# Patient Record
Sex: FEMALE | Race: WHITE | NOT HISPANIC OR LATINO | Employment: OTHER | URBAN - METROPOLITAN AREA
[De-identification: names, ages, dates, MRNs, and addresses within clinical notes are randomized per-mention and may not be internally consistent; named-entity substitution may affect disease eponyms.]

---

## 2019-11-18 ENCOUNTER — APPOINTMENT (OUTPATIENT)
Dept: CT IMAGING | Facility: CLINIC | Age: 84
DRG: 203 | End: 2019-11-18
Attending: EMERGENCY MEDICINE
Payer: COMMERCIAL

## 2019-11-18 ENCOUNTER — HOSPITAL ENCOUNTER (INPATIENT)
Facility: CLINIC | Age: 84
LOS: 7 days | Discharge: NURSING, REHAB OR LONG-TERM CARE | DRG: 203 | End: 2019-11-25
Attending: EMERGENCY MEDICINE | Admitting: INTERNAL MEDICINE
Payer: COMMERCIAL

## 2019-11-18 ENCOUNTER — APPOINTMENT (OUTPATIENT)
Dept: GENERAL RADIOLOGY | Facility: CLINIC | Age: 84
DRG: 203 | End: 2019-11-18
Attending: INTERNAL MEDICINE
Payer: COMMERCIAL

## 2019-11-18 ENCOUNTER — APPOINTMENT (OUTPATIENT)
Dept: GENERAL RADIOLOGY | Facility: CLINIC | Age: 84
DRG: 203 | End: 2019-11-18
Attending: EMERGENCY MEDICINE
Payer: COMMERCIAL

## 2019-11-18 DIAGNOSIS — I48.91 ATRIAL FIBRILLATION WITH RVR (H): ICD-10-CM

## 2019-11-18 DIAGNOSIS — J20.9 ACUTE BRONCHITIS, UNSPECIFIED ORGANISM: ICD-10-CM

## 2019-11-18 DIAGNOSIS — R06.02 SHORTNESS OF BREATH: ICD-10-CM

## 2019-11-18 DIAGNOSIS — J96.01 ACUTE RESPIRATORY FAILURE WITH HYPOXIA (H): ICD-10-CM

## 2019-11-18 DIAGNOSIS — J45.901 REACTIVE AIRWAY DISEASE WITH ACUTE EXACERBATION, UNSPECIFIED ASTHMA SEVERITY, UNSPECIFIED WHETHER PERSISTENT: ICD-10-CM

## 2019-11-18 DIAGNOSIS — R52 PAIN: Primary | ICD-10-CM

## 2019-11-18 DIAGNOSIS — R06.2 WHEEZING: ICD-10-CM

## 2019-11-18 DIAGNOSIS — K59.00 CONSTIPATION, UNSPECIFIED CONSTIPATION TYPE: ICD-10-CM

## 2019-11-18 DIAGNOSIS — G47.00 INSOMNIA, UNSPECIFIED TYPE: ICD-10-CM

## 2019-11-18 DIAGNOSIS — R05.9 COUGH: ICD-10-CM

## 2019-11-18 DIAGNOSIS — J45.901 EXACERBATION OF ASTHMA, UNSPECIFIED ASTHMA SEVERITY, UNSPECIFIED WHETHER PERSISTENT: ICD-10-CM

## 2019-11-18 LAB
ALBUMIN UR-MCNC: 10 MG/DL
ANION GAP SERPL CALCULATED.3IONS-SCNC: 7 MMOL/L (ref 3–14)
APPEARANCE UR: CLEAR
BASOPHILS # BLD AUTO: 0 10E9/L (ref 0–0.2)
BASOPHILS NFR BLD AUTO: 0.6 %
BILIRUB UR QL STRIP: NEGATIVE
BUN SERPL-MCNC: 17 MG/DL (ref 7–30)
CALCIUM SERPL-MCNC: 9.3 MG/DL (ref 8.5–10.1)
CHLORIDE SERPL-SCNC: 104 MMOL/L (ref 94–109)
CO2 BLDCOV-SCNC: 29 MMOL/L (ref 21–28)
CO2 SERPL-SCNC: 28 MMOL/L (ref 20–32)
COLOR UR AUTO: ABNORMAL
CREAT SERPL-MCNC: 0.95 MG/DL (ref 0.52–1.04)
DIFFERENTIAL METHOD BLD: ABNORMAL
EOSINOPHIL # BLD AUTO: 0 10E9/L (ref 0–0.7)
EOSINOPHIL NFR BLD AUTO: 0.1 %
ERYTHROCYTE [DISTWIDTH] IN BLOOD BY AUTOMATED COUNT: 13.2 % (ref 10–15)
FLUAV+FLUBV AG SPEC QL: NEGATIVE
FLUAV+FLUBV AG SPEC QL: NEGATIVE
GFR SERPL CREATININE-BSD FRML MDRD: 53 ML/MIN/{1.73_M2}
GLUCOSE SERPL-MCNC: 122 MG/DL (ref 70–99)
GLUCOSE UR STRIP-MCNC: NEGATIVE MG/DL
HCT VFR BLD AUTO: 31.9 % (ref 35–47)
HGB BLD-MCNC: 10.6 G/DL (ref 11.7–15.7)
HGB UR QL STRIP: ABNORMAL
IMM GRANULOCYTES # BLD: 0 10E9/L (ref 0–0.4)
IMM GRANULOCYTES NFR BLD: 0.4 %
INR PPP: 1.73 (ref 0.86–1.14)
INTERPRETATION ECG - MUSE: NORMAL
KETONES UR STRIP-MCNC: 10 MG/DL
LACTATE BLD-SCNC: 1 MMOL/L (ref 0.7–2.1)
LEUKOCYTE ESTERASE UR QL STRIP: NEGATIVE
LYMPHOCYTES # BLD AUTO: 0.4 10E9/L (ref 0.8–5.3)
LYMPHOCYTES NFR BLD AUTO: 5.7 %
MCH RBC QN AUTO: 33.5 PG (ref 26.5–33)
MCHC RBC AUTO-ENTMCNC: 33.2 G/DL (ref 31.5–36.5)
MCV RBC AUTO: 101 FL (ref 78–100)
MONOCYTES # BLD AUTO: 0.4 10E9/L (ref 0–1.3)
MONOCYTES NFR BLD AUTO: 6.3 %
MUCOUS THREADS #/AREA URNS LPF: PRESENT /LPF
NEUTROPHILS # BLD AUTO: 6.1 10E9/L (ref 1.6–8.3)
NEUTROPHILS NFR BLD AUTO: 86.9 %
NITRATE UR QL: NEGATIVE
NRBC # BLD AUTO: 0 10*3/UL
NRBC BLD AUTO-RTO: 0 /100
PCO2 BLDV: 44 MM HG (ref 40–50)
PH BLDV: 7.42 PH (ref 7.32–7.43)
PH UR STRIP: 6 PH (ref 5–7)
PLATELET # BLD AUTO: 190 10E9/L (ref 150–450)
PO2 BLDV: 38 MM HG (ref 25–47)
POTASSIUM SERPL-SCNC: 3.6 MMOL/L (ref 3.4–5.3)
RBC # BLD AUTO: 3.16 10E12/L (ref 3.8–5.2)
RBC #/AREA URNS AUTO: 18 /HPF (ref 0–2)
SAO2 % BLDV FROM PO2: 73 %
SODIUM SERPL-SCNC: 139 MMOL/L (ref 133–144)
SOURCE: ABNORMAL
SP GR UR STRIP: 1.02 (ref 1–1.03)
SPECIMEN SOURCE: NORMAL
SQUAMOUS #/AREA URNS AUTO: <1 /HPF (ref 0–1)
TROPONIN I SERPL-MCNC: 0.02 UG/L (ref 0–0.04)
TSH SERPL DL<=0.005 MIU/L-ACNC: 1.32 MU/L (ref 0.4–4)
UROBILINOGEN UR STRIP-MCNC: NORMAL MG/DL (ref 0–2)
WBC # BLD AUTO: 7 10E9/L (ref 4–11)
WBC #/AREA URNS AUTO: <1 /HPF (ref 0–5)

## 2019-11-18 PROCEDURE — 81001 URINALYSIS AUTO W/SCOPE: CPT | Performed by: EMERGENCY MEDICINE

## 2019-11-18 PROCEDURE — 82803 BLOOD GASES ANY COMBINATION: CPT

## 2019-11-18 PROCEDURE — 72100 X-RAY EXAM L-S SPINE 2/3 VWS: CPT

## 2019-11-18 PROCEDURE — 87804 INFLUENZA ASSAY W/OPTIC: CPT | Performed by: EMERGENCY MEDICINE

## 2019-11-18 PROCEDURE — 80048 BASIC METABOLIC PNL TOTAL CA: CPT | Performed by: EMERGENCY MEDICINE

## 2019-11-18 PROCEDURE — 93005 ELECTROCARDIOGRAM TRACING: CPT

## 2019-11-18 PROCEDURE — 99223 1ST HOSP IP/OBS HIGH 75: CPT | Mod: AI | Performed by: INTERNAL MEDICINE

## 2019-11-18 PROCEDURE — 87086 URINE CULTURE/COLONY COUNT: CPT | Performed by: EMERGENCY MEDICINE

## 2019-11-18 PROCEDURE — 25800030 ZZH RX IP 258 OP 636: Performed by: INTERNAL MEDICINE

## 2019-11-18 PROCEDURE — 25000125 ZZHC RX 250: Performed by: EMERGENCY MEDICINE

## 2019-11-18 PROCEDURE — 12000000 ZZH R&B MED SURG/OB

## 2019-11-18 PROCEDURE — 36415 COLL VENOUS BLD VENIPUNCTURE: CPT | Performed by: EMERGENCY MEDICINE

## 2019-11-18 PROCEDURE — 83605 ASSAY OF LACTIC ACID: CPT

## 2019-11-18 PROCEDURE — 87040 BLOOD CULTURE FOR BACTERIA: CPT | Performed by: EMERGENCY MEDICINE

## 2019-11-18 PROCEDURE — 85025 COMPLETE CBC W/AUTO DIFF WBC: CPT | Performed by: EMERGENCY MEDICINE

## 2019-11-18 PROCEDURE — 94640 AIRWAY INHALATION TREATMENT: CPT

## 2019-11-18 PROCEDURE — 25800030 ZZH RX IP 258 OP 636: Performed by: EMERGENCY MEDICINE

## 2019-11-18 PROCEDURE — 96361 HYDRATE IV INFUSION ADD-ON: CPT

## 2019-11-18 PROCEDURE — 85610 PROTHROMBIN TIME: CPT | Performed by: EMERGENCY MEDICINE

## 2019-11-18 PROCEDURE — 84484 ASSAY OF TROPONIN QUANT: CPT | Performed by: EMERGENCY MEDICINE

## 2019-11-18 PROCEDURE — 84443 ASSAY THYROID STIM HORMONE: CPT | Performed by: EMERGENCY MEDICINE

## 2019-11-18 PROCEDURE — 73523 X-RAY EXAM HIPS BI 5/> VIEWS: CPT

## 2019-11-18 PROCEDURE — 25000128 H RX IP 250 OP 636: Performed by: EMERGENCY MEDICINE

## 2019-11-18 PROCEDURE — 96374 THER/PROPH/DIAG INJ IV PUSH: CPT

## 2019-11-18 PROCEDURE — 25000132 ZZH RX MED GY IP 250 OP 250 PS 637: Performed by: INTERNAL MEDICINE

## 2019-11-18 PROCEDURE — 99285 EMERGENCY DEPT VISIT HI MDM: CPT | Mod: 25

## 2019-11-18 PROCEDURE — 71250 CT THORAX DX C-: CPT

## 2019-11-18 PROCEDURE — 71046 X-RAY EXAM CHEST 2 VIEWS: CPT

## 2019-11-18 RX ORDER — LIDOCAINE 40 MG/G
CREAM TOPICAL
Status: DISCONTINUED | OUTPATIENT
Start: 2019-11-18 | End: 2019-11-25 | Stop reason: HOSPADM

## 2019-11-18 RX ORDER — METHYLPREDNISOLONE SODIUM SUCCINATE 125 MG/2ML
125 INJECTION, POWDER, LYOPHILIZED, FOR SOLUTION INTRAMUSCULAR; INTRAVENOUS ONCE
Status: COMPLETED | OUTPATIENT
Start: 2019-11-18 | End: 2019-11-18

## 2019-11-18 RX ORDER — LEVALBUTEROL INHALATION SOLUTION 0.63 MG/3ML
0.63 SOLUTION RESPIRATORY (INHALATION) EVERY 4 HOURS PRN
Status: DISCONTINUED | OUTPATIENT
Start: 2019-11-18 | End: 2019-11-25 | Stop reason: HOSPADM

## 2019-11-18 RX ORDER — AMOXICILLIN 250 MG
2 CAPSULE ORAL 2 TIMES DAILY PRN
Status: DISCONTINUED | OUTPATIENT
Start: 2019-11-18 | End: 2019-11-25 | Stop reason: HOSPADM

## 2019-11-18 RX ORDER — SODIUM CHLORIDE 9 MG/ML
INJECTION, SOLUTION INTRAVENOUS CONTINUOUS
Status: DISCONTINUED | OUTPATIENT
Start: 2019-11-18 | End: 2019-11-19

## 2019-11-18 RX ORDER — WARFARIN SODIUM 2 MG/1
3 TABLET ORAL EVERY EVENING
Status: ON HOLD | COMMUNITY
End: 2019-11-25

## 2019-11-18 RX ORDER — WARFARIN SODIUM 3 MG/1
3 TABLET ORAL
Status: COMPLETED | OUTPATIENT
Start: 2019-11-18 | End: 2019-11-18

## 2019-11-18 RX ORDER — ONDANSETRON 2 MG/ML
4 INJECTION INTRAMUSCULAR; INTRAVENOUS EVERY 6 HOURS PRN
Status: DISCONTINUED | OUTPATIENT
Start: 2019-11-18 | End: 2019-11-25 | Stop reason: HOSPADM

## 2019-11-18 RX ORDER — POLYETHYLENE GLYCOL 3350 17 G/17G
17 POWDER, FOR SOLUTION ORAL DAILY PRN
Status: DISCONTINUED | OUTPATIENT
Start: 2019-11-18 | End: 2019-11-25 | Stop reason: HOSPADM

## 2019-11-18 RX ORDER — HYDROCODONE BITARTRATE AND ACETAMINOPHEN 5; 325 MG/1; MG/1
1 TABLET ORAL 3 TIMES DAILY
Refills: 0 | Status: ON HOLD | COMMUNITY
Start: 2019-11-01 | End: 2019-11-25

## 2019-11-18 RX ORDER — ERGOCALCIFEROL 1.25 MG/1
50000 CAPSULE, LIQUID FILLED ORAL WEEKLY
Status: ON HOLD | COMMUNITY
End: 2020-09-06

## 2019-11-18 RX ORDER — IPRATROPIUM BROMIDE AND ALBUTEROL SULFATE 2.5; .5 MG/3ML; MG/3ML
3 SOLUTION RESPIRATORY (INHALATION)
Status: DISCONTINUED | OUTPATIENT
Start: 2019-11-18 | End: 2019-11-18

## 2019-11-18 RX ORDER — ATORVASTATIN CALCIUM 10 MG/1
10 TABLET, FILM COATED ORAL AT BEDTIME
Status: DISCONTINUED | OUTPATIENT
Start: 2019-11-18 | End: 2019-11-25 | Stop reason: HOSPADM

## 2019-11-18 RX ORDER — BENZONATATE 100 MG/1
100 CAPSULE ORAL 3 TIMES DAILY PRN
Status: DISCONTINUED | OUTPATIENT
Start: 2019-11-18 | End: 2019-11-25 | Stop reason: HOSPADM

## 2019-11-18 RX ORDER — NALOXONE HYDROCHLORIDE 0.4 MG/ML
.1-.4 INJECTION, SOLUTION INTRAMUSCULAR; INTRAVENOUS; SUBCUTANEOUS
Status: DISCONTINUED | OUTPATIENT
Start: 2019-11-18 | End: 2019-11-25 | Stop reason: HOSPADM

## 2019-11-18 RX ORDER — METHYLPREDNISOLONE SODIUM SUCCINATE 40 MG/ML
40 INJECTION, POWDER, LYOPHILIZED, FOR SOLUTION INTRAMUSCULAR; INTRAVENOUS EVERY 12 HOURS
Status: DISCONTINUED | OUTPATIENT
Start: 2019-11-19 | End: 2019-11-19

## 2019-11-18 RX ORDER — HYDROCODONE BITARTRATE AND ACETAMINOPHEN 5; 325 MG/1; MG/1
1 TABLET ORAL 3 TIMES DAILY
Status: DISCONTINUED | OUTPATIENT
Start: 2019-11-18 | End: 2019-11-25 | Stop reason: HOSPADM

## 2019-11-18 RX ORDER — FLUTICASONE PROPIONATE 50 MCG
1 SPRAY, SUSPENSION (ML) NASAL DAILY PRN
COMMUNITY
End: 2021-09-22

## 2019-11-18 RX ORDER — METOPROLOL TARTRATE 50 MG
37.5 TABLET ORAL 2 TIMES DAILY
Status: ON HOLD | COMMUNITY
End: 2020-09-14

## 2019-11-18 RX ORDER — METOPROLOL TARTRATE 50 MG
50 TABLET ORAL 2 TIMES DAILY
Status: DISCONTINUED | OUTPATIENT
Start: 2019-11-18 | End: 2019-11-25 | Stop reason: HOSPADM

## 2019-11-18 RX ORDER — ONDANSETRON 4 MG/1
4 TABLET, ORALLY DISINTEGRATING ORAL EVERY 6 HOURS PRN
Status: DISCONTINUED | OUTPATIENT
Start: 2019-11-18 | End: 2019-11-25 | Stop reason: HOSPADM

## 2019-11-18 RX ORDER — AMOXICILLIN 250 MG
1 CAPSULE ORAL 2 TIMES DAILY PRN
Status: DISCONTINUED | OUTPATIENT
Start: 2019-11-18 | End: 2019-11-25 | Stop reason: HOSPADM

## 2019-11-18 RX ORDER — FLUTICASONE PROPIONATE 50 MCG
1 SPRAY, SUSPENSION (ML) NASAL DAILY
Status: DISCONTINUED | OUTPATIENT
Start: 2019-11-18 | End: 2019-11-25 | Stop reason: HOSPADM

## 2019-11-18 RX ORDER — ACETAMINOPHEN 325 MG/1
650 TABLET ORAL EVERY 4 HOURS PRN
Status: DISCONTINUED | OUTPATIENT
Start: 2019-11-18 | End: 2019-11-25 | Stop reason: HOSPADM

## 2019-11-18 RX ADMIN — SODIUM CHLORIDE: 9 INJECTION, SOLUTION INTRAVENOUS at 16:32

## 2019-11-18 RX ADMIN — SODIUM CHLORIDE: 9 INJECTION, SOLUTION INTRAVENOUS at 22:01

## 2019-11-18 RX ADMIN — ATORVASTATIN CALCIUM 10 MG: 10 TABLET, FILM COATED ORAL at 21:53

## 2019-11-18 RX ADMIN — FLUTICASONE PROPIONATE 1 SPRAY: 50 SPRAY, METERED NASAL at 16:38

## 2019-11-18 RX ADMIN — IPRATROPIUM BROMIDE AND ALBUTEROL SULFATE 3 ML: .5; 3 SOLUTION RESPIRATORY (INHALATION) at 10:49

## 2019-11-18 RX ADMIN — METHYLPREDNISOLONE SODIUM SUCCINATE 125 MG: 125 INJECTION, POWDER, FOR SOLUTION INTRAMUSCULAR; INTRAVENOUS at 11:54

## 2019-11-18 RX ADMIN — HYDROCODONE BITARTRATE AND ACETAMINOPHEN 1 TABLET: 5; 325 TABLET ORAL at 16:38

## 2019-11-18 RX ADMIN — BENZONATATE 100 MG: 100 CAPSULE ORAL at 21:53

## 2019-11-18 RX ADMIN — SODIUM CHLORIDE 1000 ML: 9 INJECTION, SOLUTION INTRAVENOUS at 10:07

## 2019-11-18 RX ADMIN — METOPROLOL TARTRATE 50 MG: 50 TABLET, FILM COATED ORAL at 21:53

## 2019-11-18 RX ADMIN — WARFARIN SODIUM 3 MG: 3 TABLET ORAL at 18:30

## 2019-11-18 ASSESSMENT — ACTIVITIES OF DAILY LIVING (ADL)
ADLS_ACUITY_SCORE: 19
ADLS_ACUITY_SCORE: 20

## 2019-11-18 ASSESSMENT — ENCOUNTER SYMPTOMS
SHORTNESS OF BREATH: 1
BACK PAIN: 1
ABDOMINAL PAIN: 1
COUGH: 1

## 2019-11-18 NOTE — ED TRIAGE NOTES
Presents to the ED with SOB and cough. Began yesterday. Reports was recently around a sick grandchild.

## 2019-11-18 NOTE — ED NOTES
"[facility]  ED Nurse Handoff Report    Radha Cunningham is a 89 year old female   ED Chief complaint: Shortness of Breath  . ED Diagnosis:   Final diagnoses:   Atrial fibrillation with RVR (H)   Shortness of breath   Acute respiratory failure with hypoxia (H)     Allergies:   Allergies   Allergen Reactions     Contrast Dye        Code Status: Not on file  Activity level - Baseline/Home:  Stand by Assist. Activity Level - Current:   Stand by Assist. Lift room needed: No. Bariatric: No   Needed: No   Isolation: No. Infection: Not Applicable.     Vital Signs:   Vitals:    11/18/19 1220 11/18/19 1230 11/18/19 1240 11/18/19 1250   BP: (!) 141/83 (!) 143/89  110/70   Pulse:  94     Resp: 22 29 22 21   Temp:       TempSrc:       SpO2: 98% 97% 97% 96%       Cardiac Rhythm:  ,      Pain level:    Patient confused: No. Patient Falls Risk: Yes.   Elimination Status: Has not voided yet   Patient Report - Initial Complaint: Cough, SOB. Focused Assessment: Radha Cunningham is a 89 year old female with a history of COPD, Atrial Fibrillation on Coumadin, skin cancer, type II DM, and hypertension who presents with shortness of breath. Patient called nurse triage today complaining of worsening shortness of breath causing difficulty speaking. Patient also has a cough associated with her shortness of breath, which began last night. Daughter confirms this, stating that the patient was coughing all last night. She does note that her grandson recently had a cough and she is thinking he got her sick. Patient also endorses a \"funny feeling\" in her abdomen. Patient did have a flu shot this year. In addition to these symptoms, daughter states that the patient had a fall one week ago and landed on her bottom. Since then, she has been complaining of mid back pain, and daughter describes bruising along her back.   Tests Performed: Chest Xray, Blood Work. Abnormal Results:   Labs Ordered and Resulted from Time of ED Arrival " Up to the Time of Departure from the ED   CBC WITH PLATELETS DIFFERENTIAL - Abnormal; Notable for the following components:       Result Value    RBC Count 3.16 (*)     Hemoglobin 10.6 (*)     Hematocrit 31.9 (*)      (*)     MCH 33.5 (*)     Absolute Lymphocytes 0.4 (*)     All other components within normal limits   BASIC METABOLIC PANEL - Abnormal; Notable for the following components:    Glucose 122 (*)     GFR Estimate 53 (*)     All other components within normal limits   ROUTINE UA WITH MICROSCOPIC - Abnormal; Notable for the following components:    Ketones Urine 10 (*)     Blood Urine Moderate (*)     Protein Albumin Urine 10 (*)     RBC Urine 18 (*)     Mucous Urine Present (*)     All other components within normal limits   INR - Abnormal; Notable for the following components:    INR 1.73 (*)     All other components within normal limits   ISTAT  GASES LACTATE WIN POCT - Abnormal; Notable for the following components:    Bicarbonate Venous 29 (*)     All other components within normal limits   TROPONIN I   TSH WITH FREE T4 REFLEX   ISTAT CG4 GASES LACTATE WIN NURSING POCT   STRAIGHT CATH FOR URINE   URINE CULTURE AEROBIC BACTERIAL   BLOOD CULTURE   BLOOD CULTURE   INFLUENZA A/B ANTIGEN     Chest CT w/o contrast   Preliminary Result   IMPRESSION:   1. No evidence of pneumonia.   2. Cardiomegaly with four-chamber enlargement and pacemaker. No   evidence of heart failure.   3. Left renal cysts.      XR Chest 2 Views   Preliminary Result   IMPRESSION: Cardiomegaly is unchanged, pacemaker with leads in the   right atrium and right ventricle is new. Lungs are clear, normal   pulmonary vascularity, no pleural effusion.          Treatments provided:  Fluids   Family Comments:Daughter in room  OBS brochure/video discussed/provided to patient:  TBD  ED Medications:   Medications   ipratropium - albuterol 0.5 mg/2.5 mg/3 mL (DUONEB) neb solution 3 mL (3 mLs Nebulization Given 11/18/19 9330)   0.9% sodium  chloride BOLUS (1,000 mLs Intravenous New Bag 11/18/19 1007)   methylPREDNISolone sodium succinate (solu-MEDROL) injection 125 mg (125 mg Intravenous Given 11/18/19 1154)     Drips infusing:  Yes  For the majority of the shift, the patient's behavior Green. Interventions performed were NA.     Severe Sepsis OR Septic Shock Diagnosis Present: No      ED Nurse Name/Phone Number: Linn Choudhury RN,   10:15 AM  RECEIVING UNIT ED HANDOFF REVIEW    Above ED Nurse Handoff Report was reviewed: Yes  Reviewed by: Bradly Gould RN on November 18, 2019 at 1:38 PM

## 2019-11-18 NOTE — H&P
[facility]  Hospitalist Admission Note  Name: Radha Cunningham    MRN: [id number]  YOB: 1929    Age: 89 year old  Date of admission: 11/18/2019  Primary care provider: Morris Bush    Chief Complaint:  Shortness of breath    Assessment and Plan:   Acute reactive airway disease, suspect viral URI:   Cough and progressive shortness of breath for the past 24-48 hours PTA.  Low-grade fever 99.3 and tachycardia in ED.  No documented hypoxia, but currently on 2 L via nasal cannula.  Significant wheezing on exam suspicious for acute reactive airway disease although no history of asthma or COPD per the daughter although she reports she has asthma.  Although I do see some documentation in this chart and care everywhere that would support asthma or obstructive pulmonary disease although she is a lifelong non-smoker.  She is on Flonase chronically.  Grandson is also coughing.  Rapid influenza antigen testing negative.  No leukocytosis.  CT chest without any obvious opacity to suggest bacterial pneumonia.  Suspect viral URI.  -Solu-Medrol 40 mg IV twice daily  -Xopenex nebs as needed for wheezing and shortness of breath (tachycardia)  -Tessalon Perles and guaifenesin cough suppressant  -Supplemental oxygen as needed  -Continue her Flonase daily    Fall: Fell 1 week ago onto her buttocks.  Does have some bruising in the mid thoracic spine area and left lateral back at this site as well.  I do not see any rib fractures on the CT chest or obvious deformity of the thoracic spine.  Uses a seated four-wheel walker at baseline.  -Obtain lumbar and pelvis x-rays  -PT consult    Paroxysmal A. fib: Chronically on warfarin.  Also metoprolol tartrate 50 mg twice daily.  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  Previous EKGs intimately show normal sinus rhythm so likely paroxysmal A. fib.  Currently in A. fib with mild tachycardia heart rates in the 100-110s, but did not take medications today.   INR subtherapeutic at 1.73 on admission.  -Continue metoprolol tartrate 50 mg twice daily  -Continue warfarin, Pharm.D. to dose  -Telemetry    History of type II DM: Diet controlled.  Most recent A1c 6.1 in October.  May have hyperglycemia with steroids, monitor for this on morning labs tomorrow.    HLD: Continue atorvastatin 10 mg at bedtime.    Chronic pain syndrome: Chronic back and SI joint pain.  Chronically on Norco 1 tablet 3 times daily.  -Continue Norco 1 tablet every 8 hours if more alert, monitor for sedation    Chronic anemia: History of iron deficiency anemia.  Unclear if still taking oral iron replacement.  Hemoglobin is 10.6 which is baseline for her per care everywhere.    GERD: Continue omeprazole 20 mg daily.    DVT Prophylaxis: Warfarin  Code Status: Full Code, discussed at length with patient's daughter and patient cannot make this decision at this time due to somnolence and some confusion from baseline.  FEN: Advance diet as tolerated to regular diet, normal saline at 100 ml/hr  Discharge Dispo: PT consult, lives with daughter  Estimated Disch Date / # of Days until Disch: Admit inpatient for reactive airway disease requiring IV steroids and nebulization treatment.  Given significant weakness and falls anticipate at least 2 night hospitalization.      History of Present Illness:  Radha Cunningham is a 89 year old female with PMH including paroxysmal A. fib on warfarin, type II DM diet-controlled, HTN, hiatal hernia, PUD, anemia, and chronic pain syndrome on Norco who presents with shortness of breath and cough.  The patient has had a persistent very frequent cough associate with shortness of breath for the past 24-48 hours.  Grandson is also sick with similar symptoms.  This morning she felt like she could not breathe so she was brought into the ER as urgent care was not open yet per daughter.  Her cough has been minimally productive.  Her oral intake has been low.  No fevers reported at home.   The patient provides minimal history due to feeling very somnolent.  Per daughter she was up most of the night coughing.  Is not reporting any chest pain symptoms.  No reported nausea, vomiting, diarrhea.  She does seem much weaker than normal and she did fall last week landing on her buttocks.  She has some bruising on her back since then and ongoing increased pain from baseline.  She has chronic SI joint and back pain for which she takes Norco 3 times per day.  Her daughter denies any history of asthma for the patient, although she herself has asthma.  The patient is on Flonase daily.  Do not take any medications today prior to coming in.  Uses a 4 wheeled seated walker at baseline.    History obtained from patient, patient's daughter, medical record, and from Dr. Francis in the emergency department.  Tachycardic to the 110-120 range.  EKG confirms A. fib.  Blood pressure 130/74.  Temperature 99.3.  Oxygen initially 96% on room air.  Now currently on 2 L with oxygen saturation 95%.  Hemoglobin 10.6 which is baseline.  White blood cell count 7.0.  BMP similar to previous.  Troponin 0 0.024.  TSH 1.32.  Lactic acid 1.0.  INR subtherapeutic at 1.73.  Rapid influenza antigen testing negative.  Quite lethargic.  Very wheezy initially and she was given a DuoNeb for this and 125 mg IV Solu-Medrol for reactive airway disease suspicion.  Chest x-ray unimpressive so CT chest without contrast performed.  No obvious infiltrate.  Does have cardiomegaly which is baseline.  Given 1 L of normal saline as well as she appears hypovolemic on exam.  Admit inpatient telemetry for IV steroids and nebulization treatment.     Past Medical History reviewed:  Past Medical History:   Diagnosis Date     Asthma      Cholelithiasis      Diverticulosis      Hiatal hernia      Hypertension      Paroxysmal A-fib (H)      PUD (peptic ulcer disease)      Skin cancer    Chronic pain syndrome  Type II DM  GERD  Anemia    Past Surgical History  reviewed:  Past Surgical History:   Procedure Laterality Date     HERNIA REPAIR  2004    umbilical     Skin cancer removal - forehead  2005     Social History reviewed:  Social History     Tobacco Use     Smoking status: Never Smoker   Substance Use Topics     Alcohol use: No     Social History     Social History Narrative     Not on file     Family History reviewed:  Family History   Problem Relation Age of Onset     C.A.D. Mother      Hypertension Mother      Allergies:  Allergies   Allergen Reactions     Contrast Dye      Medications:  Prior to Admission medications    Medication Sig Last Dose Taking? Auth Provider   ASPIRIN PO Take 81 mg by mouth daily  11/17/2019 at am Yes Reported, Patient   ATORVASTATIN CALCIUM PO Take 10 mg by mouth At Bedtime  11/17/2019 at hs Yes Reported, Patient   calcium carbonate (OS-SHAN 500 MG [place]. [place]) 500 MG tablet Take 500 mg by mouth daily  11/17/2019 at am Yes Reported, Patient   HYDROcodone-acetaminophen (NORCO) 5-325 MG tablet Take 1 tablet by mouth 3 times daily 11/17/2019 at x 3 Yes Unknown, Entered By History   metoprolol tartrate (LOPRESSOR) 50 MG tablet Take 50 mg by mouth 2 times daily 11/17/2019 at x 2 Yes Unknown, Entered By History   omeprazole (PRILOSEC) 20 MG DR capsule Take 20 mg by mouth daily 11/17/2019 at am Yes Unknown, Entered By History   vitamin D2 (ERGOCALCIFEROL) 41269 units (1250 mcg) capsule Take 50,000 Units by mouth once a week Tuesdays 11/12/2019 at am Yes Unknown, Entered By History   warfarin ANTICOAGULANT (COUMADIN) 2 MG tablet Take 3 mg by mouth every evening 11/17/2019 at pm Yes Unknown, Entered By History   fluticasone (FLONASE) 50 MCG/ACT nasal spray Spray 1 spray into both nostrils daily as needed for rhinitis or allergies More than a month at Unknown time  Unknown, Entered By History     Review of Systems:  A Comprehensive greater than 10 system review of systems was carried out.  Pertinent positives and negatives are noted above.  Otherwise  negative.     Physical Exam:  Blood pressure (!) 145/89, pulse 98, temperature 98.2  F (36.8  C), temperature source Oral, resp. rate 29, SpO2 97 %.  Wt Readings from Last 1 Encounters:   07/02/16 71.2 kg (157 lb)     Exam:  Constitutional: Somnolent but does not appear in distress  Eyes: sclera white   HEENT: Dry mucous membranes  Respiratory: Bilateral expiratory wheeze, no focal crackles  Cardiovascular: Irregularly, irregular rhythm, tachycardia, no murmur  GI: non-tender, not distended, bowel sounds present  Skin: Bruise to mid thoracic spine and left lateral back at the site  Musculoskeletal/extremities: Trace bilateral lower extremity edema  Neurologic: Somnolent, mild dysarthria  Psychiatric: calm, cooperative     Lab and imaging data personally reviewed:  Labs:  Recent Labs   Lab 11/18/19  1000   PHV 7.42   PO2V 38   PCO2V 44   HCO3V 29*     Recent Labs   Lab 11/18/19  0953   WBC 7.0   HGB 10.6*   HCT 31.9*   *        Recent Labs   Lab 11/18/19  0953      POTASSIUM 3.6   CHLORIDE 104   CO2 28   ANIONGAP 7   *   BUN 17   CR 0.95   GFRESTIMATED 53*   GFRESTBLACK 61   SHAN 9.3     Recent Labs   Lab 11/18/19  0953   INR 1.73*     Recent Labs   Lab 11/18/19  1000   LACT 1.0     Recent Labs   Lab 11/18/19  0953   TSH 1.32     Recent Labs   Lab 11/18/19  0953   TROPI 0.024     Urinalysis: Less than 1 WBC, 18 RBCs    Rapid influenza antigen negative    EKG: Tachycardia, A. fib, no ST elevation or depression    Imaging:  Recent Results (from the past 24 hour(s))   XR Chest 2 Views    Narrative    CHEST TWO VIEWS  11/18/2019 10:40 AM     HISTORY:  Shortness of breath, sepsis.    COMPARISON: 5/6/2016      Impression    IMPRESSION: Cardiomegaly is unchanged, pacemaker with leads in the  right atrium and right ventricle is new. Lungs are clear, normal  pulmonary vascularity, no pleural effusion.    KAI HACKETT MD   Chest CT w/o contrast    Narrative    CT CHEST WITHOUT CONTRAST   11/18/2019  11:45 AM     HISTORY: Acute respiratory illness; cough, shortness of breath,  concern for pneumonia.    TECHNIQUE:  No IV contrast material.  Radiation dose for this scan was  reduced using automated exposure control, adjustment of the mA and/or  kV according to patient size, or iterative reconstruction technique.    COMPARISON: Chest x-ray today.    FINDINGS:  Cardiomegaly with four-chamber enlargement. Pacemaker in  the heart, no pleural effusion or pericardial effusion. Coronary  artery calcifications. Minimal atelectasis or scarring at the  posterior right lung base and otherwise the lungs are clear. No bone  lesions, cervical spine degenerative changes. Tortuous carotid  arteries. Left renal cysts. Visualized portions of the remaining  abdominal organs appear normal.      Impression    IMPRESSION:  1. No evidence of pneumonia.  2. Cardiomegaly with four-chamber enlargement and pacemaker. No  evidence of heart failure.  3. Left renal cysts.    MD Charanjit TAYLOR MD  Hospitalist  [facility]

## 2019-11-18 NOTE — ED PROVIDER NOTES
"  History     Chief Complaint:  Shortness of Breath      HPI   Radha Cunningham is a 89 year old female with a history of COPD, Atrial Fibrillation on Coumadin, skin cancer, type II DM, and hypertension who presents with shortness of breath. Patient called nurse triage today complaining of worsening shortness of breath causing difficulty speaking. Patient also has a cough associated with her shortness of breath, which began last night. Daughter confirms this, stating that the patient was coughing all last night. She does note that her grandson recently had a cough and she is thinking he got her sick. Patient also endorses a \"funny feeling\" in her abdomen. Patient did have a flu shot this year. In addition to these symptoms, daughter states that the patient had a fall one week ago and landed on her bottom. Since then, she has been complaining of mid back pain, and daughter describes bruising along her back.    Echo 11/05/2019  Final Conclusion Previous Study: 07/11/2017   1. Normal left ventricular chamber size and systolic function. Estimated left ventricular   ejection fraction is 55-60%.   2. Mild right ventricular chamber enlargement with normal systolic function; Estimated right   ventricular systolic pressure is 38 mmHg.   3. Severe biatrial enlargement.   4. Moderate tricuspid valve regurgitation.   5. Mild mitral valve regurgitation.   6. Mild central aortic valve regurgitation.   7. Mild ascending aorta dilatation (4.3 cm).   8. When compared to the previous echocardiographic images of 07/11/2017, there has been no   significant change.   Estimated EF: 55-60%    Allergies:  Contrast Dye      Medications:    Aspirin    Atorvastatin   Omeprazole   Lopressor   Lisinopril-Hydrochlorothiazide    Norco   Coumadin    Past Medical History:    Asthma  Cholelithiasis   Diverticulosis   Hiatal hernia  Hypertension  PUD  Skin cancer   Malignant neoplasm of thyroid gland   Diabetic ulcer of left ankle   Type II DM   Pain " medication agreement  Chronic SI joint pain   Non rheumatic mitral regurgitation   Osteoarthritis of knee   Pacemaker in place   COPD   Cardiomegaly   Anemia   Chronic diarrhea   History of Atrial Fibrillation   Stroke   Pelvic abscess in female     Past Surgical History:    Hernia repair x2   Gastrostomy tube placement   Abscess drainage   Cholecystectomy     Family History:    Mother - coronary artery disease, hypertension     Social History:  The patient was accompanied to the ED by her daughter.  Smoking Status: Never  Smokeless Tobacco: No  Alcohol Use: No   Marital Status:        Review of Systems   Respiratory: Positive for cough and shortness of breath.    Gastrointestinal: Positive for abdominal pain.   Musculoskeletal: Positive for back pain.   All other systems reviewed and are negative.    Physical Exam     Patient Vitals for the past 24 hrs:   BP Temp Temp src Pulse Heart Rate Resp SpO2   11/18/19 1600 130/87 99.5  F (37.5  C) Oral -- 100 20 93 %   11/18/19 1449 (!) 154/93 100.1  F (37.8  C) Oral -- 103 24 93 %   11/18/19 1330 123/84 -- -- 86 88 24 97 %   11/18/19 1315 134/79 -- -- 93 84 23 96 %   11/18/19 1300 115/71 -- -- 90 86 23 96 %   11/18/19 1250 110/70 -- -- -- 94 21 96 %   11/18/19 1240 -- -- -- -- 91 22 97 %   11/18/19 1230 (!) 143/89 -- -- 94 86 29 97 %   11/18/19 1220 (!) 141/83 -- -- -- 92 22 98 %   11/18/19 1215 (!) 141/83 -- -- 92 89 21 97 %   11/18/19 1210 -- -- -- -- 96 24 97 %   11/18/19 1200 139/70 -- -- 93 102 18 94 %   11/18/19 1130 -- -- -- -- 104 -- 95 %   11/18/19 1120 -- -- -- -- 110 -- 96 %   11/18/19 1118 -- 98.2  F (36.8  C) Oral -- -- -- --   11/18/19 1115 130/74 -- -- 101 104 -- 95 %   11/18/19 1110 -- -- -- -- 101 -- 97 %   11/18/19 1100 131/79 -- -- 89 93 -- 100 %   11/18/19 1050 -- -- -- -- 90 -- 98 %   11/18/19 1045 (!) 146/94 -- -- 101 -- -- --   11/18/19 1030 -- -- -- 98 -- -- 97 %   11/18/19 1020 (!) 145/89 -- -- -- 106 (!) 46 97 %   11/18/19 1015 (!) 145/89  -- -- 107 112 29 98 %   11/18/19 1010 (!) 143/103 -- -- 98 101 25 99 %   11/18/19 1000 -- -- -- -- 101 (!) 33 98 %   11/18/19 0945 (!) 186/116 -- -- -- -- -- 96 %   11/18/19 0941 (!) 186/116 99.3  F (37.4  C) Oral 109 -- 24 (!) 89 %       Physical Exam  General: Alert, appears elderly and frail. Cooperative.     In moderate distress  HEENT:  Head:  Atraumatic  Ears:  External ears are normal  Mouth/Throat:  Oropharynx is without erythema or exudate and mucous membranes are dry.   Eyes:   Conjunctivae normal and EOM are normal. No scleral icterus.  CV:  Tachycardic rate, irregular rhythm, normal heart sounds and radial pulses are 2+ and symmetric.  No murmur.  Resp:  Breath sounds are coarse bilaterally with inspiratory wheezing.     Non-labored, no retractions or accessory muscle use  GI:  Abdomen is soft, no distension, no tenderness. No rebound or guarding.  No CVA tenderness bilaterally  MS:  Normal range of motion. No edema.    Back atraumatic.    No midline cervical, thoracic, or lumbar tenderness  Skin:  Warm and dry.  No rash or lesions noted.  Neuro: Alert. Globally weak. GCS: 15  Psych:  Normal mood and affect.  Lymph: No anterior or posterior cervical lymphadenopathy noted.      Emergency Department Course     ECG:  Indication: shortness of breath   Completed at 0943.  Read at 0947.   Atrial fibrillation with RVR   Left anterior fascicular block   Nonspecific ST and T wave abnormality   Abnormal ECG  Rate 110 bpm. IN interval *. QRS duration 100. QT/QTc 340/460. P-R-T axes * -51 -33.    Imaging:  Radiology findings were communicated with the patient, family and Admitting MD who voiced understanding of the findings.    Chest CT w/o contrast  IMPRESSION:  1. No evidence of pneumonia.  2. Cardiomegaly with four-chamber enlargement and pacemaker. No  evidence of heart failure.  3. Left renal cysts.  Report per radiology     XR Chest 2 Views  IMPRESSION: Cardiomegaly is unchanged, pacemaker with leads in  the  right atrium and right ventricle is new. Lungs are clear, normal  pulmonary vascularity, no pleural effusion.  Report per radiology     Laboratory:  Laboratory findings were communicated with the patient, family and Admitting MD who voiced understanding of the findings.    CBC: HGB 10.6 (L) o/w WNL. (WBC 7.0, )   BMP: Glucose 122 (H), GFR Estimate 53 (L) o/w WNL (Creatinine 0.95)     TSH with free T4 reflex: 1.32   UA: Light yellow, clear urine. Ketones urine 10, blood urine moderate, protein albumin urine 10, RBC urine 18 (H), mucous urine present o/w WNL     INR: 1.73 (H)    Troponin (Collected 0953): 0.024     ISTAT gases lactate manny POCT (1000): pH Venous 7.42, PCO2 Venous 44, PO2 Venous 38, Bicarbonate Venous 29 (H), O2 Sat Venous 73, Lactic Acid 1.0   Influenza A/B Antigen: negative for influenza A and B    Blood cultures x2: pending    Urine Culture Aerobic Bacterial: Pending    Interventions:  1007 NS Bolus 1,000mL IV   1049 DuoNeb 3mL Nebulization    1154 Solu-Medrol 125mg IV     Emergency Department Course:  Past medical records, nursing notes, and vitals reviewed.    0940 I performed an exam of the patient as documented above.     EKG obtained in the ED, see results above.   IV was inserted and blood was drawn for laboratory testing, results above.  The patient provided a urine sample here in the emergency department. This was sent for laboratory testing, findings above.  The patient was sent for a CXR while in the emergency department, results above.     1119 I rechecked the patient and discussed the results of her workup thus far.     The patient's nose was swabbed and this sample was sent for influenza antigen, findings above.     1139 I spoke with Dr. Broderick of the Hospitalist service regarding patient's presentation, findings, and plan of care.    Findings and plan explained to the Patient and daughter who consents to admission. Discussed the patient with Dr. Broderick, who will admit the  patient to a inpatient medical bed for further monitoring, evaluation, and treatment.    I personally reviewed the laboratory results with the Patient and daughter and answered all related questions prior to admission.     Impression & Plan     Medical Decision Making:  Patient is a 89-year-old female with a history of atrial fibrillation on chronic anticoagulation with Coumadin who presents with cough and generalized weakness.  Patient with a broad work-up here in the emergency department.  Reassuringly CT imaging shows no evidence of focal pneumonia.  I do suspect likely viral illness as the cause of her presentation in the setting of a possible asthma exacerbation.  She does have some inspiratory wheezing on initial presentation and hypoxia requiring nasal cannula oxygen.  Patient initially had atrial fibrillation with rapid ventricular rate although does appear rate controlled after IV fluids here.  I think there is a dehydration component superimposed on her respiratory illness.  Influenza swab negative.  Due to patient's acute respiratory failure with hypoxia and suspected asthma exacerbation in the setting of a possible viral illness plan for admission for further evaluation and treatment.  I spoke with Dr. Broderick who agreed to admission.    Discharge Diagnosis:    ICD-10-CM    1. Atrial fibrillation with RVR (H) I48.91 Troponin I (now)     CBC with platelets differential     Basic metabolic panel     TSH with free T4 reflex     UA with Microscopic     Urine Culture     Blood culture     INR     Blood culture     Lactic acid whole blood     ISTAT gases lactate manny POCT     ISTAT gases lactate manny POCT     Influenza A/B antigen     CANCELED: Lactic acid whole blood   2. Shortness of breath R06.02    3. Acute respiratory failure with hypoxia (H) J96.01    4. Exacerbation of asthma, unspecified asthma severity, unspecified whether persistent J45.901    5. Wheezing R06.2    6. Cough R05         Disposition:  Admitted to an inpatient medical bed.    Scribe Disclosure:  I, Fadumo Villatoro, am serving as a scribe at 9:45 AM on 11/18/2019 to document services personally performed by Joce Francis MD based on my observations and the provider's statements to me.   Fadumo Villatoro  11/18/2019   [facility] EMERGENCY DEPARTMENT       Joce Francis MD  11/18/19 4740

## 2019-11-18 NOTE — PHARMACY-ADMISSION MEDICATION HISTORY
Admission medication history interview status for this patient is complete. See [facility] admission navigator for allergy information, prior to admission medications and immunization status.     Medication history interview source(s):Patient  Medication history resources (including written lists, pill bottles, clinic record):None  Primary pharmacy: [id number] - [place], [place] - [address]    Changes made to PTA medication list:  Added: warfarin, flonase, metoprolol, omeprazole, hydrocodone/APAP  Deleted: raloxifene, iron, lisinopril/HCTZ  Changed: ergocalciferol (added dose/sig), atorvastatin (added dose/sig)    Actions taken by pharmacist (provider contacted, etc):None   Additional medication history information:None  Medication reconciliation/reorder completed by provider prior to medication history?  No       Prior to Admission medications    Medication Sig Last Dose Taking? Auth Provider   ASPIRIN PO Take 81 mg by mouth daily  11/17/2019 at am Yes Reported, Patient   ATORVASTATIN CALCIUM PO Take 10 mg by mouth At Bedtime  11/17/2019 at hs Yes Reported, Patient   calcium carbonate (OS-SHAN 500 MG [place]. CA) 500 MG tablet Take 500 mg by mouth daily  11/17/2019 at am Yes Reported, Patient   HYDROcodone-acetaminophen (NORCO) 5-325 MG tablet Take 1 tablet by mouth 3 times daily 11/17/2019 at x 3 Yes Unknown, Entered By History   metoprolol tartrate (LOPRESSOR) 50 MG tablet Take 50 mg by mouth 2 times daily 11/17/2019 at x 2 Yes Unknown, Entered By History   omeprazole (PRILOSEC) 20 MG DR capsule Take 20 mg by mouth daily 11/17/2019 at am Yes Unknown, Entered By History   vitamin D2 (ERGOCALCIFEROL) 10592 units (1250 mcg) capsule Take 50,000 Units by mouth once a week Tuesdays 11/12/2019 at am Yes Unknown, Entered By History   warfarin ANTICOAGULANT (COUMADIN) 2 MG tablet Take 3 mg by mouth every evening 11/17/2019 at pm Yes Unknown, Entered By History   fluticasone (FLONASE) 50 MCG/ACT nasal spray Spray 1  spray into both nostrils daily as needed for rhinitis or allergies More than a month at Unknown time  Unknown, Entered By History

## 2019-11-18 NOTE — ED NOTES
Called and spoke with family member Sandra who was looking for an update at [phone]. Patient gave verbal consent to speak with her. She was updated on plan of care. Kristie states she is currently on her way to the ER to visit patient.

## 2019-11-18 NOTE — PLAN OF CARE
Vss. A&O to person place and situation. Tearful. Hx of dementia. Lives alone. Daughter Carolina brought pt in. Per Sandra, who pt states is her guardian, pt lives a lone and her daughter Carolina helps her care. Sandra States that pt should have a PT OT eval for home safety. Pt new admit. Pt was settled. And report given to oncoming RN. PT does have 3 bandaides to left lower leg. Per Sandra pt has skin cancer and is due for a biopsy to one of the sites. Alarms on bed pt agrees to call for assist oob.

## 2019-11-19 ENCOUNTER — APPOINTMENT (OUTPATIENT)
Dept: PHYSICAL THERAPY | Facility: CLINIC | Age: 84
DRG: 203 | End: 2019-11-19
Attending: INTERNAL MEDICINE
Payer: COMMERCIAL

## 2019-11-19 LAB
ANION GAP SERPL CALCULATED.3IONS-SCNC: 6 MMOL/L (ref 3–14)
BACTERIA SPEC CULT: NO GROWTH
BUN SERPL-MCNC: 19 MG/DL (ref 7–30)
CALCIUM SERPL-MCNC: 8.2 MG/DL (ref 8.5–10.1)
CHLORIDE SERPL-SCNC: 109 MMOL/L (ref 94–109)
CO2 SERPL-SCNC: 27 MMOL/L (ref 20–32)
CREAT SERPL-MCNC: 0.82 MG/DL (ref 0.52–1.04)
ERYTHROCYTE [DISTWIDTH] IN BLOOD BY AUTOMATED COUNT: 12.9 % (ref 10–15)
GFR SERPL CREATININE-BSD FRML MDRD: 63 ML/MIN/{1.73_M2}
GLUCOSE SERPL-MCNC: 156 MG/DL (ref 70–99)
HCT VFR BLD AUTO: 28.9 % (ref 35–47)
HGB BLD-MCNC: 9.5 G/DL (ref 11.7–15.7)
INR PPP: 2.46 (ref 0.86–1.14)
Lab: NORMAL
MCH RBC QN AUTO: 32.9 PG (ref 26.5–33)
MCHC RBC AUTO-ENTMCNC: 32.9 G/DL (ref 31.5–36.5)
MCV RBC AUTO: 100 FL (ref 78–100)
PLATELET # BLD AUTO: 167 10E9/L (ref 150–450)
POTASSIUM SERPL-SCNC: 3.7 MMOL/L (ref 3.4–5.3)
RBC # BLD AUTO: 2.89 10E12/L (ref 3.8–5.2)
SODIUM SERPL-SCNC: 142 MMOL/L (ref 133–144)
SPECIMEN SOURCE: NORMAL
WBC # BLD AUTO: 5.7 10E9/L (ref 4–11)

## 2019-11-19 PROCEDURE — 25000128 H RX IP 250 OP 636: Performed by: INTERNAL MEDICINE

## 2019-11-19 PROCEDURE — 99232 SBSQ HOSP IP/OBS MODERATE 35: CPT | Performed by: HOSPITALIST

## 2019-11-19 PROCEDURE — 40000275 ZZH STATISTIC RCP TIME EA 10 MIN

## 2019-11-19 PROCEDURE — 25000132 ZZH RX MED GY IP 250 OP 250 PS 637: Performed by: HOSPITALIST

## 2019-11-19 PROCEDURE — 97530 THERAPEUTIC ACTIVITIES: CPT | Mod: GP

## 2019-11-19 PROCEDURE — 25000132 ZZH RX MED GY IP 250 OP 250 PS 637: Performed by: INTERNAL MEDICINE

## 2019-11-19 PROCEDURE — 80048 BASIC METABOLIC PNL TOTAL CA: CPT | Performed by: INTERNAL MEDICINE

## 2019-11-19 PROCEDURE — 25000125 ZZHC RX 250: Performed by: HOSPITALIST

## 2019-11-19 PROCEDURE — 40000274 ZZH STATISTIC RCP CONSULT EA 30 MIN

## 2019-11-19 PROCEDURE — 85610 PROTHROMBIN TIME: CPT | Performed by: INTERNAL MEDICINE

## 2019-11-19 PROCEDURE — 97116 GAIT TRAINING THERAPY: CPT | Mod: GP

## 2019-11-19 PROCEDURE — 94640 AIRWAY INHALATION TREATMENT: CPT | Mod: 76

## 2019-11-19 PROCEDURE — 25800030 ZZH RX IP 258 OP 636: Performed by: INTERNAL MEDICINE

## 2019-11-19 PROCEDURE — 94640 AIRWAY INHALATION TREATMENT: CPT

## 2019-11-19 PROCEDURE — 36415 COLL VENOUS BLD VENIPUNCTURE: CPT | Performed by: INTERNAL MEDICINE

## 2019-11-19 PROCEDURE — 12000000 ZZH R&B MED SURG/OB

## 2019-11-19 PROCEDURE — 85027 COMPLETE CBC AUTOMATED: CPT | Performed by: INTERNAL MEDICINE

## 2019-11-19 RX ORDER — PREDNISONE 20 MG/1
40 TABLET ORAL DAILY
Status: COMPLETED | OUTPATIENT
Start: 2019-11-20 | End: 2019-11-24

## 2019-11-19 RX ORDER — WARFARIN SODIUM 1 MG/1
1 TABLET ORAL
Status: COMPLETED | OUTPATIENT
Start: 2019-11-19 | End: 2019-11-19

## 2019-11-19 RX ORDER — IPRATROPIUM BROMIDE AND ALBUTEROL SULFATE 2.5; .5 MG/3ML; MG/3ML
3 SOLUTION RESPIRATORY (INHALATION)
Status: DISCONTINUED | OUTPATIENT
Start: 2019-11-19 | End: 2019-11-25 | Stop reason: HOSPADM

## 2019-11-19 RX ORDER — IPRATROPIUM BROMIDE AND ALBUTEROL SULFATE 2.5; .5 MG/3ML; MG/3ML
3 SOLUTION RESPIRATORY (INHALATION) EVERY 4 HOURS PRN
Status: DISCONTINUED | OUTPATIENT
Start: 2019-11-19 | End: 2019-11-25 | Stop reason: HOSPADM

## 2019-11-19 RX ADMIN — FLUTICASONE PROPIONATE 1 SPRAY: 50 SPRAY, METERED NASAL at 13:44

## 2019-11-19 RX ADMIN — ACETAMINOPHEN 650 MG: 325 TABLET, FILM COATED ORAL at 00:50

## 2019-11-19 RX ADMIN — HYDROCODONE BITARTRATE AND ACETAMINOPHEN 1 TABLET: 5; 325 TABLET ORAL at 21:48

## 2019-11-19 RX ADMIN — METOPROLOL TARTRATE 50 MG: 50 TABLET, FILM COATED ORAL at 21:48

## 2019-11-19 RX ADMIN — MICONAZOLE NITRATE: 20 POWDER TOPICAL at 22:00

## 2019-11-19 RX ADMIN — METOPROLOL TARTRATE 50 MG: 50 TABLET, FILM COATED ORAL at 07:57

## 2019-11-19 RX ADMIN — IPRATROPIUM BROMIDE AND ALBUTEROL SULFATE 3 ML: .5; 3 SOLUTION RESPIRATORY (INHALATION) at 08:16

## 2019-11-19 RX ADMIN — AMOXICILLIN AND CLAVULANATE POTASSIUM 1 TABLET: 875; 125 TABLET, FILM COATED ORAL at 21:48

## 2019-11-19 RX ADMIN — HYDROCODONE BITARTRATE AND ACETAMINOPHEN 1 TABLET: 5; 325 TABLET ORAL at 16:32

## 2019-11-19 RX ADMIN — METHYLPREDNISOLONE SODIUM SUCCINATE 40 MG: 40 INJECTION, POWDER, FOR SOLUTION INTRAMUSCULAR; INTRAVENOUS at 07:58

## 2019-11-19 RX ADMIN — WARFARIN SODIUM 1 MG: 1 TABLET ORAL at 17:35

## 2019-11-19 RX ADMIN — ATORVASTATIN CALCIUM 10 MG: 10 TABLET, FILM COATED ORAL at 21:48

## 2019-11-19 RX ADMIN — SODIUM CHLORIDE: 9 INJECTION, SOLUTION INTRAVENOUS at 08:12

## 2019-11-19 RX ADMIN — OMEPRAZOLE 20 MG: 20 CAPSULE, DELAYED RELEASE ORAL at 07:57

## 2019-11-19 RX ADMIN — IPRATROPIUM BROMIDE AND ALBUTEROL SULFATE 3 ML: .5; 3 SOLUTION RESPIRATORY (INHALATION) at 15:20

## 2019-11-19 RX ADMIN — HYDROCODONE BITARTRATE AND ACETAMINOPHEN 1 TABLET: 5; 325 TABLET ORAL at 07:56

## 2019-11-19 RX ADMIN — AMOXICILLIN AND CLAVULANATE POTASSIUM 1 TABLET: 875; 125 TABLET, FILM COATED ORAL at 13:43

## 2019-11-19 RX ADMIN — IPRATROPIUM BROMIDE AND ALBUTEROL SULFATE 3 ML: .5; 3 SOLUTION RESPIRATORY (INHALATION) at 19:29

## 2019-11-19 ASSESSMENT — ACTIVITIES OF DAILY LIVING (ADL)
ADLS_ACUITY_SCORE: 24
ADLS_ACUITY_SCORE: 19
ADLS_ACUITY_SCORE: 28
ADLS_ACUITY_SCORE: 20
ADLS_ACUITY_SCORE: 31
ADLS_ACUITY_SCORE: 19

## 2019-11-19 ASSESSMENT — MIFFLIN-ST. JEOR: SCORE: 1123.99

## 2019-11-19 NOTE — PLAN OF CARE
PT orders received, evaluation completed, treatment initiated. Pt is a 89 year old female with a history of COPD, Atrial Fibrillation on Coumadin, skin cancer, type II DM, and hypertension who presents with shortness of breath. Patient called nurse triage today complaining of worsening shortness of breath causing difficulty speaking. Patient also had a cough associated with her shortness of breath, which began last night. In addition to these symptoms, daughter states that the patient had a fall one week ago and landed on her bottom. Since then, she has been complaining of mid back pain, and daughter describes bruising along her back. Pt lives alone in large house with two stairs to enter with bilateral railings, per pt report all needs met on main level, has tub shower with a grab bar but pt reports she usually sponge bathes in sink and does not get into tub d/t fear of falling, has raised toilet seat. Pt ambulates with 4WW at baseline.      Discharge Planner PT   Patient plan for discharge: not stated  Current status: Pt alert and oriented sitting upon arrival on 2 L supplemental O2, agreeable to PT. Transfers  sit<>stand with 4WW, VC for technique, and min-mod Ax1. Ambulates 90' with 4WW, CGA, 2 L O2 and one seated rest break. O2 sats stayed above 90%. Ambulates at decreased gait speed, with significant forward flexed posture, leaning on forearms when fatigued. Pt SOB after  45', requiring seated rest break. Assist needed to manage O2 and IV lines. Transferred  sit<>supine with SBA and increased time. Scoots up in bed with max A x 2. Supine  end of session with all needs in reach.  Barriers to return to prior living situation: lives alone, stairs to enter, decreased strength requiring assist for transfers and decreased activity tolerance  Recommendations for discharge: TCU  Rationale for recommendations: Pt presenting below baseline for all functional mobility, currently requiring min to mod A with transfers and  CGA for ambulation. Pt only able to ambulate short distances at this time due to decreased activity tolerance. Would benefit from continued IP PT and continued PT in TCU setting to increase strength and endurance in order to return to PLOF.       Entered by: Evita Freed 11/19/2019 12:00 PM

## 2019-11-19 NOTE — PLAN OF CARE
Pt A& O x2, A 1-2 with gb and walker, low grade temp, o2 96% 2 L NC.  Tele afib (hr 80-90s), pt has chronic pain given schedule Narco, IVF 100ml/hr, continue IV Solumedrol, prn nebs,  skin- blanchable redness to Coccyx, bruising, wounds to left and right LE- foam applied, tolerating clears. Discharge plan two days.  Continue POC.

## 2019-11-19 NOTE — PROGRESS NOTES
[facility]    Hospitalist Progress Note             Date of Admission:  11/18/2019                   Day of hospitalization: 1    Assessment and Plan:      Radha Cunningham is a 89 year old female with PMH including paroxysmal A. fib on warfarin, type II DM diet-controlled, HTN, hiatal hernia, PUD, anemia, and chronic pain syndrome on Norco who presents with shortness of breath and cough.       Acute viral bronchitis, vs early pneumonia  - started on Augmentin, continue PO prednisone, cultures pending    Acute hypoxia  - on 2 L, monitor most likely secondary to above    Fall  - mechanical, imaging no fractures  - PT recommends TCU      Paroxysmal A. Fib  - continue metoprolol 50 mg BID, and coumadin per pharmacy     History of type II DM: Diet controlled.  Most recent A1c 6.1   - monitor with daily POC glucose    HLD: Continue atorvastatin 10 mg at bedtime.    Chronic pain syndrome: Chronic back and SI joint pain.  Chronically on Norco 1 tablet 3 times daily.  -Continue Norco 1 tablet every 8 hours if more alert, monitor for sedation    GERD: Continue omeprazole 20 mg daily.  ---------        DVT Prophylaxis: Warfarin  Code Status: Full Code,  Discussed with daughter at length, she knows her parents wishes, and has reversed code status in the past.  Discharge planning: needs TCU, anticipate ready in 1-2 days  Leilani Cantu MD  Text Page (7am - 6pm, M-F)               Subjective   Chief Complaint: cough/sob  Subjective: feels better today, has bowel movements, no diarrhea, no fevers, chills, + cough, SOB improved, feels more energetic today. Denies dizziness, chest pain, nausea, vomiting. Otherwise, no other complaints.          Objective   /80 (BP Location: Right arm)   Pulse 86   Temp 96.2  F (35.7  C) (Axillary)   Resp 22   SpO2 96%      Physical Exam  General: Pt in NAD, normal appearance  HEENT: OP clear MMM, no JVD  Lungs: Coarse breath sounds normal breathing  without accessory muscle  usage, no wheezing, rhonchi or crackles  Cardiac: +S1, S2, irregularly irregular no MRG, trace edema ble  Abdominal: normal bowel sounds, NT/ND,   Skin: warm, dry, normal turgor, no rash  Psyche: A& O x2-3, appropriate affect             Intake/Output Summary (Last 24 hours) at 11/19/2019 1243  Last data filed at 11/18/2019 1930  Gross per 24 hour   Intake 480 ml   Output --   Net 480 ml           Labs and Imaging Results:      Recent Labs   Lab 11/19/19  0725 11/18/19  0953   WBC 5.7 7.0   HGB 9.5* 10.6*    190        Recent Labs   Lab 11/19/19  0725 11/18/19  0953    139   CO2 27 28   BUN 19 17        Recent Labs   Lab 11/19/19  0725 11/18/19  0953   INR 2.46* 1.73*      No results for input(s): CKMB in the last 168 hours.    Invalid input(s): TROPONINT   No results for input(s): ALBUMIN, AST, ALT, ALKPHOS, BILITOT in the last 168 hours.     Micro:     Radio:  XR Pelvis and Hip Bilateral 2 Views   Final Result   IMPRESSION: Advanced osteoporosis. No visible/displaced fractures but   suggest MRI or CT if high clinical suspicion or persistent unexplained   pain. Mild bilateral hip osteoarthritis. No evidence for AVN. Chronic   indolent/benign sclerosis intertrochanteric proximal left femur   unchanged since 2016 CT.      WILEY VEGA MD      XR Lumbar Spine 2/3 Views   Final Result   IMPRESSION: Bones appear osteopenic which limits evaluation for   fractures. No obvious loss of vertebral body height. Moderate   degenerative endplate changes and loss of vertebral disc space   throughout the lumbar spine. Marked facet hypertrophy in the lower   lumbar spine.      ROXANNE HACKETT MD      Chest CT w/o contrast   Final Result   IMPRESSION:   1. No evidence of pneumonia.   2. Cardiomegaly with four-chamber enlargement and pacemaker. No   evidence of heart failure.   3. Left renal cysts.      KAI HACKETT MD      XR Chest 2 Views   Final Result   IMPRESSION: Cardiomegaly is unchanged, pacemaker with leads  in the   right atrium and right ventricle is new. Lungs are clear, normal   pulmonary vascularity, no pleural effusion.      KAI HACKETT MD              Medications:      Scheduled Meds:      amoxicillin-clavulanate  1 tablet Oral Q12H STEPHEN     atorvastatin  10 mg Oral At Bedtime     fluticasone  1 spray Both Nostrils Daily     HYDROcodone-acetaminophen  1 tablet Oral TID     ipratropium - albuterol 0.5 mg/2.5 mg/3 mL  3 mL Nebulization 4x daily     metoprolol tartrate  50 mg Oral BID     omeprazole  20 mg Oral Daily     predniSONE  40 mg Oral Daily     sodium chloride (PF)  3 mL Intracatheter Q8H     warfarin ANTICOAGULANT  1 mg Oral ONCE at 18:00     Continuous Infusions:      - MEDICATION INSTRUCTIONS -       Warfarin Therapy Reminder       PRN Meds:  acetaminophen, benzonatate, guaiFENesin, ipratropium - albuterol 0.5 mg/2.5 mg/3 mL, levalbuterol, lidocaine 4%, lidocaine (buffered or not buffered), melatonin, naloxone, ondansetron **OR** ondansetron, - MEDICATION INSTRUCTIONS -, polyethylene glycol, senna-docusate **OR** senna-docusate, sodium chloride (PF), Warfarin Therapy Reminder

## 2019-11-19 NOTE — PROGRESS NOTES
"[facility] RCAT     Date: 11/19/19    Admission Dx: Reactive airway disease, viral URI    Pulmonary History: No documented history of COPD or asthma    Home Nebulizer/MDI Use: None    Home Oxygen: None    Acuity Level (RCAT flow sheet): 3    Aerosol Therapy initiated: Duoneb QID, Xopenex Q4 prn, Duoneb Q4 prn      Pulmonary Hygiene initiated: Cough and deep breathing      Volume Expansion initiated: Incentive Spirometry      Current Oxygen Requirements: 2L NC  Current SpO2: 94%    Re-evaluation date: 11/22/19    Patient Education: Talked with patient about RCAT protocol and medication benefits as well as side effects. Patient is easily confused but for the most part understood what I was talking about.           See \"RT Assessments\" flow sheet for patient assessment scoring and Acuity Level Details.             "

## 2019-11-19 NOTE — PROGRESS NOTES
11/19/19 1053   Quick Adds   Type of Visit Initial PT Evaluation   Living Environment   Lives With alone   Living Arrangements house   Home Accessibility stairs to enter home   Number of Stairs, Main Entrance 2   Stair Railings, Main Entrance railings on both sides of stairs   Transportation Anticipated family or friend will provide   Living Environment Comment Lives alone in large house, per pt report all needs met on main level, has tub shower with a grab bar but pt reports she usually sponge bathes in sink and does not get into tub d/t fear of falling, has raised toilet seat   Self-Care   Usual Activity Tolerance moderate   Current Activity Tolerance fair   Equipment Currently Used at Home walker, rolling   Functional Level Prior   Ambulation 1-->assistive equipment   Transferring 1-->assistive equipment   Toileting 1-->assistive equipment   Bathing 3-->assistive equipment and person  (per  pt: does not bathe independently  d/t fear of falling)   Fall history within last six months yes   Number of times patient has fallen within last six months 1   Which of the above functional risks had a recent onset or change? ambulation;transferring;toileting;bathing;dressing   General Information   Onset of Illness/Injury or Date of Surgery - Date 11/18/19   Referring Physician Charanjit Broderick MD   Patient/Family Goals Statement not stated   Pertinent History of Current Problem (include personal factors and/or comorbidities that impact the POC) Pt is a 89 year old female with a history of COPD, Atrial Fibrillation on Coumadin, skin cancer, type II DM, and hypertension who presents with shortness of breath. Patient called nurse triage today complaining of worsening shortness of breath causing difficulty speaking. Patient also has a cough associated with her shortness of breath, which began last night. In addition to these symptoms, daughter states that the patient had a fall one week ago and landed on her bottom.  Since then, she has been complaining of mid back pain, and daughter describes bruising along her back.   Precautions/Limitations fall precautions   Cognitive Status Examination   Orientation orientation to person, place and time   Level of Consciousness alert   Follows Commands and Answers Questions 100% of the time   Personal Safety and Judgment intact   Memory intact   Integumentary/Edema   Integumentary/Edema Comments Pt has  IV in R UE   Posture    Posture Forward head position;Kyphosis;Protracted shoulders   Range of Motion (ROM)   ROM Comment Ambulates with  forward flexed posture   Strength   Strength Comments Decreased strength noted with transfers; pt requires min-mod A   Bed Mobility   Bed Mobility Comments Transferred  sit<>supine with SBA and increased time. Scoots up in bed with max A x 2. Supine  end of session with all needs in reach.   Transfer Skills   Transfer Comments Transfers  sit<>stand with 4WW, VC for technique, and min-mod A.    Gait   Gait Comments Ambulates 90' with 4WW, CGA, 2 L O2 and one seated rest break. O2 sats stayed above 90%. Ambulates at decreased gait speed, with significant forward flexed posture, leaning on forearms when fatigued. Pt SOB after  45', requiring seated rest break. Assist needed to manage O2 and IV lines.   Balance   Balance Comments moderately impaired, uses 4WW for support   Coordination   Coordination no deficits were identified   Muscle Tone   Muscle Tone no deficits were identified   General Therapy Interventions   Planned Therapy Interventions bed mobility training;gait training;transfer training;home program guidelines;progressive activity/exercise   Clinical Impression   Criteria for Skilled Therapeutic Intervention yes, treatment indicated   PT Diagnosis impaired functional mobility   Influenced by the following impairments decreased  activity tolerance, decreased balance, decreased strength   Functional limitations due to impairments transfers, bed  "mobility, ambulation, stairs   Clinical Presentation Stable/Uncomplicated   Clinical Presentation Rationale symptoms unchanging, PT plan in place   Clinical Decision Making (Complexity) Low complexity   Therapy Frequency 5x/week   Predicted Duration of Therapy Intervention (days/wks) 5 days   Anticipated Discharge Disposition Transitional Care Facility   Risk & Benefits of therapy have been explained Yes   Patient, Family & other staff in agreement with plan of care Yes   [facility] TM \"6 Clicks\"   2016, Trustees of [facility], under license to [employer].  All rights reserved.   6 Clicks Short Forms Basic Mobility Inpatient Short Form   [facility] AM-PAC  \"6 Clicks\" V.2 Basic Mobility Inpatient Short Form   1. Turning from your back to your side while in a flat bed without using bedrails? 3 - A Little   2. Moving from lying on your back to sitting on the side of a flat bed without using bedrails? 3 - A Little   3. Moving to and from a bed to a chair (including a wheelchair)? 3 - A Little   4. Standing up from a chair using your arms (e.g., wheelchair, or bedside chair)? 3 - A Little   5. To walk in hospital room? 3 - A Little   6. Climbing 3-5 steps with a railing? 2 - A Lot   Basic Mobility Raw Score (Score out of 24.Lower scores equate to lower levels of function) 17   Total Evaluation Time   Total Evaluation Time (Minutes) 10     "

## 2019-11-19 NOTE — PLAN OF CARE
VS: max temp 100.4, tylenol given  Orientation: WDL, forgetful  Tele: Afib, CVR  Glucose checks: NA  Activity: x1-2 gb walker  Diet: clears ADAT  GI: WDL  : WDL, incontinent at times  Respiratory: 2LNC. Dim lungs with wheezing  IV: NS @100  Plan: continue treatment with nebs, solumedrol, flonase, and tesslon pearls

## 2019-11-19 NOTE — PLAN OF CARE
Bronchitis started on Augmentin and steroids. Previous fall with negative scans for injury. Chronic back pain on norco. A fib on metoprolol and coumadin. DM, diet controlled. Plan to TCU.

## 2019-11-19 NOTE — PROGRESS NOTES
Patient has a possible guardian listed on demographics sheet. Contacted Sandra Mann ([phone]) of [employer]. She was getting ready to fax guardianship papers to unit fax at [phone]. Updated Chg RN to watch for them. CM will send them to [facility] for validation. Once validated, writer will document valid legal guardian in sticky notes.     CM will continue to follow patient until discharge for any additional needs.     Heidy Swift RN, BSN, CPHN, CM  Inpatient Care Coordination  [facility]  [phone].    Addendum 3:26pm- Received response from [facility]. They need additional information (Letters of Guardianship that shows acceptance of guardianship). LM with Sandra Mann of [employer] requesting Letters of Guardianship. Updated CM who will f/u tomorrow.    ds

## 2019-11-20 ENCOUNTER — DOCUMENTATION ONLY (OUTPATIENT)
Dept: OTHER | Facility: CLINIC | Age: 84
End: 2019-11-20

## 2019-11-20 LAB
GLUCOSE BLDC GLUCOMTR-MCNC: 122 MG/DL (ref 70–99)
INR PPP: 2.67 (ref 0.86–1.14)

## 2019-11-20 PROCEDURE — 25000132 ZZH RX MED GY IP 250 OP 250 PS 637: Performed by: INTERNAL MEDICINE

## 2019-11-20 PROCEDURE — 25000132 ZZH RX MED GY IP 250 OP 250 PS 637: Performed by: HOSPITALIST

## 2019-11-20 PROCEDURE — 36415 COLL VENOUS BLD VENIPUNCTURE: CPT | Performed by: INTERNAL MEDICINE

## 2019-11-20 PROCEDURE — 94640 AIRWAY INHALATION TREATMENT: CPT

## 2019-11-20 PROCEDURE — 40000275 ZZH STATISTIC RCP TIME EA 10 MIN

## 2019-11-20 PROCEDURE — 12000000 ZZH R&B MED SURG/OB

## 2019-11-20 PROCEDURE — 00000146 ZZHCL STATISTIC GLUCOSE BY METER IP

## 2019-11-20 PROCEDURE — 99232 SBSQ HOSP IP/OBS MODERATE 35: CPT | Performed by: INTERNAL MEDICINE

## 2019-11-20 PROCEDURE — 25000125 ZZHC RX 250: Performed by: HOSPITALIST

## 2019-11-20 PROCEDURE — 25000131 ZZH RX MED GY IP 250 OP 636 PS 637: Performed by: HOSPITALIST

## 2019-11-20 PROCEDURE — 85610 PROTHROMBIN TIME: CPT | Performed by: INTERNAL MEDICINE

## 2019-11-20 PROCEDURE — 94640 AIRWAY INHALATION TREATMENT: CPT | Mod: 76

## 2019-11-20 RX ORDER — WARFARIN SODIUM 1 MG/1
1 TABLET ORAL
Status: COMPLETED | OUTPATIENT
Start: 2019-11-20 | End: 2019-11-20

## 2019-11-20 RX ADMIN — HYDROCODONE BITARTRATE AND ACETAMINOPHEN 1 TABLET: 5; 325 TABLET ORAL at 08:45

## 2019-11-20 RX ADMIN — PREDNISONE 40 MG: 20 TABLET ORAL at 08:45

## 2019-11-20 RX ADMIN — IPRATROPIUM BROMIDE AND ALBUTEROL SULFATE 3 ML: .5; 3 SOLUTION RESPIRATORY (INHALATION) at 11:20

## 2019-11-20 RX ADMIN — HYDROCODONE BITARTRATE AND ACETAMINOPHEN 1 TABLET: 5; 325 TABLET ORAL at 15:58

## 2019-11-20 RX ADMIN — IPRATROPIUM BROMIDE AND ALBUTEROL SULFATE 3 ML: .5; 3 SOLUTION RESPIRATORY (INHALATION) at 20:23

## 2019-11-20 RX ADMIN — AMOXICILLIN AND CLAVULANATE POTASSIUM 1 TABLET: 875; 125 TABLET, FILM COATED ORAL at 19:09

## 2019-11-20 RX ADMIN — HYDROCODONE BITARTRATE AND ACETAMINOPHEN 1 TABLET: 5; 325 TABLET ORAL at 22:05

## 2019-11-20 RX ADMIN — ATORVASTATIN CALCIUM 10 MG: 10 TABLET, FILM COATED ORAL at 22:05

## 2019-11-20 RX ADMIN — MICONAZOLE NITRATE: 20 POWDER TOPICAL at 10:49

## 2019-11-20 RX ADMIN — FLUTICASONE PROPIONATE 1 SPRAY: 50 SPRAY, METERED NASAL at 08:51

## 2019-11-20 RX ADMIN — IPRATROPIUM BROMIDE AND ALBUTEROL SULFATE 3 ML: .5; 3 SOLUTION RESPIRATORY (INHALATION) at 07:55

## 2019-11-20 RX ADMIN — WARFARIN SODIUM 1 MG: 1 TABLET ORAL at 19:09

## 2019-11-20 RX ADMIN — METOPROLOL TARTRATE 50 MG: 50 TABLET, FILM COATED ORAL at 08:45

## 2019-11-20 RX ADMIN — AMOXICILLIN AND CLAVULANATE POTASSIUM 1 TABLET: 875; 125 TABLET, FILM COATED ORAL at 08:45

## 2019-11-20 RX ADMIN — OMEPRAZOLE 20 MG: 20 CAPSULE, DELAYED RELEASE ORAL at 08:45

## 2019-11-20 RX ADMIN — METOPROLOL TARTRATE 50 MG: 50 TABLET, FILM COATED ORAL at 22:06

## 2019-11-20 RX ADMIN — IPRATROPIUM BROMIDE AND ALBUTEROL SULFATE 3 ML: .5; 3 SOLUTION RESPIRATORY (INHALATION) at 15:17

## 2019-11-20 ASSESSMENT — ACTIVITIES OF DAILY LIVING (ADL)
ADLS_ACUITY_SCORE: 31
ADLS_ACUITY_SCORE: 30

## 2019-11-20 NOTE — PROGRESS NOTES
Brief sw note:    **Sw is aware of consult to see the pt for discharge planning.    Pt's discharge recommendation is TCU.  RN CC is attempting to get the correct paperwork for [employer] to determine the correct guardian for the pt (See CC notes from 11/19 and 11/20).  Sw will follow-up with this consult tomorrow once the correct guardian is identified.    ALEJANDRO Peters, [facility]  Inpatient Care Coordination  [facility]  [phone]

## 2019-11-20 NOTE — PLAN OF CARE
Day RN  VS monitored, low grade temp, weaned to RA, LS dim, up w/A1 and ww, c/o abd pain that eventually went away with scheduled Norco and relaxation, dtr reports this abd pain is on-going and would like this checked out while in hospital, Dr Bhatia notified, remote tele d/c'd, urinary incontinence, hypo BS, last bm 11/19, wolf reg diet, TCU upon discharge, will cont to monitor.

## 2019-11-20 NOTE — PLAN OF CARE
Tele: Afib CVR w/occ V-paced  A&Ox4  Activity: A-1 walker  Diet:Regular  VSS   O2: 2L O2 via nasal canula sat mid 90's  PIV: SL RA  Pain: denies  GI/: incontinent at times  Labs: INR 2.46  Plan: PO prednisone and Augmentin, metoporol bid  Discharge:TCU

## 2019-11-20 NOTE — PROGRESS NOTES
Per patient's [place] patient was exposed to influenza A.  Grandson came down with it.  Patient was with him Friday into Saturday.    Web paged Dr. Martino with above.    Informed primary Rn, Alana Encinas.    Dr. Bhatia called.  She said patient was screened for influenza on the 18th.  It was negative.    Will inform RN, Alana Encinas.

## 2019-11-20 NOTE — PROGRESS NOTES
[facility]  Hospitalist Progress Note    Summary:  Radha is a 89-year-old female with a past medical history including paroxysmal atrial fibrillation on warfarin, type 2 diabetes mellitus diet-controlled, hypertension, hiatal hernia, peptic ulcer disease, and anemia presents to [facility] on 11/18 with shortness of breath and cough.     Assessment & Plan   Radha is a 89-year-old female with a past medical history including paroxysmal atrial fibrillation on warfarin, type 2 diabetes mellitus diet-controlled, hypertension, hiatal hernia, peptic ulcer disease, and anemia presents to [facility] on 11/18 with shortness of breath and cough.     Acute viral bronchitis with acute hypoxic respiratory failure  On admission patient was tachycardic, had a low-grade fever, and 2 L nasal cannula in the ER.   -On admission patient started on Solu-Medrol 40 mg IV twice daily, Xopenex, Tessalon Perles, oxygen and Flonase.  She was transitioned to Augmentin (11/23) and oral prednisone (last dose 11/20). Eileen QID.   -Follow culture data to completion no growth to date  -Titrate oxygen - goal >92%; off oxygen 11/20 in AM    Abdominal pain  Patient reported dull abdominal pain this AM that resolved by the time I met with the patient. She was unconcerned about her symptoms. Family called and informed nursing that his has been an ongoing issue and workup could be completed  -Patient's symptoms had resolved. Will monitor for signs of constipation and reassess if patient reoccur. Recommend outpatient follow-up with PCP.     Fall  Patient fell 1 week ago.  CT did not show rib fractures.  Lumbar pelvis x-rays were completed did not show an acute fracture  -Continue with physical therapy and recommending TCU on discharge    Paroxysmal A. Fib   -Continued metoprolol 50 mg BID  -Coumadin with pharmacy to dose    Hx of DMII  -Diet controlled  -A1c 6.1  -POCT    Chronic pain syndrome  -Chronic back and SI joint  pain  -Continue Noroc    GERD  -Omeprazole 20 mg daily     HLD  -Atorvastatin 10 mg daily     FEN: Oral hydration; monitor; regular   DVT Prophylaxis: Warfarin  Code Status: Full Code  Expected discharge: Tomorrow, recommended to transitional care unit once O2 use less than 1 liters/minute.    Roxana Bhatia DO  Text Page (7am - 6pm, M-F)    Interval History   Patient doing well this morning. Titrated off oxygen today. Reported abdominal pain in the morning that resolved. Unclear what worsens abdominal pain symptoms or what improved the pain. However, she reports pain went away when she was able to relax. Denies chest pain or palpitations. SOB improved. Cough present with no increased sputum production. Family not present. Reports she lives in a home with her grandsons. Discussed with nursing.     -Data reviewed today: I reviewed all new labs and imaging results over the last 24 hours.     Physical Exam   Temp: 99.2  F (37.3  C) Temp src: Oral BP: 136/82 Pulse: 89 Heart Rate: 78 Resp: 20 SpO2: 99 % O2 Device: None (Room air) Oxygen Delivery: 2 LPM  Vitals:    11/19/19 1643   Weight: 75.1 kg (165 lb 7.7 oz)     Vital Signs with Ranges  Temp:  [96.2  F (35.7  C)-99.2  F (37.3  C)] 99.2  F (37.3  C)  Pulse:  [89] 89  Heart Rate:  [71-92] 78  Resp:  [16-22] 20  BP: (113-140)/(63-91) 136/82  SpO2:  [94 %-99 %] 99 %  I/O last 3 completed shifts:  In: 1560 [P.O.:960; I.V.:600]  Out: -     Constitutional: NAD. Non-toxic. Comfortable in chair. Frail  HEENT: NC. AT. MMM. No JVD  Cardiovascular:  Irregularly, irregular. Normal S1/S2. No LE edema.   Lungs: Moves air bilaterally. No wheezing, rales, or rhonchi. Course. No use of accessory respiratory muscles. No oxygen.   GI: Obese, round, soft, NT, ND. Normoactive BS+ No rebound tenderness or guarnding.   Skin/Integumen: Warm, dry, no rash  Psych: A&Ox3.; Appropriate affect.     Medications     - MEDICATION INSTRUCTIONS -       Warfarin Therapy Reminder          amoxicillin-clavulanate  1 tablet Oral Q12H STEPHEN     atorvastatin  10 mg Oral At Bedtime     fluticasone  1 spray Both Nostrils Daily     HYDROcodone-acetaminophen  1 tablet Oral TID     ipratropium - albuterol 0.5 mg/2.5 mg/3 mL  3 mL Nebulization 4x daily     metoprolol tartrate  50 mg Oral BID     omeprazole  20 mg Oral Daily     predniSONE  40 mg Oral Daily     sodium chloride (PF)  3 mL Intracatheter Q8H       Data   Recent Labs   Lab 11/20/19  0656 11/19/19  0725 11/18/19  0953   WBC  --  5.7 7.0   HGB  --  9.5* 10.6*   MCV  --  100 101*   PLT  --  167 190   INR 2.67* 2.46* 1.73*   NA  --  142 139   POTASSIUM  --  3.7 3.6   CHLORIDE  --  109 104   CO2  --  27 28   BUN  --  19 17   CR  --  0.82 0.95   ANIONGAP  --  6 7   SHAN  --  8.2* 9.3   GLC  --  156* 122*   TROPI  --   --  0.024       No results found for this or any previous visit (from the past 24 hour(s)).

## 2019-11-20 NOTE — PLAN OF CARE
ORIENTATION: A&o x4  VS:stable  PAIN: denies pain  TELE:Afib-CVR with occational AV pacing  GLUCOSE CHECKS:  No BG check  RESPIRATORY: LS clear/diminished   GI:BS+ , BM for this shift  : incontinent at times  SKIN: red, blanchable, wound, BLE foam applied  ACTIVITY: Ax1 with Walker gb  DIET: Regular  PLAN: Continue metoprolol 50 mg BID, PO prednisone, discharge TCU 1-2 days.

## 2019-11-20 NOTE — PROGRESS NOTES
"Pt noted to have possible guardian, working to obtain the correct documentation indicating this. Contacted Sandra Mann ([phone]) of [employer] and  requesting additional information for: Letters of Guardianship that shows acceptance of guardianship. Requested this be faxed to [employer] at [phone]. Awaiting documents/response.     Update 1450: Received fax from Sandra w/ \"Order appointing conservator and limited guardian\", same documents that were received on 11/19. Discussed w/ Renuka of [employer] who is reviewing this further. Awaiting response. Copy also placed on front of chart.     CM will continue to follow patient for any additional discharge needs.     Amaris Rico RN BSN   Inpatient Care Coordination  [facility]   Phone [phone]    "

## 2019-11-21 ENCOUNTER — APPOINTMENT (OUTPATIENT)
Dept: PHYSICAL THERAPY | Facility: CLINIC | Age: 84
DRG: 203 | End: 2019-11-21
Payer: COMMERCIAL

## 2019-11-21 LAB
ANION GAP SERPL CALCULATED.3IONS-SCNC: 4 MMOL/L (ref 3–14)
BUN SERPL-MCNC: 24 MG/DL (ref 7–30)
CALCIUM SERPL-MCNC: 8.6 MG/DL (ref 8.5–10.1)
CHLORIDE SERPL-SCNC: 108 MMOL/L (ref 94–109)
CO2 SERPL-SCNC: 29 MMOL/L (ref 20–32)
CREAT SERPL-MCNC: 0.77 MG/DL (ref 0.52–1.04)
ERYTHROCYTE [DISTWIDTH] IN BLOOD BY AUTOMATED COUNT: 13.4 % (ref 10–15)
GFR SERPL CREATININE-BSD FRML MDRD: 67 ML/MIN/{1.73_M2}
GLUCOSE BLDC GLUCOMTR-MCNC: 105 MG/DL (ref 70–99)
GLUCOSE SERPL-MCNC: 98 MG/DL (ref 70–99)
HCT VFR BLD AUTO: 28.8 % (ref 35–47)
HGB BLD-MCNC: 9.6 G/DL (ref 11.7–15.7)
INR PPP: 1.97 (ref 0.86–1.14)
MCH RBC QN AUTO: 33.7 PG (ref 26.5–33)
MCHC RBC AUTO-ENTMCNC: 33.3 G/DL (ref 31.5–36.5)
MCV RBC AUTO: 101 FL (ref 78–100)
PLATELET # BLD AUTO: 166 10E9/L (ref 150–450)
POTASSIUM SERPL-SCNC: 3.9 MMOL/L (ref 3.4–5.3)
RBC # BLD AUTO: 2.85 10E12/L (ref 3.8–5.2)
SODIUM SERPL-SCNC: 141 MMOL/L (ref 133–144)
WBC # BLD AUTO: 6.7 10E9/L (ref 4–11)

## 2019-11-21 PROCEDURE — 97530 THERAPEUTIC ACTIVITIES: CPT | Mod: GP | Performed by: PHYSICAL THERAPY ASSISTANT

## 2019-11-21 PROCEDURE — 97116 GAIT TRAINING THERAPY: CPT | Mod: GP | Performed by: PHYSICAL THERAPY ASSISTANT

## 2019-11-21 PROCEDURE — 00000146 ZZHCL STATISTIC GLUCOSE BY METER IP

## 2019-11-21 PROCEDURE — 94640 AIRWAY INHALATION TREATMENT: CPT

## 2019-11-21 PROCEDURE — 94640 AIRWAY INHALATION TREATMENT: CPT | Mod: 76

## 2019-11-21 PROCEDURE — 80048 BASIC METABOLIC PNL TOTAL CA: CPT | Performed by: INTERNAL MEDICINE

## 2019-11-21 PROCEDURE — 25000132 ZZH RX MED GY IP 250 OP 250 PS 637: Performed by: INTERNAL MEDICINE

## 2019-11-21 PROCEDURE — 25000131 ZZH RX MED GY IP 250 OP 636 PS 637: Performed by: INTERNAL MEDICINE

## 2019-11-21 PROCEDURE — 12000000 ZZH R&B MED SURG/OB

## 2019-11-21 PROCEDURE — 40000275 ZZH STATISTIC RCP TIME EA 10 MIN

## 2019-11-21 PROCEDURE — 85610 PROTHROMBIN TIME: CPT | Performed by: INTERNAL MEDICINE

## 2019-11-21 PROCEDURE — 36415 COLL VENOUS BLD VENIPUNCTURE: CPT | Performed by: INTERNAL MEDICINE

## 2019-11-21 PROCEDURE — 85027 COMPLETE CBC AUTOMATED: CPT | Performed by: INTERNAL MEDICINE

## 2019-11-21 PROCEDURE — 25000125 ZZHC RX 250: Performed by: HOSPITALIST

## 2019-11-21 PROCEDURE — 99232 SBSQ HOSP IP/OBS MODERATE 35: CPT | Performed by: INTERNAL MEDICINE

## 2019-11-21 RX ORDER — WARFARIN SODIUM 2.5 MG/1
2.5 TABLET ORAL
Status: COMPLETED | OUTPATIENT
Start: 2019-11-21 | End: 2019-11-21

## 2019-11-21 RX ADMIN — AMOXICILLIN AND CLAVULANATE POTASSIUM 1 TABLET: 875; 125 TABLET, FILM COATED ORAL at 07:59

## 2019-11-21 RX ADMIN — METOPROLOL TARTRATE 50 MG: 50 TABLET, FILM COATED ORAL at 21:39

## 2019-11-21 RX ADMIN — OMEPRAZOLE 20 MG: 20 CAPSULE, DELAYED RELEASE ORAL at 07:59

## 2019-11-21 RX ADMIN — WARFARIN SODIUM 2.5 MG: 2.5 TABLET ORAL at 17:12

## 2019-11-21 RX ADMIN — METOPROLOL TARTRATE 50 MG: 50 TABLET, FILM COATED ORAL at 07:59

## 2019-11-21 RX ADMIN — HYDROCODONE BITARTRATE AND ACETAMINOPHEN 1 TABLET: 5; 325 TABLET ORAL at 17:12

## 2019-11-21 RX ADMIN — PREDNISONE 40 MG: 20 TABLET ORAL at 08:00

## 2019-11-21 RX ADMIN — HYDROCODONE BITARTRATE AND ACETAMINOPHEN 1 TABLET: 5; 325 TABLET ORAL at 21:39

## 2019-11-21 RX ADMIN — IPRATROPIUM BROMIDE AND ALBUTEROL SULFATE 3 ML: .5; 3 SOLUTION RESPIRATORY (INHALATION) at 15:25

## 2019-11-21 RX ADMIN — MICONAZOLE NITRATE: 20 POWDER TOPICAL at 21:42

## 2019-11-21 RX ADMIN — IPRATROPIUM BROMIDE AND ALBUTEROL SULFATE 3 ML: .5; 3 SOLUTION RESPIRATORY (INHALATION) at 19:23

## 2019-11-21 RX ADMIN — AMOXICILLIN AND CLAVULANATE POTASSIUM 1 TABLET: 875; 125 TABLET, FILM COATED ORAL at 21:39

## 2019-11-21 RX ADMIN — ATORVASTATIN CALCIUM 10 MG: 10 TABLET, FILM COATED ORAL at 21:39

## 2019-11-21 RX ADMIN — HYDROCODONE BITARTRATE AND ACETAMINOPHEN 1 TABLET: 5; 325 TABLET ORAL at 08:00

## 2019-11-21 RX ADMIN — FLUTICASONE PROPIONATE 1 SPRAY: 50 SPRAY, METERED NASAL at 08:03

## 2019-11-21 RX ADMIN — IPRATROPIUM BROMIDE AND ALBUTEROL SULFATE 3 ML: .5; 3 SOLUTION RESPIRATORY (INHALATION) at 07:34

## 2019-11-21 ASSESSMENT — ACTIVITIES OF DAILY LIVING (ADL)
COGNITION: 0 - NO COGNITION ISSUES REPORTED
RETIRED_EATING: 0-->INDEPENDENT
AMBULATION: 1-->ASSISTIVE EQUIPMENT
TRANSFERRING: 1-->ASSISTIVE EQUIPMENT
ADLS_ACUITY_SCORE: 23
BATHING: 3-->ASSISTIVE EQUIPMENT AND PERSON
FALL_HISTORY_WITHIN_LAST_SIX_MONTHS: YES
ADLS_ACUITY_SCORE: 29
TOILETING: 1-->ASSISTIVE EQUIPMENT
WHICH_OF_THE_ABOVE_FUNCTIONAL_RISKS_HAD_A_RECENT_ONSET_OR_CHANGE?: AMBULATION;TRANSFERRING;TOILETING;BATHING;DRESSING
ADLS_ACUITY_SCORE: 29
ADLS_ACUITY_SCORE: 29
NUMBER_OF_TIMES_PATIENT_HAS_FALLEN_WITHIN_LAST_SIX_MONTHS: 1
ADLS_ACUITY_SCORE: 29
RETIRED_COMMUNICATION: 0-->UNDERSTANDS/COMMUNICATES WITHOUT DIFFICULTY
SWALLOWING: 0-->SWALLOWS FOODS/LIQUIDS WITHOUT DIFFICULTY
DRESS: 0-->INDEPENDENT
ADLS_ACUITY_SCORE: 23

## 2019-11-21 NOTE — CONSULTS
Care Transition Initial Assessment - SW     Met with: Patient    Active Problems:    Reactive airway disease with acute exacerbation       DATA  Lives With: alone   Living Arrangements: house  Quality of Family Relationships: helpful, involved, supportive  Description of Support System: Supportive, Involved  Who is your support system?: Children, Guardian  Support Assessment: Adequate social supports, Adequate family and caregiver support.  Pt states she has a very supportive family and has great support from her guardian (for abode) - Sandra.  Identified issues/concerns regarding health management: Pt was admitted for reactive airway disease with acute exacerbation.     Resources List: Skilled Nursing Facility     Quality of Family Relationships: helpful, involved, supportive  Transportation Anticipated: HE wheelchair    ASSESSMENT  Cognitive Status:  awake, alert and oriented   Concerns to be addressed: Pt was sitting in her chair when sw arrived.  Pt was in a pleasant mood and contributed appropriately to the conversation.  Pt states that she lives alone and there are stairs in her home, but all of her needs can be met on the main level.  Pt uses a walker to help with ambulation.  The pt gets help with housekeeping, shopping, meals, transport, and medications from her dtr Carolina.  Pt is aware and accepts the discharge recommendation of TCU.  The pt requested that sw reach out to Sandra, pt's guardian for abode through [employer] [phone], and her dtr Carolina, [phone].  Sallie spoke with Sandra who requested that referrals be sent to [facility] and [facility].  Pt will need prior authorization from her insurance.  Sandra said that she is the guardian for making sure the pt has placement and is conservator of her finances.  Sandra requests the pt be in a private room and that HE wheelchair transport is used.  Sandra is aware and acknowledges the costs.  Sandra said that she became involved with the pt  through [facility].  Sandra has had concerns about the pt's family, specifically Carolina, isolating the pt and keeping information from Sandra.  Sandra said that the police have been involved with the pt in addition to APS for safety and family concerns.  At this time, Sandra has petitioned the court to get additional medical khan for the pt.  The pt is on a wait list at [facility].    Sw sent the TCU referrals.       PLAN  Financial costs for the patient includes private room fees and transportation.  Patient given options and choices for discharge TCU.  Patient/family is agreeable to the plan?  Yes.  Transportation/person available to transport on day of discharge  is HE wheelchair.  Patient Goals and Preferences: discharge to TCU and return home.  Patient anticipates discharging to:  TCU, undetermined at this time.  Referrals were sent.    Sw will continue with discharge planning and will be available as needed until discharge.      ALEJANDRO Peters, [facility]  Inpatient Care Coordination  [facility]  [phone]

## 2019-11-21 NOTE — PLAN OF CARE
Discharge Planner PT   Patient plan for discharge: TCU  Current status: gait wit 4ww to 100 feet x 2 with CGA SOB noted, bed mobility with Min A   Barriers to return to prior living situation: : lives alone, stairs to enter, decreased strength requiring assist for transfers and decreased activity tolerance  Recommendations for discharge: TCU per plan established by the PT.   Rationale for recommendations: Pt presenting below baseline for all functional mobility, currently requiring min to mod A with transfers and CGA for ambulation. Pt only able to ambulate short distances at this time due to decreased activity tolerance. Would benefit from continued IP PT and continued PT in TCU setting to increase strength and endurance in order to return to PLOF.       Entered by: Brandie Goldstein 11/21/2019 2:23 PM

## 2019-11-21 NOTE — PROGRESS NOTES
Patient has been assessed for Home Oxygen needs. Oxygen readings:    *Pulse oximetry (SpO2) = 94% on room air at rest while awake.    *SpO2 improved to 94% on RA  at rest.    *SpO2 = 88-91% on room air during activity/with exercise.    *SpO2 improved to 92% on RA  during activity/with exercise.    Pt had dyspnea on exertion. Oxygen dropped to 86 but recovered quickly to 88-91% on room air during walk in pham. Oxygen was not applied at this time.

## 2019-11-21 NOTE — PLAN OF CARE
Tele: discontinued  A&O x4  Activity: A-1 walker, gait belt  Diet:Regular  VSS  O2: RA  PIV: SL RA  Pain: denies  GI/: incontinent at times  Labs: INR 2.67  Plan: Abd pain resolved, pt to follow up outpatient with PCP  Discharge:Today to TCU if able to keep O2 less than 1L.  Continue plan of care.

## 2019-11-21 NOTE — PROGRESS NOTES
[facility]  Hospitalist Progress Note    Summary:  Radha is a 89-year-old female with a past medical history including paroxysmal atrial fibrillation on warfarin, type 2 diabetes mellitus diet-controlled, hypertension, hiatal hernia, peptic ulcer disease, and anemia presents to [facility] on 11/18 with shortness of breath and cough.     Assessment & Plan   Radha is a 89-year-old female with a past medical history including paroxysmal atrial fibrillation on warfarin, type 2 diabetes mellitus diet-controlled, hypertension, hiatal hernia, peptic ulcer disease, and anemia presents to [facility] on 11/18 with shortness of breath and cough.     Acute viral bronchitis with acute hypoxic respiratory failure  On admission patient was tachycardic, had a low-grade fever, and 2 L nasal cannula in the ER.   -On admission patient started on Solu-Medrol 40 mg IV twice daily, Xopenex, Tessalon Perles, oxygen and Flonase.  She was transitioned to Augmentin (11/23 last dose) and oral prednisone (last dose 11/20). Eileen QID.   -Follow culture data to completion no growth to date  -Titrate oxygen - goal >92%; off oxygen 11/20; check ambulatory pulse ox     Abdominal pain  Patient reported dull abdominal pain this AM that resolved by the time I met with the patient. She was unconcerned about her symptoms. Family called and informed nursing that his has been an ongoing issue and workup could be completed  -Patient denies abdominal pain today. Reports that she gets lower quadrant abdominal pain on occasion. It occurs prior to bowel movements and is relieved with bowel movements  -Continue to monitor and defer to PCP if symptoms progress    Fall  Patient fell 1 week ago.  CT did not show rib fractures.  Lumbar pelvis x-rays were completed did not show an acute fracture  -Continue with physical therapy and recommending TCU on discharge    Paroxysmal A. Fib   -Continued metoprolol 50 mg BID  -Coumadin with pharmacy to  dose    Hx of DMII  -Diet controlled  -A1c 6.1  -POCT    Chronic pain syndrome  -Chronic back and SI joint pain  -Continue Noroc    GERD  -Omeprazole 20 mg daily     HLD  -Atorvastatin 10 mg daily     FEN: Oral hydration; monitor; regular   DVT Prophylaxis: Warfarin  Code Status: Full Code  Expected discharge: Tomorrow, recommended to transitional care unit. Awaiting placement.     Roxana Bhatia DO  Text Page (7am - 6pm, M-F)    Interval History   Abdominal pain resolved. Eating breakfast. Fells well. No cough or SOB. No nausea or vomiting. Family not present. Discussed with nursing.     -Data reviewed today: I reviewed all new labs and imaging results over the last 24 hours.     Physical Exam   Temp: 97.5  F (36.4  C) Temp src: Axillary BP: (!) 152/88 Pulse: 83 Heart Rate: 64 Resp: 28 SpO2: 92 % O2 Device: None (Room air)    Vitals:    11/19/19 1643   Weight: 75.1 kg (165 lb 7.7 oz)     Vital Signs with Ranges  Temp:  [97.2  F (36.2  C)-97.8  F (36.6  C)] 97.5  F (36.4  C)  Pulse:  [83-89] 83  Heart Rate:  [64-86] 64  Resp:  [18-28] 28  BP: (121-152)/(65-88) 152/88  SpO2:  [92 %-95 %] 92 %  I/O last 3 completed shifts:  In: 600 [P.O.:600]  Out: -     Constitutional: NAD. Non-toxic. Comfortable in chair. Frail  HEENT: NC. AT. MMM. No JVD  Cardiovascular:  Irregularly, irregular. Normal S1/S2. No LE edema.   Lungs: Moves air bilaterally. No wheezing, rales, or rhonchi. No use of accessory respiratory muscles. No oxygen.   GI: Obese, round, soft, NT, ND. Normoactive BS+ No rebound tenderness or guarnding.   Skin/Integumen: Warm, dry, no rash  Psych: A&Ox3.; Appropriate affect.     Medications     - MEDICATION INSTRUCTIONS -       Warfarin Therapy Reminder         amoxicillin-clavulanate  1 tablet Oral Q12H STEPHEN     atorvastatin  10 mg Oral At Bedtime     fluticasone  1 spray Both Nostrils Daily     HYDROcodone-acetaminophen  1 tablet Oral TID     ipratropium - albuterol 0.5 mg/2.5 mg/3 mL  3 mL Nebulization 4x daily      metoprolol tartrate  50 mg Oral BID     omeprazole  20 mg Oral Daily     predniSONE  40 mg Oral Daily     sodium chloride (PF)  3 mL Intracatheter Q8H     warfarin ANTICOAGULANT  2.5 mg Oral ONCE at 18:00     Data   Recent Labs   Lab 11/21/19  0721 11/20/19  0656 11/19/19  0725 11/18/19  0953   WBC 6.7  --  5.7 7.0   HGB 9.6*  --  9.5* 10.6*   *  --  100 101*     --  167 190   INR 1.97* 2.67* 2.46* 1.73*     --  142 139   POTASSIUM 3.9  --  3.7 3.6   CHLORIDE 108  --  109 104   CO2 29  --  27 28   BUN 24  --  19 17   CR 0.77  --  0.82 0.95   ANIONGAP 4  --  6 7   SHAN 8.6  --  8.2* 9.3   GLC 98  --  156* 122*   TROPI  --   --   --  0.024       No results found for this or any previous visit (from the past 24 hour(s)).

## 2019-11-21 NOTE — PLAN OF CARE
ORIENTATION: A&O x 4, forgetful  VS:stable  PAIN: no pain for this shift  TELE: no tele  GLUCOSE CHECKS: none  IV: SL  RESPIRATORY: LS clear,  GI:BS+  : incontinent at times  SKIN: red blanchable on coccyx, wound BLE, foam applied  ACTIVITY:Ax1 with walker  DIET:Regular diet  PLAN: continue treatment with nebs, solumedrol, teslon. Discharge plan to TCU tomorrow.  has following.

## 2019-11-22 ENCOUNTER — APPOINTMENT (OUTPATIENT)
Dept: GENERAL RADIOLOGY | Facility: CLINIC | Age: 84
DRG: 203 | End: 2019-11-22
Attending: INTERNAL MEDICINE
Payer: COMMERCIAL

## 2019-11-22 ENCOUNTER — APPOINTMENT (OUTPATIENT)
Dept: OCCUPATIONAL THERAPY | Facility: CLINIC | Age: 84
DRG: 203 | End: 2019-11-22
Attending: INTERNAL MEDICINE
Payer: COMMERCIAL

## 2019-11-22 LAB
ANION GAP SERPL CALCULATED.3IONS-SCNC: 5 MMOL/L (ref 3–14)
BUN SERPL-MCNC: 23 MG/DL (ref 7–30)
CALCIUM SERPL-MCNC: 8.5 MG/DL (ref 8.5–10.1)
CHLORIDE SERPL-SCNC: 107 MMOL/L (ref 94–109)
CO2 SERPL-SCNC: 28 MMOL/L (ref 20–32)
CREAT SERPL-MCNC: 0.72 MG/DL (ref 0.52–1.04)
ERYTHROCYTE [DISTWIDTH] IN BLOOD BY AUTOMATED COUNT: 13.2 % (ref 10–15)
GFR SERPL CREATININE-BSD FRML MDRD: 74 ML/MIN/{1.73_M2}
GLUCOSE SERPL-MCNC: 95 MG/DL (ref 70–99)
HCT VFR BLD AUTO: 30.2 % (ref 35–47)
HGB BLD-MCNC: 9.7 G/DL (ref 11.7–15.7)
INR PPP: 1.83 (ref 0.86–1.14)
MCH RBC QN AUTO: 32.4 PG (ref 26.5–33)
MCHC RBC AUTO-ENTMCNC: 32.1 G/DL (ref 31.5–36.5)
MCV RBC AUTO: 101 FL (ref 78–100)
PLATELET # BLD AUTO: 172 10E9/L (ref 150–450)
POTASSIUM SERPL-SCNC: 3.9 MMOL/L (ref 3.4–5.3)
RBC # BLD AUTO: 2.99 10E12/L (ref 3.8–5.2)
SODIUM SERPL-SCNC: 140 MMOL/L (ref 133–144)
WBC # BLD AUTO: 5.9 10E9/L (ref 4–11)

## 2019-11-22 PROCEDURE — 40000274 ZZH STATISTIC RCP CONSULT EA 30 MIN

## 2019-11-22 PROCEDURE — 94640 AIRWAY INHALATION TREATMENT: CPT | Mod: 76

## 2019-11-22 PROCEDURE — 97530 THERAPEUTIC ACTIVITIES: CPT | Mod: GO | Performed by: REHABILITATION PRACTITIONER

## 2019-11-22 PROCEDURE — 99233 SBSQ HOSP IP/OBS HIGH 50: CPT | Performed by: INTERNAL MEDICINE

## 2019-11-22 PROCEDURE — 85610 PROTHROMBIN TIME: CPT | Performed by: INTERNAL MEDICINE

## 2019-11-22 PROCEDURE — 25000125 ZZHC RX 250: Performed by: HOSPITALIST

## 2019-11-22 PROCEDURE — 97166 OT EVAL MOD COMPLEX 45 MIN: CPT | Mod: GO | Performed by: REHABILITATION PRACTITIONER

## 2019-11-22 PROCEDURE — 71046 X-RAY EXAM CHEST 2 VIEWS: CPT

## 2019-11-22 PROCEDURE — 85027 COMPLETE CBC AUTOMATED: CPT | Performed by: INTERNAL MEDICINE

## 2019-11-22 PROCEDURE — 25000131 ZZH RX MED GY IP 250 OP 636 PS 637: Performed by: INTERNAL MEDICINE

## 2019-11-22 PROCEDURE — 25000132 ZZH RX MED GY IP 250 OP 250 PS 637: Performed by: INTERNAL MEDICINE

## 2019-11-22 PROCEDURE — 80048 BASIC METABOLIC PNL TOTAL CA: CPT | Performed by: INTERNAL MEDICINE

## 2019-11-22 PROCEDURE — 40000275 ZZH STATISTIC RCP TIME EA 10 MIN

## 2019-11-22 PROCEDURE — 94640 AIRWAY INHALATION TREATMENT: CPT

## 2019-11-22 PROCEDURE — 94799 UNLISTED PULMONARY SVC/PX: CPT

## 2019-11-22 PROCEDURE — 12000000 ZZH R&B MED SURG/OB

## 2019-11-22 PROCEDURE — 36415 COLL VENOUS BLD VENIPUNCTURE: CPT | Performed by: INTERNAL MEDICINE

## 2019-11-22 PROCEDURE — 40000809 ZZH STATISTIC NO DOCUMENTATION TO SUPPORT CHARGE

## 2019-11-22 RX ORDER — WARFARIN SODIUM 2.5 MG/1
2.5 TABLET ORAL
Status: COMPLETED | OUTPATIENT
Start: 2019-11-22 | End: 2019-11-22

## 2019-11-22 RX ADMIN — METOPROLOL TARTRATE 50 MG: 50 TABLET, FILM COATED ORAL at 08:12

## 2019-11-22 RX ADMIN — HYDROCODONE BITARTRATE AND ACETAMINOPHEN 1 TABLET: 5; 325 TABLET ORAL at 16:56

## 2019-11-22 RX ADMIN — ATORVASTATIN CALCIUM 10 MG: 10 TABLET, FILM COATED ORAL at 21:14

## 2019-11-22 RX ADMIN — WARFARIN SODIUM 2.5 MG: 2.5 TABLET ORAL at 19:05

## 2019-11-22 RX ADMIN — IPRATROPIUM BROMIDE AND ALBUTEROL SULFATE 3 ML: .5; 3 SOLUTION RESPIRATORY (INHALATION) at 07:15

## 2019-11-22 RX ADMIN — FLUTICASONE PROPIONATE 1 SPRAY: 50 SPRAY, METERED NASAL at 08:12

## 2019-11-22 RX ADMIN — HYDROCODONE BITARTRATE AND ACETAMINOPHEN 1 TABLET: 5; 325 TABLET ORAL at 21:14

## 2019-11-22 RX ADMIN — OMEPRAZOLE 20 MG: 20 CAPSULE, DELAYED RELEASE ORAL at 08:12

## 2019-11-22 RX ADMIN — IPRATROPIUM BROMIDE AND ALBUTEROL SULFATE 3 ML: .5; 3 SOLUTION RESPIRATORY (INHALATION) at 11:42

## 2019-11-22 RX ADMIN — AMOXICILLIN AND CLAVULANATE POTASSIUM 1 TABLET: 875; 125 TABLET, FILM COATED ORAL at 19:05

## 2019-11-22 RX ADMIN — PREDNISONE 40 MG: 20 TABLET ORAL at 08:12

## 2019-11-22 RX ADMIN — METOPROLOL TARTRATE 50 MG: 50 TABLET, FILM COATED ORAL at 21:14

## 2019-11-22 RX ADMIN — IPRATROPIUM BROMIDE AND ALBUTEROL SULFATE 3 ML: .5; 3 SOLUTION RESPIRATORY (INHALATION) at 19:09

## 2019-11-22 RX ADMIN — AMOXICILLIN AND CLAVULANATE POTASSIUM 1 TABLET: 875; 125 TABLET, FILM COATED ORAL at 08:12

## 2019-11-22 RX ADMIN — IPRATROPIUM BROMIDE AND ALBUTEROL SULFATE 3 ML: .5; 3 SOLUTION RESPIRATORY (INHALATION) at 16:04

## 2019-11-22 RX ADMIN — HYDROCODONE BITARTRATE AND ACETAMINOPHEN 1 TABLET: 5; 325 TABLET ORAL at 08:12

## 2019-11-22 ASSESSMENT — ACTIVITIES OF DAILY LIVING (ADL)
ADLS_ACUITY_SCORE: 23
ADLS_ACUITY_SCORE: 23
PREVIOUS_RESPONSIBILITIES: LAUNDRY;SHOPPING
ADLS_ACUITY_SCORE: 23

## 2019-11-22 NOTE — PROGRESS NOTES
D:  Per record review, pt's discharge recommendation is TCU.  Referrals were sent to [facility] and [facility].    I:  Ami at [facility], [phone], called and said that they can send in for insurance prior authorization.  The only hold up is an OT recommendation.  Rosalba paged the physician and requested an OT consult.  The consult was placed and rosalba spoke with the OT lead who said that they will see the pt ASAP.    A/P:  Rosalba will continue with discharge planning and will be available as needed until discharge.    ALEJANDRO Peters, [facility]  Inpatient Care Coordination  [facility]  [phone]    ADDENDUM:  Rosalba spoke with Ami who said that they send in the paperwork for the insurance authorization and will update rosalba once the approval has been received.

## 2019-11-22 NOTE — PLAN OF CARE
Pt is a/o, up with A1 and her own walker. VSS. Lung sounds course with expiratory wheezes.Cough infreqeunt.  Incontinent at times. Discharge to TCU possibly tomorrow, placement pending.

## 2019-11-22 NOTE — PLAN OF CARE
A&Ox4  Activity:A-1 walker gait belt  Diet:Reg  VSS  O2: RA  PIV: SL   Pain: denies  GI/: incontinent at times  Labs: blood cultures show no growth  Discharge: Possible discharge today to TCU pending placement.  Continue plan of care.

## 2019-11-22 NOTE — PROGRESS NOTES
A/O x4, forgetful. VSS. Lower back pain managed with scheduled norco. Ambulated in hallway with pulse ox - desat to 86% on RA but recovered quickly with rest. Tolerating regular diet. Incontinent of urine, purewick applied. +BM today. Plan for possible discharge to TCU tomorrow. Will continue to monitor.

## 2019-11-22 NOTE — PROGRESS NOTES
"[facility] RCAT     Date: 11-2-2019  Admission Dx: RAD  Pulmonary History:  NO  Home Nebulizer/MDI Use: NO  Home Oxygen: NO  Acuity Level (RCAT flow sheet): 3  Aerosol Therapy initiated: Duoneb QID, Aerobika QID      Pulmonary Hygiene initiated: Cough and Deep Breathe      Volume Expansion initiated: Incentive Spirometer, Aerobika, C&DB      Current Oxygen Requirements: 1 LPM  Current SpO2: 96%    Re-evaluation date: 11-    Patient Education: Proper use of nebs, IS and Aerobika      See \"RT Assessments\" flow sheet for patient assessment scoring and Acuity Level Details.             "

## 2019-11-22 NOTE — PLAN OF CARE
OT- eval completed and treatment initiated. Patient is a 89-year-old female with a past medical history including paroxysmal atrial fibrillation on warfarin, type 2 diabetes mellitus diet-controlled, hypertension, hiatal hernia, peptic ulcer disease, and anemia presents to [facility] on 11/18 with shortness of breath and cough. DX with Acute viral bronchitis with acute hypoxic respiratory failure.     Prior to admission, patient living alone in large house, per pt report all needs met on main level, has tub shower with a grab bar but pt reports she usually sponge bathes in sink and does not get into tub d/t fear of falling, has raised toilet seat. Patient's daughter A with shopping needs (patient was able to ambulate in [employer] and [employer] while pushing 4ww or cart, slowly, but able to manage with dtr A with shopping in Oct). Dtr completes housekeeping, all meals come from [facility], patient no longer cooks but likes to do her own laundry.     Discharge Planner OT   Patient plan for discharge: rehab  Current status: Patient was seated in chair and agreeable to OT session. Patient was I with donning doffing socks and shoes, however patient required significant rest breaks between each task due to MARIO and fatigue. As a result, task required significant time to complete compared to expected time frame. CGA, 4ww to stand and ambulate short distance in room, patient unable to advance beyond 6 feet due to MARIO before needing to return to chair to sit and rest. O2 sats remained at 93% on RA for seated activity, after mobility, O2 sats were 90%, rebounded to 93% with rest.   Barriers to return to prior living situation: lives alone, stairs to enter, decreased strength requiring assist for transfers and decreased activity tolerance, below baseline for ADL's  Recommendations for discharge: TCU  Rationale for recommendations: patient would benefit from TCU to maximize ADL and functional mobility I in order to return home  safety and PLOF. OT will continue to advance ADL's, improve activity tolerance and strength.       Entered by: Brandie Toussaint 11/22/2019 11:49 AM

## 2019-11-22 NOTE — PROGRESS NOTES
[facility]  Hospitalist Progress Note    Assessment & Plan   Radha is a 89-year-old female with a past medical history including paroxysmal atrial fibrillation on warfarin, type 2 diabetes mellitus diet-controlled, hypertension, hiatal hernia, peptic ulcer disease, and anemia presents to [facility] on 11/18 with shortness of breath and cough.     Acute viral bronchitis with acute hypoxic respiratory failure  On admission patient was tachycardic, had a low-grade fever, and 2 L nasal cannula in the ER.   -Solu-Medrol 40 mg IV twice daily, Xopenex, Tessalon Perles, oxygen and Flonase.  She was transitioned to Augmentin (11/23 last dose) and oral prednisone (last dose 11/20). Eileen QID.   -Follow culture data to completion no growth to date  -Increased SOB/work of breathing in AM - improving with respiratory treatments - repeat CXR  -Titrate oxygen - goal >92% - briefly wearing oxygen 11/22 in AM and now off in afternoon     Abdominal pain  Patient reported dull abdominal pain this AM that resolved by the time I met with the patient. She was unconcerned about her symptoms. Family called and informed nursing that his has been an ongoing issue and workup could be completed  -Continues to deny abdominal pain.   -Continue to monitor and defer to PCP if symptoms progress    Fall  Patient fell 1 week ago.  CT did not show rib fractures.  Lumbar pelvis x-rays were completed did not show an acute fracture  -Continue with physical therapy and recommending TCU on discharge    Paroxysmal A. Fib   -Continued metoprolol 50 mg BID  -Coumadin with pharmacy to dose    Hx of DMII  -Diet controlled  -A1c 6.1  -POCT    Chronic pain syndrome  -Chronic back and SI joint pain  -Continue Noroc    GERD  -Omeprazole 20 mg daily     HLD  -Atorvastatin 10 mg daily     FEN: Oral hydration; monitor; regular   DVT Prophylaxis: Warfarin  Code Status: Full Code  Expected discharge: Tomorrow, recommended to transitional care unit.  Awaiting placement.     Roxana Bhatia, DO  Text Page (7am - 6pm, M-F)    Interval History   Not feeling well this AM. Increased SOB and work of breathing. Treated with nebulizer and oxygen. Improved and titrated off oxygen. No abdominal pain or chest pain. No SOB this afternoon. No family present. Discussed with nursing.     -Data reviewed today: I reviewed all new labs and imaging results over the last 24 hours.     Physical Exam   Temp: 98.6  F (37  C) Temp src: Oral BP: (!) 152/87 Pulse: 80 Heart Rate: 88 Resp: 22 SpO2: 94 %(recheck with O2) O2 Device: Nasal cannula Oxygen Delivery: 1 LPM  Vitals:    11/19/19 1643   Weight: 75.1 kg (165 lb 7.7 oz)     Vital Signs with Ranges  Temp:  [96.8  F (36  C)-99.3  F (37.4  C)] 98.6  F (37  C)  Pulse:  [70-80] 80  Heart Rate:  [] 88  Resp:  [18-24] 22  BP: (131-168)/(73-95) 152/87  SpO2:  [90 %-95 %] 94 %  I/O last 3 completed shifts:  In: 120 [P.O.:120]  Out: -     Constitutional: NAD. Non-toxic. Increased work of breathing. NAD.   HEENT: NC. AT. MMM.   Cardiovascular:  Irregularly, irregular. Normal S1/S2. No LE edema.   Lungs: Moves air bilaterally. Diffuse crackles throughout with rhonchi. No wheezing. No use of accessory respiratory muscles  GI: Obese, round, soft, NT, ND. Normoactive BS+ No rebound tenderness or guarnding.   Skin/Integumen: Warm, dry, no rash  Psych: A&Ox3.; Appropriate affect.     Medications     - MEDICATION INSTRUCTIONS -       Warfarin Therapy Reminder         amoxicillin-clavulanate  1 tablet Oral Q12H STEPHEN     atorvastatin  10 mg Oral At Bedtime     fluticasone  1 spray Both Nostrils Daily     HYDROcodone-acetaminophen  1 tablet Oral TID     ipratropium - albuterol 0.5 mg/2.5 mg/3 mL  3 mL Nebulization 4x daily     metoprolol tartrate  50 mg Oral BID     omeprazole  20 mg Oral Daily     predniSONE  40 mg Oral Daily     sodium chloride (PF)  3 mL Intracatheter Q8H     warfarin ANTICOAGULANT  2.5 mg Oral ONCE at 18:00     Data   Recent Labs    Lab 11/22/19  0738 11/21/19  0721 11/20/19  0656 11/19/19  0725 11/18/19  0953   WBC  --  6.7  --  5.7 7.0   HGB  --  9.6*  --  9.5* 10.6*   MCV  --  101*  --  100 101*   PLT  --  166  --  167 190   INR 1.83* 1.97* 2.67* 2.46* 1.73*   NA  --  141  --  142 139   POTASSIUM  --  3.9  --  3.7 3.6   CHLORIDE  --  108  --  109 104   CO2  --  29  --  27 28   BUN  --  24  --  19 17   CR  --  0.77  --  0.82 0.95   ANIONGAP  --  4  --  6 7   SHAN  --  8.6  --  8.2* 9.3   GLC  --  98  --  156* 122*   TROPI  --   --   --   --  0.024       Recent Results (from the past 24 hour(s))   XR Chest 2 Views    Narrative    CHEST TWO VIEWS  11/22/2019 2:34 PM     HISTORY:  Shortness of breath.    COMPARISON: 11/18/2019.      Impression    IMPRESSION: No significant interval change. Cardiac enlargement.  Dual-lead cardiac device left chest wall, with leads unchanged in  position. Pulmonary vascularity is within normal limits. Tortuous  calcified thoracic aorta. No pleural effusions. Lungs clear.

## 2019-11-22 NOTE — PLAN OF CARE
"VS BP (!) 152/87 (BP Location: Left arm)   Pulse 80   Temp 98.6  F (37  C) (Oral)   Resp 22   Ht 1.575 m (5' 2.01\")   Wt 75.1 kg (165 lb 7.7 oz)   SpO2 94%   BMI 30.26 kg/m    Lung sounds course, expiratory/inspiratory wheezes, CXR pending  O2 weaned to room air  Bowel sounds active, BM this shift  Tolerating regular diet  IVF saline locked  Dressings multiple mepilex in place over cancerous sites/biopsies  CMS forgetful  Activity up with stand by assist  Pain denies, takes scheduled [place]   Patient/family centered care patient involved in plan of care rounding  Discharge plan anticipate discharge to TCU when stable and placement found    "

## 2019-11-22 NOTE — PROGRESS NOTES
11/22/19 1119   Quick Adds   Type of Visit Initial Occupational Therapy Evaluation   Living Environment   Lives With alone   Living Arrangements house   Home Accessibility stairs to enter home   Number of Stairs, Main Entrance 2   Stair Railings, Main Entrance railings on both sides of stairs   Transportation Anticipated family or friend will provide   Living Environment Comment Lives alone in large house, per pt report all needs met on main level, has tub shower with a grab bar but pt reports she usually sponge bathes in sink and does not get into tub d/t fear of falling, has raised toilet seat   Self-Care   Equipment Currently Used at Home walker, rolling  (4ww)   Functional Level   Ambulation 1-->assistive equipment   Transferring 1-->assistive equipment   Toileting 1-->assistive equipment   Bathing 3-->assistive equipment and person  (sponge bath)   Dressing 0-->independent   Eating 0-->independent   Communication 0-->understands/communicates without difficulty   Swallowing 0-->swallows foods/liquids without difficulty   Cognition 0 - no cognition issues reported   Fall history within last six months yes   Number of times patient has fallen within last six months 1   Which of the above functional risks had a recent onset or change? ambulation;transferring;toileting;bathing;dressing   General Information   Onset of Illness/Injury or Date of Surgery - Date 11/18/19   Referring Physician Dr. Roxana Bhatia   Patient/Family Goals Statement to discharge to rehab   Additional Occupational Profile Info/Pertinent History of Current Problem Patient is a 89-year-old female with a past medical history including paroxysmal atrial fibrillation on warfarin, type 2 diabetes mellitus diet-controlled, hypertension, hiatal hernia, peptic ulcer disease, and anemia presents to [facility] on 11/18 with shortness of breath and cough. DX with Acute viral bronchitis with acute hypoxic respiratory failure   Precautions/Limitations  fall precautions   General Observations patient seated in chair and agreeable to OT session   Cognitive Status Examination   Orientation orientation to person, place and time   Level of Consciousness alert   Follows Commands (Cognition) WNL   Memory intact   Visual Perception   Visual Perception Comments patient B cataract removeal and no longer needs glasses   Pain Assessment   Patient Currently in Pain Yes, see Vital Sign flowsheet  (rating not provided)   Integumentary/Edema   Integumentary/Edema no deficits were identifed   Range of Motion (ROM)   ROM Quick Adds No deficits were identified   Strength   Strength Comments demonstrates generalized weakness and activity tolerance   Hand Strength   Hand Strength Comments B hand strength intact   Muscle Tone Assessment   Muscle Tone Quick Adds No deficits were identified   Coordination   Upper Extremity Coordination No deficits were identified   Mobility   Bed Mobility Comments not assessed, patient was in chair upon OT arrival and requested to remain in chiar   Transfer Skill: Sit to Stand   Level of [place]: Sit/Stand contact guard   Physical Assist/Nonphysical Assist: Sit/Stand supervision   Transfer Skill: Sit to Stand weight-bearing as tolerated   Assistive Device for Transfer: Sit/Stand rolling walker   Toilet Transfer   Toilet Transfer Comments patient declined offer to practice or complete toileting   Balance   Balance Comments LOB was not noted, general unsteadiness observed at times   Lower Body Dressing   Level of [place]: Dress Lower Body contact guard  (treatment initiated-defer to OT daily note for details)   Physical Assist/Nonphysical Assist: Dress Lower Body supervision   Eating/Self Feeding   Level of [place]: Eating independent   Instrumental Activities of Daily Living (IADL)   Previous Responsibilities laundry;shopping   IADL Comments patient's daughter A with shopping needs (patient was able to ambulate in [employer] and [facility]  "while pushing 4ww or cart, slowly, but able to manage with dtr A with shopping in Oct). Dtr completes housekeeping, all meals come from [facility], patient no longer cooks   Activities of Daily Living Analysis   Impairments Contributing to Impaired Activities of Daily Living strength decreased;pain  (decreased activity tolerance, MARIO)   General Therapy Interventions   Planned Therapy Interventions ADL retraining;progressive activity/exercise   Clinical Impression   Criteria for Skilled Therapeutic Interventions Met yes, treatment indicated   OT Diagnosis decreased ADLs   Influenced by the following impairments strength decreased;pain, decreased activity tolerance   Assessment of Occupational Performance 5 or more Performance Deficits   Identified Performance Deficits decreased ADL's and IADL's- dsg, toileting, household chores (laundry), safe functional and community mobility   Clinical Decision Making (Complexity) Moderate complexity   Therapy Frequency 3x/week   Predicted Duration of Therapy Intervention (days/wks) 3 days   Anticipated Discharge Disposition Transitional Care Facility   Risks and Benefits of Treatment have been explained. Yes   Patient, Family & other staff in agreement with plan of care Yes   [facility] [employer]  \"6 Clicks\"   2016, Trustees of [facility], under license to [employer].  All rights reserved.   6 Clicks Short Forms Daily Activity Inpatient Short Form   [facility] AM-PAC  \"6 Clicks\" Daily Activity Inpatient Short Form   1. Putting on and taking off regular lower body clothing? 3 - A Little   2. Bathing (including washing, rinsing, drying)? 3 - A Little   3. Toileting, which includes using toilet, bedpan or urinal? 3 - A Little   4. Putting on and taking off regular upper body clothing? 3 - A Little   5. Taking care of personal grooming such as brushing teeth? 3 - A Little   6. Eating meals? 4 - None   Daily Activity Raw Score (Score out of 24.Lower scores equate to " lower levels of function) 19   Total Evaluation Time   Total Evaluation Time (Minutes) 10

## 2019-11-23 ENCOUNTER — APPOINTMENT (OUTPATIENT)
Dept: OCCUPATIONAL THERAPY | Facility: CLINIC | Age: 84
DRG: 203 | End: 2019-11-23
Attending: INTERNAL MEDICINE
Payer: COMMERCIAL

## 2019-11-23 ENCOUNTER — APPOINTMENT (OUTPATIENT)
Dept: PHYSICAL THERAPY | Facility: CLINIC | Age: 84
DRG: 203 | End: 2019-11-23
Payer: COMMERCIAL

## 2019-11-23 LAB
ANION GAP SERPL CALCULATED.3IONS-SCNC: 1 MMOL/L (ref 3–14)
BUN SERPL-MCNC: 22 MG/DL (ref 7–30)
CALCIUM SERPL-MCNC: 8.6 MG/DL (ref 8.5–10.1)
CHLORIDE SERPL-SCNC: 105 MMOL/L (ref 94–109)
CO2 SERPL-SCNC: 33 MMOL/L (ref 20–32)
CREAT SERPL-MCNC: 0.74 MG/DL (ref 0.52–1.04)
ERYTHROCYTE [DISTWIDTH] IN BLOOD BY AUTOMATED COUNT: 13.2 % (ref 10–15)
GFR SERPL CREATININE-BSD FRML MDRD: 71 ML/MIN/{1.73_M2}
GLUCOSE SERPL-MCNC: 104 MG/DL (ref 70–99)
HCT VFR BLD AUTO: 29.5 % (ref 35–47)
HGB BLD-MCNC: 9.6 G/DL (ref 11.7–15.7)
INR PPP: 1.91 (ref 0.86–1.14)
MCH RBC QN AUTO: 33.1 PG (ref 26.5–33)
MCHC RBC AUTO-ENTMCNC: 32.5 G/DL (ref 31.5–36.5)
MCV RBC AUTO: 102 FL (ref 78–100)
PLATELET # BLD AUTO: 161 10E9/L (ref 150–450)
POTASSIUM SERPL-SCNC: 4.4 MMOL/L (ref 3.4–5.3)
RBC # BLD AUTO: 2.9 10E12/L (ref 3.8–5.2)
SODIUM SERPL-SCNC: 139 MMOL/L (ref 133–144)
WBC # BLD AUTO: 4.6 10E9/L (ref 4–11)

## 2019-11-23 PROCEDURE — 94640 AIRWAY INHALATION TREATMENT: CPT

## 2019-11-23 PROCEDURE — 12000000 ZZH R&B MED SURG/OB

## 2019-11-23 PROCEDURE — 25000125 ZZHC RX 250: Performed by: HOSPITALIST

## 2019-11-23 PROCEDURE — 94640 AIRWAY INHALATION TREATMENT: CPT | Mod: 76

## 2019-11-23 PROCEDURE — 25000131 ZZH RX MED GY IP 250 OP 636 PS 637: Performed by: INTERNAL MEDICINE

## 2019-11-23 PROCEDURE — 97530 THERAPEUTIC ACTIVITIES: CPT | Mod: GO

## 2019-11-23 PROCEDURE — 85610 PROTHROMBIN TIME: CPT | Performed by: INTERNAL MEDICINE

## 2019-11-23 PROCEDURE — 80048 BASIC METABOLIC PNL TOTAL CA: CPT | Performed by: INTERNAL MEDICINE

## 2019-11-23 PROCEDURE — 94799 UNLISTED PULMONARY SVC/PX: CPT

## 2019-11-23 PROCEDURE — 97530 THERAPEUTIC ACTIVITIES: CPT | Mod: GP | Performed by: PHYSICAL THERAPIST

## 2019-11-23 PROCEDURE — 25000132 ZZH RX MED GY IP 250 OP 250 PS 637: Performed by: INTERNAL MEDICINE

## 2019-11-23 PROCEDURE — 36415 COLL VENOUS BLD VENIPUNCTURE: CPT | Performed by: INTERNAL MEDICINE

## 2019-11-23 PROCEDURE — 40000275 ZZH STATISTIC RCP TIME EA 10 MIN

## 2019-11-23 PROCEDURE — 40000274 ZZH STATISTIC RCP CONSULT EA 30 MIN

## 2019-11-23 PROCEDURE — 97116 GAIT TRAINING THERAPY: CPT | Mod: GP | Performed by: PHYSICAL THERAPIST

## 2019-11-23 PROCEDURE — 99232 SBSQ HOSP IP/OBS MODERATE 35: CPT | Performed by: INTERNAL MEDICINE

## 2019-11-23 PROCEDURE — 85027 COMPLETE CBC AUTOMATED: CPT | Performed by: INTERNAL MEDICINE

## 2019-11-23 RX ORDER — WARFARIN SODIUM 2.5 MG/1
2.5 TABLET ORAL
Status: COMPLETED | OUTPATIENT
Start: 2019-11-23 | End: 2019-11-23

## 2019-11-23 RX ADMIN — AMOXICILLIN AND CLAVULANATE POTASSIUM 1 TABLET: 875; 125 TABLET, FILM COATED ORAL at 20:43

## 2019-11-23 RX ADMIN — ATORVASTATIN CALCIUM 10 MG: 10 TABLET, FILM COATED ORAL at 22:23

## 2019-11-23 RX ADMIN — FLUTICASONE PROPIONATE 1 SPRAY: 50 SPRAY, METERED NASAL at 09:15

## 2019-11-23 RX ADMIN — OMEPRAZOLE 20 MG: 20 CAPSULE, DELAYED RELEASE ORAL at 09:13

## 2019-11-23 RX ADMIN — IPRATROPIUM BROMIDE AND ALBUTEROL SULFATE 3 ML: .5; 3 SOLUTION RESPIRATORY (INHALATION) at 19:31

## 2019-11-23 RX ADMIN — PREDNISONE 40 MG: 20 TABLET ORAL at 09:13

## 2019-11-23 RX ADMIN — IPRATROPIUM BROMIDE AND ALBUTEROL SULFATE 3 ML: .5; 3 SOLUTION RESPIRATORY (INHALATION) at 07:37

## 2019-11-23 RX ADMIN — HYDROCODONE BITARTRATE AND ACETAMINOPHEN 1 TABLET: 5; 325 TABLET ORAL at 22:23

## 2019-11-23 RX ADMIN — HYDROCODONE BITARTRATE AND ACETAMINOPHEN 1 TABLET: 5; 325 TABLET ORAL at 16:09

## 2019-11-23 RX ADMIN — METOPROLOL TARTRATE 50 MG: 50 TABLET, FILM COATED ORAL at 20:43

## 2019-11-23 RX ADMIN — AMOXICILLIN AND CLAVULANATE POTASSIUM 1 TABLET: 875; 125 TABLET, FILM COATED ORAL at 09:13

## 2019-11-23 RX ADMIN — IPRATROPIUM BROMIDE AND ALBUTEROL SULFATE 3 ML: .5; 3 SOLUTION RESPIRATORY (INHALATION) at 15:28

## 2019-11-23 RX ADMIN — IPRATROPIUM BROMIDE AND ALBUTEROL SULFATE 3 ML: .5; 3 SOLUTION RESPIRATORY (INHALATION) at 11:52

## 2019-11-23 RX ADMIN — WARFARIN SODIUM 2.5 MG: 2.5 TABLET ORAL at 18:06

## 2019-11-23 RX ADMIN — METOPROLOL TARTRATE 50 MG: 50 TABLET, FILM COATED ORAL at 09:13

## 2019-11-23 RX ADMIN — HYDROCODONE BITARTRATE AND ACETAMINOPHEN 1 TABLET: 5; 325 TABLET ORAL at 09:13

## 2019-11-23 ASSESSMENT — ACTIVITIES OF DAILY LIVING (ADL)
ADLS_ACUITY_SCORE: 23

## 2019-11-23 NOTE — PLAN OF CARE
Pt A/O x 4, forgetful. VSS, Pt denies pain, headache, dizziness, & n/v. Pt dyspneic upon exertion. Lung sounds coarse, expiratory wheezes, on 1L O2. Pt up Asst:1 w/walker & gait belt. Foam dressings in-place, no drainage. Will continue with plan of care.

## 2019-11-23 NOTE — PLAN OF CARE
Discharge Planner PT   Patient plan for discharge: TCU  Current status: gait 100 feet x 2 with 4WW, 1 l oxygen via n/c , sats 96 % after HR up to 108 bpm. Transfers sit to stand from chair with min assist.  Ambulates with flexed trunk and forearms on the handles of the walker , with upright trunk greater trendelenburg on the R.   Barriers to return to prior living situation: lives alone, stairs, decreased from her baseline.   Recommendations for discharge: TCU  Rationale for recommendations: Pt would benefit from further PT to safely progress her functional activity tolerance, strength and mobiltiy and optimize her ability to return home safely.        Entered by: Kaitlyn Yates 11/23/2019 1:05 PM

## 2019-11-23 NOTE — PLAN OF CARE
A&O, forgetful. Denies pain. VSS. IV SL. LS Coarse. MARIO/SOB. O2 95% on 1 L. Does not wear O2 at home. Dressing to LLE and RLE CDI. Assist of 1 with a walker.

## 2019-11-23 NOTE — PROGRESS NOTES
"                                                            [facility] RCAT    Date:11/23/2019  Admission Dx: Acute viral bronchitis with acute hypoxic respiratory failure  Pulmonary History: none  Home Nebulizer/MDI Use: none  Home Oxygen: none    Acuity Level (RCAT flow sheet): 3    Aerosol Therapy initiated: Duoneb QID    Pulmonary Hygiene initiated: Acapella QID    Volume Expansion initiated: IS    Current Oxygen Requirements: RA     Current SpO2: 92%    Re-evaluation date: 30 November     Patient Education: Patient informed of medication benefits and possible side effects.    See \"RT Assessments\" flow sheet for patient assessment scoring and Acuity Level Details.       Jocy Renteria, RRT  11/23/2019    "

## 2019-11-23 NOTE — PLAN OF CARE
Discharge Planner OT   Patient plan for discharge: TCU  Current status: Pt completed sit > stand from chair with min A, unable to achieve upright standing position without boost. Pt ambulated short distance in room 4ww level, SOB noted, unable to tolerate upright standing position due to fatigue/weakness. MARIO with minimal activity, required min A for safe mgmt of 4ww. O2 sats 96% on 1LPM at rest; 93% on 1LPM post activity. Declined ADLs at this time.    Barriers to return to prior living situation: lives alone, stairs to enter, decreased strength requiring assist for transfers and decreased activity tolerance, below baseline for ADL's  Recommendations for discharge: TCU  Rationale for recommendations: Patient would benefit from TCU to maximize ADL and functional mobility I in order to return home safety and PLOF. OT will continue to advance ADL's, improve activity tolerance and strength.       Entered by: Shavonne Navarro 11/23/2019 10:57 AM

## 2019-11-24 ENCOUNTER — APPOINTMENT (OUTPATIENT)
Dept: PHYSICAL THERAPY | Facility: CLINIC | Age: 84
DRG: 203 | End: 2019-11-24
Payer: COMMERCIAL

## 2019-11-24 LAB
BACTERIA SPEC CULT: NO GROWTH
BACTERIA SPEC CULT: NO GROWTH
INR PPP: 2.11 (ref 0.86–1.14)
Lab: NORMAL
Lab: NORMAL
SPECIMEN SOURCE: NORMAL
SPECIMEN SOURCE: NORMAL

## 2019-11-24 PROCEDURE — 25000131 ZZH RX MED GY IP 250 OP 636 PS 637: Performed by: INTERNAL MEDICINE

## 2019-11-24 PROCEDURE — 94640 AIRWAY INHALATION TREATMENT: CPT | Mod: 76

## 2019-11-24 PROCEDURE — 40000275 ZZH STATISTIC RCP TIME EA 10 MIN

## 2019-11-24 PROCEDURE — 36415 COLL VENOUS BLD VENIPUNCTURE: CPT | Performed by: INTERNAL MEDICINE

## 2019-11-24 PROCEDURE — 99232 SBSQ HOSP IP/OBS MODERATE 35: CPT | Performed by: INTERNAL MEDICINE

## 2019-11-24 PROCEDURE — 25000132 ZZH RX MED GY IP 250 OP 250 PS 637: Performed by: INTERNAL MEDICINE

## 2019-11-24 PROCEDURE — 12000000 ZZH R&B MED SURG/OB

## 2019-11-24 PROCEDURE — 97116 GAIT TRAINING THERAPY: CPT | Mod: GP

## 2019-11-24 PROCEDURE — 94640 AIRWAY INHALATION TREATMENT: CPT

## 2019-11-24 PROCEDURE — 85610 PROTHROMBIN TIME: CPT | Performed by: INTERNAL MEDICINE

## 2019-11-24 PROCEDURE — 97530 THERAPEUTIC ACTIVITIES: CPT | Mod: GP

## 2019-11-24 PROCEDURE — 25000125 ZZHC RX 250: Performed by: HOSPITALIST

## 2019-11-24 RX ORDER — WARFARIN SODIUM 2.5 MG/1
2.5 TABLET ORAL
Status: COMPLETED | OUTPATIENT
Start: 2019-11-24 | End: 2019-11-24

## 2019-11-24 RX ADMIN — OMEPRAZOLE 20 MG: 20 CAPSULE, DELAYED RELEASE ORAL at 08:12

## 2019-11-24 RX ADMIN — METOPROLOL TARTRATE 50 MG: 50 TABLET, FILM COATED ORAL at 21:46

## 2019-11-24 RX ADMIN — FLUTICASONE PROPIONATE 1 SPRAY: 50 SPRAY, METERED NASAL at 08:11

## 2019-11-24 RX ADMIN — METOPROLOL TARTRATE 50 MG: 50 TABLET, FILM COATED ORAL at 08:12

## 2019-11-24 RX ADMIN — WARFARIN SODIUM 2.5 MG: 2.5 TABLET ORAL at 17:41

## 2019-11-24 RX ADMIN — IPRATROPIUM BROMIDE AND ALBUTEROL SULFATE 3 ML: .5; 3 SOLUTION RESPIRATORY (INHALATION) at 19:53

## 2019-11-24 RX ADMIN — HYDROCODONE BITARTRATE AND ACETAMINOPHEN 1 TABLET: 5; 325 TABLET ORAL at 16:03

## 2019-11-24 RX ADMIN — IPRATROPIUM BROMIDE AND ALBUTEROL SULFATE 3 ML: .5; 3 SOLUTION RESPIRATORY (INHALATION) at 07:58

## 2019-11-24 RX ADMIN — PREDNISONE 40 MG: 20 TABLET ORAL at 08:12

## 2019-11-24 RX ADMIN — IPRATROPIUM BROMIDE AND ALBUTEROL SULFATE 3 ML: .5; 3 SOLUTION RESPIRATORY (INHALATION) at 15:38

## 2019-11-24 RX ADMIN — HYDROCODONE BITARTRATE AND ACETAMINOPHEN 1 TABLET: 5; 325 TABLET ORAL at 08:12

## 2019-11-24 RX ADMIN — HYDROCODONE BITARTRATE AND ACETAMINOPHEN 1 TABLET: 5; 325 TABLET ORAL at 21:45

## 2019-11-24 RX ADMIN — ATORVASTATIN CALCIUM 10 MG: 10 TABLET, FILM COATED ORAL at 21:46

## 2019-11-24 ASSESSMENT — ACTIVITIES OF DAILY LIVING (ADL)
ADLS_ACUITY_SCORE: 23

## 2019-11-24 NOTE — PROGRESS NOTES
[facility]  Hospitalist Progress Note  Patient Name: Radha Cunningham    MRN: [id number]  Provider: Dean Taavrez MD  11/24/19    Initial presenting complaint/issue to hospital (Diagnosis): shortness of breath         Assessment and Plan:      Summary:  Radha is a 89-year-old female with a past medical history including paroxysmal atrial fibrillation on warfarin, type 2 diabetes mellitus diet-controlled, hypertension, hiatal hernia, peptic ulcer disease, and anemia who presented to [facility] on 11/18 with shortness of breath and cough. Work up suggested acute bronchitis with bronchospasm and acute hypoxic respiratory failure.     Problem list:     Acute viral bronchitis with bronchospasm and acute hypoxic respiratory failure  On admission patient was tachycardic, had a low-grade fever, and 2 L nasal cannula in the ER.   -She was treated with Solu-Medrol 40 mg IV twice daily, Xopenex, Tessalon Perles, oxygen and Flonase.  She was transitioned to Augmentin (5 day course completed)  and oral prednisone (starting taper with dose reduction to 30 mg daily tomorrow). ContinueDuonebs QID. Wheezing was worse on 11/23 but improved today. Continue to wean O2 as able      Abdominal pain  -Resolved     Fall  Patient fell 1 week ago.  CT did not show rib fractures.  Lumbar pelvis x-rays were completed did not show an acute fracture  -Continue with physical therapy and recommending TCU on discharge     Paroxysmal A. Fib   -Continued metoprolol 50 mg BID  -Coumadin with pharmacy to dose     Hx of DMII  -Diet controlled  -A1c 6.1  -POCT     Chronic pain syndrome  -Chronic back and SI joint pain  -Continue Noroc     GERD  -Omeprazole 20 mg daily      HLD  -Atorvastatin 10 mg daily      FEN: Oral hydration; monitor; regular   DVT Prophylaxis: Warfarin  Code Status: Full Code  Disposition:  SW following. To TCU tomorrow if improvement continues.         Interval History:      Less wheezing today- feels better.         "           Physical Exam:      Last Vital Signs:  /71 (BP Location: Left arm)   Pulse 75   Temp 98.3  F (36.8  C) (Oral)   Resp 20   Ht 1.575 m (5' 2.01\")   Wt 75.1 kg (165 lb 7.7 oz)   SpO2 94%   BMI 30.26 kg/m      Intake/Output Summary (Last 24 hours) at 11/24/2019 1531  Last data filed at 11/24/2019 1329  Gross per 24 hour   Intake 536 ml   Output --   Net 536 ml       GENERAL:  Comfortable. Cooperative.  PSYCH: pleasant, oriented, No acute distress.  EYES: PERRLA, Normal conjunctiva.  HEART:  Regular rate and rhythm. No JVD. Pulses normal. No edema.  LUNGS:  Clear to auscultation, normal Respiratory effort.  ABDOMEN:  Soft, no hepatosplenomegaly, normal bowel sounds.  EXTREMETIES: No clubbing, cyanosis or ischemia  SKIN:  Dry to touch, No rash.           Medications:      All current medications were reviewed.         Data:      All new lab and imaging data was reviewed.   Labs:  Recent Labs   Lab 11/18/19  1111 11/18/19  1025 11/18/19  0953   CULT No growth No growth No growth          Lab Results   Component Value Date     11/23/2019     11/22/2019     11/21/2019    Lab Results   Component Value Date    CHLORIDE 105 11/23/2019    CHLORIDE 107 11/22/2019    CHLORIDE 108 11/21/2019    Lab Results   Component Value Date    BUN 22 11/23/2019    BUN 23 11/22/2019    BUN 24 11/21/2019      Lab Results   Component Value Date    POTASSIUM 4.4 11/23/2019    POTASSIUM 3.9 11/22/2019    POTASSIUM 3.9 11/21/2019    Lab Results   Component Value Date    CO2 33 11/23/2019    CO2 28 11/22/2019    CO2 29 11/21/2019    Lab Results   Component Value Date    CR 0.74 11/23/2019    CR 0.72 11/22/2019    CR 0.77 11/21/2019        Recent Labs   Lab 11/23/19  0654 11/22/19  0738 11/21/19  0721   WBC 4.6 5.9 6.7   HGB 9.6* 9.7* 9.6*   HCT 29.5* 30.2* 28.8*   * 101* 101*    172 166            "

## 2019-11-24 NOTE — PLAN OF CARE
VSS on 1L O2. Scheduled Norco for chronic pain. Attempted to wean off oxygen, but de-sated to mid 80s. Up with 1 and walker. Incontinent at times. PO augmentin. Accepted at [facility] waiting insurance authorization. Plan: possibly discharge Monday.

## 2019-11-24 NOTE — PLAN OF CARE
VSS. On scheduled Norco for chronic pain. Placed on 1L 02. Ambulated in halls with staff x1. Scheduled nebs. Up with 1. Insurance auth came through today. Plan: possibly discharge tomorrow to [facility].

## 2019-11-24 NOTE — PLAN OF CARE
Discharge Planner PT   Patient plan for discharge: rehab  Current status: Sats 90% on RA at rest. 1L O2 during ambulation sats > 90%.     Pt performed STS with min A x 1, requires cues and education for walker safety with use of 4WW as pt tends to abandon walker leaving it to the side when approaching surfaces to sit. Also cues to lock braks    Pt ambulated 80' x 2 with use of 4WW and CGA-SBA, pt required extended seated rest break, increased wheezing noted post ambulation. Pt ambulates with kyphotic posture, leans forearms on walker     Barriers to return to prior living situation: Level of assist, impaired activity tolerance, fall risk     Recommendations for discharge: TCU    Rationale for recommendations: Pt will benefit from continued skilled PT at TCU to improve strength, balance, gait, endurance to improve functional mobility prior to return home          Entered by: Alissa Farley 11/24/2019 11:47 AM

## 2019-11-24 NOTE — PLAN OF CARE
A&O, but forgetful. Denies pain. LS Coarse crackles. MARIO. 98% 1 L. Dressing to BLE, CDI. Incontinent of urine at times. Assist of 1 with walker/gait belt.

## 2019-11-24 NOTE — PROGRESS NOTES
Received a call from Lynette at [facility] reporting insurance auth as come through. Lynette requested medicare number.   Page MD

## 2019-11-25 VITALS
BODY MASS INDEX: 30.45 KG/M2 | TEMPERATURE: 97.8 F | OXYGEN SATURATION: 94 % | WEIGHT: 165.48 LBS | RESPIRATION RATE: 19 BRPM | HEART RATE: 75 BPM | HEIGHT: 62 IN | SYSTOLIC BLOOD PRESSURE: 147 MMHG | DIASTOLIC BLOOD PRESSURE: 67 MMHG

## 2019-11-25 PROBLEM — J20.9 ACUTE BRONCHITIS: Status: ACTIVE | Noted: 2019-11-25

## 2019-11-25 PROBLEM — J96.01 ACUTE RESPIRATORY FAILURE WITH HYPOXIA (H): Status: ACTIVE | Noted: 2019-11-25

## 2019-11-25 LAB — INR PPP: 2.5 (ref 0.86–1.14)

## 2019-11-25 PROCEDURE — 25000125 ZZHC RX 250: Performed by: HOSPITALIST

## 2019-11-25 PROCEDURE — 99239 HOSP IP/OBS DSCHRG MGMT >30: CPT | Performed by: INTERNAL MEDICINE

## 2019-11-25 PROCEDURE — 25000132 ZZH RX MED GY IP 250 OP 250 PS 637: Performed by: INTERNAL MEDICINE

## 2019-11-25 PROCEDURE — 94640 AIRWAY INHALATION TREATMENT: CPT | Mod: 76

## 2019-11-25 PROCEDURE — 85610 PROTHROMBIN TIME: CPT | Performed by: INTERNAL MEDICINE

## 2019-11-25 PROCEDURE — 25000128 H RX IP 250 OP 636: Performed by: INTERNAL MEDICINE

## 2019-11-25 PROCEDURE — 94640 AIRWAY INHALATION TREATMENT: CPT

## 2019-11-25 PROCEDURE — 36415 COLL VENOUS BLD VENIPUNCTURE: CPT | Performed by: INTERNAL MEDICINE

## 2019-11-25 PROCEDURE — 25000131 ZZH RX MED GY IP 250 OP 636 PS 637: Performed by: INTERNAL MEDICINE

## 2019-11-25 PROCEDURE — 40000275 ZZH STATISTIC RCP TIME EA 10 MIN

## 2019-11-25 RX ORDER — ALBUTEROL SULFATE 0.63 MG/3ML
1 SOLUTION RESPIRATORY (INHALATION) EVERY 4 HOURS PRN
DISCHARGE
Start: 2019-11-25 | End: 2019-12-13 | Stop reason: ALTCHOICE

## 2019-11-25 RX ORDER — BENZONATATE 100 MG/1
100 CAPSULE ORAL 3 TIMES DAILY PRN
DISCHARGE
Start: 2019-11-25 | End: 2019-12-13

## 2019-11-25 RX ORDER — POLYETHYLENE GLYCOL 3350 17 G/17G
17 POWDER, FOR SOLUTION ORAL DAILY PRN
Status: ON HOLD | DISCHARGE
Start: 2019-11-25 | End: 2020-09-06

## 2019-11-25 RX ORDER — POTASSIUM CHLORIDE 1.5 G/1.58G
20 POWDER, FOR SOLUTION ORAL ONCE
Status: COMPLETED | OUTPATIENT
Start: 2019-11-25 | End: 2019-11-25

## 2019-11-25 RX ORDER — IPRATROPIUM BROMIDE AND ALBUTEROL SULFATE 2.5; .5 MG/3ML; MG/3ML
3 SOLUTION RESPIRATORY (INHALATION) 4 TIMES DAILY
DISCHARGE
Start: 2019-11-25 | End: 2021-09-22

## 2019-11-25 RX ORDER — WARFARIN SODIUM 2.5 MG/1
2.5 TABLET ORAL EVERY EVENING
DISCHARGE
Start: 2019-11-25 | End: 2019-11-28

## 2019-11-25 RX ORDER — ACETAMINOPHEN 325 MG/1
325-650 TABLET ORAL EVERY 6 HOURS PRN
Status: ON HOLD | DISCHARGE
Start: 2019-11-25 | End: 2020-09-06

## 2019-11-25 RX ORDER — FUROSEMIDE 10 MG/ML
20 INJECTION INTRAMUSCULAR; INTRAVENOUS ONCE
Status: COMPLETED | OUTPATIENT
Start: 2019-11-25 | End: 2019-11-25

## 2019-11-25 RX ORDER — WARFARIN SODIUM 2 MG/1
3 TABLET ORAL EVERY EVENING
DISCHARGE
Start: 2019-11-25 | End: 2019-11-25

## 2019-11-25 RX ORDER — AMOXICILLIN 250 MG
1 CAPSULE ORAL 2 TIMES DAILY PRN
Status: ON HOLD | DISCHARGE
Start: 2019-11-25 | End: 2020-09-06

## 2019-11-25 RX ORDER — PREDNISONE 10 MG/1
TABLET ORAL
DISCHARGE
Start: 2019-11-25 | End: 2019-12-13

## 2019-11-25 RX ORDER — HYDROCODONE BITARTRATE AND ACETAMINOPHEN 5; 325 MG/1; MG/1
1 TABLET ORAL 3 TIMES DAILY
Qty: 15 TABLET | Refills: 0 | Status: SHIPPED | OUTPATIENT
Start: 2019-11-25 | End: 2019-11-30

## 2019-11-25 RX ADMIN — IPRATROPIUM BROMIDE AND ALBUTEROL SULFATE 3 ML: .5; 3 SOLUTION RESPIRATORY (INHALATION) at 08:04

## 2019-11-25 RX ADMIN — METOPROLOL TARTRATE 50 MG: 50 TABLET, FILM COATED ORAL at 09:07

## 2019-11-25 RX ADMIN — HYDROCODONE BITARTRATE AND ACETAMINOPHEN 1 TABLET: 5; 325 TABLET ORAL at 09:07

## 2019-11-25 RX ADMIN — FUROSEMIDE 20 MG: 10 INJECTION, SOLUTION INTRAVENOUS at 11:58

## 2019-11-25 RX ADMIN — POTASSIUM CHLORIDE 20 MEQ: 1.5 POWDER, FOR SOLUTION ORAL at 11:58

## 2019-11-25 RX ADMIN — IPRATROPIUM BROMIDE AND ALBUTEROL SULFATE 3 ML: .5; 3 SOLUTION RESPIRATORY (INHALATION) at 11:22

## 2019-11-25 RX ADMIN — PREDNISONE 30 MG: 20 TABLET ORAL at 09:07

## 2019-11-25 RX ADMIN — FLUTICASONE PROPIONATE 1 SPRAY: 50 SPRAY, METERED NASAL at 09:11

## 2019-11-25 RX ADMIN — OMEPRAZOLE 20 MG: 20 CAPSULE, DELAYED RELEASE ORAL at 09:10

## 2019-11-25 ASSESSMENT — ACTIVITIES OF DAILY LIVING (ADL)
ADLS_ACUITY_SCORE: 23

## 2019-11-25 NOTE — PLAN OF CARE
Occupational Therapy Discharge Summary    Reason for therapy discharge:    Discharged to transitional care facility.    Progress towards therapy goal(s). See goals on Care Plan in [facility] electronic health record for goal details.  Goals not met.  Barriers to achieving goals:   limited tolerance for therapy and discharge from facility.    Therapy recommendation(s):    Continued therapy is recommended.  Rationale/Recommendations:  TCU.

## 2019-11-25 NOTE — PLAN OF CARE
Vitals stable and afebrile. Took norco for chronic pain per normal dose. Watching tv award show and talking on the phone this evening.

## 2019-11-25 NOTE — PLAN OF CARE
A&O, forgetful. Denies pain. LS coarse crackles throughout, Bilateral upper lobes exp. Wheezing. O2 96% on RA. Dressings CDI to BLE. Incontinent at times. Assist of 1 with walker.

## 2019-11-25 NOTE — PHARMACY-ANTICOAGULATION SERVICE
Clinical Pharmacy- Warfarin Discharge Note  Warfarin PTA Regimen: 3mg daily      Anticoagulation Dose History     Recent Dosing and Labs Latest Ref Rng & Units 11/19/2019 11/20/2019 11/21/2019 11/22/2019 11/23/2019 11/24/2019 11/25/2019    Warfarin 1 mg - 1 mg 1 mg - - - - -    Warfarin 2.5 mg - - - 2.5 mg 2.5 mg 2.5 mg 2.5 mg -    INR 0.86 - 1.14 2.46(H) 2.67(H) 1.97(H) 1.83(H) 1.91(H) 2.11(H) 2.50(H)        Patient admitted with warfarin as pta med.  Agree with plan to reduce dose slightly to 2.5 mg daily- NOTE- patient is on steroids which can interact.  Will need close monitoring with decreasing steroid dose    The patient should have an INR checked weekly INR.

## 2019-11-25 NOTE — DISCHARGE SUMMARY
"Discharge Summary    Radha Cunningham MRN# [id number]   YOB: 1929 Age: 89 year old     Date of Admission:  11/18/2019  Date of Discharge:  11/25/2019  Admitting Physician:  Charanjit Broderick MD  Discharge Physician:  Dean Tavarez MD  Discharging Service:  Hospitalist     Home clinic: [facility]  Primary Provider: Morris Bush          Discharge Diagnosis:   Acute bronchitis, likely viral  Acute bronchospasm  Acute hypoxic respiratory failure  Paroxysmal atrial fibrillation  Chronic anticoagulation with coumadin  GERD  Diet controlled type 2 diabtes  Dyslipidemia  Skin cancer on right lower leg with plans for excision in the near future  Skin tear on left lower leg             Discharge Disposition:   Discharged to home           Allergies:   Allergies   Allergen Reactions     Contrast Dye                 Condition on Discharge:   Discharge condition: Stable   Discharge vitals: Blood pressure (!) 147/67, pulse 75, temperature 97.8  F (36.6  C), temperature source Oral, resp. rate 19, height 1.575 m (5' 2.01\"), weight 75.1 kg (165 lb 7.7 oz), SpO2 94 %.   Code status on discharge: Full Code   Physical exam on day of discharge:   GENERAL:  Comfortable. Cooperative.  PSYCH: pleasant, oriented, No acute distress.  EYES: PERRLA, Normal conjunctiva.  HEART:  Regular rate and rhythm. No JVD. Pulses normal. No edema.  LUNGS:  Clear to auscultation, normal Respiratory effort.  ABDOMEN:  Soft, no hepatosplenomegaly, normal bowel sounds.  EXTREMETIES: No clubbing, cyanosis or ischemia  SKIN:  Dry to touch, No rash.         History of Present Illness and Hospital Course:     See detailed admission note for full details.  Radha is a 89-year-old female with history of paroxysmal atrial fibrillation for which she is on warfarin, type 2 diabetes mellitus (diet-controlled), hypertension, hiatal hernia, peptic ulcer disease, and anemia. She presented to [facility] Emergency Department " on 11/18 with shortness of breath and cough. Work up suggested acute bronchitis with bronchospasm and acute hypoxic respiratory failure. Neither CXR nor CT of chest showed pneumonia.  She was admitted and treated with empiric antibiotics  (Augmentin), corticosteroids, bronchodilators, and supplemental oxygen. She was slow to improve but did improve. She is still weak and needing a bit of oxygen on the day of discharge. She will discharge to transitional care at [facility] today. I met with Radha and her two daughters prior to discharge and answered all questions. Her daughter, Carolina, advocated for a prolonged Prednisone taper. Given Radha's slow improvement, I agree that this would be advantageous.            Procedures / Imaging:   CXR  CT of chest           Consultations:   No consultations were requested during this admission             Pending Results:   None           Discharge Instructions and Follow-Up:   Discharge diet: Regular   Discharge activity: Activity as tolerated with assistance   Discharge follow-up: Follow up with nursing home physician with weekly INR   Outpatient therapy: PT and OT   Other instructions: None             Discharge Medications:   Current Discharge Medication List      START taking these medications    Details   acetaminophen (TYLENOL) 325 MG tablet Take 1-2 tablets (325-650 mg) by mouth every 6 hours as needed for mild pain    Associated Diagnoses: Pain      albuterol (ACCUNEB) 0.63 MG/3ML neb solution Take 3 mLs (0.63 mg) by nebulization every 4 hours as needed for shortness of breath / dyspnea or wheezing    Associated Diagnoses: Reactive airway disease with acute exacerbation, unspecified asthma severity, unspecified whether persistent      benzonatate (TESSALON) 100 MG capsule Take 1 capsule (100 mg) by mouth 3 times daily as needed for cough    Associated Diagnoses: Acute bronchitis, unspecified organism      guaiFENesin (ROBITUSSIN) 20 mg/mL SOLN solution Take 10  mLs by mouth every 4 hours as needed for cough    Associated Diagnoses: Acute bronchitis, unspecified organism      ipratropium - albuterol 0.5 mg/2.5 mg/3 mL (DUONEB) 0.5-2.5 (3) MG/3ML neb solution Take 1 vial (3 mLs) by nebulization 4 times daily    Associated Diagnoses: Reactive airway disease with acute exacerbation, unspecified asthma severity, unspecified whether persistent      melatonin 1 MG TABS tablet Take 1 tablet (1 mg) by mouth nightly as needed for sleep    Associated Diagnoses: Insomnia, unspecified type      polyethylene glycol (MIRALAX/GLYCOLAX) packet Take 17 g by mouth daily as needed for constipation    Associated Diagnoses: Constipation, unspecified constipation type      predniSONE (DELTASONE) 10 MG tablet By mouth, take 3 tabs daily for 4 days, then 2 tabs daily for 4 days, then 1 tab daily for 4 days, then stop.    Associated Diagnoses: Reactive airway disease with acute exacerbation, unspecified asthma severity, unspecified whether persistent      senna-docusate (SENOKOT-S/PERICOLACE) 8.6-50 MG tablet Take 1 tablet by mouth 2 times daily as needed for constipation    Associated Diagnoses: Constipation, unspecified constipation type         CONTINUE these medications which have CHANGED    Details   HYDROcodone-acetaminophen (NORCO) 5-325 MG tablet Take 1 tablet by mouth 3 times daily  Qty: 15 tablet, Refills: 0    Associated Diagnoses: Pain      warfarin ANTICOAGULANT (COUMADIN) 2.5 MG tablet Take 1 tablet (2.5 mg) by mouth every evening or as directed by provider.    Associated Diagnoses: Atrial fibrillation with RVR (H)         CONTINUE these medications which have NOT CHANGED    Details   ASPIRIN PO Take 81 mg by mouth daily       ATORVASTATIN CALCIUM PO Take 10 mg by mouth At Bedtime       calcium carbonate (OS-SHAN 500 MG [place]. [place]) 500 MG tablet Take 500 mg by mouth daily       metoprolol tartrate (LOPRESSOR) 50 MG tablet Take 50 mg by mouth 2 times daily      omeprazole (PRILOSEC) 20 MG  DR capsule Take 20 mg by mouth daily      vitamin D2 (ERGOCALCIFEROL) 73814 units (1250 mcg) capsule Take 50,000 Units by mouth once a week Tuesdays      fluticasone (FLONASE) 50 MCG/ACT nasal spray Spray 1 spray into both nostrils daily as needed for rhinitis or allergies                Total time spent in face to face contact with the patient and coordinating discharge was:  45 Minutes

## 2019-11-25 NOTE — PROVIDER NOTIFICATION
Discharge Planner   Discharge Plans in progress: Pt will discharge today to [facility] via HE wheelchair at 1300.  Pt's financial conservator, Sandra Post [phone], was updated on the discharge plan and is aware and acknowledges the costs for transport.  Sw updated the facility on the discharge time and orders were sent.  Pt's dtr Justa was present during visit with the pt.     11/25/19 1054   Final Resources   Resources List Skilled Nursing Facility   Skilled Nursing Facility [facility] [phone], Fax: [phone]   PAS Number [id number]     Barriers to discharge plan: None.  Follow up plan: Sw will continue to be available as needed until discharge.       Entered by: Magda Reeves 11/25/2019 10:58 AM     ALEJANDRO Peters, [facility]  Inpatient Care Coordination  [facility]  [phone]

## 2019-11-25 NOTE — PROGRESS NOTES
Your information has been submitted on November 25th, 2019 at 09:13:22 AM [facility]. The confirmation number is [id number]

## 2019-11-25 NOTE — PLAN OF CARE
Pt discharged to TCU with [facility] as transport via wheelchair.  Discharge instructions reviewed with patient, she verbalized understanding and all questions were answered.  IV removed.  Pt discharged with all personal belongings except red pair of soiled pants.  Pt was to be notified.

## 2019-11-25 NOTE — PLAN OF CARE
Physical Therapy Discharge Summary    Reason for therapy discharge:    Discharged to transitional care facility.    Progress towards therapy goal(s). See goals on Care Plan in [facility] electronic health record for goal details.  Goals partially met.  Barriers to achieving goals:   discharge from facility.    Therapy recommendation(s):    Continued therapy is recommended.  Rationale/Recommendations:  to improve IND with functional mobility as pt below baseline at this time .

## 2019-11-26 ENCOUNTER — NURSING HOME VISIT (OUTPATIENT)
Dept: GERIATRICS | Facility: CLINIC | Age: 84
End: 2019-11-26
Payer: COMMERCIAL

## 2019-11-26 VITALS
WEIGHT: 165 LBS | BODY MASS INDEX: 30.36 KG/M2 | OXYGEN SATURATION: 91 % | TEMPERATURE: 97.9 F | SYSTOLIC BLOOD PRESSURE: 155 MMHG | HEART RATE: 90 BPM | HEIGHT: 62 IN | RESPIRATION RATE: 20 BRPM | DIASTOLIC BLOOD PRESSURE: 89 MMHG

## 2019-11-26 DIAGNOSIS — R53.81 PHYSICAL DECONDITIONING: ICD-10-CM

## 2019-11-26 DIAGNOSIS — L97.329 DIABETIC ULCER OF LEFT ANKLE (H): ICD-10-CM

## 2019-11-26 DIAGNOSIS — E11.622 DIABETIC ULCER OF LEFT ANKLE (H): ICD-10-CM

## 2019-11-26 DIAGNOSIS — K21.9 GASTROESOPHAGEAL REFLUX DISEASE, ESOPHAGITIS PRESENCE NOT SPECIFIED: ICD-10-CM

## 2019-11-26 DIAGNOSIS — I48.0 PAROXYSMAL ATRIAL FIBRILLATION (H): ICD-10-CM

## 2019-11-26 DIAGNOSIS — E78.5 DYSLIPIDEMIA: ICD-10-CM

## 2019-11-26 DIAGNOSIS — Z71.89 ACP (ADVANCE CARE PLANNING): ICD-10-CM

## 2019-11-26 DIAGNOSIS — E11.9 TYPE 2 DIABETES, DIET CONTROLLED (H): ICD-10-CM

## 2019-11-26 DIAGNOSIS — J96.01 ACUTE RESPIRATORY FAILURE WITH HYPOXIA (H): ICD-10-CM

## 2019-11-26 DIAGNOSIS — J98.01 ACUTE BRONCHOSPASM: ICD-10-CM

## 2019-11-26 DIAGNOSIS — J20.9 ACUTE BRONCHITIS, UNSPECIFIED ORGANISM: Primary | ICD-10-CM

## 2019-11-26 DIAGNOSIS — I10 ESSENTIAL HYPERTENSION: ICD-10-CM

## 2019-11-26 DIAGNOSIS — G89.4 CHRONIC PAIN SYNDROME: ICD-10-CM

## 2019-11-26 DIAGNOSIS — C44.702 CANCER OF SKIN OF RIGHT LOWER EXTREMITY: ICD-10-CM

## 2019-11-26 PROCEDURE — 99310 SBSQ NF CARE HIGH MDM 45: CPT | Performed by: NURSE PRACTITIONER

## 2019-11-26 ASSESSMENT — MIFFLIN-ST. JEOR: SCORE: 1121.69

## 2019-11-26 NOTE — PROGRESS NOTES
[facility]  PRIMARY CARE PROVIDER AND CLINIC:  Morris Bush MD, [address] [facility] [address]  Chief Complaint   Patient presents with     Hospital F/U     [facility] Record Number:  [id number]  Place of Service where encounter took place:  [facility] ([facility] [[id number]]    Radha Cunningham  is a 89 year old  (6/20/1929), admitted to the above facility from  [facility]. Hospital stay 11/18/19 through 11/25/19..  Admitted to this facility for  rehab, medical management and nursing care.    HPI:    HPI information obtained from: facility chart records, facility staff, patient report and [facility] chart review, and Care Everywhere.   Brief Summary of Hospital Course: Ms. Radha Cunningham is a 89 year old female with PMH of paroxysmal atrial fibrillation on coumadin, type 2 diabetes that is diet controlled, HTN, peptic ulcer disease, anemia, chronic pain syndrome on narcotics, diabetic ulcer of left ankle, and hiatal hernia. She was admitted to [facility] from 11/18/19-11/25/19 for shortness of breath, cough and hypoxia, with acute bronchitis with bronchospasm and acute hypoxic respiratory failure. She was treated with augmentin, prednisone, bronchodilators and oxygen. Discharged to TCU 11/25 to work with therapy and for medical management.    Updates on Status Since Skilled nursing Admission: Ms. Cunningham was seen today for admission visit to TCU. She continues to report feeling SOB, however tells me it is improving. She has productive cough, but that too is improving. She has not required oxygen since admission to TCU- oxygen saturations on RA 90-94%. Denies CP, dizziness/lightheadedness, edema. Bowels are moving daily- denies loose stools/ constipation, abdominal pain, nausea. Reports no trouble voiding, dysuria. Denies pain. Reports feeling slightly weak. Uses a 4WW at home. She lives independently in a home. Her daughter lives  nearby and sees her on near daily basis. She tells me her mood is good. INR today 2.5    CODE STATUS/ADVANCE DIRECTIVES DISCUSSION:   CPR/Full code   Patient's living condition: lives alone  ALLERGIES: Contrast dye  PAST MEDICAL HISTORY:  has a past medical history of Asthma, Cholelithiasis, Diverticulosis, Hiatal hernia, Hypertension, Paroxysmal A-fib (H), PUD (peptic ulcer disease), and Skin cancer.  PAST SURGICAL HISTORY:   has a past surgical history that includes hernia repair (2004) and Skin cancer removal - forehead (2005).  FAMILY HISTORY: family history includes C.A.D. in her mother; Hypertension in her mother.  SOCIAL HISTORY:   reports that she has never smoked. She does not have any smokeless tobacco history on file. She reports that she does not drink alcohol.    Post Discharge Medication Reconciliation Status: discharge medications reconciled and changed, per note/orders (see AVS)    Current Outpatient Medications   Medication Sig Dispense Refill     acetaminophen (TYLENOL) 325 MG tablet Take 1-2 tablets (325-650 mg) by mouth every 6 hours as needed for mild pain       albuterol (ACCUNEB) 0.63 MG/3ML neb solution Take 3 mLs (0.63 mg) by nebulization every 4 hours as needed for shortness of breath / dyspnea or wheezing       ASPIRIN PO Take 81 mg by mouth daily        ATORVASTATIN CALCIUM PO Take 10 mg by mouth At Bedtime        benzonatate (TESSALON) 100 MG capsule Take 1 capsule (100 mg) by mouth 3 times daily as needed for cough       calcium carbonate (OS-SHAN 500 MG [place]. CA) 500 MG tablet Take 500 mg by mouth daily        fluticasone (FLONASE) 50 MCG/ACT nasal spray Spray 1 spray into both nostrils daily as needed for rhinitis or allergies       guaiFENesin (ROBITUSSIN) 20 mg/mL SOLN solution Take 10 mLs by mouth every 4 hours as needed for cough       HYDROcodone-acetaminophen (NORCO) 5-325 MG tablet Take 1 tablet by mouth 3 times daily 15 tablet 0     ipratropium - albuterol 0.5 mg/2.5 mg/3 mL  "(DUONEB) 0.5-2.5 (3) MG/3ML neb solution Take 1 vial (3 mLs) by nebulization 4 times daily       melatonin 1 MG TABS tablet Take 1 tablet (1 mg) by mouth nightly as needed for sleep       metoprolol tartrate (LOPRESSOR) 50 MG tablet Take 50 mg by mouth 2 times daily       omeprazole (PRILOSEC) 20 MG DR capsule Take 20 mg by mouth daily       polyethylene glycol (MIRALAX/GLYCOLAX) packet Take 17 g by mouth daily as needed for constipation       predniSONE (DELTASONE) 10 MG tablet By mouth, take 3 tabs daily for 4 days, then 2 tabs daily for 4 days, then 1 tab daily for 4 days, then stop.       senna-docusate (SENOKOT-S/PERICOLACE) 8.6-50 MG tablet Take 1 tablet by mouth 2 times daily as needed for constipation       vitamin D2 (ERGOCALCIFEROL) 79014 units (1250 mcg) capsule Take 50,000 Units by mouth once a week Tuesdays       Warfarin Therapy Reminder 1 each continuous prn         ROS:  10 point ROS of systems including Constitutional, Eyes, Respiratory, Cardiovascular, Gastroenterology, Genitourinary, Integumentary, Musculoskeletal, Neurological Psychiatric were all negative except for pertinent positives noted in my HPI.    Vitals:  BP (!) 155/89   Pulse 90   Temp 97.9  F (36.6  C)   Resp 20   Ht 1.575 m (5' 2\")   Wt 74.8 kg (165 lb)   SpO2 91%   BMI 30.18 kg/m    Exam:  GENERAL APPEARANCE:  Alert, pleasant and cooperative, elderly female sitting in wheelchair on exam today  HEENT: normocephalic, moist mucous membranes, nose without drainage or crusting, hearing acuity: intact  RESP:  respiratory effort normal, no respiratory distress, Lung sounds clear, productive cough x2 during visit, patient is on RA  CV: auscultation of heart done, rate and rhythm regular. no murmur, no rub or gallop. No peripheral edema  ABDOMEN:  + bowel sounds, soft, nontender, no grimacing or guarding with palpation.  M/S:   Gait and station abnormal: uses walker with assist for ambulation; no tenderness or swelling of the joints; " able to move all extremities   SKIN:  Inspection and palpation of skin and subcutaneous tissue: skin warm, dry without rashes, with area of skin cancer to left lower extremity that has scab in place and is partially open, with 1 skin tears to left lower extremity near ankle, and diabetic ulcer present to left lower extremity- no signs of infection  NEURO: no facial asymmetry, no speech deficits and able to follow directions, moves all extremities symmetrically  PSYCH: oriented x person, place, time and situation, insight and judgement at baseline, memory baseline, affect and mood normal      Lab/Diagnostic data:  Recent labs in [facility] reviewed by me today.     ASSESSMENT/PLAN:  (J20.9) Acute bronchitis, unspecified organism  (primary encounter diagnosis)  (J98.01) Acute bronchospasm  (J96.01) Acute respiratory failure with hypoxia (H)  Comment: acute, still with some SOB, but overall is improving- not at goal  Plan: Continue prednisone taper, duonebs scheduled, albuterol PRN, tessalon Perles and guaifenesin PRN, flonase. Oxygen to keep saturations >88%. Monitor for improvement.     (I48.0) Paroxysmal atrial fibrillation (H)  Comment: chronic, INR therapeutic, HR controlled  Plan: Goal INR 2-3. Coumadin 2.5 mg daily. INR 11/29. She is on steroids which can interact with coumadin and will need to be monitored closely with tapering of steroid. Continue metoprolol.    (E11.622,  L97.329) Diabetic ulcer of left ankle (H)  Comment: Chronic, has had on and off for several years- no signs of infection now- present on and off over past few years   -per care everywhere was recently treated with levaquin and completed about 2 weeks ago  Plan: WOC RN to follow at TCU. Wound care to LLE: cleanse with wound cleanser, pat dry. Apply hydrofera blue and cover with mepilex. Change 3 times per week. Nursing to update provider with worsening.     (I10) Essential hypertension  Comment: acute on chronic, little data available -157,  variable control  Plan: Continue metoprolol. VS per TCU policy.    (C44.702) Cancer of skin of right lower extremity  Comment: acute on chronic, followed by dermatology specialists per care everywhere- was supposed to have procedure on 11/21 but due to being in hospital was cancelled  Plan: Discussed that it is ok to go out for appointments while at TCU. Follow up with dermatology specialists     (G89.4) Chronic pain syndrome  Comment: chronic, per care everywhere has narcotic contract with PCP for chronic neck, hip, and back pain that have worsened over multiple falls- but with hip and low back pain x15 years, neck pain x3 years, denies pain at time of visit today  Plan: Continue norco TID per home regimen. Tylenol PRN. Monitor.    (R53.81) Physical deconditioning  Comment: Acute, secondary to recent hospitalization, medical conditions as above  Plan: Encourage participation in physical therapy/occupational therapy for strengthening and deconditioning. Discharge planning per their recommendation. Social work to assist with d/c planning.    (E11.9) Type 2 diabetes, diet controlled (H)  Comment: Chronic, diet controlled  Hemoglobin A1C   Date Value Ref Range Status   03/19/2005 6.4 (H) 4.3 - 6.0 % Final     Plan: Carb consistent diet. Follow up with PCP after discharge from TCU.    (E78.5) Dyslipidemia  Comment: Chronic  Plan: continue PTA atorvastatin. Follow up with PCP for management after discharge from TCU.    (Z71.89) ACP (advance care planning)  Patient would like to continue full code with full treatment. POLST completed today.       Total time spent with patient visit at the skilled nursing facility was 46 minutes including patient visit, review of past records, review of admission orders, medication reconciliation, and discussion with nursing. Greater than 50% of total time spent with counseling and coordinating care due to discussion of wound care management for diabetic ulcer with WOC RN, discussion of  medication management with patient for multiple comorbidities as above, review of code status and completion of POLST.     Electronically signed by:  ROSALIND Rodriges CNP

## 2019-11-29 ENCOUNTER — TELEPHONE (OUTPATIENT)
Dept: GERIATRICS | Facility: CLINIC | Age: 84
End: 2019-11-29

## 2019-11-29 NOTE — TELEPHONE ENCOUNTER
inr - 2.8  INR 11/25 2.5 on 2.5 mg po every day  IINR 11/24  2.11    Cont 2.5 mg po q day and recheck on Monday.

## 2019-11-30 DIAGNOSIS — R52 PAIN: ICD-10-CM

## 2019-11-30 RX ORDER — HYDROCODONE BITARTRATE AND ACETAMINOPHEN 5; 325 MG/1; MG/1
1 TABLET ORAL 3 TIMES DAILY
Qty: 9 TABLET | Refills: 0 | Status: SHIPPED | OUTPATIENT
Start: 2019-11-30 | End: 2019-12-03

## 2019-12-02 ENCOUNTER — NURSING HOME VISIT (OUTPATIENT)
Dept: GERIATRICS | Facility: CLINIC | Age: 84
End: 2019-12-02
Payer: COMMERCIAL

## 2019-12-02 VITALS
WEIGHT: 165.2 LBS | BODY MASS INDEX: 30.4 KG/M2 | TEMPERATURE: 98.7 F | SYSTOLIC BLOOD PRESSURE: 118 MMHG | DIASTOLIC BLOOD PRESSURE: 74 MMHG | HEART RATE: 64 BPM | HEIGHT: 62 IN | RESPIRATION RATE: 16 BRPM | OXYGEN SATURATION: 95 %

## 2019-12-02 DIAGNOSIS — I48.0 PAROXYSMAL ATRIAL FIBRILLATION (H): Primary | ICD-10-CM

## 2019-12-02 DIAGNOSIS — Z79.01 LONG TERM (CURRENT) USE OF ANTICOAGULANTS: ICD-10-CM

## 2019-12-02 DIAGNOSIS — Z51.81 ENCOUNTER FOR THERAPEUTIC DRUG MONITORING: ICD-10-CM

## 2019-12-02 PROCEDURE — 99308 SBSQ NF CARE LOW MDM 20: CPT | Performed by: NURSE PRACTITIONER

## 2019-12-02 ASSESSMENT — MIFFLIN-ST. JEOR: SCORE: 1122.59

## 2019-12-02 NOTE — LETTER
"    12/2/2019        RE: Radha Cunningham  [address] [place] [address]        [facility]  [facility] Record Number:  [id number]  Place of Service where encounter took place: [facility] [[id number]]    HPI:    Radha Cunningham is a 89 year old  (6/20/1929), who is being seen today for an episodic care visit at the above location.   HPI information obtained from: facility chart records, facility staff and [facility] chart review. Today's concern is INR/Coumadin management for A. Fib    ROS/Subjective:  Bleeding Signs/Symptoms:  None  Thromboembolic Signs/Symptoms:  None  Medication Changes:  Yes: is on prednisone taper which can interact with coumadin  Dietary Changes:  Yes, at TCU  Activity Changes: Yes: at TCU  Bacterial/Viral Infection:  Yes: recent acute respiratory failure, acute bronchitis  Missed Coumadin Doses:  None  On ASA: Yes - 81 mg po q day  Other Concerns:  No    OBJECTIVE:  /74   Pulse 64   Temp 98.7  F (37.1  C)   Resp 16   Ht 1.575 m (5' 2\")   Wt 74.9 kg (165 lb 3.2 oz)   SpO2 95%   BMI 30.22 kg/m     Last INR: 2.8 on 11/29/19  INR Today:  3.0  Current Dose:  Coumadin 2.5 mg daily  INR Flow sheet at [facility]:    ASSESSMENT:  Paroxysmal atrial fibrillation (H)  Encounter for therapeutic drug monitoring  Long term (current) use of anticoagulants    Therapeutic INR for goal of 2-3    PLAN:   Orders written by provider at facility  New Dose: Coumadin 1 mg today, 2 mg tomorrow    Next INR: 12/4/19      Electronically signed by:  ROSALIND Rodriges CNP       Sincerely,        ROSALIND Rodriges CNP    "

## 2019-12-02 NOTE — PROGRESS NOTES
"[place] [facility]  [place] Medical Record Number:  [id number]  Place of Service where encounter took place: [facility] ([facility] [[id number]]    HPI:    Radha Cunningham is a 89 year old  (6/20/1929), who is being seen today for an episodic care visit at the above location.   HPI information obtained from: facility chart records, facility staff and [facility] chart review. Today's concern is INR/Coumadin management for A. Fib    ROS/Subjective:  Bleeding Signs/Symptoms:  None  Thromboembolic Signs/Symptoms:  None  Medication Changes:  Yes: is on prednisone taper which can interact with coumadin  Dietary Changes:  Yes, at TCU  Activity Changes: Yes: at TCU  Bacterial/Viral Infection:  Yes: recent acute respiratory failure, acute bronchitis  Missed Coumadin Doses:  None  On ASA: Yes - 81 mg po q day  Other Concerns:  No    OBJECTIVE:  /74   Pulse 64   Temp 98.7  F (37.1  C)   Resp 16   Ht 1.575 m (5' 2\")   Wt 74.9 kg (165 lb 3.2 oz)   SpO2 95%   BMI 30.22 kg/m    Last INR: 2.8 on 11/29/19  INR Today:  3.0  Current Dose:  Coumadin 2.5 mg daily  INR Flow sheet at SNF:    ASSESSMENT:  Paroxysmal atrial fibrillation (H)  Encounter for therapeutic drug monitoring  Long term (current) use of anticoagulants    Therapeutic INR for goal of 2-3    PLAN:   Orders written by provider at facility  New Dose: Coumadin 1 mg today, 2 mg tomorrow    Next INR: 12/4/19      Electronically signed by:  ROSALIND Rodriges CNP   "

## 2019-12-03 ENCOUNTER — NURSING HOME VISIT (OUTPATIENT)
Dept: GERIATRICS | Facility: CLINIC | Age: 84
End: 2019-12-03
Payer: COMMERCIAL

## 2019-12-03 VITALS
HEART RATE: 78 BPM | HEIGHT: 62 IN | OXYGEN SATURATION: 94 % | RESPIRATION RATE: 16 BRPM | DIASTOLIC BLOOD PRESSURE: 68 MMHG | SYSTOLIC BLOOD PRESSURE: 131 MMHG | BODY MASS INDEX: 30.4 KG/M2 | WEIGHT: 165.2 LBS | TEMPERATURE: 98.7 F

## 2019-12-03 DIAGNOSIS — J20.9 ACUTE BRONCHITIS, UNSPECIFIED ORGANISM: ICD-10-CM

## 2019-12-03 DIAGNOSIS — J96.01 ACUTE RESPIRATORY FAILURE WITH HYPOXIA (H): Primary | ICD-10-CM

## 2019-12-03 DIAGNOSIS — G89.4 CHRONIC PAIN SYNDROME: ICD-10-CM

## 2019-12-03 DIAGNOSIS — I48.21 PERMANENT ATRIAL FIBRILLATION (H): Chronic | ICD-10-CM

## 2019-12-03 DIAGNOSIS — R52 PAIN: ICD-10-CM

## 2019-12-03 DIAGNOSIS — R41.89 COGNITIVE IMPAIRMENT: ICD-10-CM

## 2019-12-03 DIAGNOSIS — R54 FRAILTY: ICD-10-CM

## 2019-12-03 PROCEDURE — 99305 1ST NF CARE MODERATE MDM 35: CPT | Performed by: INTERNAL MEDICINE

## 2019-12-03 RX ORDER — HYDROCODONE BITARTRATE AND ACETAMINOPHEN 5; 325 MG/1; MG/1
1 TABLET ORAL 2 TIMES DAILY
Qty: 30 TABLET | Refills: 0 | Status: ON HOLD | OUTPATIENT
Start: 2019-12-03 | End: 2019-12-21

## 2019-12-03 RX ORDER — HYDROCODONE BITARTRATE AND ACETAMINOPHEN 5; 325 MG/1; MG/1
1 TABLET ORAL 3 TIMES DAILY
Qty: 21 TABLET | Refills: 0 | Status: CANCELLED | OUTPATIENT
Start: 2019-12-03 | End: 2019-12-10

## 2019-12-03 ASSESSMENT — MIFFLIN-ST. JEOR: SCORE: 1122.59

## 2019-12-03 NOTE — TELEPHONE ENCOUNTER
Attempted to refill norco but unable to e prescribed. 3 day emergency script called to [facility]. She will need a new script again in 3 days.     Savanna Michael NP

## 2019-12-03 NOTE — PROGRESS NOTES
[place] [facility]  PHYSICIAN NOTE    PRIMARY CARE PROVIDER AND CLINIC:  Samaria Chappell MD, [facility] [place] [address] [facility]    Chief Complaint   Patient presents with     Hospital F/U     [place] Medical Record Number:  [id number]  Place of Service where encounter took place:  [facility] (S) [[id number]]    Radha Cunningham is a 89 year old (6/20/1929), admitted to the above facility from  [facility]. Hospital stay 11/18/19 through 11/25/19. Admitted to this facility for  rehab, medical management and nursing care.     HPI:    HPI information obtained from: facility chart records, facility staff, patient report and [facility] chart review.     Brief summary of hospital course: Radha was admitted with signs/sx of bronchitis needing O2 therapy. CXR and CT did not show focal infiltrate though administered empiric Augmentin with prednisone and bronchodilators. Frailty also noted and TCU recommended. Daughter is very involved in Radha's life. She is also anticoagulated with Warfarin for afib and has chronic pain on scheduled TID Norco and h/o cognitive impairment with some confusion noted while hospitalized.    Updates on status since skilled nursing admission: Radha is seen in her room today accompanied by her daughter. Will transition from 20 mg to 10 mg Prednisone as part of taper tomorrow. Still needing O2. Vague historian about her respiratory status but doesn't feel she has concerns. I spoke with speech therapist who has also noted some cognitive impairment while here at TCU. Derm appointment for skin cancer this morning went well. Agreed to trial decreasing her chronic Norco from TID down to BID. Rx e-prescribed but note that on-call did a telephone order earlier this AM for the TID dosing as she was running low on Norco. I clarified this with nursing staff who was also going to call the pharmacy that dose is being reduced. Daughter is  hesitant about this change more so than Radha is fearful that her mom will have pain.    CODE STATUS/ADVANCE DIRECTIVES DISCUSSION:   CPR/Full code   Patient's living condition: lives alone with daughter visiting regularly     ALLERGIES: Contrast dye    Past Medical History:   Diagnosis Date     Asthma      Cholelithiasis      Diverticulosis      Hiatal hernia      Hypertension      Paroxysmal A-fib (H)      PUD (peptic ulcer disease)      Skin cancer       Past Surgical History:   Procedure Laterality Date     HERNIA REPAIR  2004    umbilical     Skin cancer removal - forehead  2005     Family History   Problem Relation Age of Onset     C.A.D. Mother      Hypertension Mother      Social History     Tobacco Use     Smoking status: Never Smoker   Substance Use Topics     Alcohol use: No     Drug use: Not on file        Post-discharge medication reconciliation status: Changed Norco dosing from TID down to BID    Current Outpatient Medications   Medication Sig Dispense Refill     acetaminophen (TYLENOL) 325 MG tablet Take 1-2 tablets (325-650 mg) by mouth every 6 hours as needed for mild pain       albuterol (ACCUNEB) 0.63 MG/3ML neb solution Take 3 mLs (0.63 mg) by nebulization every 4 hours as needed for shortness of breath / dyspnea or wheezing       ASPIRIN PO Take 81 mg by mouth daily        ATORVASTATIN CALCIUM PO Take 10 mg by mouth At Bedtime        benzonatate (TESSALON) 100 MG capsule Take 1 capsule (100 mg) by mouth 3 times daily as needed for cough       calcium carbonate (OS-SHAN 500 MG [place]. [place]) 500 MG tablet Take 500 mg by mouth daily        fluticasone (FLONASE) 50 MCG/ACT nasal spray Spray 1 spray into both nostrils daily as needed for rhinitis or allergies       guaiFENesin (ROBITUSSIN) 20 mg/mL SOLN solution Take 10 mLs by mouth every 4 hours as needed for cough       HYDROcodone-acetaminophen (NORCO) 5-325 MG tablet Take 1 tablet by mouth 2 times daily At 0800 and 2000 30 tablet 0     ipratropium -  "albuterol 0.5 mg/2.5 mg/3 mL (DUONEB) 0.5-2.5 (3) MG/3ML neb solution Take 1 vial (3 mLs) by nebulization 4 times daily       melatonin 1 MG TABS tablet Take 1 tablet (1 mg) by mouth nightly as needed for sleep       metoprolol tartrate (LOPRESSOR) 50 MG tablet Take 50 mg by mouth 2 times daily       omeprazole (PRILOSEC) 20 MG DR capsule Take 20 mg by mouth daily       polyethylene glycol (MIRALAX/GLYCOLAX) packet Take 17 g by mouth daily as needed for constipation       predniSONE (DELTASONE) 10 MG tablet By mouth, take 3 tabs daily for 4 days, then 2 tabs daily for 4 days, then 1 tab daily for 4 days, then stop.       senna-docusate (SENOKOT-S/PERICOLACE) 8.6-50 MG tablet Take 1 tablet by mouth 2 times daily as needed for constipation       vitamin D2 (ERGOCALCIFEROL) 88795 units (1250 mcg) capsule Take 50,000 Units by mouth once a week Tuesdays       Warfarin Therapy Reminder 1 each continuous prn         ROS:  Limited secondary to cognitive impairment but today pt reports as above in HPI    Exam:  /68   Pulse 78   Temp 98.7  F (37.1  C)   Resp 16   Ht 1.575 m (5' 2\")   Wt 74.9 kg (165 lb 3.2 oz)   SpO2 94%   BMI 30.22 kg/m    Alert, talkative, sitting up in wheelchair casually dressed  Wearing nasal canula O2 at 2 LPM with sats 96-97%  Heart irregularly irregular rate controlled  No edema  Occasional course breath sounds, poor air movement in general though no respiratory distress  Abdomen soft  Left lateral ankle has a tiny scab, R shin clean new bandage in place per derm, steri-strips right lower lip and bandage from biopsy site left upper lip  Mood cheerful  Lack of insight into cognitive impairment     Lab/Diagnostic data:  Noncontrast chest CT:  IMPRESSION:  1. No evidence of pneumonia.  2. Cardiomegaly with four-chamber enlargement and pacemaker. No evidence of heart failure.  3. Left renal cysts.    ASSESSMENT/PLAN:  Acute respiratory failure with hypoxia  Acute bronchitis, unspecified " "organism  Will transition from 20 mg to 10 mg Prednisone as part of taper tomorrow. Still needing O2 and done with antibiotics. Saw pulmonary through [facility] and no obvious pulmonary disease but thought could have some cardiovascular disease contributing to her dyspnea in November. Echo per [facility] 1/5/19 shows EF 55-60%.    Permanent atrial fibrillation  Continue rate control and coumadin    Chronic pain syndrome  June said she had \"no pain\" and had trouble describing what her chronic pain was (cognitive impairment likely contributes) so she agreed to trial decreasing her chronic Norco from TID down to BID. Rx e-prescribed but note that on-call did a telephone order earlier this AM for the TID dosing as she was running low on Norco. I clarified this with nursing staff who was also going to call the pharmacy.  - HYDROcodone-acetaminophen (NORCO) 5-325 MG tablet; Take 1 tablet by mouth 2 times daily At 0800 and 2000    Cognitive impairment  Frailty  Physical therapy and occupational therapy eval and treat for safe dispo      Electronically signed by:  Tawny Sellers,     "

## 2019-12-03 NOTE — LETTER
12/3/2019        RE: Radha Cunningham  [address] [place] [address]        [place] [facility]  PHYSICIAN NOTE    PRIMARY CARE PROVIDER AND CLINIC:  Samaria Chappell MD, [facility] [address] [facility]    Chief Complaint   Patient presents with     Hospital F/U     [place] Medical Record Number:  [id number]  Place of Service where encounter took place:  [facility] ([facility]) [[id number]]    Radha Cunningham is a 89 year old (6/20/1929), admitted to the above facility from  [facility]. Hospital stay 11/18/19 through 11/25/19. Admitted to this facility for  rehab, medical management and nursing care.     HPI:    HPI information obtained from: facility chart records, facility staff, patient report and [facility] chart review.     Brief summary of hospital course: Radha was admitted with signs/sx of bronchitis needing O2 therapy. CXR and CT did not show focal infiltrate though administered empiric Augmentin with prednisone and bronchodilators. Frailty also noted and TCU recommended. Daughter is very involved in Radha's life. She is also anticoagulated with Warfarin for afib and has chronic pain on scheduled TID Norco and h/o cognitive impairment with some confusion noted while hospitalized.    Updates on status since skilled nursing admission: Radha is seen in her room today accompanied by her daughter. Will transition from 20 mg to 10 mg Prednisone as part of taper tomorrow. Still needing O2. Vague historian about her respiratory status but doesn't feel she has concerns. I spoke with speech therapist who has also noted some cognitive impairment while here at TCU. Derm appointment for skin cancer this morning went well. Agreed to trial decreasing her chronic Norco from TID down to BID. Rx e-prescribed but note that on-call did a telephone order earlier this AM for the TID dosing as she was running low on Norco. I clarified this with  nursing staff who was also going to call the pharmacy that dose is being reduced. Daughter is hesitant about this change more so than June is fearful that her mom will have pain.    CODE STATUS/ADVANCE DIRECTIVES DISCUSSION:   CPR/Full code   Patient's living condition: lives alone with daughter visiting regularly     ALLERGIES: Contrast dye    Past Medical History:   Diagnosis Date     Asthma      Cholelithiasis      Diverticulosis      Hiatal hernia      Hypertension      Paroxysmal A-fib (H)      PUD (peptic ulcer disease)      Skin cancer       Past Surgical History:   Procedure Laterality Date     HERNIA REPAIR  2004    umbilical     Skin cancer removal - forehead  2005     Family History   Problem Relation Age of Onset     C.A.D. Mother      Hypertension Mother      Social History     Tobacco Use     Smoking status: Never Smoker   Substance Use Topics     Alcohol use: No     Drug use: Not on file        Post-discharge medication reconciliation status: Changed Norco dosing from TID down to BID    Current Outpatient Medications   Medication Sig Dispense Refill     acetaminophen (TYLENOL) 325 MG tablet Take 1-2 tablets (325-650 mg) by mouth every 6 hours as needed for mild pain       albuterol (ACCUNEB) 0.63 MG/3ML neb solution Take 3 mLs (0.63 mg) by nebulization every 4 hours as needed for shortness of breath / dyspnea or wheezing       ASPIRIN PO Take 81 mg by mouth daily        ATORVASTATIN CALCIUM PO Take 10 mg by mouth At Bedtime        benzonatate (TESSALON) 100 MG capsule Take 1 capsule (100 mg) by mouth 3 times daily as needed for cough       calcium carbonate (OS-SHAN 500 MG [place]. [place]) 500 MG tablet Take 500 mg by mouth daily        fluticasone (FLONASE) 50 MCG/ACT nasal spray Spray 1 spray into both nostrils daily as needed for rhinitis or allergies       guaiFENesin (ROBITUSSIN) 20 mg/mL SOLN solution Take 10 mLs by mouth every 4 hours as needed for cough       HYDROcodone-acetaminophen (NORCO) 5-325 MG  "tablet Take 1 tablet by mouth 2 times daily At 0800 and 2000 30 tablet 0     ipratropium - albuterol 0.5 mg/2.5 mg/3 mL (DUONEB) 0.5-2.5 (3) MG/3ML neb solution Take 1 vial (3 mLs) by nebulization 4 times daily       melatonin 1 MG TABS tablet Take 1 tablet (1 mg) by mouth nightly as needed for sleep       metoprolol tartrate (LOPRESSOR) 50 MG tablet Take 50 mg by mouth 2 times daily       omeprazole (PRILOSEC) 20 MG DR capsule Take 20 mg by mouth daily       polyethylene glycol (MIRALAX/GLYCOLAX) packet Take 17 g by mouth daily as needed for constipation       predniSONE (DELTASONE) 10 MG tablet By mouth, take 3 tabs daily for 4 days, then 2 tabs daily for 4 days, then 1 tab daily for 4 days, then stop.       senna-docusate (SENOKOT-S/PERICOLACE) 8.6-50 MG tablet Take 1 tablet by mouth 2 times daily as needed for constipation       vitamin D2 (ERGOCALCIFEROL) 13514 units (1250 mcg) capsule Take 50,000 Units by mouth once a week Tuesdays       Warfarin Therapy Reminder 1 each continuous prn         ROS:  Limited secondary to cognitive impairment but today pt reports as above in HPI    Exam:  /68   Pulse 78   Temp 98.7  F (37.1  C)   Resp 16   Ht 1.575 m (5' 2\")   Wt 74.9 kg (165 lb 3.2 oz)   SpO2 94%   BMI 30.22 kg/m     Alert, talkative, sitting up in wheelchair casually dressed  Wearing nasal canula O2 at 2 LPM with sats 96-97%  Heart irregularly irregular rate controlled  No edema  Occasional course breath sounds, poor air movement in general though no respiratory distress  Abdomen soft  Left lateral ankle has a tiny scab, R shin clean new bandage in place per derm, steri-strips right lower lip and bandage from biopsy site left upper lip  Mood cheerful  Lack of insight into cognitive impairment     Lab/Diagnostic data:  Noncontrast chest CT:  IMPRESSION:  1. No evidence of pneumonia.  2. Cardiomegaly with four-chamber enlargement and pacemaker. No evidence of heart failure.  3. Left renal " "cysts.    ASSESSMENT/PLAN:  Acute respiratory failure with hypoxia  Acute bronchitis, unspecified organism  Will transition from 20 mg to 10 mg Prednisone as part of taper tomorrow. Still needing O2 and done with antibiotics. Saw pulmonary through [facility] and no obvious pulmonary disease but thought could have some cardiovascular disease contributing to her dyspnea in November. Echo per [facility] 1/5/19 shows EF 55-60%.    Permanent atrial fibrillation  Continue rate control and coumadin    Chronic pain syndrome  June said she had \"no pain\" and had trouble describing what her chronic pain was (cognitive impairment likely contributes) so she agreed to trial decreasing her chronic Norco from TID down to BID. Rx e-prescribed but note that on-call did a telephone order earlier this AM for the TID dosing as she was running low on Norco. I clarified this with nursing staff who was also going to call the pharmacy.  - HYDROcodone-acetaminophen (NORCO) 5-325 MG tablet; Take 1 tablet by mouth 2 times daily At 0800 and 2000    Cognitive impairment  Frailty  Physical therapy and occupational therapy eval and treat for safe dispo      Electronically signed by:  Tawny Sellers DO        Sincerely,        Tawny Sellers, DO    "

## 2019-12-04 ENCOUNTER — NURSING HOME VISIT (OUTPATIENT)
Dept: GERIATRICS | Facility: CLINIC | Age: 84
End: 2019-12-04
Payer: COMMERCIAL

## 2019-12-04 VITALS
OXYGEN SATURATION: 99 % | WEIGHT: 165.2 LBS | TEMPERATURE: 97.4 F | HEART RATE: 61 BPM | RESPIRATION RATE: 18 BRPM | HEIGHT: 62 IN | BODY MASS INDEX: 30.4 KG/M2 | DIASTOLIC BLOOD PRESSURE: 79 MMHG | SYSTOLIC BLOOD PRESSURE: 142 MMHG

## 2019-12-04 DIAGNOSIS — Z79.01 LONG TERM (CURRENT) USE OF ANTICOAGULANTS: ICD-10-CM

## 2019-12-04 DIAGNOSIS — Z51.81 ENCOUNTER FOR THERAPEUTIC DRUG MONITORING: ICD-10-CM

## 2019-12-04 DIAGNOSIS — I48.0 PAROXYSMAL ATRIAL FIBRILLATION (H): Primary | ICD-10-CM

## 2019-12-04 PROCEDURE — 99308 SBSQ NF CARE LOW MDM 20: CPT | Performed by: NURSE PRACTITIONER

## 2019-12-04 ASSESSMENT — MIFFLIN-ST. JEOR: SCORE: 1122.59

## 2019-12-04 NOTE — PROGRESS NOTES
"[place] [facility]  [place] Medical Record Number:  [id number]  Place of Service where encounter took place: [facility] ([facility] [[id number]]    HPI:    Radha Cunningham is a 89 year old  (6/20/1929), who is being seen today for an episodic care visit at the above location.   HPI information obtained from: facility chart records, facility staff, patient report and [facility] chart review. Today's concern is INR/Coumadin management for A. Fib    ROS/Subjective:  Bleeding Signs/Symptoms:  None  Thromboembolic Signs/Symptoms:  None  Medication Changes:  Daughter is going to  antibiotic, Bactrim, from pharmacy today per Dermatology On Prednisone taper  Dietary Changes:  No  Activity Changes: No  Bacterial/Viral Infection:  Yes: Daughter is going to  prescription for Bactrim today from pharmacy  Missed Coumadin Doses:  None  On ASA: Yes - 81 mg po q day  Other Concerns:  No    OBJECTIVE:  BP (!) 142/79   Pulse 61   Temp 97.4  F (36.3  C)   Resp 18   Ht 1.575 m (5' 2\")   Wt 74.9 kg (165 lb 3.2 oz)   SpO2 99%   BMI 30.22 kg/m    Last INR: 3.0 on 12/2/19  INR Today:  2.0  Current Dose:  Warfarin 1 mg on 12/2 and 2 mg 12/3     ASSESSMENT:  Paroxysmal Atrial Fibrillation on Anticoagulation.  Therapeutic INR for goal of 2-3    PLAN:   Orders written by provider at facility  New Dose: Warfarin 2 mg daily    Next INR: 12/6/19    Electronically signed by:  ROSALIND Alejandro CNP   "

## 2019-12-04 NOTE — LETTER
"    12/4/2019        RE: Radha Cunningham  [address] [place] [address]        [place] [facility]  [place] Medical Record Number:  [id number]  Place of Service where encounter took place: [facility] ([facility] [[id number]]    HPI:    Radha Cunningham is a 89 year old  (6/20/1929), who is being seen today for an episodic care visit at the above location.   HPI information obtained from: facility chart records, facility staff, patient report and [facility] chart review. Today's concern is INR/Coumadin management for A. Fib    ROS/Subjective:  Bleeding Signs/Symptoms:  None  Thromboembolic Signs/Symptoms:  None  Medication Changes:  Daughter is going to  antibiotic, Bactrim, from pharmacy today per Dermatology On Prednisone taper  Dietary Changes:  No  Activity Changes: No  Bacterial/Viral Infection:  Yes: Daughter is going to  prescription for Bactrim today from pharmacy  Missed Coumadin Doses:  None  On ASA: Yes - 81 mg po q day  Other Concerns:  No    OBJECTIVE:  BP (!) 142/79   Pulse 61   Temp 97.4  F (36.3  C)   Resp 18   Ht 1.575 m (5' 2\")   Wt 74.9 kg (165 lb 3.2 oz)   SpO2 99%   BMI 30.22 kg/m     Last INR: 3.0 on 12/2/19  INR Today:  2.0  Current Dose:  Warfarin 1 mg on 12/2 and 2 mg 12/3     ASSESSMENT:  Paroxysmal Atrial Fibrillation on Anticoagulation.  Therapeutic INR for goal of 2-3    PLAN:   Orders written by provider at facility  New Dose: Warfarin 2 mg daily    Next INR: 12/6/19    Electronically signed by:  ROSALIND Alejandro CNP       Sincerely,        ROSALIND Alejandro CNP    "

## 2019-12-06 ENCOUNTER — DISCHARGE SUMMARY NURSING HOME (OUTPATIENT)
Dept: GERIATRICS | Facility: CLINIC | Age: 84
End: 2019-12-06
Payer: COMMERCIAL

## 2019-12-06 VITALS
RESPIRATION RATE: 16 BRPM | OXYGEN SATURATION: 94 % | TEMPERATURE: 98.9 F | HEIGHT: 62 IN | WEIGHT: 165.2 LBS | DIASTOLIC BLOOD PRESSURE: 80 MMHG | SYSTOLIC BLOOD PRESSURE: 133 MMHG | HEART RATE: 86 BPM | BODY MASS INDEX: 30.4 KG/M2

## 2019-12-06 DIAGNOSIS — E11.621 TYPE 2 DIABETES MELLITUS WITH FOOT ULCER, WITHOUT LONG-TERM CURRENT USE OF INSULIN (H): ICD-10-CM

## 2019-12-06 DIAGNOSIS — K27.9 PUD (PEPTIC ULCER DISEASE): ICD-10-CM

## 2019-12-06 DIAGNOSIS — G47.00 INSOMNIA, UNSPECIFIED TYPE: ICD-10-CM

## 2019-12-06 DIAGNOSIS — J40 BRONCHITIS: Primary | ICD-10-CM

## 2019-12-06 DIAGNOSIS — D64.9 ANEMIA, UNSPECIFIED TYPE: ICD-10-CM

## 2019-12-06 DIAGNOSIS — R53.81 PHYSICAL DECONDITIONING: ICD-10-CM

## 2019-12-06 DIAGNOSIS — Z85.828 HISTORY OF SKIN CANCER: ICD-10-CM

## 2019-12-06 DIAGNOSIS — R41.89 COGNITIVE IMPAIRMENT: ICD-10-CM

## 2019-12-06 DIAGNOSIS — J30.9 ALLERGIC RHINITIS, UNSPECIFIED SEASONALITY, UNSPECIFIED TRIGGER: ICD-10-CM

## 2019-12-06 DIAGNOSIS — E78.5 HYPERLIPIDEMIA, UNSPECIFIED HYPERLIPIDEMIA TYPE: ICD-10-CM

## 2019-12-06 DIAGNOSIS — L97.509 TYPE 2 DIABETES MELLITUS WITH FOOT ULCER, WITHOUT LONG-TERM CURRENT USE OF INSULIN (H): ICD-10-CM

## 2019-12-06 DIAGNOSIS — I48.0 PAF (PAROXYSMAL ATRIAL FIBRILLATION) (H): ICD-10-CM

## 2019-12-06 DIAGNOSIS — K59.00 CONSTIPATION, UNSPECIFIED CONSTIPATION TYPE: ICD-10-CM

## 2019-12-06 DIAGNOSIS — I10 BENIGN ESSENTIAL HYPERTENSION: ICD-10-CM

## 2019-12-06 DIAGNOSIS — G89.4 CHRONIC PAIN SYNDROME: ICD-10-CM

## 2019-12-06 PROCEDURE — 99316 NF DSCHRG MGMT 30 MIN+: CPT | Performed by: NURSE PRACTITIONER

## 2019-12-06 RX ORDER — CEPHALEXIN 500 MG/1
500 CAPSULE ORAL 3 TIMES DAILY
COMMUNITY
Start: 2019-12-04 | End: 2019-12-13

## 2019-12-06 ASSESSMENT — MIFFLIN-ST. JEOR: SCORE: 1122.59

## 2019-12-06 NOTE — PROGRESS NOTES
[facility] DISCHARGE SUMMARY  PATIENT'S NAME: Radha Cunningham  YOB: 1929  MEDICAL RECORD NUMBER:  [id number]  Place of Service where encounter took place:  [facility] ([facility]) [[id number]]    PRIMARY CARE PROVIDER AND CLINIC RESPONSIBLE AFTER TRANSFER:   Samaria Chappell MD, [facility] [address] Replaced by [facility]    Assisted Living: [facility]     Transferring providers: ROSALIND Alejandro CNP, Tawny Sellers MD  Recent Hospitalization/ED:  [facility] stay 11/18/19 to 11/25/19.  Date of SNF Admission: November / 25 / 2019  Date of SNF (anticipated) Discharge: December / 08 / 2019 ADDENDUM: Discharged postponed until 12/17/19  Discharged to: new assisted living for patient   Cognitive Scores: BIMS: 13/15, SLUMS: 14/30 and CPT: 4.3/5.6  Physical Function: TUG 19.91 sec with 4WW.Ambulating up to 90 feet with 4WW and SBA. SpO2 readings WNL although patient sometimes feels short of breath and requests Oxygen. Supervision with sit-to-supine. SBA for edge-of-bed to wheelchair. Needs cues to remember to use brakes in wheelchair  DME: [facility]    CODE STATUS/ADVANCE DIRECTIVES DISCUSSION:  Full Code   ALLERGIES: Contrast dye    DISCHARGE DIAGNOSIS/NURSING FACILITY COURSE:   This is a 89-year-old female, with a past medical history significant for paroxysmal atrial fibrillation, type 2 diabetes mellitus., hypertension, hiatal hernia, peptic ulcer disease and anemia, who was admitted to [facility] with shortness of breath. Chest x-ray and CT negative. Thought to be acute bronchitis with bronchospasm and acute hypoxic respiratory failure. Treated with antibiotics, steroids, bronchodilators and supplemental Oxygen. A TCU stay was recommended for ongoing physical rehabilitation.    Acute Bronchitis and Hypoxic Respiratory Failure. Prolonged steroid taper due to slow recovery. Continue Prednisone 10 mg until  12/7/19. Continue Albuterol, Benzonatate, Guaifenesin and DuoNeb as ordered. Did not use Guaifenesin while in TCU. Has not utilized Benzonatate in the past week. Home Physical and Occupational Therapy ordered for discharge.    Paroxsymal Atrial Fibrillation. INR 2.2 on 12/6/19. Previous INR 2.0 on 12/4/19. Will order Warfarin 2 mg PO daily. Recheck INR on 12/9/19. Monitor closely as patient is taking Cephalexin and Prednisone.  Heart rate 61-86 over the past 5 days. Continue Metoprolol as ordered.    Type 2 Diabetes Mellitus. Last A1C 6.0 on 10/25/19. Diet-controlled. Blood sugars not routinely checked while in TCU.    Hypertension and Hyperlipidemia. Upon review of blood pressure over the past 5 days, systolic range from 101-142, most 110-130s. Diastolic range from 59-92. Continue Aspirin, Atorvastatin and Metoprolol as ordered.    Peptic Ulcer Disease. Continue Omeprazole as ordered.    Anemia. Last Hemoglobin 9.6 on 11/23/19. Baseline Hemoglobin ~ 10. Monitor outpatient to ensure stability.     Skin Cancer. Seen by [facility] on 12/4/[facility]. Skin removed from left upper lip, slightly below right lower lig and right leg. Instructed to leave steri-strips in place until sutures removed. Taking Cephalexin until 12/11/19.     Insomnia. Continue Melatonin as ordered.    Chronic Pain Syndrome. Decreased PTA Hydrocodone-Acetaminophen from TID to BID dosing while in TCU.     Constipation. Continue Miralax and Senna-S as ordered.    Diabetic Ulcer of Left Ankle. Monitored by WOC RN while in TCU. Cleanse with wound cleanser. Pat dry. Apply Hydrofera blue and cover with Mepilex. Change 3 times per week. Will order Home Health RN at discharge.     Allergic Rhinitis. Continue Fluticasone as ordered.    Cognitive Impairment. CPT 4.3/5.6. Has Guardian. Will discharge to [facility].    Physical Deconditioning. Secondary to recent hospitalization and co-morbidities. Home Physical and Occupational  Therapy ordered.    Past Medical History:  has a past medical history of Asthma, Cholelithiasis, Diverticulosis, Hiatal hernia, Hypertension, Paroxysmal A-fib (H), PUD (peptic ulcer disease), and Skin cancer.    Discharge Medications:  Current Outpatient Medications   Medication Sig Dispense Refill     acetaminophen (TYLENOL) 325 MG tablet Take 1-2 tablets (325-650 mg) by mouth every 6 hours as needed for mild pain       albuterol (ACCUNEB) 0.63 MG/3ML neb solution Take 3 mLs (0.63 mg) by nebulization every 4 hours as needed for shortness of breath / dyspnea or wheezing       ASPIRIN PO Take 81 mg by mouth daily        ATORVASTATIN CALCIUM PO Take 10 mg by mouth At Bedtime        benzonatate (TESSALON) 100 MG capsule Take 1 capsule (100 mg) by mouth 3 times daily as needed for cough       calcium carbonate (OS-SHAN 500 MG [place]. [place]) 500 MG tablet Take 500 mg by mouth daily        cephALEXin (KEFLEX) 500 MG capsule Take 500 mg by mouth 3 times daily       fluticasone (FLONASE) 50 MCG/ACT nasal spray Spray 1 spray into both nostrils daily as needed for rhinitis or allergies       guaiFENesin (ROBITUSSIN) 20 mg/mL SOLN solution Take 10 mLs by mouth every 4 hours as needed for cough       HYDROcodone-acetaminophen (NORCO) 5-325 MG tablet Take 1 tablet by mouth 2 times daily At 0800 and 2000 30 tablet 0     ipratropium - albuterol 0.5 mg/2.5 mg/3 mL (DUONEB) 0.5-2.5 (3) MG/3ML neb solution Take 1 vial (3 mLs) by nebulization 4 times daily       melatonin 1 MG TABS tablet Take 1 tablet (1 mg) by mouth nightly as needed for sleep       metoprolol tartrate (LOPRESSOR) 50 MG tablet Take 50 mg by mouth 2 times daily       omeprazole (PRILOSEC) 20 MG DR capsule Take 20 mg by mouth daily       polyethylene glycol (MIRALAX/GLYCOLAX) packet Take 17 g by mouth daily as needed for constipation       predniSONE (DELTASONE) 10 MG tablet By mouth, take 3 tabs daily for 4 days, then 2 tabs daily for 4 days, then 1 tab daily for 4 days,  "then stop.       senna-docusate (SENOKOT-S/PERICOLACE) 8.6-50 MG tablet Take 1 tablet by mouth 2 times daily as needed for constipation       vitamin D2 (ERGOCALCIFEROL) 61459 units (1250 mcg) capsule Take 50,000 Units by mouth once a week Tuesdays       Warfarin Therapy Reminder 1 each continuous prn       Medication Changes/Rationale:     See above    Controlled medications sent with patient:   Will need to check with  what pharmacy to send prescription for Hydrocodone-Acetaminophen.      ROS:   4 point ROS including Respiratory, CV, GI and , other than that noted in the HPI,  is negative    Physical Exam:   Vitals: /80   Pulse 86   Temp 98.9  F (37.2  C)   Resp 16   Ht 1.575 m (5' 2\")   Wt 74.9 kg (165 lb 3.2 oz)   SpO2 94%   BMI 30.22 kg/m    BMI= Body mass index is 30.22 kg/m .  GENERAL APPEARANCE:  Alert, in no distress  ENT:  Mouth and posterior oropharynx normal, moist mucous membranes  EYES:  EOM, conjunctivae, lids, pupils and irises normal  RESP:  respiratory effort and palpation of chest normal, lungs clear to auscultation , no respiratory distress  CV:  Palpation and auscultation of heart done , regular rate and rhythm, no murmur, rub, or gallop  ABDOMEN:  normal bowel sounds, soft, nontender, no hepatosplenomegaly or other masses  M/S:   Active movement of bilateral upper and lower extremities. Trace edema in BLE.  SKIN:  Dry blood present on right chin near lip and right leg  NEURO:   Cranial nerves 2-12 are normal tested and grossly at patient's baseline  PSYCH:  affect and mood normal     SNF labs: Labs done in SNF are in [place] EPIC. Please refer to them using EPIC/Care Everywhere.    DISCHARGE PLAN:    Follow up labs: INR due 12/9/19 to be drawn by home care with results to PCP, Dr. Chappell    Medical Follow Up:      Follow up with primary care provider in 1 week    MTM referral needed and placed by this provider: No      Discharge Services: Home Care:  Occupational " Therapy, Physical Therapy, Registered Nurse and Home Health Aide    TOTAL DISCHARGE TIME:   Greater than 30 minutes  Electronically signed by:  ROSALIND Alejandro CNP        Documentation of Face-to-Face and Certification for Home Health Services     Patient: Radha Cunningham   YOB: 1929  MR Number: [id number]  Today's Date: 12/7/2019    I certify that patient: Radha Cunningham is under my care and that I, or a nurse practitioner or physician's assistant working with me, had a face-to-face encounter that meets the physician face-to-face encounter requirements with this patient on: 12/6/19.    This encounter with the patient was in whole, or in part, for the following medical condition, which is the primary reason for home health care: Bronchitis, Paroxysmal Atrial FIbrillation.    I certify that, based on my findings, the following services are medically necessary home health services: Nursing, Occupational Therapy and Physical Therapy.    My clinical findings support the need for the above services because: Nurse is needed: To assess INR, pain after changes in medications or other medical regimen.., Occupational Therapy Services are needed to assess and treat cognitive ability and address ADL safety due to impairment in cognitive impairment. and Physical Therapy Services are needed to assess and treat the following functional impairments: physical deconditioning due to recent hospitalization.    Further, I certify that my clinical findings support that this patient is homebound (i.e. absences from home require considerable and taxing effort and are for medical reasons or [employer] services or infrequently or of short duration when for other reasons) because: Requires assistance of another person or specialized equipment to access medical services because patient: Requires supervision of another for safe transfer...    Based on the above findings. I certify that this patient is confined to the  home and needs intermittent skilled nursing care, physical therapy and/or speech therapy.  The patient is under my care, and I have initiated the establishment of the plan of care.  This patient will be followed by a physician who will periodically review the plan of care.  Physician/Provider to provide follow up care: Samaria Chappell    Responsible Medicare certified PECOS Physician: Dr. Tawny Sellers  Physician Signature: See electronic signature associated with these discharge orders.  Date: 12/7/2019

## 2019-12-06 NOTE — LETTER
12/6/2019        RE: Radha Cunningham  [address] [place]  [facility] [address]        [facility] DISCHARGE SUMMARY  PATIENT'S NAME: Radha Cunningham  YOB: 1929  MEDICAL RECORD NUMBER:  [id number]  Place of Service where encounter took place:  [facility] ([facility]) [[id number]]    PRIMARY CARE PROVIDER AND CLINIC RESPONSIBLE AFTER TRANSFER:   Samaria Chappell MD, [facility] [address] [facility]    Assisted Living: [facility]     Transferring providers: Martha Yates, ROSALIND POON, Tawny Sellers MD  Recent Hospitalization/ED:  Hospital  [facility] stay 11/18/19 to 11/25/19.  Date of SNF Admission: November / 25 / 2019  Date of SNF (anticipated) Discharge: December / 08 / 2019  Discharged to: new assisted living for patient   Cognitive Scores: BIMS: 13/15, SLUMS: 14/30 and CPT: 4.3/5.6  Physical Function: TUG 19.91 sec with 4WW.Ambulating up to 90 feet with 4WW and SBA. SpO2 readings WNL although patient sometimes feels short of breath and requests Oxygen. Supervision with sit-to-supine. SBA for edge-of-bed to wheelchair. Needs cues to remember to use brakes in wheelchair  DME: [facility]    CODE STATUS/ADVANCE DIRECTIVES DISCUSSION:  Full Code   ALLERGIES: Contrast dye    DISCHARGE DIAGNOSIS/NURSING FACILITY COURSE:   This is a 89-year-old female, with a past medical history significant for paroxysmal atrial fibrillation, type 2 diabetes mellitus., hypertension, hiatal hernia, peptic ulcer disease and anemia, who was admitted to [facility] with shortness of breath. Chest x-ray and CT negative. Thought to be acute bronchitis with bronchospasm and acute hypoxic respiratory failure. Treated with antibiotics, steroids, bronchodilators and supplemental Oxygen. A TCU stay was recommended for ongoing physical rehabilitation.    Acute Bronchitis and Hypoxic Respiratory Failure. Prolonged steroid taper due to  slow recovery. Continue Prednisone 10 mg until 12/7/19. Continue Albuterol, Benzonatate, Guaifenesin and DuoNeb as ordered. Did not use Guaifenesin while in TCU. Has not utilized Benzonatate in the past week. Home Physical and Occupational Therapy ordered for discharge.    Paroxsymal Atrial Fibrillation. INR 2.2 on 12/6/19. Previous INR 2.0 on 12/4/19. Will order Warfarin 2 mg PO daily. Recheck INR on 12/9/19. Monitor closely as patient is taking Cephalexin and Prednisone.  Heart rate 61-86 over the past 5 days. Continue Metoprolol as ordered.    Type 2 Diabetes Mellitus. Last A1C 6.0 on 10/25/19. Diet-controlled. Blood sugars not routinely checked while in TCU.    Hypertension and Hyperlipidemia. Upon review of blood pressure over the past 5 days, systolic range from 101-142, most 110-130s. Diastolic range from 59-92. Continue Aspirin, Atorvastatin and Metoprolol as ordered.    Peptic Ulcer Disease. Continue Omeprazole as ordered.    Anemia. Last Hemoglobin 9.6 on 11/23/19. Baseline Hemoglobin ~ 10. Monitor outpatient to ensure stability.     Skin Cancer. Seen by [facility] on 12/4/19. Skin removed from left upper lip, slightly below right lower lig and right leg. Instructed to leave steri-strips in place until sutures removed. Taking Cephalexin until 12/11/19.     Insomnia. Continue Melatonin as ordered.    Chronic Pain Syndrome. Decreased PTA Hydrocodone-Acetaminophen from TID to BID dosing while in TCU.     Constipation. Continue Miralax and Senna-S as ordered.    Diabetic Ulcer of Left Ankle. Monitored by WOC RN while in TCU. Cleanse with wound cleanser. Pat dry. Apply Hydrofera blue and cover with Mepilex. Change 3 times per week. Will order Home Health RN at discharge.     Allergic Rhinitis. Continue Fluticasone as ordered.    Cognitive Impairment. CPT 4.3/5.6. Has Guardian. Will discharge to [facility].    Physical Deconditioning. Secondary to recent hospitalization and  co-morbidities. Home Physical and Occupational Therapy ordered.    Past Medical History:  has a past medical history of Asthma, Cholelithiasis, Diverticulosis, Hiatal hernia, Hypertension, Paroxysmal A-fib (H), PUD (peptic ulcer disease), and Skin cancer.    Discharge Medications:  Current Outpatient Medications   Medication Sig Dispense Refill     acetaminophen (TYLENOL) 325 MG tablet Take 1-2 tablets (325-650 mg) by mouth every 6 hours as needed for mild pain       albuterol (ACCUNEB) 0.63 MG/3ML neb solution Take 3 mLs (0.63 mg) by nebulization every 4 hours as needed for shortness of breath / dyspnea or wheezing       ASPIRIN PO Take 81 mg by mouth daily        ATORVASTATIN CALCIUM PO Take 10 mg by mouth At Bedtime        benzonatate (TESSALON) 100 MG capsule Take 1 capsule (100 mg) by mouth 3 times daily as needed for cough       calcium carbonate (OS-SHAN 500 MG [place]. CA) 500 MG tablet Take 500 mg by mouth daily        cephALEXin (KEFLEX) 500 MG capsule Take 500 mg by mouth 3 times daily       fluticasone (FLONASE) 50 MCG/ACT nasal spray Spray 1 spray into both nostrils daily as needed for rhinitis or allergies       guaiFENesin (ROBITUSSIN) 20 mg/mL SOLN solution Take 10 mLs by mouth every 4 hours as needed for cough       HYDROcodone-acetaminophen (NORCO) 5-325 MG tablet Take 1 tablet by mouth 2 times daily At 0800 and 2000 30 tablet 0     ipratropium - albuterol 0.5 mg/2.5 mg/3 mL (DUONEB) 0.5-2.5 (3) MG/3ML neb solution Take 1 vial (3 mLs) by nebulization 4 times daily       melatonin 1 MG TABS tablet Take 1 tablet (1 mg) by mouth nightly as needed for sleep       metoprolol tartrate (LOPRESSOR) 50 MG tablet Take 50 mg by mouth 2 times daily       omeprazole (PRILOSEC) 20 MG DR capsule Take 20 mg by mouth daily       polyethylene glycol (MIRALAX/GLYCOLAX) packet Take 17 g by mouth daily as needed for constipation       predniSONE (DELTASONE) 10 MG tablet By mouth, take 3 tabs daily for 4 days, then 2 tabs  "daily for 4 days, then 1 tab daily for 4 days, then stop.       senna-docusate (SENOKOT-S/PERICOLACE) 8.6-50 MG tablet Take 1 tablet by mouth 2 times daily as needed for constipation       vitamin D2 (ERGOCALCIFEROL) 86157 units (1250 mcg) capsule Take 50,000 Units by mouth once a week Tuesdays       Warfarin Therapy Reminder 1 each continuous prn       Medication Changes/Rationale:     See above    Controlled medications sent with patient:   Will need to check with  what pharmacy to send prescription for Hydrocodone-Acetaminophen.      ROS:   4 point ROS including Respiratory, CV, GI and , other than that noted in the HPI,  is negative    Physical Exam:   Vitals: /80   Pulse 86   Temp 98.9  F (37.2  C)   Resp 16   Ht 1.575 m (5' 2\")   Wt 74.9 kg (165 lb 3.2 oz)   SpO2 94%   BMI 30.22 kg/m     BMI= Body mass index is 30.22 kg/m .  GENERAL APPEARANCE:  Alert, in no distress  ENT:  Mouth and posterior oropharynx normal, moist mucous membranes  EYES:  EOM, conjunctivae, lids, pupils and irises normal  RESP:  respiratory effort and palpation of chest normal, lungs clear to auscultation , no respiratory distress  CV:  Palpation and auscultation of heart done , regular rate and rhythm, no murmur, rub, or gallop  ABDOMEN:  normal bowel sounds, soft, nontender, no hepatosplenomegaly or other masses  M/S:   Active movement of bilateral upper and lower extremities. Trace edema in BLE.  SKIN:  Dry blood present on right chin near lip and right leg  NEURO:   Cranial nerves 2-12 are normal tested and grossly at patient's baseline  PSYCH:  affect and mood normal     SNF labs: Labs done in SNF are in [place] EPIC. Please refer to them using EPIC/Care Everywhere.    DISCHARGE PLAN:    Follow up labs: INR due 12/9/19 to be drawn by home care with results to PCP, Dr. Chappell    Medical Follow Up:      Follow up with primary care provider in 1 week    MT referral needed and placed by this provider: " No      Discharge Services: Home Care:  Occupational Therapy, Physical Therapy, Registered Nurse and Home Health Aide    TOTAL DISCHARGE TIME:   Greater than 30 minutes  Electronically signed by:  ROSALIND Alejandro CNP        Documentation of Face-to-Face and Certification for Home Health Services     Patient: Radha Cunningham   YOB: 1929  MR Number: [id number]  Today's Date: 12/7/2019    I certify that patient: Radha Cunningham is under my care and that I, or a nurse practitioner or physician's assistant working with me, had a face-to-face encounter that meets the physician face-to-face encounter requirements with this patient on: 12/6/19.    This encounter with the patient was in whole, or in part, for the following medical condition, which is the primary reason for home health care: Bronchitis, Paroxysmal Atrial FIbrillation.    I certify that, based on my findings, the following services are medically necessary home health services: Nursing, Occupational Therapy and Physical Therapy.    My clinical findings support the need for the above services because: Nurse is needed: To assess INR, pain after changes in medications or other medical regimen.., Occupational Therapy Services are needed to assess and treat cognitive ability and address ADL safety due to impairment in cognitive impairment. and Physical Therapy Services are needed to assess and treat the following functional impairments: physical deconditioning due to recent hospitalization.    Further, I certify that my clinical findings support that this patient is homebound (i.e. absences from home require considerable and taxing effort and are for medical reasons or [employer] services or infrequently or of short duration when for other reasons) because: Requires assistance of another person or specialized equipment to access medical services because patient: Requires supervision of another for safe transfer...    Based on the above  findings. I certify that this patient is confined to the home and needs intermittent skilled nursing care, physical therapy and/or speech therapy.  The patient is under my care, and I have initiated the establishment of the plan of care.  This patient will be followed by a physician who will periodically review the plan of care.  Physician/Provider to provide follow up care: Samaria Chappell    Responsible Medicare certified PECOS Physician: Dr. Tawny Sellers  Physician Signature: See electronic signature associated with these discharge orders.  Date: 12/7/2019            Sincerely,        ROSALIND Alejandro CNP

## 2019-12-13 ENCOUNTER — TELEPHONE (OUTPATIENT)
Dept: GERIATRICS | Facility: CLINIC | Age: 84
End: 2019-12-13

## 2019-12-13 ENCOUNTER — NURSING HOME VISIT (OUTPATIENT)
Dept: GERIATRICS | Facility: CLINIC | Age: 84
End: 2019-12-13
Payer: COMMERCIAL

## 2019-12-13 VITALS
RESPIRATION RATE: 17 BRPM | SYSTOLIC BLOOD PRESSURE: 116 MMHG | HEART RATE: 66 BPM | OXYGEN SATURATION: 96 % | TEMPERATURE: 97.9 F | WEIGHT: 162.4 LBS | HEIGHT: 62 IN | DIASTOLIC BLOOD PRESSURE: 78 MMHG | BODY MASS INDEX: 29.88 KG/M2

## 2019-12-13 DIAGNOSIS — Z51.81 ENCOUNTER FOR THERAPEUTIC DRUG MONITORING: ICD-10-CM

## 2019-12-13 DIAGNOSIS — I48.0 PAROXYSMAL ATRIAL FIBRILLATION (H): Primary | ICD-10-CM

## 2019-12-13 DIAGNOSIS — Z79.01 LONG TERM (CURRENT) USE OF ANTICOAGULANTS: ICD-10-CM

## 2019-12-13 PROCEDURE — 99308 SBSQ NF CARE LOW MDM 20: CPT | Performed by: NURSE PRACTITIONER

## 2019-12-13 RX ORDER — CEPHALEXIN 500 MG/1
500 CAPSULE ORAL 3 TIMES DAILY
Status: ON HOLD | COMMUNITY
Start: 2019-12-13 | End: 2019-12-21

## 2019-12-13 ASSESSMENT — MIFFLIN-ST. JEOR: SCORE: 1109.89

## 2019-12-13 NOTE — PROGRESS NOTES
"[facility]  [facility] Medical Record Number:  [id number]  Place of Service where encounter took place: [facility] ([facility]) [[id number]]    HPI:    Radha Cunningham is a 89 year old  (6/20/1929), who is being seen today for an episodic care visit at the above location.   HPI information obtained from: facility chart records, facility staff, patient report and [facility] chart review. Today's concern is INR/Coumadin management for A. Fib    ROS/Subjective:  Bleeding Signs/Symptoms:  None  Thromboembolic Signs/Symptoms:  None  Medication Changes:  Yes Completed Cephalexin on 12/11/19  Dietary Changes:  No  Activity Changes: No  Bacterial/Viral Infection:  No  Missed Coumadin Doses:  None  On ASA: Yes - 81 mg po q day  Other Concerns:  No    OBJECTIVE:  /78   Pulse 66   Temp 97.9  F (36.6  C)   Resp 17   Ht 1.575 m (5' 2\")   Wt 73.7 kg (162 lb 6.4 oz)   SpO2 96%   BMI 29.70 kg/m    Last INR: 1.7 on 12/12/19 2.1 on 12/9/19   INR Today:  2.0  Current Dose:  Warfarin 2.5 mg PO on 12/12/19 and Warfarin 2.0 mg on 12/9/19 through 12/11/19    ASSESSMENT:  Paroxysmal Atrial Fibrillation  Therapeutic INR for goal of 2-3    PLAN:   Orders written by provider at facility  New Dose: Warfarin 2.5 mg PO daily    Next INR: 12/16/19    Electronically signed by:  ROSALIND Alejandro CNP     ADDENDUM: Following today's visit, staff called and stated patient was noted to have wheezing upon return from Dermatology appointment. Temperature 101.5 F. Ordered chest x-ray. Had attempted to change DuoNebs to PRN as patient has anticipated discharge date of 12/17/19 and no nebulizer machine at home, but will change DuoNebs back to scheduled QID.  Dermatology starting patient on Keflex 500 mg PO TID so will decrease Warfarin dose to 2 mg PO QD  "

## 2019-12-13 NOTE — LETTER
"    12/13/2019        RE: Radha Cunningham  [address] [place] [address]        [facility]  [facility] Record Number:  [id number]  Place of Service where encounter took place: [facility] ([facility]) [[id number]]    HPI:    Radha Cunningham is a 89 year old  (6/20/1929), who is being seen today for an episodic care visit at the above location.   HPI information obtained from: facility chart records, facility staff, patient report and [facility] chart review. Today's concern is INR/Coumadin management for A. Fib    ROS/Subjective:  Bleeding Signs/Symptoms:  None  Thromboembolic Signs/Symptoms:  None  Medication Changes:  Yes Completed Cephalexin on 12/11/19  Dietary Changes:  No  Activity Changes: No  Bacterial/Viral Infection:  No  Missed Coumadin Doses:  None  On ASA: Yes - 81 mg po q day  Other Concerns:  No    OBJECTIVE:  /78   Pulse 66   Temp 97.9  F (36.6  C)   Resp 17   Ht 1.575 m (5' 2\")   Wt 73.7 kg (162 lb 6.4 oz)   SpO2 96%   BMI 29.70 kg/m     Last INR: 1.7 on 12/12/19 2.1 on 12/9/19   INR Today:  2.0  Current Dose:  Warfarin 2.5 mg PO on 12/12/19 and Warfarin 2.0 mg on 12/9/19 through 12/11/19    ASSESSMENT:  Paroxysmal Atrial Fibrillation  Therapeutic INR for goal of 2-3    PLAN:   Orders written by provider at facility  New Dose: Warfarin 2.5 mg PO daily    Next INR: 12/16/19    Electronically signed by:  ROSALIND Alejandro CNP     ADDENDUM: Following today's visit, staff called and stated patient was noted to have wheezing upon return from Dermatology appointment. Temperature 101.5 F. Ordered chest x-ray. Had attempted to change DuoNebs to PRN as patient has anticipated discharge date of 12/17/19 and no nebulizer machine at home, but will change DuoNebs back to scheduled QID.  Dermatology starting patient on Keflex 500 mg PO TID so will decrease Warfarin dose to 2 mg PO QD      Sincerely,        Martha Yates, " APRN CNP

## 2019-12-14 ENCOUNTER — TELEPHONE (OUTPATIENT)
Dept: GERIATRICS | Facility: CLINIC | Age: 84
End: 2019-12-14

## 2019-12-14 DIAGNOSIS — R06.2 WHEEZING: Primary | ICD-10-CM

## 2019-12-14 RX ORDER — AZITHROMYCIN 250 MG/1
TABLET, FILM COATED ORAL
Qty: 6 TABLET | Refills: 0 | Status: ON HOLD | COMMUNITY
Start: 2019-12-14 | End: 2019-12-21

## 2019-12-14 RX ORDER — IPRATROPIUM BROMIDE AND ALBUTEROL SULFATE 2.5; .5 MG/3ML; MG/3ML
1 SOLUTION RESPIRATORY (INHALATION) EVERY 4 HOURS PRN
Status: ON HOLD | COMMUNITY
Start: 2019-12-14 | End: 2020-09-06

## 2019-12-14 NOTE — TELEPHONE ENCOUNTER
Had temp this afternoon >101 with wheezing in setting of chronic lung disease. CXR was ordered by my colleague with results showing no acute cardiopulmonary disease or infiltrate. She improved with the neb and is no longer wheezing; also defervesced with Tylenol to 98.6 now. Incidentally was started on Keflex today by derm. Will continue to monitor for any new signs/sx r/t her isolated fever earlier today.    Tawny Sellers, DO

## 2019-12-15 ENCOUNTER — APPOINTMENT (OUTPATIENT)
Dept: CT IMAGING | Facility: CLINIC | Age: 84
DRG: 314 | End: 2019-12-15
Attending: EMERGENCY MEDICINE
Payer: COMMERCIAL

## 2019-12-15 ENCOUNTER — APPOINTMENT (OUTPATIENT)
Dept: CARDIOLOGY | Facility: CLINIC | Age: 84
DRG: 314 | End: 2019-12-15
Attending: INTERNAL MEDICINE
Payer: COMMERCIAL

## 2019-12-15 ENCOUNTER — APPOINTMENT (OUTPATIENT)
Dept: GENERAL RADIOLOGY | Facility: CLINIC | Age: 84
DRG: 314 | End: 2019-12-15
Attending: EMERGENCY MEDICINE
Payer: COMMERCIAL

## 2019-12-15 ENCOUNTER — HOSPITAL ENCOUNTER (INPATIENT)
Facility: CLINIC | Age: 84
LOS: 6 days | Discharge: NURSING, REHAB OR LONG-TERM CARE | DRG: 314 | End: 2019-12-21
Attending: EMERGENCY MEDICINE | Admitting: INTERNAL MEDICINE
Payer: COMMERCIAL

## 2019-12-15 ENCOUNTER — APPOINTMENT (OUTPATIENT)
Dept: GENERAL RADIOLOGY | Facility: CLINIC | Age: 84
DRG: 314 | End: 2019-12-15
Attending: INTERNAL MEDICINE
Payer: COMMERCIAL

## 2019-12-15 DIAGNOSIS — G89.4 CHRONIC PAIN SYNDROME: ICD-10-CM

## 2019-12-15 DIAGNOSIS — E13.69 OTHER SPECIFIED DIABETES MELLITUS WITH OTHER SPECIFIED COMPLICATION, UNSPECIFIED WHETHER LONG TERM INSULIN USE (H): Primary | ICD-10-CM

## 2019-12-15 DIAGNOSIS — I48.91 ATRIAL FIBRILLATION WITH RVR (H): ICD-10-CM

## 2019-12-15 DIAGNOSIS — R06.03 ACUTE RESPIRATORY DISTRESS: ICD-10-CM

## 2019-12-15 DIAGNOSIS — J96.00 ACUTE RESPIRATORY FAILURE, UNSPECIFIED WHETHER WITH HYPOXIA OR HYPERCAPNIA (H): ICD-10-CM

## 2019-12-15 DIAGNOSIS — R10.9 ACUTE ABDOMINAL PAIN: ICD-10-CM

## 2019-12-15 DIAGNOSIS — J96.01 ACUTE RESPIRATORY FAILURE WITH HYPOXIA (H): ICD-10-CM

## 2019-12-15 DIAGNOSIS — J45.901 ASTHMA WITH ACUTE EXACERBATION, UNSPECIFIED ASTHMA SEVERITY, UNSPECIFIED WHETHER PERSISTENT: ICD-10-CM

## 2019-12-15 DIAGNOSIS — A41.9 SEVERE SEPSIS (H): ICD-10-CM

## 2019-12-15 DIAGNOSIS — R65.20 SEVERE SEPSIS (H): ICD-10-CM

## 2019-12-15 LAB
ALBUMIN SERPL-MCNC: 3.2 G/DL (ref 3.4–5)
ALBUMIN UR-MCNC: 200 MG/DL
ALP SERPL-CCNC: 97 U/L (ref 40–150)
ALT SERPL W P-5'-P-CCNC: 24 U/L (ref 0–50)
AMORPH CRY #/AREA URNS HPF: ABNORMAL /HPF
ANION GAP SERPL CALCULATED.3IONS-SCNC: 9 MMOL/L (ref 3–14)
APPEARANCE UR: ABNORMAL
AST SERPL W P-5'-P-CCNC: 22 U/L (ref 0–45)
BACTERIA #/AREA URNS HPF: ABNORMAL /HPF
BASOPHILS # BLD AUTO: 0 10E9/L (ref 0–0.2)
BASOPHILS NFR BLD AUTO: 0.4 %
BILIRUB SERPL-MCNC: 0.5 MG/DL (ref 0.2–1.3)
BILIRUB UR QL STRIP: NEGATIVE
BUN SERPL-MCNC: 18 MG/DL (ref 7–30)
CALCIUM SERPL-MCNC: 8.9 MG/DL (ref 8.5–10.1)
CHLORIDE SERPL-SCNC: 104 MMOL/L (ref 94–109)
CO2 BLDCOV-SCNC: 23 MMOL/L (ref 21–28)
CO2 BLDCOV-SCNC: 24 MMOL/L (ref 21–28)
CO2 SERPL-SCNC: 24 MMOL/L (ref 20–32)
COLOR UR AUTO: YELLOW
CREAT SERPL-MCNC: 0.88 MG/DL (ref 0.52–1.04)
DIFFERENTIAL METHOD BLD: ABNORMAL
EOSINOPHIL # BLD AUTO: 0 10E9/L (ref 0–0.7)
EOSINOPHIL NFR BLD AUTO: 0.4 %
ERYTHROCYTE [DISTWIDTH] IN BLOOD BY AUTOMATED COUNT: 13.8 % (ref 10–15)
FLUAV+FLUBV AG SPEC QL: NEGATIVE
FLUAV+FLUBV AG SPEC QL: NEGATIVE
FLUAV+FLUBV RNA SPEC QL NAA+PROBE: NEGATIVE
FLUAV+FLUBV RNA SPEC QL NAA+PROBE: NEGATIVE
GFR SERPL CREATININE-BSD FRML MDRD: 57 ML/MIN/{1.73_M2}
GLUCOSE BLDC GLUCOMTR-MCNC: 252 MG/DL (ref 70–99)
GLUCOSE BLDC GLUCOMTR-MCNC: 273 MG/DL (ref 70–99)
GLUCOSE BLDC GLUCOMTR-MCNC: 273 MG/DL (ref 70–99)
GLUCOSE SERPL-MCNC: 287 MG/DL (ref 70–99)
GLUCOSE UR STRIP-MCNC: NEGATIVE MG/DL
HBA1C MFR BLD: 6.7 % (ref 0–5.6)
HCT VFR BLD AUTO: 33 % (ref 35–47)
HGB BLD-MCNC: 10.5 G/DL (ref 11.7–15.7)
HGB UR QL STRIP: NEGATIVE
HYALINE CASTS #/AREA URNS LPF: 16 /LPF (ref 0–2)
IMM GRANULOCYTES # BLD: 0.1 10E9/L (ref 0–0.4)
IMM GRANULOCYTES NFR BLD: 1.4 %
INR PPP: 1.48 (ref 0.86–1.14)
INTERPRETATION ECG - MUSE: NORMAL
KETONES UR STRIP-MCNC: NEGATIVE MG/DL
LACTATE BLD-SCNC: 2.5 MMOL/L (ref 0.7–2.1)
LACTATE BLD-SCNC: 4.1 MMOL/L (ref 0.7–2.1)
LEUKOCYTE ESTERASE UR QL STRIP: NEGATIVE
LYMPHOCYTES # BLD AUTO: 1.5 10E9/L (ref 0.8–5.3)
LYMPHOCYTES NFR BLD AUTO: 15 %
MCH RBC QN AUTO: 32.9 PG (ref 26.5–33)
MCHC RBC AUTO-ENTMCNC: 31.8 G/DL (ref 31.5–36.5)
MCV RBC AUTO: 103 FL (ref 78–100)
MONOCYTES # BLD AUTO: 0.7 10E9/L (ref 0–1.3)
MONOCYTES NFR BLD AUTO: 7 %
MRSA DNA SPEC QL NAA+PROBE: NEGATIVE
MUCOUS THREADS #/AREA URNS LPF: PRESENT /LPF
NEUTROPHILS # BLD AUTO: 7.6 10E9/L (ref 1.6–8.3)
NEUTROPHILS NFR BLD AUTO: 75.8 %
NITRATE UR QL: NEGATIVE
NRBC # BLD AUTO: 0 10*3/UL
NRBC BLD AUTO-RTO: 0 /100
NT-PROBNP SERPL-MCNC: 2584 PG/ML (ref 0–1800)
PCO2 BLDV: 39 MM HG (ref 40–50)
PCO2 BLDV: 46 MM HG (ref 40–50)
PH BLDV: 7.3 PH (ref 7.32–7.43)
PH BLDV: 7.39 PH (ref 7.32–7.43)
PH UR STRIP: 5.5 PH (ref 5–7)
PLATELET # BLD AUTO: 154 10E9/L (ref 150–450)
PO2 BLDV: 56 MM HG (ref 25–47)
PO2 BLDV: 57 MM HG (ref 25–47)
POTASSIUM SERPL-SCNC: 4.2 MMOL/L (ref 3.4–5.3)
PROT SERPL-MCNC: 6.8 G/DL (ref 6.8–8.8)
RBC # BLD AUTO: 3.19 10E12/L (ref 3.8–5.2)
RBC #/AREA URNS AUTO: 9 /HPF (ref 0–2)
RSV RNA SPEC NAA+PROBE: POSITIVE
SAO2 % BLDV FROM PO2: 85 %
SAO2 % BLDV FROM PO2: 89 %
SODIUM SERPL-SCNC: 137 MMOL/L (ref 133–144)
SOURCE: ABNORMAL
SP GR UR STRIP: 1.03 (ref 1–1.03)
SPECIMEN SOURCE: ABNORMAL
SPECIMEN SOURCE: NORMAL
SPECIMEN SOURCE: NORMAL
SQUAMOUS #/AREA URNS AUTO: 1 /HPF (ref 0–1)
TROPONIN I SERPL-MCNC: 0.04 UG/L (ref 0–0.04)
UROBILINOGEN UR STRIP-MCNC: NORMAL MG/DL (ref 0–2)
WBC # BLD AUTO: 10 10E9/L (ref 4–11)
WBC #/AREA URNS AUTO: 2 /HPF (ref 0–5)

## 2019-12-15 PROCEDURE — 40000275 ZZH STATISTIC RCP TIME EA 10 MIN

## 2019-12-15 PROCEDURE — 99223 1ST HOSP IP/OBS HIGH 75: CPT | Mod: AI | Performed by: INTERNAL MEDICINE

## 2019-12-15 PROCEDURE — 87040 BLOOD CULTURE FOR BACTERIA: CPT | Performed by: EMERGENCY MEDICINE

## 2019-12-15 PROCEDURE — 74176 CT ABD & PELVIS W/O CONTRAST: CPT

## 2019-12-15 PROCEDURE — 81001 URINALYSIS AUTO W/SCOPE: CPT | Performed by: EMERGENCY MEDICINE

## 2019-12-15 PROCEDURE — 94640 AIRWAY INHALATION TREATMENT: CPT | Mod: 76

## 2019-12-15 PROCEDURE — 25000125 ZZHC RX 250: Performed by: INTERNAL MEDICINE

## 2019-12-15 PROCEDURE — 84484 ASSAY OF TROPONIN QUANT: CPT | Performed by: EMERGENCY MEDICINE

## 2019-12-15 PROCEDURE — 25000132 ZZH RX MED GY IP 250 OP 250 PS 637: Performed by: EMERGENCY MEDICINE

## 2019-12-15 PROCEDURE — 99222 1ST HOSP IP/OBS MODERATE 55: CPT | Mod: 25 | Performed by: INTERNAL MEDICINE

## 2019-12-15 PROCEDURE — 85025 COMPLETE CBC W/AUTO DIFF WBC: CPT | Performed by: EMERGENCY MEDICINE

## 2019-12-15 PROCEDURE — 25000132 ZZH RX MED GY IP 250 OP 250 PS 637: Performed by: SURGERY

## 2019-12-15 PROCEDURE — 20000003 ZZH R&B ICU

## 2019-12-15 PROCEDURE — 82803 BLOOD GASES ANY COMBINATION: CPT

## 2019-12-15 PROCEDURE — 87641 MR-STAPH DNA AMP PROBE: CPT | Performed by: INTERNAL MEDICINE

## 2019-12-15 PROCEDURE — 25000131 ZZH RX MED GY IP 250 OP 636 PS 637: Performed by: INTERNAL MEDICINE

## 2019-12-15 PROCEDURE — 25000128 H RX IP 250 OP 636: Performed by: INTERNAL MEDICINE

## 2019-12-15 PROCEDURE — 96367 TX/PROPH/DG ADDL SEQ IV INF: CPT

## 2019-12-15 PROCEDURE — 83880 ASSAY OF NATRIURETIC PEPTIDE: CPT | Performed by: EMERGENCY MEDICINE

## 2019-12-15 PROCEDURE — 93321 DOPPLER ECHO F-UP/LMTD STD: CPT | Mod: 26 | Performed by: INTERNAL MEDICINE

## 2019-12-15 PROCEDURE — 36569 INSJ PICC 5 YR+ W/O IMAGING: CPT

## 2019-12-15 PROCEDURE — 83036 HEMOGLOBIN GLYCOSYLATED A1C: CPT | Performed by: EMERGENCY MEDICINE

## 2019-12-15 PROCEDURE — 93308 TTE F-UP OR LMTD: CPT

## 2019-12-15 PROCEDURE — 83605 ASSAY OF LACTIC ACID: CPT

## 2019-12-15 PROCEDURE — 36415 COLL VENOUS BLD VENIPUNCTURE: CPT | Performed by: EMERGENCY MEDICINE

## 2019-12-15 PROCEDURE — 25800030 ZZH RX IP 258 OP 636: Performed by: INTERNAL MEDICINE

## 2019-12-15 PROCEDURE — 25800030 ZZH RX IP 258 OP 636: Performed by: EMERGENCY MEDICINE

## 2019-12-15 PROCEDURE — 25000125 ZZHC RX 250: Performed by: EMERGENCY MEDICINE

## 2019-12-15 PROCEDURE — 87640 STAPH A DNA AMP PROBE: CPT | Performed by: INTERNAL MEDICINE

## 2019-12-15 PROCEDURE — 94660 CPAP INITIATION&MGMT: CPT

## 2019-12-15 PROCEDURE — 93325 DOPPLER ECHO COLOR FLOW MAPG: CPT | Mod: 26 | Performed by: INTERNAL MEDICINE

## 2019-12-15 PROCEDURE — 93005 ELECTROCARDIOGRAM TRACING: CPT

## 2019-12-15 PROCEDURE — 80053 COMPREHEN METABOLIC PANEL: CPT | Performed by: EMERGENCY MEDICINE

## 2019-12-15 PROCEDURE — 99292 CRITICAL CARE ADDL 30 MIN: CPT

## 2019-12-15 PROCEDURE — 96366 THER/PROPH/DIAG IV INF ADDON: CPT

## 2019-12-15 PROCEDURE — 96375 TX/PRO/DX INJ NEW DRUG ADDON: CPT

## 2019-12-15 PROCEDURE — 00000146 ZZHCL STATISTIC GLUCOSE BY METER IP

## 2019-12-15 PROCEDURE — 87631 RESP VIRUS 3-5 TARGETS: CPT | Performed by: INTERNAL MEDICINE

## 2019-12-15 PROCEDURE — 40000986 XR CHEST PORT 1 VW

## 2019-12-15 PROCEDURE — 40000281 ZZH STATISTIC TRANSPORT TIME EA 15 MIN

## 2019-12-15 PROCEDURE — 96365 THER/PROPH/DIAG IV INF INIT: CPT

## 2019-12-15 PROCEDURE — 25000128 H RX IP 250 OP 636: Performed by: EMERGENCY MEDICINE

## 2019-12-15 PROCEDURE — 71045 X-RAY EXAM CHEST 1 VIEW: CPT

## 2019-12-15 PROCEDURE — 87804 INFLUENZA ASSAY W/OPTIC: CPT | Performed by: EMERGENCY MEDICINE

## 2019-12-15 PROCEDURE — 25000132 ZZH RX MED GY IP 250 OP 250 PS 637: Performed by: INTERNAL MEDICINE

## 2019-12-15 PROCEDURE — 99291 CRITICAL CARE FIRST HOUR: CPT | Mod: 25

## 2019-12-15 PROCEDURE — 94640 AIRWAY INHALATION TREATMENT: CPT

## 2019-12-15 PROCEDURE — 93308 TTE F-UP OR LMTD: CPT | Mod: 26 | Performed by: INTERNAL MEDICINE

## 2019-12-15 PROCEDURE — 85610 PROTHROMBIN TIME: CPT | Performed by: EMERGENCY MEDICINE

## 2019-12-15 RX ORDER — DEXTROSE MONOHYDRATE 25 G/50ML
25-50 INJECTION, SOLUTION INTRAVENOUS
Status: DISCONTINUED | OUTPATIENT
Start: 2019-12-15 | End: 2019-12-15

## 2019-12-15 RX ORDER — ONDANSETRON 2 MG/ML
4 INJECTION INTRAMUSCULAR; INTRAVENOUS EVERY 6 HOURS PRN
Status: DISCONTINUED | OUTPATIENT
Start: 2019-12-15 | End: 2019-12-21 | Stop reason: HOSPADM

## 2019-12-15 RX ORDER — IPRATROPIUM BROMIDE AND ALBUTEROL SULFATE 2.5; .5 MG/3ML; MG/3ML
SOLUTION RESPIRATORY (INHALATION)
Status: DISCONTINUED
Start: 2019-12-15 | End: 2019-12-15 | Stop reason: HOSPADM

## 2019-12-15 RX ORDER — ZINC OXIDE 216 MG/ML
LOTION TOPICAL 2 TIMES DAILY
COMMUNITY
End: 2021-05-28

## 2019-12-15 RX ORDER — WARFARIN SODIUM 2 MG/1
TABLET ORAL
Status: ON HOLD | COMMUNITY
End: 2021-06-01

## 2019-12-15 RX ORDER — ASPIRIN 81 MG/1
81 TABLET, CHEWABLE ORAL DAILY
Status: DISCONTINUED | OUTPATIENT
Start: 2019-12-15 | End: 2019-12-15

## 2019-12-15 RX ORDER — ONDANSETRON 4 MG/1
4 TABLET, ORALLY DISINTEGRATING ORAL EVERY 6 HOURS PRN
Status: DISCONTINUED | OUTPATIENT
Start: 2019-12-15 | End: 2019-12-21 | Stop reason: HOSPADM

## 2019-12-15 RX ORDER — ALBUTEROL SULFATE 0.83 MG/ML
SOLUTION RESPIRATORY (INHALATION)
Status: DISCONTINUED
Start: 2019-12-15 | End: 2019-12-15 | Stop reason: HOSPADM

## 2019-12-15 RX ORDER — NICOTINE POLACRILEX 4 MG
15-30 LOZENGE BUCCAL
Status: DISCONTINUED | OUTPATIENT
Start: 2019-12-15 | End: 2019-12-15

## 2019-12-15 RX ORDER — HEPARIN SODIUM,PORCINE 10 UNIT/ML
2-5 VIAL (ML) INTRAVENOUS
Status: DISCONTINUED | OUTPATIENT
Start: 2019-12-15 | End: 2019-12-17

## 2019-12-15 RX ORDER — IPRATROPIUM BROMIDE AND ALBUTEROL SULFATE 2.5; .5 MG/3ML; MG/3ML
3 SOLUTION RESPIRATORY (INHALATION)
Status: DISCONTINUED | OUTPATIENT
Start: 2019-12-15 | End: 2019-12-21 | Stop reason: HOSPADM

## 2019-12-15 RX ORDER — IPRATROPIUM BROMIDE AND ALBUTEROL SULFATE 2.5; .5 MG/3ML; MG/3ML
6 SOLUTION RESPIRATORY (INHALATION) ONCE
Status: COMPLETED | OUTPATIENT
Start: 2019-12-15 | End: 2019-12-15

## 2019-12-15 RX ORDER — FUROSEMIDE 10 MG/ML
20 INJECTION INTRAMUSCULAR; INTRAVENOUS ONCE
Status: COMPLETED | OUTPATIENT
Start: 2019-12-15 | End: 2019-12-15

## 2019-12-15 RX ORDER — HYDROCODONE BITARTRATE AND ACETAMINOPHEN 5; 325 MG/1; MG/1
1 TABLET ORAL ONCE
Status: COMPLETED | OUTPATIENT
Start: 2019-12-15 | End: 2019-12-15

## 2019-12-15 RX ORDER — METHYLPREDNISOLONE SODIUM SUCCINATE 125 MG/2ML
125 INJECTION, POWDER, LYOPHILIZED, FOR SOLUTION INTRAMUSCULAR; INTRAVENOUS ONCE
Status: COMPLETED | OUTPATIENT
Start: 2019-12-15 | End: 2019-12-15

## 2019-12-15 RX ORDER — NICOTINE POLACRILEX 4 MG
15-30 LOZENGE BUCCAL
Status: DISCONTINUED | OUTPATIENT
Start: 2019-12-15 | End: 2019-12-21 | Stop reason: HOSPADM

## 2019-12-15 RX ORDER — DEXTROSE MONOHYDRATE 25 G/50ML
25-50 INJECTION, SOLUTION INTRAVENOUS
Status: DISCONTINUED | OUTPATIENT
Start: 2019-12-15 | End: 2019-12-21 | Stop reason: HOSPADM

## 2019-12-15 RX ORDER — EMOLLIENT BASE
CREAM (GRAM) TOPICAL 2 TIMES DAILY PRN
COMMUNITY

## 2019-12-15 RX ORDER — FLUTICASONE PROPIONATE 50 MCG
1 SPRAY, SUSPENSION (ML) NASAL DAILY PRN
Status: DISCONTINUED | OUTPATIENT
Start: 2019-12-15 | End: 2019-12-21 | Stop reason: HOSPADM

## 2019-12-15 RX ORDER — NYSTATIN 100000 [USP'U]/G
POWDER TOPICAL 2 TIMES DAILY
COMMUNITY

## 2019-12-15 RX ORDER — EMOLLIENT BASE
CREAM (GRAM) TOPICAL 2 TIMES DAILY
Status: DISCONTINUED | OUTPATIENT
Start: 2019-12-15 | End: 2019-12-21 | Stop reason: HOSPADM

## 2019-12-15 RX ORDER — NALOXONE HYDROCHLORIDE 0.4 MG/ML
.1-.4 INJECTION, SOLUTION INTRAMUSCULAR; INTRAVENOUS; SUBCUTANEOUS
Status: DISCONTINUED | OUTPATIENT
Start: 2019-12-15 | End: 2019-12-21 | Stop reason: HOSPADM

## 2019-12-15 RX ORDER — CEFTRIAXONE 1 G/1
1 INJECTION, POWDER, FOR SOLUTION INTRAMUSCULAR; INTRAVENOUS ONCE
Status: COMPLETED | OUTPATIENT
Start: 2019-12-15 | End: 2019-12-15

## 2019-12-15 RX ORDER — ACETAMINOPHEN 650 MG/1
650 SUPPOSITORY RECTAL ONCE
Status: COMPLETED | OUTPATIENT
Start: 2019-12-15 | End: 2019-12-15

## 2019-12-15 RX ORDER — LIDOCAINE 40 MG/G
CREAM TOPICAL
Status: DISCONTINUED | OUTPATIENT
Start: 2019-12-15 | End: 2019-12-21 | Stop reason: HOSPADM

## 2019-12-15 RX ORDER — HYDROCODONE BITARTRATE AND ACETAMINOPHEN 5; 325 MG/1; MG/1
1 TABLET ORAL 2 TIMES DAILY
Status: DISCONTINUED | OUTPATIENT
Start: 2019-12-15 | End: 2019-12-21 | Stop reason: HOSPADM

## 2019-12-15 RX ADMIN — METHYLPREDNISOLONE SODIUM SUCCINATE 125 MG: 125 INJECTION, POWDER, FOR SOLUTION INTRAMUSCULAR; INTRAVENOUS at 05:40

## 2019-12-15 RX ADMIN — DILTIAZEM HYDROCHLORIDE 5 MG/HR: 5 INJECTION INTRAVENOUS at 06:57

## 2019-12-15 RX ADMIN — INSULIN ASPART 3 UNITS: 100 INJECTION, SOLUTION INTRAVENOUS; SUBCUTANEOUS at 16:05

## 2019-12-15 RX ADMIN — AMIODARONE HYDROCHLORIDE 150 MG: 1.5 INJECTION, SOLUTION INTRAVENOUS at 13:09

## 2019-12-15 RX ADMIN — IPRATROPIUM BROMIDE AND ALBUTEROL SULFATE 3 ML: .5; 3 SOLUTION RESPIRATORY (INHALATION) at 11:47

## 2019-12-15 RX ADMIN — HYDROCODONE BITARTRATE AND ACETAMINOPHEN 1 TABLET: 5; 325 TABLET ORAL at 21:56

## 2019-12-15 RX ADMIN — AMIODARONE HYDROCHLORIDE 1 MG/MIN: 50 INJECTION, SOLUTION INTRAVENOUS at 14:00

## 2019-12-15 RX ADMIN — IPRATROPIUM BROMIDE AND ALBUTEROL SULFATE 3 ML: .5; 3 SOLUTION RESPIRATORY (INHALATION) at 15:51

## 2019-12-15 RX ADMIN — HYDROCODONE BITARTRATE AND ACETAMINOPHEN 1 TABLET: 5; 325 TABLET ORAL at 07:33

## 2019-12-15 RX ADMIN — INSULIN ASPART 3 UNITS: 100 INJECTION, SOLUTION INTRAVENOUS; SUBCUTANEOUS at 11:16

## 2019-12-15 RX ADMIN — IPRATROPIUM BROMIDE AND ALBUTEROL SULFATE 6 ML: .5; 3 SOLUTION RESPIRATORY (INHALATION) at 05:30

## 2019-12-15 RX ADMIN — IPRATROPIUM BROMIDE AND ALBUTEROL SULFATE 3 ML: .5; 3 SOLUTION RESPIRATORY (INHALATION) at 20:00

## 2019-12-15 RX ADMIN — INSULIN GLARGINE 8 UNITS: 100 INJECTION, SOLUTION SUBCUTANEOUS at 11:17

## 2019-12-15 RX ADMIN — Medication: at 20:46

## 2019-12-15 RX ADMIN — FUROSEMIDE 20 MG: 10 INJECTION, SOLUTION INTRAMUSCULAR; INTRAVENOUS at 10:47

## 2019-12-15 RX ADMIN — ACETAMINOPHEN 650 MG: 650 SUPPOSITORY RECTAL at 05:51

## 2019-12-15 RX ADMIN — CEFTRIAXONE SODIUM 1 G: 1 INJECTION, POWDER, FOR SOLUTION INTRAMUSCULAR; INTRAVENOUS at 06:38

## 2019-12-15 RX ADMIN — MICONAZOLE NITRATE: 20 POWDER TOPICAL at 18:10

## 2019-12-15 ASSESSMENT — ACTIVITIES OF DAILY LIVING (ADL)
ADLS_ACUITY_SCORE: 22
ADLS_ACUITY_SCORE: 23
ADLS_ACUITY_SCORE: 23

## 2019-12-15 ASSESSMENT — ENCOUNTER SYMPTOMS
SHORTNESS OF BREATH: 1
ABDOMINAL PAIN: 1

## 2019-12-15 ASSESSMENT — MIFFLIN-ST. JEOR: SCORE: 1117.13

## 2019-12-15 NOTE — PHARMACY-ADMISSION MEDICATION HISTORY
Admission medication history interview status for this patient is complete. See [facility] admission navigator for allergy information, prior to admission medications and immunization status.     Medication history interview source(s):Caregivers  Medication history resources (including written lists, pill bottles, clinic record): [place] and MAR provided by [facility] ([phone])  Primary pharmacy: [id number] - [place], [place] - [address] [place] [address]     Changes made to PTA medication list:  Added: Magic Cup nutritional supplement and nystatin powder, vanicream  Deleted: aspirin  Changed: warfarin (updated to current dosing)    Actions taken by pharmacist (provider contacted, etc):None     Additional medication history information: Aspirin removed from PTA med list as caregivers do not believe patient has an indication for this medication (already on warfarin). Azithromycin therapy continue until 12/18. Cephalexin therapy continue until 12/23.     Medication reconciliation/reorder completed by provider prior to medication history?  Yes   Do you take OTC medications (eg tylenol, ibuprofen, fish oil, eye/ear drops, etc)? Yes       Prior to Admission medications    Medication Sig Last Dose Taking? Auth Provider   acetaminophen (TYLENOL) 325 MG tablet Take 1-2 tablets (325-650 mg) by mouth every 6 hours as needed for mild pain Past Week at Unknown time Yes Dean Tavarez MD   ASPIRIN PO Take 81 mg by mouth daily  12/14/2019 at 0800 Yes Reported, Patient   atorvastatin (LIPITOR) 10 MG tablet Take 10 mg by mouth At Bedtime  12/14/2019 at 2000 Yes Reported, Patient   azithromycin (ZITHROMAX) 250 MG tablet Take 2 tablets (500 mg) by mouth daily for 1 day, THEN 1 tablet (250 mg) daily for 4 days. 12/14/2019 at 2000 Yes Tawny Sellesr,    calcium carbonate (OS-SHAN 500 MG [place]. [place]) 500 MG tablet Take 500 mg by mouth daily  12/14/2019 at 1700 Yes Reported, Patient  "  cephALEXin (KEFLEX) 500 MG capsule Take 500 mg by mouth 3 times daily 12/14/2019 at 1800 Yes Reported, Patient   emollient (VANICREAM) external cream Apply topically 2 times daily To whole body Past Week at Unknown time Yes Unknown, Entered By History   HYDROcodone-acetaminophen (NORCO) 5-325 MG tablet Take 1 tablet by mouth 2 times daily At 0800 and 2000 12/14/2019 at 2000 Yes Tawny Sellers, DO   ipratropium - albuterol 0.5 mg/2.5 mg/3 mL (DUONEB) 0.5-2.5 (3) MG/3ML neb solution Take 1 vial (3 mLs) by nebulization every 4 hours as needed for shortness of breath / dyspnea or wheezing (In addition to QID scheduled) Past Week at Unknown time Yes Tawny Sellers, DO   ipratropium - albuterol 0.5 mg/2.5 mg/3 mL (DUONEB) 0.5-2.5 (3) MG/3ML neb solution Take 1 vial (3 mLs) by nebulization 4 times daily 12/14/2019 at 1800 Yes Dean Tavarez MD   melatonin 1 MG TABS tablet Take 1 tablet (1 mg) by mouth nightly as needed for sleep 12/14/2019 at 2000 Yes Dean Tavarez MD   metoprolol tartrate (LOPRESSOR) 50 MG tablet Take 50 mg by mouth 2 times daily 12/14/2019 at 2000 Yes Unknown, Entered By History   Nutritional Supplements (NUTRITIONAL SUPPLEMENT PLUS) LIQD Take by mouth 2 times daily \"Magic Cup\" brand nutritional supplement 12/14/2019 at 1800 Yes Unknown, Entered By History   nystatin (MYCOSTATIN) 285210 UNIT/GM external powder Apply topically 2 times daily Under right breast and groin area 12/14/2019 at 2000 Yes Unknown, Entered By History   omeprazole (PRILOSEC) 20 MG DR capsule Take 20 mg by mouth daily 12/14/2019 at 1700 Yes Unknown, Entered By History   polyethylene glycol (MIRALAX/GLYCOLAX) packet Take 17 g by mouth daily as needed for constipation Past Month at Unknown time Yes Dena Tavarez MD   senna-docusate (SENOKOT-S/PERICOLACE) 8.6-50 MG tablet Take 1 tablet by mouth 2 times daily as needed for constipation Past Month at Unknown time Yes Dean Tavarez MD   vitamin D2 " (ERGOCALCIFEROL) 94643 units (1250 mcg) capsule Take 50,000 Units by mouth once a week Tuesdays 12/10/2019 at 0700 Yes Unknown, Entered By History   warfarin ANTICOAGULANT (COUMADIN) 2 MG tablet Take 2 mg by mouth daily 12/14/2019 Yes Unknown, Entered By History   fluticasone (FLONASE) 50 MCG/ACT nasal spray Spray 1 spray into both nostrils daily as needed for rhinitis or allergies More than a month at Unknown time  Unknown, Entered By History

## 2019-12-15 NOTE — PROGRESS NOTES
RT: Received patient on BIPAP 12/6, 30%. Patient tolerated being weaned from BIPAP to room air, SPO2 95%+.    RR 20-24. BS coarse crackles. Duoneb given as scheduled. Will continue to follow and assess.

## 2019-12-15 NOTE — PROVIDER NOTIFICATION
12/15/19 1445   Significant Event   Significant Event Critical result/value  (RSV postitive)   MD notified of RSV. Droplet precautions ordered

## 2019-12-15 NOTE — TELEPHONE ENCOUNTER
Called re: repeat fever tonight like last night. Is on Keflex since yesterday per derm though had low grade temp yesterday with unremarkable CXR. Tonight, 151/74, 92, 96% on 2 LPM, 100.4 and then after Tylenol still 100.3 temp. Per nurse, her lungs sound crackly and tight even after DuoNeb and she is more fatigued than baseline. Will add Zpak to Keflex and add PRN DuoNebs to her already scheduled DuoNebs. If not effective, consider adding back prednisone 40 mg daily x 5 days but she just finished some prednisone from hospital stay. Likely to have chronic lung disease. Also consider in Ddx testing for influenza if not turning the corner. Her coumadin dosing was adjusted yesterday to account for Keflex antibiotics and will have INR check on Monday.    Tawny Sellers, DO

## 2019-12-15 NOTE — ED NOTES
Coarse crackle bilat lower lungs, crackles t/o other lung hardwick.  MD Vega notified.  This nurse rcvd report from night staff, reported wheezing.  MD Vega reassessed patient stating this is a change; however, to keep fluids bolusing.  -150's, on cardizem drip at 5mg/hr. Patient requesting home pain meds.

## 2019-12-15 NOTE — PROGRESS NOTES
RT: Patient transported on BIPAP, 30% to ICU. SPO2 96%+ throughout. No complications noted.    Will continue to follow and assess.

## 2019-12-15 NOTE — PLAN OF CARE
ICU End of Shift Summary.  For vital signs and complete assessments, please see documentation flowsheets.     Pertinent assessments: A&Ox4. Tele - Afib RVR changing to Afib CVR - HR 90's. BiPAP turned off this afternoon. 94% RA. MARIO. Lungs coarse, crackles. Updated MD. IV Lasix given with no Urine output in [place]. Bladder Scan 104, notified MD. BPs soft with Dilt gtt changed to Amio gtt. Denies pain. POC reviewed with pt and family. Sleeping at this time.   Major Shift Events: new admit  Plan (Upcoming Events): continue to monitor closely.   Discharge/Transfer Needs: tbd - family will NOT go back to [facility].     Bedside Shift Report Completed : yes  Bedside Safety Check Completed: yes

## 2019-12-15 NOTE — H&P
[facility] History and Physical    Radha Cunningham MRN# [id number]   Age: 89 year old YOB: 1929     Date of Admission:  12/15/2019    Home clinic: [facility]  Primary care provider: Samaria Chappell          Assessment and Plan:   Assessment:   Radha Cunningham is a 89 year old woman who was discharged from [facility] on 11/25/19 after week's hospitalization for respiratory problems.  She did feel significantly improved on discharge noting that she was treated with antibiotics, bronchodilators and steroids.  She was discharged to transitional care and had remained there until today when she was noted to be in respiratory distress for which reason she was transferred to the ED for evaluation.  The patient describes having been exposed to multiple ill people including workers and residence at [facility].    Prior medical history significant for permanent Atrial Fibrillation s/p pacer placement in April, 2018 for pauses and syncope.  Prior surgeries include Cholecystectomy, tonsillectoy and hernia repair.    En route to the hospital, she was noted to be severely tachycardic with a heart rate of 190.  She was given duo nebs, albuterol nebs and a couple of doses of verapamil by EMS.     Upon arrival at the [facility] emergency department, the heart rate was noted to be in excess of 200, she was febrile to 101.6 and blood pressure generally was stable, generally 100-110/80's and saturating at greater than 96% on room air.  She was noted to be severely tachypneic and with a rate of 40-50.  She was put on BiPAP and this also then helped with decreasing her work of breathing.    Lab evaluation did indicate an elevated N-terminal proBNP, lactic acid 4.1, normal electrolytes and creatinine as well as only a mild left shift with WBC of 10.  Catheterized urinary specimen is without obvious evidence of infection, influenza antigen are negative.  Chest x-ray shows cardiac enlargement.  CT scan chest,  abdomen, pelvis shows only mild scattered infiltrates in the right upper lung.    Diagnoses:  1.  Respiratory distress without evident hypoxia or hypercarbia.  Mild, scattered right upper lobe infiltrates.  2.  Permanent A fib now with RVR.  On rate-control strategy with Metoprolol and anticoagulated with Warfarin.  Pacemaker placed in April, 2018 for pauses.   3.  Subtherapeutic INR.   4.  Severe, new LV dysfunction, LV EF < 25% possibly due to tachycardia.   5.  NIDDM.   6.  Chronic Low Back/SI joint pain.      Plan:   1.  Admit to ICU on BiPAP.  2.  Cont Dilt drip for now to maintain SBP > 100 and HR < 100.    3.  Formal Cardiology consult requested.  I spoke with Dr. Deshpande who agreed with trial of amiodarone for at least temporary rate control.  I spoke with the ICU pharmacist, Cole, in regard to the patient's reported allergy to IVP dye with facial swelling as the side effect.   4.  Cont empiric ceftriaxone.  I requested influenza and RSV PCR's in addition to flu swabs obtained in the emergency department for antigen that were negative.  5.  Insulin sliding scale with Lantus 8 units daily.  6.  We will plan on a single dose of Lasix at this point.  Hold off on further diuresis.             Chief Complaint:   Cough, fever, respiratory distress     History is obtained from the patient who was on BiPAP at the time that I spoke with her both in the ED as well as in the ICU.  I also spoke with 2 of her daughters present at the bedside in the ICU, emergency room physician and reviewed electronic medical record.    Radha Cunningham is a 89-year-old woman who typically lives by herself but has a couple of daughters who are very involved in her day-to-day health who had been directed into the emergency department after several days of cough and shortness of breath from [facility].  The patient clearly describes having been exposed to multiple other residents as well as employees who had cough.  She apparently had an  episode of rather severe shortness of breath yesterday but I believe this quieted down.  The patient is not 100% clear on whether she had palpitations, but she does describe awareness of relatively rapid heart rate.    At this time, the patient endorses fevers but without shaking chills or drenching sweats.  She describes rather small volume nonpurulent cough with associated rhinorrhea.  She has been eating okay.  No nausea, vomiting.  Patient apparently had a single episode of loose stool but this is not terribly unusual for her.  She has mild diffuse abdominal pain.    Apparently did have significant bronchospasm during the last hospitalization but is not typically managed with bronchodilators.  I note in the medical record (care everywhere) that she has been evaluated fairly extensively for shortness of breath by pulmonary medicine.  Most noteworthy, echocardiogram completed in October 2019 showed normal LV function, mild right ventricular chamber enlargement and severe biatrial enlargement.  Patient has mild to moderate valvular abnormalities also incidentally noted.    Noted in the emergency department, the patient was sent for CT scan chest abdomen and pelvis.  This did not reveal abdominal pathology but the patient does seem to have at least mild right upper lobe infiltrates.  In addition, a cardiogram was obtained stat in the emergency department that shows markedly diminished LV function (LVEF less than 25%) and right ventricular dilatation with severely decreased function.          Past Medical History:     Past Medical History:   Diagnosis Date     Asthma      Cholelithiasis      Diverticulosis      Hiatal hernia      Hypertension      Paroxysmal A-fib (H)      PUD (peptic ulcer disease)      Skin cancer              Past Surgical History:      Past Surgical History:   Procedure Laterality Date     HERNIA REPAIR  2004    umbilical     Skin cancer removal - forehead  2005             Social History:      Social History     Tobacco Use     Smoking status: Never Smoker   Substance Use Topics     Alcohol use: No             Family History:     Family History   Problem Relation Age of Onset     C.A.D. Mother      Hypertension Mother      Family history reviewed.         Allergies:     Allergies   Allergen Reactions     Contrast Dye              Medications:     Medications Prior to Admission   Medication Sig Dispense Refill Last Dose     acetaminophen (TYLENOL) 325 MG tablet Take 1-2 tablets (325-650 mg) by mouth every 6 hours as needed for mild pain   Past Week at Unknown time     atorvastatin (LIPITOR) 10 MG tablet Take 10 mg by mouth At Bedtime    12/14/2019 at 2000     azithromycin (ZITHROMAX) 250 MG tablet Take 2 tablets (500 mg) by mouth daily for 1 day, THEN 1 tablet (250 mg) daily for 4 days. 6 tablet 0 12/14/2019 at 2000     calcium carbonate (OS-SHAN 500 MG [place]) 500 MG tablet Take 500 mg by mouth daily    12/14/2019 at 1700     cephALEXin (KEFLEX) 500 MG capsule Take 500 mg by mouth 3 times daily   12/14/2019 at 1800     emollient (VANICREAM) external cream Apply topically 2 times daily To whole body   Past Week at Unknown time     HYDROcodone-acetaminophen (NORCO) 5-325 MG tablet Take 1 tablet by mouth 2 times daily At 0800 and 2000 30 tablet 0 12/14/2019 at 2000     ipratropium - albuterol 0.5 mg/2.5 mg/3 mL (DUONEB) 0.5-2.5 (3) MG/3ML neb solution Take 1 vial (3 mLs) by nebulization every 4 hours as needed for shortness of breath / dyspnea or wheezing (In addition to QID scheduled)   Past Week at Unknown time     ipratropium - albuterol 0.5 mg/2.5 mg/3 mL (DUONEB) 0.5-2.5 (3) MG/3ML neb solution Take 1 vial (3 mLs) by nebulization 4 times daily   12/14/2019 at 1800     melatonin 1 MG TABS tablet Take 1 tablet (1 mg) by mouth nightly as needed for sleep   12/14/2019 at 2000     metoprolol tartrate (LOPRESSOR) 50 MG tablet Take 50 mg by mouth 2 times daily   12/14/2019 at 2000     Nutritional  "Supplements (NUTRITIONAL SUPPLEMENT PLUS) LIQD Take by mouth 2 times daily \"Magic Cup\" brand nutritional supplement   12/14/2019 at 1800     nystatin (MYCOSTATIN) 742808 UNIT/GM external powder Apply topically 2 times daily Under right breast and groin area   12/14/2019 at 2000     omeprazole (PRILOSEC) 20 MG DR capsule Take 20 mg by mouth daily   12/14/2019 at 1700     polyethylene glycol (MIRALAX/GLYCOLAX) packet Take 17 g by mouth daily as needed for constipation   Past Month at Unknown time     senna-docusate (SENOKOT-S/PERICOLACE) 8.6-50 MG tablet Take 1 tablet by mouth 2 times daily as needed for constipation   Past Month at Unknown time     vitamin D2 (ERGOCALCIFEROL) 62327 units (1250 mcg) capsule Take 50,000 Units by mouth once a week Tuesdays   12/10/2019 at 0700     warfarin ANTICOAGULANT (COUMADIN) 2 MG tablet Take 2 mg by mouth daily   12/14/2019     fluticasone (FLONASE) 50 MCG/ACT nasal spray Spray 1 spray into both nostrils daily as needed for rhinitis or allergies   More than a month at Unknown time             Review of Systems:   A comprehensive review of systems was performed and found to be negative except as described in this note           Physical Exam:   Vitals were reviewed  Temp: 98.1  F (36.7  C) Temp src: Axillary BP: 99/69 Pulse: 95 Heart Rate: 101 Resp: (!) 34 SpO2: 98 % O2 Device: BiPAP/CPAP    Constitutional: Awake, alert, cooperative.  Currently wearing BiPAP mask.  Eyes: Lids and lashes normal, pupils equal, round and reactive to light, extra ocular muscles intact, sclera clear, conjunctiva normal.  ENT: Normocephalic, without obvious abnormality, atraumatic.  Limited evaluation.  Neck: Supple, symmetrical, trachea midline, no adenopathy, thyroid symmetric, not enlarged and no tenderness, skin normal.  Hematologic / Lymphatic: No cervical lymphadenopathy and no supraclavicular lymphadenopathy.  Back: Symmetric, no curvature, spinous processes are non-tender on palpation, " paraspinous muscles are non-tender on palpation, no costal vertebral tenderness.  Lungs: No increased work of breathing.  Diffuse expiratory wheeze as well as scattered rales.  Cardiovascular: IRRIR, tacky.  Abdomen: Normal bowel sounds, soft, non-distended, minimally and nonspecifically tender.  No masses palpated, no hepatosplenomegaly.  Musculoskeletal: No redness, warmth, or swelling of the joints.  Full range of motion noted.  Motor strength is 5 out of 5 all extremities bilaterally.  Tone is normal.  Neurologic: Awake, alert, oriented to name, place and time.  Cranial nerves II-XII are grossly intact.  Motor is 5 out of 5 bilaterally.    Neuropsychiatric: Limited evaluation.  Skin: No rashes, erythema, pallor, petechia or purpura.          Data:     Results for orders placed or performed during the hospital encounter of 12/15/19 (from the past 24 hour(s))   CBC with platelets differential   Result Value Ref Range    WBC 10.0 4.0 - 11.0 10e9/L    RBC Count 3.19 (L) 3.8 - 5.2 10e12/L    Hemoglobin 10.5 (L) 11.7 - 15.7 g/dL    Hematocrit 33.0 (L) 35.0 - 47.0 %     (H) 78 - 100 fl    MCH 32.9 26.5 - 33.0 pg    MCHC 31.8 31.5 - 36.5 g/dL    RDW 13.8 10.0 - 15.0 %    Platelet Count 154 150 - 450 10e9/L    Diff Method Automated Method     % Neutrophils 75.8 %    % Lymphocytes 15.0 %    % Monocytes 7.0 %    % Eosinophils 0.4 %    % Basophils 0.4 %    % Immature Granulocytes 1.4 %    Nucleated RBCs 0 0 /100    Absolute Neutrophil 7.6 1.6 - 8.3 10e9/L    Absolute Lymphocytes 1.5 0.8 - 5.3 10e9/L    Absolute Monocytes 0.7 0.0 - 1.3 10e9/L    Absolute Eosinophils 0.0 0.0 - 0.7 10e9/L    Absolute Basophils 0.0 0.0 - 0.2 10e9/L    Abs Immature Granulocytes 0.1 0 - 0.4 10e9/L    Absolute Nucleated RBC 0.0    Troponin I   Result Value Ref Range    Troponin I ES 0.045 0.000 - 0.045 ug/L   Nt probnp inpatient   Result Value Ref Range    N-Terminal Pro BNP Inpatient 2,584 (H) 0 - 1,800 pg/mL   Comprehensive metabolic  panel   Result Value Ref Range    Sodium 137 133 - 144 mmol/L    Potassium 4.2 3.4 - 5.3 mmol/L    Chloride 104 94 - 109 mmol/L    Carbon Dioxide 24 20 - 32 mmol/L    Anion Gap 9 3 - 14 mmol/L    Glucose 287 (H) 70 - 99 mg/dL    Urea Nitrogen 18 7 - 30 mg/dL    Creatinine 0.88 0.52 - 1.04 mg/dL    GFR Estimate 57 (L) >60 mL/min/[1.73_m2]    GFR Estimate If Black 66 >60 mL/min/[1.73_m2]    Calcium 8.9 8.5 - 10.1 mg/dL    Bilirubin Total 0.5 0.2 - 1.3 mg/dL    Albumin 3.2 (L) 3.4 - 5.0 g/dL    Protein Total 6.8 6.8 - 8.8 g/dL    Alkaline Phosphatase 97 40 - 150 U/L    ALT 24 0 - 50 U/L    AST 22 0 - 45 U/L   INR   Result Value Ref Range    INR 1.48 (H) 0.86 - 1.14   Hemoglobin A1c   Result Value Ref Range    Hemoglobin A1C 6.7 (H) 0 - 5.6 %   ISTAT gases lactate manny POCT   Result Value Ref Range    Ph Venous 7.30 (L) 7.32 - 7.43 pH    PCO2 Venous 46 40 - 50 mm Hg    PO2 Venous 56 (H) 25 - 47 mm Hg    Bicarbonate Venous 23 21 - 28 mmol/L    O2 Sat Venous 85 %    Lactic Acid 4.1 (HH) 0.7 - 2.1 mmol/L   EKG 12-lead, tracing only   Result Value Ref Range    Interpretation ECG Click View Image link to view waveform and result    XR Chest Port 1 View    Narrative    EXAM: XR CHEST PORTABLE 1 VIEW  LOCATION: [facility]  DATE/TIME: 12/15/2019, 5:32 AM    INDICATION: Shortness of breath.  COMPARISON: 11/22/2019.    FINDINGS: Left subclavian cardiac device in place. No pneumothorax. The heart is enlarged. There is no pulmonary edema. The thoracic aorta is calcified and mildly tortuous. There is mild right basilar atelectasis. The lungs are otherwise clear.   Degenerative disease in the spine and shoulders.      Impression    IMPRESSION: Mild right basilar atelectasis.     Influenza A/B antigen   Result Value Ref Range    Influenza A/B Agn Specimen Nasopharyngeal     Influenza A Negative NEG^Negative    Influenza B Negative NEG^Negative   Blood culture   Result Value Ref Range    Specimen Description Blood Right  Hand     Special Requests Aerobic and anaerobic bottles received     Culture Micro PENDING    Blood culture   Result Value Ref Range    Specimen Description Blood Left Arm     Special Requests Aerobic and anaerobic bottles received     Culture Micro PENDING    UA with Microscopic   Result Value Ref Range    Color Urine Yellow     Appearance Urine Slightly Cloudy     Glucose Urine Negative NEG^Negative mg/dL    Bilirubin Urine Negative NEG^Negative    Ketones Urine Negative NEG^Negative mg/dL    Specific Gravity Urine 1.026 1.003 - 1.035    Blood Urine Negative NEG^Negative    pH Urine 5.5 5.0 - 7.0 pH    Protein Albumin Urine 200 (A) NEG^Negative mg/dL    Urobilinogen mg/dL Normal 0.0 - 2.0 mg/dL    Nitrite Urine Negative NEG^Negative    Leukocyte Esterase Urine Negative NEG^Negative    Source Catheterized Urine     WBC Urine 2 0 - 5 /HPF    RBC Urine 9 (H) 0 - 2 /HPF    Bacteria Urine Moderate (A) NEG^Negative /HPF    Squamous Epithelial /HPF Urine 1 0 - 1 /HPF    Mucous Urine Present (A) NEG^Negative /LPF    Hyaline Casts 16 (H) 0 - 2 /LPF    Amorphous Crystals Many (A) NEG^Negative /HPF   CT Chest Abdomen Pelvis w/o Contrast    Narrative    CT CHEST, ABDOMEN AND PELVIS WITHOUT CONTRAST  12/15/2019 7:50 AM    HISTORY:  Shortness of breath, cough and severe abdominal pain.    TECHNIQUE: CT scan obtained of the chest, abdomen, and pelvis without  IV contrast. Radiation dose for this scan was reduced using automated  exposure control, adjustment of the mA and/or kV according to patient  size, or iterative reconstruction technique.    COMPARISON:  12/15/2019 chest x-ray    FINDINGS:  Chest: Cardiac device left chest wall with right ventricular and  atrial leads. Prominent atherosclerotic vascular calcification and  aneurysmal ascending thoracic aorta 4.3 cm at the level of right  pulmonary artery.    There is mild right paratracheal and pretracheal adenopathy. Suspect  mild right hilar adenopathy, although difficult  to assess with  noncontrast technique. There is subpleural thickening at the  posteromedial right lung base. Bilateral bronchial wall or  peribronchial thickening greatest in the lower lobes and multifocal  patchy airspace opacities greatest in the right upper lobe and mild in  the lingula. Consistent with infectious/inflammatory process.    Abdomen/pelvis: 1.5 cm low-attenuation lesion in the left lobe of the  liver, series 2 image 52, is identified as a cyst on prior abdomen and  pelvic CT of 2016. Gallbladder difficult to visualize, may be  contracted with small stones. Spleen and adrenal glands demonstrate no  acute-appearing abnormality as seen with noncontrast technique.  Bilateral low-dense kidney lesions consistent with previously seen  renal cysts. Pancreas is very poorly visualized with prominent atrophy  and fat infiltration. Atherosclerotic vascular calcification.    No periaortic or pelvic adenopathy. No free fluid. Mild sigmoid  diverticulosis. No acute-appearing bowel abnormality. Multiple  vertebral body compressions of indeterminate age which are new since  2016, including superior endplate T11 and T4 and T5.      Impression    IMPRESSION:   1. Pulmonary opacities and adenopathy. Favor infectious or  inflammatory etiology. Recommend follow-up to ensure complete  resolution.  2. No acute-appearing abnormality in the abdomen or pelvis.  Indeterminate age vertebral compressions.   Echocardiogram Limited    Narrative    [id number]  [id number]  [id number]  [id number]^MARJORIE^SIENNA^R           [facility]  Echocardiography Laboratory  [address] [place] [address]        Name: SANDRO ECHEVERRIA  MRN: [id number]  : 1929  Study Date: 12/15/2019 08:09 AM  Age: 89 yrs  Gender: Female  Patient Location: [facility]  Reason For Study: Respiratory Failure  Ordering Physician: SIENNA AMADOR  Referring Physician: Samaria Chappell  Performed By: Marjan Swift RDCS     BSA: 1.8 m2  Height: 63  in  Weight: 160 lb  HR: 134  BP: 100/75 mmHg  _____________________________________________________________________________  __        Procedure  Limited Portable Echo Adult. (Emergent exam, abbreviated study performed).  _____________________________________________________________________________  __        Interpretation Summary     The left ventricle is mildly dilated.  Left ventricular systolic function is severely reduced.  The visual ejection fraction is estimated at 20-25%.  There is severe global hypokinesia of the left ventricle.  Severely decreased right ventricular systolic function  There is mild (1+) mitral regurgitation.  There is moderate (2+) tricuspid regurgitation.  There is mild (1+) aortic regurgitation.  The rhythm was rapid atrial fibrillation.  There is no comparison study available.  _____________________________________________________________________________  __        Left Ventricle  The left ventricle is mildly dilated. There is normal left ventricular wall  thickness. Left ventricular systolic function is severely reduced. The visual  ejection fraction is estimated at 20-25%. There is severe global hypokinesia  of the left ventricle.     Right Ventricle  The right ventricle is mildly dilated. Severely decreased right ventricular  systolic function. There is a pacemaker lead in the right ventricle.     Atria  There is moderate biatrial enlargement.     Mitral Valve  The mitral valve leaflets are mildly thickened. There is moderate mitral  annular calcification. There is mild (1+) mitral regurgitation.        Tricuspid Valve  There is moderate (2+) tricuspid regurgitation. The right ventricular systolic  pressure is approximated at 22.4 mmHg plus the right atrial pressure. IVC  diameter >2.1 cm collapsing <50% with sniff suggests a high RA pressure  estimated at 15 mmHg or greater. Right ventricular systolic pressure is  elevated, consistent with mild pulmonary hypertension.     Aortic  Valve  There is moderate trileaflet aortic sclerosis. There is mild (1+) aortic  regurgitation. No hemodynamically significant valvular aortic stenosis.     Pulmonic Valve  There is mild (1+) pulmonic valvular regurgitation.     Vessels  The ascending aorta is Mildly dilated.     Pericardium  There is no pericardial effusion.        Rhythm  The rhythm was rapid atrial fibrillation.  _____________________________________________________________________________  __  MMode/2D Measurements & Calculations  asc Aorta Diam: 4.1 cm           Doppler Measurements & Calculations  TR max werner: 236.1 cm/sec  TR max P.4 mmHg           _____________________________________________________________________________  __           Report approved by: Rosa Ghotra 12/15/2019 09:14 AM      ISTAT gases lactate manny POCT   Result Value Ref Range    Ph Venous 7.39 7.32 - 7.43 pH    PCO2 Venous 39 (L) 40 - 50 mm Hg    PO2 Venous 57 (H) 25 - 47 mm Hg    Bicarbonate Venous 24 21 - 28 mmol/L    O2 Sat Venous 89 %    Lactic Acid 2.5 (H) 0.7 - 2.1 mmol/L   Glucose by meter   Result Value Ref Range    Glucose 273 (H) 70 - 99 mg/dL      EKG results:   Performed on admission        Atrial fibrillation with RVR and LV conduction delay  No electrical evidence of ischemia present      All imaging studies reviewed by me.      Attestation:  I have reviewed today's vital signs, notes, medications, labs and imaging.  Total time: 45 minutes     Grover Camarena MD

## 2019-12-15 NOTE — ED PROVIDER NOTES
History     Chief Complaint:  Shortness of Breath    History limited by: respiratory distress.     Radha Cunningham is a 89 year old female on warfarin with a history of asthma and atrial fibrillation, seen in November for respiratory failure, who presents to the emergency department today for evaluation of shortness of breath. She was recently started on antibiotics for presumed pneumonia. Tonight the patient called staff members and told them she was short of breath prompting EMS to be called. When they arrived she was in A-fib with a rate of 190 and difficulty breathing. While en route to the ED she was given 2 duonebs, 2 albuterol nebs, and 2x 5mg doses of verapamil. The patient also reports diffuse abdominal pain, worse epigastric.        Allergies:  Contrast Dye    Medications:    Aspirin   Atorvastatin   Zithromax  Keflex  Norco  Melatonin   Lopressor  Prilosec  Miralax  Senokot  Warfarin     Past Medical History:    Asthma  Umbilical hernia  CHF  Benign neoplasm of skin  Malignant neoplasm of thyroid gland  Reactive airway disease with acute exacerbation  Acute respiratory failure with hypoxia  Cholelithiasis  Diverticulitis  HTN  PUD    Past Surgical History:    hernia repair   Skin cancer removal     Family History:    The patient's family history includes C.A.D. in her mother; Hypertension in her mother.    Social History:  The patient reports that she has never smoked. She does not have any smokeless tobacco history on file. She reports that she does not drink alcohol.   PCP: Samaria Chappell  Marital Status:  [2]      Review of Systems   Respiratory: Positive for shortness of breath.    Cardiovascular: Negative for chest pain.   Gastrointestinal: Positive for abdominal pain.   All other systems reviewed and are negative.      Physical Exam     Patient Vitals for the past 24 hrs:   BP Temp Temp src Pulse Heart Rate Resp SpO2   12/15/19 0730 100/75 -- -- 129 130 27 97 %   12/15/19 0720 107/80 -- -- 137  121 29 97 %   12/15/19 0715 100/73 -- -- 131 124 (!) 31 98 %   12/15/19 0710 104/85 -- -- 126 121 19 97 %   12/15/19 0705 106/77 -- -- 135 135 26 95 %   12/15/19 0700 110/82 -- -- 126 138 (!) 31 95 %   12/15/19 0655 110/82 -- -- 146 128 26 97 %   12/15/19 0650 118/88 -- -- 146 149 (!) 31 96 %   12/15/19 0645 104/79 -- -- 126 137 (!) 34 98 %   12/15/19 0640 100/89 -- -- 130 130 (!) 34 99 %   12/15/19 0635 115/79 -- -- 133 151 30 99 %   12/15/19 0630 103/77 -- -- 138 128 (!) 33 98 %   12/15/19 0625 99/54 -- -- 133 130 (!) 33 97 %   12/15/19 0624 99/54 -- -- 133 135 (!) 32 98 %   12/15/19 0620 -- -- -- -- 133 (!) 33 98 %   12/15/19 0615 -- -- -- 135 128 (!) 32 98 %   12/15/19 0610 -- -- -- -- -- -- 95 %   12/15/19 0605 (!) 69/28 -- -- 120 -- -- 98 %   12/15/19 0600 113/86 -- -- (!) 217 -- (!) 49 98 %   12/15/19 0541 -- 101.6  F (38.7  C) Temporal -- -- -- --   12/15/19 0531 109/84 -- -- 122 120 (!) 41 97 %     Physical Exam  Constitutional:  Oriented to person, place, and time. Moderate respiratory distress.   HENT:   Head:    Normocephalic.   Mouth/Throat:   Oropharynx is clear and moist.   Eyes:    EOM are normal. Pupils are equal, round, and reactive to light.   Neck:    Neck supple.   Cardiovascular:  Tachycardic, regular rhythm and normal heart sounds.      Exam reveals no gallop and no friction rub.       No murmur heard.  Pulmonary/Chest:  Crackles and wheezes bilaterally. Moderate respiratory distress using accessory muscles.      No reproducible chest wall pain.  Abdominal:   Soft. No distension. No tenderness. No rebound and no guarding.   Musculoskeletal:  2+ distal equal pulses.     No leg calf tenderness, swelling or edema.    Neurological:   Alert and oriented to person, place, and time.           Moves all 4 extremities spontaneously    Skin:    No rash noted. No pallor.     Emergency Department Course     ECG:  ECG taken at 0534, ECG read at 0536  Atrial fibrillation with RVR  Left anterior fascicular  block  Abnormal ECG  Rate 136 bpm. CA interval * ms. QRS duration 96 ms. QT/QTc 318/478 ms. P-R-T axes * -50 71.    Imaging:  Radiology findings were communicated with the patient who voiced understanding of the findings.  XR Chest port 1 view  IMPRESSION: Mild right basilar atelectasis  Reading per radiology    CT Abd/pelvis  IMPRESSION:   1. Pulmonary opacities and adenopathy. Favor infectious or  inflammatory etiology. Recommend follow-up to ensure complete  resolution.  2. No acute-appearing abnormality in the abdomen or pelvis.  Indeterminate age vertebral compressions.    Laboratory:  Laboratory findings were communicated with the patient who voiced understanding of the findings.    CBC: HGB 10.5 (L0 o/w WNL. (WBC 10.0, )   CMP: Glucose 287 (H), GFR 57 (L), albumin 3.2 (L), o/w WNL (Creatinine: 0.88)    Troponin (Collected 0524): 0.045  Nt prrpbnp inpatient 2,584  INR: 1.48  Blood culture in progress    ISTAT gases lactate manny POCT: pH Venous 7.3 (L), PCO2 Venous 46, PO2 Venous 56 (H), Bicarbonate Venous 23, O2 Sat Venous 85, Lactic Acid 4.1 (H)    Influenza A/B Antigen: negative    UA: protein albumin 200, RBC/HPF 9 (H), bacteria moderate, mucus present, hyaline casts 16 (H), amorphous crystals many, o/w Negative    Interventions:  0530 Duoneb, 3 mL, nebulization   0539 lactated ringers 2.2L IV Bolus  0540 methylprednisolone 125 mg IV  0551 Tylenol 650 mg Rectal  0638 Rocephin 1 g IV  0657 cardizem 125 mg IV  0733 Norco 1 tablet PO    Emergency Department Course:  Past medical records, nursing notes, and vitals reviewed.  0532: I performed an exam of the patient and obtained history, as documented above.     IV was inserted and blood was drawn for laboratory testing, results above.  The patient was sent for a chest XR while in the emergency department, findings above.  The patient provided a urine sample here in the emergency department. This was sent for laboratory testing, findings above.    I  personally reviewed the laboratory/imaging results with the Patient and answered all related questions prior to admission.    Findings and plan explained to the Patient who consents to admission.     0716: Discussed the patient with Dr. Camarena, who will admit the patient to an ICU bed for further monitoring, evaluation, and treatment.     Impression & Plan      Critical Care time was 65 minutes for this patient excluding procedures.     CMS Diagnoses:   The patient has signs of Septic Shock as evidenced by:  1. Presence of Sepsis, AND  2. Lactic Acid level greater than or equal to 4    Time septic shock diagnosis confirmed =   12/16/19   as this was the time when Lactate was resulted and the level was greater than or equal to 4    3 Hour Septic Shock Bundle Completion:  1. Initial Lactic Acid Result:   Recent Labs   Lab Test 12/15/19  0854 12/15/19  0527 11/18/19  1000   LACT 2.5* 4.1* 1.0     2. Blood Cultures before Antibiotics: Yes  3. Broad Spectrum Antibiotics Administered:  yes     Anti-infectives (From admission through now)    Start     Dose/Rate Route Frequency Ordered Stop    12/15/19 0623  cefTRIAXone (ROCEPHIN) 1 g vial to attach to  mL bag for ADULTS or NS 50 mL bag for PEDS      1 g  over 15-30 Minutes Intravenous ONCE 12/15/19 0622 12/15/19 0657        4. IF patient is in septic shock within 6 hours of time zero, as defined by:  -Initial HypoTN:  2 low BP readings (SBP <90, MAP <65, or decrease > 40 from baseline due to infection) within 3 hrs of each other during the time period of 6hrs before and 6 hrs  after time zero  -Lactate > or = 4,  THEN: 30 cc/kg ml of IV fluids given.   Weight: 72 kg   Ideal body weight: 52.4 kg (115 lb 8.3 oz)  Adjusted ideal body weight: 60.6 kg (133 lb 8.2 oz)     (pt must be at least 60 inches tall to calculate IBW)     Body mass index is 28.43 kg/m .    Septic Shock reassessment:  1. Repeat Lactic Acid Level: 2.5  2. MAP>65 after initial IVF bolus, will continue  to monitor fluid status and vital signs    I attest to having performed a repeat sepsis exam and assessment of perfusion  and the results demonstrate improved perfusion.      Medical Decision Making:  Radha Cunningham is a 89 year old female with a history of COPD and asthma who presents to the emergency department today from a TCU facility for evaluation of developing fever, cough, and shortness of breath. She was found to be in a-fib with RVR and started on verapamil by EMS. Differential includes COPD/astham exacerbation, pneumonia , influenza, viral syndrome as well as CHF, a-fib or other causes. She was noted to be in sever respiratory distress requiring BiPAP that should help improve her symptoms. She does have an elevated BNP but no other findings to suggest over pulmonary edema which she does not have a history of. She was noted to have an elevated lactic acid of 4.1 as well. I assume this is multifactorial with the Duoneb's given as well as her work of breathing but I have to assume this is severe sepsis until proven otherwise. Therefore 30cc/kg fluid bolus was given and repeat lactic acid will be done. She has remained normal tensive throughout her stay here. I am unable to find obvious sources for her fever. Rocephin has been started for broad antibiotic coverage. Influenza is negative. Patient is also complaining of epigastric abdominal pain. Patient will go on for CT imaging and will be admitted to the ICU for further monitoring.     Diagnosis:    ICD-10-CM   1. Acute respiratory failure, unspecified whether with hypoxia or hypercapnia (H) J96.00   2. Asthma with acute exacerbation, unspecified asthma severity, unspecified whether persistent J45.901   3. Severe sepsis (H) A41.9    R65.20   4. Atrial fibrillation with RVR (H) I48.91   5. Acute abdominal pain R10.9       Disposition:   Admitted to ICU      Scribe Disclosure:  Anastasia BARRIOS, am serving as a scribe at 5:37 AM on 12/15/2019 to document services  personally performed by Yakov Vega MD based on my observations and the provider's statements to me.    [facility] EMERGENCY DEPARTMENT       Yakov Vega MD  12/16/19 3341

## 2019-12-15 NOTE — ED NOTES
Pt incontinent of large amount of liquid stool.  Pt cleaned up with new pad placed.    Pts work of breathing has improved since being on bipap.

## 2019-12-15 NOTE — PROGRESS NOTES
[facility]   Procedure Note           Peripherally Inserted Central Line Catheter (PICC):       Radha Cunningham  MRN# [id number]   December 15, 2019, 5:52 PM Indication: Medication administration           Pause for the cause: Consent for catheter placement procedure signed  Time out completed  Patient ID's verified using two distinct indicators  All necessary equipment is present   Type of line to be used: PICC   Full barrier precautions used: Yes   Skin preparation: Chloraprep   Date of insertion: December 15, 2019, 5:52 PM   Device type: Double lumen, valved, 5.0   Catheter brand: [employer]   Lot number: [id number]   Insertion location: Right brachial vein (medial)   Method of placement: Venipuncture  Ultrasound   Number of attempts: With ultrasound: 1   Without ultrasound: 0   Difficulty threading: No   Midline IV device: Transparent semmipermeable dressing applied  Chlorhexidine patch  Catheter securement device   Arm circumference: 32   Midline extremity circumference: 0 cm   Internal length: 41 cm   Midline visible catheter length: 0 cm   Total catheter length: 41 cm   Tip termination: SVC   Method of verification: Chest x-ray   Midline patency post placement: Positive blood return  Flushes without difficulty   Line flush: Line flush documented on the eMARN   Placement verified by: Physician   Catheter placed by: Brooke Lozano   Discontinuation form initiated: No   Patient tolerance: Tolerated well      Summary:  This procedure was performed without difficulty and she tolerated the procedure well with no immediate complications.       Recorded by Brooke Lozano    Attestation:  Brooke Lozano RN

## 2019-12-15 NOTE — ED TRIAGE NOTES
"Pt presented to ED from [facility] in [place].  Pt was started on an antibiotic, per EMS they \"assumed pneumonia.\"  Pt called her staff members tonight stating that she was short of breath.  EMS found heart rate to be 190bpm in afib.  Pt increased work of breathing.    Prior to arrival patient received 2 duonebs, 2 albuterol nebs, and x2 doses of 5mg of verapamil.     Pt was sent over with wrong residents paperwork.  Wrong resident name was also hand written on the transfer checklist from [facility].  Spoke to MALINA Melo who sent patient here who states he is faxing the correct paperwork.    "

## 2019-12-15 NOTE — PROGRESS NOTES
A BiPAP of  12/6 @ 35% was applied to the pt via the mask for an increase in WOB and/or SOB. Skin integrity is good. Pt is tolerating it well. Will continue to monitor and assess the pt's current respiratory status and needs.    Tristen Woods, RT on 12/15/2019 at 5:39 AM

## 2019-12-16 ENCOUNTER — MEDICAL CORRESPONDENCE (OUTPATIENT)
Dept: HEALTH INFORMATION MANAGEMENT | Facility: CLINIC | Age: 84
End: 2019-12-16

## 2019-12-16 LAB
ALBUMIN SERPL-MCNC: 3 G/DL (ref 3.4–5)
ALP SERPL-CCNC: 98 U/L (ref 40–150)
ALT SERPL W P-5'-P-CCNC: 55 U/L (ref 0–50)
ANION GAP SERPL CALCULATED.3IONS-SCNC: 8 MMOL/L (ref 3–14)
AST SERPL W P-5'-P-CCNC: 49 U/L (ref 0–45)
BILIRUB SERPL-MCNC: 0.3 MG/DL (ref 0.2–1.3)
BUN SERPL-MCNC: 27 MG/DL (ref 7–30)
CALCIUM SERPL-MCNC: 8.8 MG/DL (ref 8.5–10.1)
CHLORIDE SERPL-SCNC: 105 MMOL/L (ref 94–109)
CO2 SERPL-SCNC: 27 MMOL/L (ref 20–32)
CREAT SERPL-MCNC: 0.97 MG/DL (ref 0.52–1.04)
ERYTHROCYTE [DISTWIDTH] IN BLOOD BY AUTOMATED COUNT: 13.7 % (ref 10–15)
GFR SERPL CREATININE-BSD FRML MDRD: 51 ML/MIN/{1.73_M2}
GLUCOSE BLDC GLUCOMTR-MCNC: 162 MG/DL (ref 70–99)
GLUCOSE BLDC GLUCOMTR-MCNC: 182 MG/DL (ref 70–99)
GLUCOSE BLDC GLUCOMTR-MCNC: 189 MG/DL (ref 70–99)
GLUCOSE BLDC GLUCOMTR-MCNC: 211 MG/DL (ref 70–99)
GLUCOSE BLDC GLUCOMTR-MCNC: 217 MG/DL (ref 70–99)
GLUCOSE BLDC GLUCOMTR-MCNC: 240 MG/DL (ref 70–99)
GLUCOSE SERPL-MCNC: 173 MG/DL (ref 70–99)
HCT VFR BLD AUTO: 31.3 % (ref 35–47)
HGB BLD-MCNC: 10 G/DL (ref 11.7–15.7)
INR PPP: 1.75 (ref 0.86–1.14)
MAGNESIUM SERPL-MCNC: 2.1 MG/DL (ref 1.6–2.3)
MCH RBC QN AUTO: 32.1 PG (ref 26.5–33)
MCHC RBC AUTO-ENTMCNC: 31.9 G/DL (ref 31.5–36.5)
MCV RBC AUTO: 100 FL (ref 78–100)
PLATELET # BLD AUTO: 152 10E9/L (ref 150–450)
POTASSIUM SERPL-SCNC: 4.2 MMOL/L (ref 3.4–5.3)
PROT SERPL-MCNC: 6.6 G/DL (ref 6.8–8.8)
RBC # BLD AUTO: 3.12 10E12/L (ref 3.8–5.2)
SODIUM SERPL-SCNC: 140 MMOL/L (ref 133–144)
WBC # BLD AUTO: 10.2 10E9/L (ref 4–11)

## 2019-12-16 PROCEDURE — 25000125 ZZHC RX 250: Performed by: INTERNAL MEDICINE

## 2019-12-16 PROCEDURE — 25000132 ZZH RX MED GY IP 250 OP 250 PS 637: Performed by: INTERNAL MEDICINE

## 2019-12-16 PROCEDURE — 83735 ASSAY OF MAGNESIUM: CPT | Performed by: INTERNAL MEDICINE

## 2019-12-16 PROCEDURE — 94640 AIRWAY INHALATION TREATMENT: CPT

## 2019-12-16 PROCEDURE — 85027 COMPLETE CBC AUTOMATED: CPT | Performed by: INTERNAL MEDICINE

## 2019-12-16 PROCEDURE — 99233 SBSQ HOSP IP/OBS HIGH 50: CPT | Performed by: INTERNAL MEDICINE

## 2019-12-16 PROCEDURE — 40000901 ZZH STATISTIC WOC PT EDUCATION, 0-15 MIN

## 2019-12-16 PROCEDURE — 25000132 ZZH RX MED GY IP 250 OP 250 PS 637: Performed by: PHYSICIAN ASSISTANT

## 2019-12-16 PROCEDURE — 20000003 ZZH R&B ICU

## 2019-12-16 PROCEDURE — 85610 PROTHROMBIN TIME: CPT | Performed by: INTERNAL MEDICINE

## 2019-12-16 PROCEDURE — 25800030 ZZH RX IP 258 OP 636: Performed by: INTERNAL MEDICINE

## 2019-12-16 PROCEDURE — 00000146 ZZHCL STATISTIC GLUCOSE BY METER IP

## 2019-12-16 PROCEDURE — 25000132 ZZH RX MED GY IP 250 OP 250 PS 637: Performed by: SURGERY

## 2019-12-16 PROCEDURE — 25000128 H RX IP 250 OP 636: Performed by: INTERNAL MEDICINE

## 2019-12-16 PROCEDURE — 80053 COMPREHEN METABOLIC PANEL: CPT | Performed by: INTERNAL MEDICINE

## 2019-12-16 PROCEDURE — 40000275 ZZH STATISTIC RCP TIME EA 10 MIN

## 2019-12-16 PROCEDURE — 94640 AIRWAY INHALATION TREATMENT: CPT | Mod: 76

## 2019-12-16 PROCEDURE — 99207 ZZC CONSULT E&M CHANGED TO INITIAL LEVEL: CPT | Performed by: NURSE PRACTITIONER

## 2019-12-16 PROCEDURE — 25000131 ZZH RX MED GY IP 250 OP 636 PS 637: Performed by: INTERNAL MEDICINE

## 2019-12-16 PROCEDURE — 99223 1ST HOSP IP/OBS HIGH 75: CPT | Performed by: NURSE PRACTITIONER

## 2019-12-16 PROCEDURE — 94660 CPAP INITIATION&MGMT: CPT

## 2019-12-16 RX ORDER — WARFARIN SODIUM 1 MG/1
2 TABLET ORAL ONCE
Status: COMPLETED | OUTPATIENT
Start: 2019-12-16 | End: 2019-12-16

## 2019-12-16 RX ADMIN — AMIODARONE HYDROCHLORIDE 1 MG/MIN: 50 INJECTION, SOLUTION INTRAVENOUS at 15:15

## 2019-12-16 RX ADMIN — Medication: at 08:32

## 2019-12-16 RX ADMIN — AMIODARONE HYDROCHLORIDE 1 MG/MIN: 50 INJECTION, SOLUTION INTRAVENOUS at 23:32

## 2019-12-16 RX ADMIN — HYDROCODONE BITARTRATE AND ACETAMINOPHEN 1 TABLET: 5; 325 TABLET ORAL at 08:02

## 2019-12-16 RX ADMIN — Medication: at 20:16

## 2019-12-16 RX ADMIN — IPRATROPIUM BROMIDE AND ALBUTEROL SULFATE 3 ML: .5; 3 SOLUTION RESPIRATORY (INHALATION) at 15:46

## 2019-12-16 RX ADMIN — IPRATROPIUM BROMIDE AND ALBUTEROL SULFATE 3 ML: .5; 3 SOLUTION RESPIRATORY (INHALATION) at 07:31

## 2019-12-16 RX ADMIN — MICONAZOLE NITRATE: 20 POWDER TOPICAL at 08:32

## 2019-12-16 RX ADMIN — Medication 12.5 MG: at 20:15

## 2019-12-16 RX ADMIN — IPRATROPIUM BROMIDE AND ALBUTEROL SULFATE 3 ML: .5; 3 SOLUTION RESPIRATORY (INHALATION) at 21:18

## 2019-12-16 RX ADMIN — AMIODARONE HYDROCHLORIDE 1 MG/MIN: 50 INJECTION, SOLUTION INTRAVENOUS at 12:35

## 2019-12-16 RX ADMIN — WARFARIN SODIUM 2 MG: 1 TABLET ORAL at 20:16

## 2019-12-16 RX ADMIN — AMIODARONE HYDROCHLORIDE 1 MG/MIN: 50 INJECTION, SOLUTION INTRAVENOUS at 19:31

## 2019-12-16 RX ADMIN — HYDROCODONE BITARTRATE AND ACETAMINOPHEN 1 TABLET: 5; 325 TABLET ORAL at 20:15

## 2019-12-16 RX ADMIN — IPRATROPIUM BROMIDE AND ALBUTEROL SULFATE 3 ML: .5; 3 SOLUTION RESPIRATORY (INHALATION) at 11:28

## 2019-12-16 RX ADMIN — Medication 12.5 MG: at 16:59

## 2019-12-16 RX ADMIN — INSULIN GLARGINE 8 UNITS: 100 INJECTION, SOLUTION SUBCUTANEOUS at 08:05

## 2019-12-16 RX ADMIN — MICONAZOLE NITRATE: 20 POWDER TOPICAL at 20:16

## 2019-12-16 ASSESSMENT — MIFFLIN-ST. JEOR: SCORE: 1117.13

## 2019-12-16 ASSESSMENT — ACTIVITIES OF DAILY LIVING (ADL)
ADLS_ACUITY_SCORE: 22
ADLS_ACUITY_SCORE: 20
ADLS_ACUITY_SCORE: 22

## 2019-12-16 NOTE — PLAN OF CARE
ICU End of Shift Summary.  For vital signs and complete assessments, please see documentation flowsheets.     Pertinent assessments: A/Ox4. Tele a-fib, rate is around 90s to low 100s. BP currently stable with amio gtt off. Pt on RA. LS coarse. Denies SOB. 2 PIV's & PICC. Saline locked.   Major Shift Events: Amio gtt on hold per Dr. Camarena- reassess/restart if HR consistently around 120 or above.  PICC placed by PICC RN, xray pending. Pt voided large amount in brief, purewick changed.   Plan (Upcoming Events): Continue to monitor. Palliative consult.   Discharge/Transfer Needs: TBD    Bedside Shift Report Completed : y  Bedside Safety Check Completed: y

## 2019-12-16 NOTE — CONSULTS
CLINICAL NUTRITION SERVICES  -  ASSESSMENT NOTE      MALNUTRITION:  % Weight Loss:  Up to 1-2% in 1 week (non-severe malnutrition)  % Intake:  <75% for > 7 days (non-severe malnutrition)  Subcutaneous Fat Loss:  Orbital region mild to moderate depletion and Upper arm region moderate depletion  Muscle Loss:  Temporal region moderate depletion, Clavicle bone region moderate to severe depletion and Acromion bone region moderate to severe depletion --> did not observe LEs today  Fluid Retention:  None noted    Malnutrition Diagnosis: Severe malnutrition  In Context of:  Acute illness or injury  Chronic illness or disease        REASON FOR ASSESSMENT  Radha Cunningham is a 89 year old female seen by Registered Dietitian for Admission Nutrition Risk Screen for reduced oral intake over the last month.      NUTRITION HISTORY  - Information obtained from patient and daughter in room.   - Patient with a h/o diverticulitis, a fib, chronic back pain.  Admit 2/2 respiratory distress.    - Noted recent admit for respiratory issues, was admitted from TCU.  Generally resides independently.  Noted has support from daughters.   - Regular diet at home and at TCU.  - When feeling well she generally eats meals TID + has a protein drink (either [employer] or Boost).    - While at TCU daughter reports overall decreased appetite and consistently eating less than usual for a period of about 3 weeks.  Patient describes she also did not like the food at this facility.  She was not getting a protein drink per their report.    - NKFA.       CURRENT NUTRITION ORDERS  Diet Order:     Regular    Current Intake/Tolerance:  Diet just advanced from NPO.  Helped to order meals for today and breakfast tomorrow.        NUTRITION FOCUSED PHYSICAL ASSESSMENT FOR DIAGNOSING MALNUTRITION)  Yes           Observed:    Muscle wasting (refer to documentation in Malnutrition section) and Subcutaneous fat loss (refer to documentation in Malnutrition  "section)    Obtained from Chart/Interdisciplinary Team:  Stooling patterns reviewed  Palliative consulted    ANTHROPOMETRICS  Height: 5' 3\"  Weight: 160 lbs 7.92 oz  Body mass index is 28.43 kg/m .  Weight Status:  Overweight BMI 25-29.9  Weight History:  Wt Readings from Last 10 Encounters:   12/16/19 72.8 kg (160 lb 7.9 oz)   12/13/19 73.7 kg (162 lb 6.4 oz)   12/06/19 74.9 kg (165 lb 3.2 oz)   12/04/19 74.9 kg (165 lb 3.2 oz)   12/03/19 74.9 kg (165 lb 3.2 oz)   12/02/19 74.9 kg (165 lb 3.2 oz)   11/26/19 74.8 kg (165 lb)   11/19/19 75.1 kg (165 lb 7.7 oz)   07/02/16 71.2 kg (157 lb)   11/14/05 83.5 kg (184 lb)   - 3% wt loss over the past 1-2 weeks.      LABS  Labs reviewed:  Lab Results   Component Value Date    A1C 6.7 12/15/2019    A1C 6.4 03/19/2005       MEDICATIONS  Medications reviewed      ASSESSED NUTRITION NEEDS PER APPROVED PRACTICE GUIDELINES:    Dosing Weight 72.8 kg   Estimated Energy Needs: 3084-3856 kcals (25-30 Kcal/Kg)  Justification: maintenance  Estimated Protein Needs:  grams protein (1.2-1.5 g pro/Kg)  Justification: preservation of lean body mass and advanced age  Estimated Fluid Needs: per MD    MALNUTRITION:  % Weight Loss:  Up to 1-2% in 1 week (non-severe malnutrition)  % Intake:  <75% for > 7 days (non-severe malnutrition)  Subcutaneous Fat Loss:  Orbital region mild to moderate depletion and Upper arm region moderate depletion  Muscle Loss:  Temporal region moderate depletion, Clavicle bone region moderate to severe depletion and Acromion bone region moderate to severe depletion --> did not observe LEs today  Fluid Retention:  None noted    Malnutrition Diagnosis: Severe malnutrition  In Context of:  Acute illness or injury  Chronic illness or disease    NUTRITION DIAGNOSIS:  Inadequate oral intake related to decreased appetite, second admission for increased respiratory needs as evidenced by meeting <75% needs for period of 3 weeks PTA, fat/muscle loss, 1-2% wt loss PTA, " coding of malnutrition.        NUTRITION INTERVENTIONS  Recommendations / Nutrition Prescription  Continue regular diet.  Daughter would like high protein focus.      Room service with assist for meal ordering guidance.      Chocolate boost or plus2 supplement BID between meals.      Implementation  Nutrition education: Provided education on above with patient/family.    Medical Food Supplement: As above.    Collaboration and Referral of Nutrition care: Discussed POC with team during rounds.      Nutrition Goals  Patient to consume at least 50-75% of meals or supplements TID.      MONITORING AND EVALUATION:  Progress towards goals will be monitored and evaluated per protocol and Practice Guidelines        Azalea Blake RD, LD  Clinical Dietitian  3rd floor/ICU: [phone]  All other floors: [phone]  Weekend/holiday: [phone]

## 2019-12-16 NOTE — PROVIDER NOTIFICATION
Pt was eating lunch, sitting up in bed & HR back in a-fib, RVR, rate in 140s-160s. BP stable. Dr. Camarena notified, at bedside. Amiodorone gtt restarted at rate that was running yesterday per Dr Camarena, restarted at 60 ml/hr.

## 2019-12-16 NOTE — PROGRESS NOTES
RT: Received patient on room air, SPO2 95%+. Pt wore BIPAP 12/6, 35% intermittently t/o shift. Skin integrity is fair- pt has mepilex on the bridge of her nose.    RR 20-30. BS coarse with crackles and scattered expiratory wheezes. Duoneb given as scheduled. Will continue to follow and assess.

## 2019-12-16 NOTE — CONSULTS
[facility]    Palliative Care Consultation   Text Page    Date of Admission:  12/15/2019    Assessment & Plan   Radha Cunningham is a 89 year old female who was admitted on 12/15/2019. I was asked to see the patient for goals of care conversation.    Recommendations:   Please see assessments below for rationale.  1.Decisional Capacity -  Intact. Patient does not have an advance directive. Per  informed consent policy next of kin should be involved if patient becomes unable.  2. Pain- patient denies acute pain.  No active pain issues.  3. Shortness of breath - secondary to fluid imbalance, viral syndrome and atrial fibrillation with RVR.  Now improved with resolution of heart rate.  Patient requiring intermittent bipap and prone to increased shortness of breath with conversation or emotionally charged situations.  Correlating RVR on monitor during assessment.  4. Cough - acute with issues as above.  Stable at this time.  5. Nutritional status - patient with less appetite, but eats two meals daily.  No acute concerns.  6. Spiritual Care- Oriented to Spiritual Health as part of Palliative Care team.  7. Care Planning- Appreciate [facility].     Goals of care - Full code with restorative measures.  Per discussion with daughters patient wishes to go home again and maintain functional independence.    Findings & plan of care discussed with: Bedside Nurse Jennifer and Hospitalist Dr. Camarena  Thank you for involving us in the patient's care.     Jocy BOYER, CNP  Pain Management and Palliative Care  [facility]  Pgr: [phone]    Time Spent on this Encounter   Total unit/floor time 75 minutes, time consisted of the following, examination of the patient, reviewing the record and completing documentation. >50% of time spent in counseling and coordination of care.  Time spend counseling with patient and family consisted of the following topics, goals of care and advance care  planning.  Time spent in coordination of care with team members as listed above.       Palliative Care Assessment:  Radha Cunningham is a 89 year old patient with history of atrial fibrillation with RVR, diabetes and memory impairment admitted with symptoms of progressive worsening cough and shortness of breath for the past 1-2 weeks. She has been treated for RVR and fluid overload with respiratory distress.  She responded to diuresis, bipap and antibiotics with improved mentation and decreased respiratory symptoms.      This is in the setting of recent hospitalization with discharge to transitional care unit on 11/25/2019 for episode respiratory distress.  She has been hospitalized 2 times in the past 6 months for recurrent respiratory decompensations. There is not reported or documented decline in patient's function, patient lives at home in independent apartment with assistance of her two daughters frequently.  There is no reported or documented weight loss.     Symptoms:   Dyspnea - acute, continues to have episodes requiring Bipap.  Fatigue - deconditioning with less tolerance for ADLs due to recent hospitalization, re-hospitalization and acute illness.  Anxiety - appears situational, patient agitated with recent TCU stay and new conversations regarding goals of care.  Patient's daughters state she did not have baseline anxiety.    Social:         Living situation: home independently       Support system: two daughters Hilton and Carolina.       Actual/Potential Caregiver: daughters.       Functional status: independent.       Financial concerns: none       Substance use disorder (past / present): none    Mental Health:   Acute anxiety/agitation related to acute illness and recent stay at transitional care unit.  Patient denies baseline anxiety or depression.      Coping:   Acute distress with discussions surrounding end of life or goals of care.  Assessment unable to be obtained due to hemodynamic  instability.    Spiritual/Holiness:    Spiritual background: not disclosed.  Spiritual needs: none indicated.  Sense/Meaning Making: none indicated    Prognostic Information:   Patient with independent mobility and functioning at baseline.  Acute illness due to viral syndrome following recent issues with rate control have created emotional distress for the patient and family in the setting of baseline stability.  Given advanced age and tenuous medical state on assessment, may have long term functional decline following this acute hospital stay. This has been discussed with patient's daughters and primary team.    Advance Care Planning:        Decision making capacity: intact       Disease understanding: understood.       Goals of Care: restorative       Preferred way of decision making: with family       Health care directive: none on file.       Health care agent: none, next of kin are daughters Hilton and Carolina       Code Status:  Full Code       POLST Physician orders for life-sustaining treatment (POLST) form is not completed      History of Present Illness   History is obtained from the patient, electronic health record and patient's family    Radha Cunningham is a 89 year old patient with history of atrial fibrillation with RVR, s/p pacemaker for pauses, diabetes and unspecified memory impairment admitted with symptoms of progressive worsening cough and shortness of breath for the past 1-2 weeks. She was recently hospitalized and [facility] to recover from previous episode of respiratory distress.  Her baseline living situation is independent in an apartment with the assistance of her two daughters for cares and support most days.      Prior to this presentation, patient reports being exposed to multiple people with viral illness and cough and was diagnosed with RSV at time of initial work up in the Emergency department.  She denied nausea, vomiting or diarrheal illness.  She notes  rhinorrhea, abdominal discomfort and cough with minimal phlegm.  Echocardiogram was done in the ED with findings of reduced EF of 25%, thought to be secondary to RVR.  No further findings of initial work up.  She has been treated for RVR and fluid overload with respiratory distress.  She responded to diuresis, bipap and antibiotics with improved mentation and decreased respiratory symptoms.  She denies pain, nausea, continued distress.  Notes that her anxiety was 'through the roof' at time of ED presentation, now feels improved.      Decision-Making & Goals of Care Discussion:  Discussed on December 16, 2019 with Jocy Espinoza APRN, CNP:   Met with patient and her two daughters, Carolina and Hilton at the bedside.  Radha was agitated and anxious when I arrived in the room.  Dr. Camarena at the bedside and had been discussing goals of care and code status briefly.  Patient states she does not like these topics of conversation and daughters confirm that she has become tearful with these conversations in the past.  Due to patient's medical instability with RVR and respiratory distress I inquired if I may meet with her daughters separately to discuss goals with them for advanced care planning purposes.  Patient consented to that.    Met with daughters in the ICU conference room to discuss palliative care services and advanced care planning.  They verbalized that their mom is very independent at baseline and doesn't like to talk about health problems.  Carolina reflected that their mom has been doing fine until the most recent hospitalization and discharge to TCU.  They both discussed dissatisfaction with the TCU stay and that they wished they could have just taken her home.  I reassured them that moving forward we wished to offer support to help them meet their mom's needs and goals for the future.      I reassured them that our goals include getting their mother home, but expressed concern to them that with a  "re-hospitalization and deconditioning from lying in a hospital bed with acute illness, that she may show some signs of functional decline for some time.  I inquired with them about their plan for support if her functional capacity declines and if she needs more help.  Daughter Carolina stated her mom would want to move in with one of them and that her house would be the most likely place for her to go.  I reflected that if we needed to consider that we help would line up resources appropriately.     Finally I reviewed the FULL CODE status that their mother has chosen and how I had concern that for her it would be more harmful than beneficial if she were to have a cardiac or respiratory arrest.  They verbalized understanding, but stated they would defer to her to make those decisions at this time.     Past Medical History   I have reviewed this patient's medical history and updated it with pertinent information if needed.   Past Medical History:   Diagnosis Date     Asthma      Cholelithiasis      Diverticulosis      Hiatal hernia      Hypertension      Paroxysmal A-fib (H)      PUD (peptic ulcer disease)      Skin cancer        Past Surgical History   I have reviewed this patient's surgical history and updated it with pertinent information if needed.  Past Surgical History:   Procedure Laterality Date     HERNIA REPAIR  2004    umbilical     Skin cancer removal - forehead  2005       Prior to Admission Medications   Prior to Admission Medications   Prescriptions Last Dose Informant Patient Reported? Taking?   HYDROcodone-acetaminophen (NORCO) 5-325 MG tablet 12/14/2019 at 2000  No Yes   Sig: Take 1 tablet by mouth 2 times daily At 0800 and 2000   Nutritional Supplements (NUTRITIONAL SUPPLEMENT PLUS) LIQD 12/14/2019 at 1800  Yes Yes   Sig: Take by mouth 2 times daily \"Magic Cup\" brand nutritional supplement   acetaminophen (TYLENOL) 325 MG tablet Past Week at Unknown time  No Yes   Sig: Take 1-2 tablets (325-650 mg) by " mouth every 6 hours as needed for mild pain   atorvastatin (LIPITOR) 10 MG tablet 12/14/2019 at 2000  Yes Yes   Sig: Take 10 mg by mouth At Bedtime    azithromycin (ZITHROMAX) 250 MG tablet 12/14/2019 at 2000  Yes Yes   Sig: Take 2 tablets (500 mg) by mouth daily for 1 day, THEN 1 tablet (250 mg) daily for 4 days.   calcium carbonate (OS-SHAN 500 MG [place]. [place]) 500 MG tablet 12/14/2019 at 1700  Yes Yes   Sig: Take 500 mg by mouth daily    cephALEXin (KEFLEX) 500 MG capsule 12/14/2019 at 1800  Yes Yes   Sig: Take 500 mg by mouth 3 times daily   emollient (VANICREAM) external cream Past Week at Unknown time  Yes Yes   Sig: Apply topically 2 times daily To whole body   fluticasone (FLONASE) 50 MCG/ACT nasal spray More than a month at Unknown time  Yes No   Sig: Spray 1 spray into both nostrils daily as needed for rhinitis or allergies   ipratropium - albuterol 0.5 mg/2.5 mg/3 mL (DUONEB) 0.5-2.5 (3) MG/3ML neb solution 12/14/2019 at 1800  No Yes   Sig: Take 1 vial (3 mLs) by nebulization 4 times daily   ipratropium - albuterol 0.5 mg/2.5 mg/3 mL (DUONEB) 0.5-2.5 (3) MG/3ML neb solution Past Week at Unknown time  Yes Yes   Sig: Take 1 vial (3 mLs) by nebulization every 4 hours as needed for shortness of breath / dyspnea or wheezing (In addition to QID scheduled)   melatonin 1 MG TABS tablet 12/14/2019 at 2000  No Yes   Sig: Take 1 tablet (1 mg) by mouth nightly as needed for sleep   metoprolol tartrate (LOPRESSOR) 50 MG tablet 12/14/2019 at 2000  Yes Yes   Sig: Take 50 mg by mouth 2 times daily   nystatin (MYCOSTATIN) 151927 UNIT/GM external powder 12/14/2019 at 2000  Yes Yes   Sig: Apply topically 2 times daily Under right breast and groin area   omeprazole (PRILOSEC) 20 MG DR capsule 12/14/2019 at 1700  Yes Yes   Sig: Take 20 mg by mouth daily   polyethylene glycol (MIRALAX/GLYCOLAX) packet Past Month at Unknown time  No Yes   Sig: Take 17 g by mouth daily as needed for constipation   senna-docusate  (SENOKOT-S/PERICOLACE) 8.6-50 MG tablet Past Month at Unknown time  No Yes   Sig: Take 1 tablet by mouth 2 times daily as needed for constipation   vitamin D2 (ERGOCALCIFEROL) 08223 units (1250 mcg) capsule 12/10/2019 at 0700  Yes Yes   Sig: Take 50,000 Units by mouth once a week Tuesdays   warfarin ANTICOAGULANT (COUMADIN) 2 MG tablet 12/14/2019  Yes Yes   Sig: Take 2 mg by mouth daily      Facility-Administered Medications: None     Allergies   Allergies   Allergen Reactions     Contrast Dye        Social History   I have updated and reviewed the following Social History Narrative:   Social History     Social History Narrative     Not on file     Family History   I have reviewed this patient's family history and updated it with pertinent information if needed.   Family History   Problem Relation Age of Onset     C.A.D. Mother      Hypertension Mother        Review of Systems   The 10 point Review of Systems is negative other than noted in the HPI or here.     Palliative Symptom Review (0=no symptom/no concern, 1=mild, 2=moderate, 3=severe):      Pain: 0-none      Fatigue: 1-mild      Nausea: 0-none      Constipation: 0-none      Diarrhea: 0-none      Depressive Symptoms: 0-none      Anxiety: 1-mild      Drowsiness: 0-none      Poor Appetite: 1-mild      Shortness of Breath: 1-mild      Insomnia: 0-none    Physical Exam   Temp:  [96.8  F (36  C)-98.6  F (37  C)] 97.6  F (36.4  C)  Pulse:  [] 103  Heart Rate:  [] 93  Resp:  [16-26] 24  BP: ()/(57-94) 116/83  FiO2 (%):  [30 %-96 %] 96 %  SpO2:  [93 %-99 %] 98 %  160 lbs 7.92 oz  GEN:  Alert, oriented x 3, tearful, appears to have emotional distress.  HEENT:  Normocephalic/atraumatic, no scleral icterus, no nasal discharge, mouth moist.  CV:  RRR, S1, S2; no murmurs or other irregularities noted.  +3 DP/PT pulses bilatererally; no edema BLE.  RESP:  Expiratory wheezing on assessment, tachypnea on assessment and patient with increased work of  breathing.  ABD:  Rounded, soft, non-tender/non-distended.  +BS  EXT:  Edema & pulses as noted above.  CMS intact x 4.     M/S:   No tenderness to palpation.    SKIN:  Dry to touch, no exanthems noted in the visualized areas.    NEURO: Symmetric strength +5/5.  Sensation to touch intact all extremities.   There is no area of allodynia or hyperesthesia.  Psych:  Anxious affect.  Tearful, medically unstable on assessment.     Delirium Screen/CAM:  Delirium = (#1 and #2 = YES) + (#3 and/or #4)   1) Acute onset and fluctuating course:   No   (acute change in mental status from baseline over last 24 hours)  2) Inattention:   No   (difficulty focusing, distractible, can't follow conversation)  3) Disorganized thinking:   No   (score only if #1 and #2 are YES)  (rambling/irrelevant conversation, unclear/illogical thoughts, inconsistency)  4) Altered level of consciousness:   No   (score only if #1 and #2 are YES)  (other than alert, calm, cooperative)    Delirium/CAM score: 0/4  Interpretation:  1)  Delirium:  Absent    Data   Most Recent 3 CBC's:  Recent Labs   Lab Test 12/16/19  0540 12/15/19  0524 11/23/19  0654   WBC 10.2 10.0 4.6   HGB 10.0* 10.5* 9.6*    103* 102*    154 161     Most Recent 3 BMP's:  Recent Labs   Lab Test 12/16/19  0540 12/15/19  0524 11/23/19  0654    137 139   POTASSIUM 4.2 4.2 4.4   CHLORIDE 105 104 105   CO2 27 24 33*   BUN 27 18 22   CR 0.97 0.88 0.74   ANIONGAP 8 9 1*   SHAN 8.8 8.9 8.6   * 287* 104*     Most Recent 2 LFT's:  Recent Labs   Lab Test 12/16/19  0540 12/15/19  0524   AST 49* 22   ALT 55* 24   ALKPHOS 98 97   BILITOTAL 0.3 0.5     Most Recent 3 INR's:  Recent Labs   Lab Test 12/15/19  0524 11/25/19  0718 11/24/19  0738   INR 1.48* 2.50* 2.11*

## 2019-12-16 NOTE — CONSULTS
[facility]    Cardiology Consultation     Date of Admission:  12/15/2019    Assessment & Plan   Radha Cunningham is a 89 year old female who was admitted on 12/15/2019.    1.  Respiratory distress, likely due primarily to upper respiratory infection with RSV.    2.  Permanent atrial fibrillation with rapid ventricular response during respiratory distress.    3.  Severe cardiomyopathy, which appears new since 11/5/2019, probably due to atrial fibrillation with rapid ventricular response but may also be due to stress cardiomyopathy.  The pattern of global hypokinesis with some preservation of function at the base favors stress cardiomyopathy.    4.  Sick sinus syndrome with history of permanent pacemaker implantation    Recommendations:    IV diltiazem was initially insufficient in controlling heart rates and limited by dropping blood pressure.  I discussed this with Dr. Camarena and we agreed that IV amiodarone may be helpful at additional rate control without as much blood pressure lowering effect.  The combination of those medications did improve heart rates but the blood pressure continued to go down requiring discontinuation of diltiazem initially and then amiodarone.  Fortunately, the patient's respiratory status was also improving significantly by that time and she was able to maintain heart rates in the 80s and 90s without additional rate controlling medications.  She is now resting comfortably without respiratory distress using nasal cannula oxygen with heart rates consistently less than 100.    Eventually, the patient can likely return to her usual long-term rate control strategy with metoprolol and anticoagulation for stroke prevention    KAI NASSAR MD    Primary Care Physician   Samaria Chappell    Reason for Consult   Reason for consult: I was asked by Dr. Camarena to evaluate this patient for atrial fibrillation with rapid ventricular response and severe cardiomyopathy.    History of Present  Illness   Radha Cunningham is a 89 year old female who presents with severe shortness of breath from the TCU.  She was hospitalized here several weeks ago with respiratory failure and improved with a strategy of treatment for acute viral bronchitis with bronchospasm, including bronchodilators steroids and antibiotics.  He was discharged on November 25, 2019 to the transitional care unit.  Unfortunately there are a lot of patients there with respiratory symptoms and she now returns with recurrent respiratory failure.  Her breathing at the time of presentation was severely compromised, requiring BiPAP in the emergency room.  Her respiratory rate was 40-50.  She has chronic atrial fibrillation which is generally rate controlled on metoprolol tartrate 50 mg p.o. twice daily, but her heart rate during this episode of respiratory distress was up over 200 bpm at times.  An echocardiogram was done stat in the emergency room with her heart rate still in the 130s and 140s, which demonstrated a severe cardiomyopathy.  She had global hypokinesis although there was some sparing of function at the bases.  This finding is consistent with rapid heart rates, possibly rate related cardiomyopathy, or possibly due to stress cardiomyopathy.    She was admitted to the ICU on IV diltiazem.  Heart rates were still fast but blood pressure was marginal.  I discussed this with Dr. Camarena and we elected to add IV amiodarone for additional heart rate control which would not affect blood pressure significantly.  However, her blood pressure did continue to go down to a low of 79/63 after amiodarone bolus and it was not continued.  Diltiazem was eventually discontinued as well.  The patient's breathing improved, and she was able to maintain heart rates in the 80s and 90s without ongoing medication for rate control.    Patient Active Problem List   Diagnosis     Umbilical hernia     LEWIS HYP HRT/KID W HEART FAIL(aka CHF)     Benign neoplasm of skin  "    Malignant neoplasm of thyroid gland (H)     Reactive airway disease with acute exacerbation     Acute respiratory failure with hypoxia (H)     Acute bronchitis     Acute respiratory distress       Past Medical History   I have reviewed this patient's medical history and updated it with pertinent information if needed.   Past Medical History:   Diagnosis Date     Asthma      Cholelithiasis      Diverticulosis      Hiatal hernia      Hypertension      Paroxysmal A-fib (H)      PUD (peptic ulcer disease)      Skin cancer        Past Surgical History   I have reviewed this patient's surgical history and updated it with pertinent information if needed.  Past Surgical History:   Procedure Laterality Date     HERNIA REPAIR  2004    umbilical     Skin cancer removal - forehead  2005       Prior to Admission Medications   Prior to Admission Medications   Prescriptions Last Dose Informant Patient Reported? Taking?   HYDROcodone-acetaminophen (NORCO) 5-325 MG tablet 12/14/2019 at 2000  No Yes   Sig: Take 1 tablet by mouth 2 times daily At 0800 and 2000   Nutritional Supplements (NUTRITIONAL SUPPLEMENT PLUS) LIQD 12/14/2019 at 1800  Yes Yes   Sig: Take by mouth 2 times daily \"Magic Cup\" brand nutritional supplement   acetaminophen (TYLENOL) 325 MG tablet Past Week at Unknown time  No Yes   Sig: Take 1-2 tablets (325-650 mg) by mouth every 6 hours as needed for mild pain   atorvastatin (LIPITOR) 10 MG tablet 12/14/2019 at 2000  Yes Yes   Sig: Take 10 mg by mouth At Bedtime    azithromycin (ZITHROMAX) 250 MG tablet 12/14/2019 at 2000  Yes Yes   Sig: Take 2 tablets (500 mg) by mouth daily for 1 day, THEN 1 tablet (250 mg) daily for 4 days.   calcium carbonate (OS-SHAN 500 MG [place]) 500 MG tablet 12/14/2019 at 1700  Yes Yes   Sig: Take 500 mg by mouth daily    cephALEXin (KEFLEX) 500 MG capsule 12/14/2019 at 1800  Yes Yes   Sig: Take 500 mg by mouth 3 times daily   emollient (VANICREAM) external cream Past Week at Unknown " time  Yes Yes   Sig: Apply topically 2 times daily To whole body   fluticasone (FLONASE) 50 MCG/ACT nasal spray More than a month at Unknown time  Yes No   Sig: Spray 1 spray into both nostrils daily as needed for rhinitis or allergies   ipratropium - albuterol 0.5 mg/2.5 mg/3 mL (DUONEB) 0.5-2.5 (3) MG/3ML neb solution 12/14/2019 at 1800  No Yes   Sig: Take 1 vial (3 mLs) by nebulization 4 times daily   ipratropium - albuterol 0.5 mg/2.5 mg/3 mL (DUONEB) 0.5-2.5 (3) MG/3ML neb solution Past Week at Unknown time  Yes Yes   Sig: Take 1 vial (3 mLs) by nebulization every 4 hours as needed for shortness of breath / dyspnea or wheezing (In addition to QID scheduled)   melatonin 1 MG TABS tablet 12/14/2019 at 2000  No Yes   Sig: Take 1 tablet (1 mg) by mouth nightly as needed for sleep   metoprolol tartrate (LOPRESSOR) 50 MG tablet 12/14/2019 at 2000  Yes Yes   Sig: Take 50 mg by mouth 2 times daily   nystatin (MYCOSTATIN) 825502 UNIT/GM external powder 12/14/2019 at 2000  Yes Yes   Sig: Apply topically 2 times daily Under right breast and groin area   omeprazole (PRILOSEC) 20 MG DR capsule 12/14/2019 at 1700  Yes Yes   Sig: Take 20 mg by mouth daily   polyethylene glycol (MIRALAX/GLYCOLAX) packet Past Month at Unknown time  No Yes   Sig: Take 17 g by mouth daily as needed for constipation   senna-docusate (SENOKOT-S/PERICOLACE) 8.6-50 MG tablet Past Month at Unknown time  No Yes   Sig: Take 1 tablet by mouth 2 times daily as needed for constipation   vitamin D2 (ERGOCALCIFEROL) 89149 units (1250 mcg) capsule 12/10/2019 at 0700  Yes Yes   Sig: Take 50,000 Units by mouth once a week Tuesdays   warfarin ANTICOAGULANT (COUMADIN) 2 MG tablet 12/14/2019  Yes Yes   Sig: Take 2 mg by mouth daily      Facility-Administered Medications: None     Current Facility-Administered Medications   Medication Dose Route Frequency     emollient   Topical BID     insulin aspart  1-6 Units Subcutaneous Q4H     insulin glargine  8 Units  "Subcutaneous QAM AC     ipratropium - albuterol 0.5 mg/2.5 mg/3 mL  3 mL Nebulization 4x daily     lactated ringers  30 mL/kg Intravenous Once     Current Facility-Administered Medications   Medication Last Rate     amiodarone Stopped (12/15/19 1650)     - MEDICATION INSTRUCTIONS -       Allergies   Allergies   Allergen Reactions     Contrast Dye        Social History    reports that she has never smoked. She does not have any smokeless tobacco history on file. She reports that she does not drink alcohol.    Family History   Family History   Problem Relation Age of Onset     C.A.D. Mother      Hypertension Mother        Review of Systems   The comprehensive 10 point Review of Systems is negative other than noted in the HPI or here.     Physical Exam   Vital Signs with Ranges  Temp:  [97.9  F (36.6  C)-101.6  F (38.7  C)] 98.5  F (36.9  C)  Pulse:  [] 107  Heart Rate:  [] 93  Resp:  [18-49] 22  BP: ()/(28-94) 119/57  FiO2 (%):  [30 %] 30 %  SpO2:  [93 %-100 %] 95 %  Wt Readings from Last 4 Encounters:   12/15/19 72.8 kg (160 lb 7.9 oz)   12/13/19 73.7 kg (162 lb 6.4 oz)   12/06/19 74.9 kg (165 lb 3.2 oz)   12/04/19 74.9 kg (165 lb 3.2 oz)     I/O last 3 completed shifts:  In: 157.83 [I.V.:157.83]  Out: -       Vitals: /57   Pulse 107   Temp 98.5  F (36.9  C) (Oral)   Resp 22   Ht 1.6 m (5' 3\")   Wt 72.8 kg (160 lb 7.9 oz)   SpO2 95%   BMI 28.43 kg/m      Constitutional: Resting comfortably with NC oxygen, no apparent distress, and appears stated age.  Eyes: Lids and lashes normal, , sclera clear, conjunctiva normal.  ENT: Normocephalic, without obvious abnormality, atramatic, .  Neck: Supple, symmetrical, trachea midline, no adenopathy,, skin normal.  Lungs: No increased work of breathing, good air exchange, clear to auscultation bilaterally, no crackles or wheezing.  Cardiovascular:Irregular rate and rhythm, normal S1 and S2, no S3 or S4, and no murmur noted.  Abdomen: No scars, normal " bowel sounds, soft, non-distended, non-tender, no masses palpated, no hepatosplenomegally.  Musculoskeletal: No redness, warmth, or swelling of the joints.  .  Tone is normal.  Neurologic: Awake, alert, oriented to name, place and time.  Neuropsychiatric: Normal affect, mood, orientation, memory and insight.  Skin: No rashes, erythema, pallor, petechia or purpura.    Recent Labs   Lab 12/15/19  0524   TROPI 0.045       Recent Labs   Lab 12/15/19  0524   WBC 10.0   HGB 10.5*   *      INR 1.48*      POTASSIUM 4.2   CHLORIDE 104   CO2 24   BUN 18   CR 0.88   GFRESTIMATED 57*   GFRESTBLACK 66   ANIONGAP 9   SHAN 8.9   *   ALBUMIN 3.2*   PROTTOTAL 6.8   BILITOTAL 0.5   ALKPHOS 97   ALT 24   AST 22   TROPI 0.045     No results for input(s): CHOL, HDL, LDL, TRIG, CHOLHDLRATIO in the last 69474 hours.  Recent Labs   Lab 12/15/19  0524   WBC 10.0   HGB 10.5*   HCT 33.0*   *        Recent Labs   Lab 12/15/19  0854 12/15/19  0527   PHV 7.39 7.30*   PO2V 57* 56*   PCO2V 39* 46   HCO3V 24 23     Recent Labs   Lab 12/15/19  0524   NTBNPI 2,584*     No results for input(s): DD in the last 168 hours.  No results for input(s): SED, CRP in the last 168 hours.  Recent Labs   Lab 12/15/19  0524        No results for input(s): TSH in the last 168 hours.  Recent Labs   Lab 12/15/19  0635   COLOR Yellow   APPEARANCE Slightly Cloudy   URINEGLC Negative   URINEBILI Negative   URINEKETONE Negative   SG 1.026   UBLD Negative   URINEPH 5.5   PROTEIN 200*   NITRITE Negative   LEUKEST Negative   RBCU 9*   WBCU 2       Imaging:  Recent Results (from the past 48 hour(s))   XR Chest Port 1 View    Narrative    EXAM: XR CHEST PORTABLE 1 VIEW  LOCATION: [facility]  DATE/TIME: 12/15/2019, 5:32 AM    INDICATION: Shortness of breath.  COMPARISON: 11/22/2019.    FINDINGS: Left subclavian cardiac device in place. No pneumothorax. The heart is enlarged. There is no pulmonary edema. The thoracic  aorta is calcified and mildly tortuous. There is mild right basilar atelectasis. The lungs are otherwise clear.   Degenerative disease in the spine and shoulders.      Impression    IMPRESSION: Mild right basilar atelectasis.     CT Chest Abdomen Pelvis w/o Contrast    Narrative    CT CHEST, ABDOMEN AND PELVIS WITHOUT CONTRAST  12/15/2019 7:50 AM    HISTORY:  Shortness of breath, cough and severe abdominal pain.    TECHNIQUE: CT scan obtained of the chest, abdomen, and pelvis without  IV contrast. Radiation dose for this scan was reduced using automated  exposure control, adjustment of the mA and/or kV according to patient  size, or iterative reconstruction technique.    COMPARISON:  12/15/2019 chest x-ray    FINDINGS:  Chest: Cardiac device left chest wall with right ventricular and  atrial leads. Prominent atherosclerotic vascular calcification and  aneurysmal ascending thoracic aorta 4.3 cm at the level of right  pulmonary artery.    There is mild right paratracheal and pretracheal adenopathy. Suspect  mild right hilar adenopathy, although difficult to assess with  noncontrast technique. There is subpleural thickening at the  posteromedial right lung base. Bilateral bronchial wall or  peribronchial thickening greatest in the lower lobes and multifocal  patchy airspace opacities greatest in the right upper lobe and mild in  the lingula. Consistent with infectious/inflammatory process.    Abdomen/pelvis: 1.5 cm low-attenuation lesion in the left lobe of the  liver, series 2 image 52, is identified as a cyst on prior abdomen and  pelvic CT of 5/6/2016. Gallbladder difficult to visualize, may be  contracted with small stones. Spleen and adrenal glands demonstrate no  acute-appearing abnormality as seen with noncontrast technique.  Bilateral low-dense kidney lesions consistent with previously seen  renal cysts. Pancreas is very poorly visualized with prominent atrophy  and fat infiltration. Atherosclerotic vascular  calcification.    No periaortic or pelvic adenopathy. No free fluid. Mild sigmoid  diverticulosis. No acute-appearing bowel abnormality. Multiple  vertebral body compressions of indeterminate age which are new since  2016, including superior endplate T11 and T4 and T5.      Impression    IMPRESSION:   1. Pulmonary opacities and adenopathy. Favor infectious or  inflammatory etiology. Recommend follow-up to ensure complete  resolution.  2. No acute-appearing abnormality in the abdomen or pelvis.  Indeterminate age vertebral compressions.    DARWIN BOWIE MD   Echocardiogram Limited    Narrative    [id number]  [id number]  [id number]  [id number]^MARJORIE^SIENNA^R           [facility]  Echocardiography Laboratory  [address] [place] [address]        Name: SANDRO ECHEVERRIA  MRN: [id number]  : 1929  Study Date: 12/15/2019 08:09 AM  Age: 89 yrs  Gender: Female  Patient Location: [facility]  Reason For Study: Respiratory Failure  Ordering Physician: SIENNA AMADOR  Referring Physician: Samaria Chappell  Performed By: Marjan Swift RDCS     BSA: 1.8 m2  Height: 63 in  Weight: 160 lb  HR: 134  BP: 100/75 mmHg  _____________________________________________________________________________  __        Procedure  Limited Portable Echo Adult. (Emergent exam, abbreviated study performed).  _____________________________________________________________________________  __        Interpretation Summary     The left ventricle is mildly dilated.  Left ventricular systolic function is severely reduced.  The visual ejection fraction is estimated at 20-25%.  There is severe global hypokinesia of the left ventricle.  Severely decreased right ventricular systolic function  There is mild (1+) mitral regurgitation.  There is moderate (2+) tricuspid regurgitation.  There is mild (1+) aortic regurgitation.  The rhythm was rapid atrial fibrillation.  There is no comparison study  available.  _____________________________________________________________________________  __        Left Ventricle  The left ventricle is mildly dilated. There is normal left ventricular wall  thickness. Left ventricular systolic function is severely reduced. The visual  ejection fraction is estimated at 20-25%. There is severe global hypokinesia  of the left ventricle.     Right Ventricle  The right ventricle is mildly dilated. Severely decreased right ventricular  systolic function. There is a pacemaker lead in the right ventricle.     Atria  There is moderate biatrial enlargement.     Mitral Valve  The mitral valve leaflets are mildly thickened. There is moderate mitral  annular calcification. There is mild (1+) mitral regurgitation.        Tricuspid Valve  There is moderate (2+) tricuspid regurgitation. The right ventricular systolic  pressure is approximated at 22.4 mmHg plus the right atrial pressure. IVC  diameter >2.1 cm collapsing <50% with sniff suggests a high RA pressure  estimated at 15 mmHg or greater. Right ventricular systolic pressure is  elevated, consistent with mild pulmonary hypertension.     Aortic Valve  There is moderate trileaflet aortic sclerosis. There is mild (1+) aortic  regurgitation. No hemodynamically significant valvular aortic stenosis.     Pulmonic Valve  There is mild (1+) pulmonic valvular regurgitation.     Vessels  The ascending aorta is Mildly dilated.     Pericardium  There is no pericardial effusion.        Rhythm  The rhythm was rapid atrial fibrillation.  _____________________________________________________________________________  __  MMode/2D Measurements & Calculations  asc Aorta Diam: 4.1 cm           Doppler Measurements & Calculations  TR max werner: 236.1 cm/sec  TR max P.4 mmHg           _____________________________________________________________________________  __           Report approved by: Rosa Ghotra 12/15/2019 09:14 AM      XR Chest Port 1 View     Narrative    EXAM: XR CHEST PORT 1 VW  LOCATION: [facility]  DATE/TIME: 12/15/2019 5:46 PM    INDICATION: Verify PICC line placement  COMPARISON: 12/15/2019      Impression    IMPRESSION: Right upper extremity PICC line terminates at the cavoatrial junction. Left chest wall pacer with right atrial and ventricular leads, stable. No pneumothorax. No pleural effusion. Mild cardiomegaly.       Echo:  No results found for this or any previous visit (from the past 4320 hour(s)).

## 2019-12-16 NOTE — PROGRESS NOTES
[facility]  Hospitalist Progress Note  Grover Camarena MD 12/16/2019    Reason for Stay (Diagnosis): Respiratory distress and A fib with RVR         Assessment and Plan:      Summary of Stay: Radha Cunningham is a 89 year old female admitted on 12/15/2019 with respiratory distress.       She was discharged from [facility] on 11/25/19 after week's hospitalization for respiratory problems.  She did feel significantly improved on discharge noting that she was treated with antibiotics, bronchodilators and steroids.  She went to transitional care and had remained there until the date of admission when she was noted to be in respiratory distress for which reason she was transferred to the ED for evaluation.  The patient describes having been exposed to multiple ill people including workers and residence at [facility].     Prior medical history significant for permanent Atrial Fibrillation s/p pacer placement in April, 2018 for pauses and syncope.  Prior surgeries include Cholecystectomy, tonsillectoy and hernia repair.     En route to the hospital, she was noted to be severely tachycardic with a heart rate of 190.  She was given duo nebs, albuterol nebs and a couple of doses of verapamil by EMS.      Upon arrival at the [facility] emergency department, the heart rate was noted to be in excess of 200, she was febrile to 101.6 and blood pressure generally was stable, generally 100-110/80's and saturating at greater than 96% on room air.  She was noted to be severely tachypneic and with a rate of 40-50.  She was put on BiPAP and this also then helped with decreasing her work of breathing.     Lab evaluation did indicate an elevated N-terminal proBNP, lactic acid 4.1, normal electrolytes and creatinine as well as only a mild left shift with WBC of 10.  Catheterized urinary specimen is without obvious evidence of infection, influenza antigen are negative.  Chest x-ray shows cardiac enlargement.  CT scan chest, abdomen, pelvis  "shows only mild scattered infiltrates in the right upper lung.    After admission to the ICU, the pt was started on amiodarone.  The diltiazem was stopped, but she became hypotensive nevertheless.  Her HR slowed significantly, but she also dropped her BP. Fortunately, overnight she remained stable.     Problem List:   1. Resp distress without true resp failure.  Minimal upper lobe infiltrate and RSV positive.  2. Permanent A fib, presented in RVR, though without dramatic failure.  Rapid rate again developed today.  3. New finding cardiomyopathy, rate-related v stress (considered more likely stress, per Dr. Deshpande).   4. Subtherapeutic INR.   5. NIDDM.   6. Chronic Low back/SI joint pain.   7. Advance directives. Pt remains fairly independent and overall alert and appropriate. Palliative care has been asked to help clarify goals of care.    PLAN:  1.  Resume and complete, if possible Amiodarone load.   2.  Anticipate re-initiation of Warfarin.   3.  Will hope that pt can discharge to floor tomorrow for increased activity, assuming her HR comes under control.  4.  Unclear whether the Urine output recording is correct.   5.  Stop abx.   6.  Cont insulin as ordered.      DVT Prophylaxis: Warfarin  Code Status: Full Code  Discharge Dispo: TBD, not clear that return to full independence is appropriate.  Estimated Disch Date / # of Days until Disch: TBD, at least 2 more nights        Interval History (Subjective):      I again met with pt and two of her daughters.  I did initiate a conversation with them all about the appropriate Code Status, noting that we would do more harm than good if she underwent chest compressions, etc.     Pt's daughter keeps asking that I start the pt on steroids, but I believe the wheeze is upper airway.                   Physical Exam:      Last Vital Signs:  BP (!) 118/92 (BP Location: Left arm)   Pulse 87   Temp 98.6  F (37  C) (Oral)   Resp 23   Ht 1.6 m (5' 3\")   Wt 72.8 kg (160 lb 7.9 " oz)   SpO2 94%   BMI 28.43 kg/m      I/O last 3 completed shifts:  In: 180 [P.O.:120; I.V.:60]  Out: 200 [Urine:200]    Constitutional: Awake, alert, cooperative, no apparent distress   Respiratory: Good air entry bilat. No increased work of breathing initially, but when she became upset, she developed coarse upper airway noise with wheeze.    Cardiovascular: IRRIR,tachy   Abdomen: Normal bowel sounds, soft, non-distended, non-tender   Skin: No rashes, no cyanosis, dry to touch   Neuro: Alert and oriented x3, no weakness, numbness, memory loss   Extremities: No edema   Other(s):        All other systems: Negative          Medications:      All current medications were reviewed with changes reflected in problem list.         Data:      All new lab and imaging data was reviewed.   Labs/Imaging:  Results for orders placed or performed during the hospital encounter of 12/15/19 (from the past 24 hour(s))   Glucose by meter   Result Value Ref Range    Glucose 211 (H) 70 - 99 mg/dL   Glucose by meter   Result Value Ref Range    Glucose 189 (H) 70 - 99 mg/dL   Social Work IP Consult    Narrative    White, Corinne C, LSW     12/16/2019  5:02 PM  Care Transition Initial Assessment - SW     Met with: Patient and Family  Daughter harrison present. She   presented HCD for SW.  Aware pt has limited Guardianship. Will   reach out to Janae Post [phone]  Active Problems:    Acute respiratory distress       DATA  Lives With: alone - home setting. Admitted from TCU    Quality of Family Relationships: helpful, involved  Description of Support System: Involved  Who is your support system?: Children, Guardian  Support Assessment: Adequate family and caregiver support,   Adequate social supports. Per conversation, pt has support from   family for shopping, transport and cleaning. Has MOW  Identified issues/concerns regarding health management: Admitted   from TCU for respiratory distress    @   Resources List: Skilled Nursing  Facility  Admitted from [facility]> Will need prior auth through [employer].  PT/Family requested list for choices.      Quality of Family Relationships: helpful, involved  Transportation Anticipated: agency  May need W/C transport pending mobility status   ASSESSMENT  Cognitive Status:  awake, alert and oriented- wearing Bi-pap at   time of visit   Concerns to be addressed: Met with pt and her daughter. SW did   leave VM message for Janae Post [phone]. Per chart she is   pt's Conservator with limited Guardianship. HARVEY called to confirm   what this means for d.c planning support for pt.    Prior to previous admission pt was living in her own home,   daughter Ana would assist with shopping and transport needs. Pt   was getting MOW.  It is believed pt will need TCU once again for support prior to   returning home  Provided [employer] List for TCU's to daughter to review      PLAN  Will need Following for TCU choices and Rehab recommendations for   ins plan     [facility] [place]  Inpatient Care Coordination   [phone]  [facility]        CBC with platelets   Result Value Ref Range    WBC 10.2 4.0 - 11.0 10e9/L    RBC Count 3.12 (L) 3.8 - 5.2 10e12/L    Hemoglobin 10.0 (L) 11.7 - 15.7 g/dL    Hematocrit 31.3 (L) 35.0 - 47.0 %     78 - 100 fl    MCH 32.1 26.5 - 33.0 pg    MCHC 31.9 31.5 - 36.5 g/dL    RDW 13.7 10.0 - 15.0 %    Platelet Count 152 150 - 450 10e9/L   Comprehensive metabolic panel   Result Value Ref Range    Sodium 140 133 - 144 mmol/L    Potassium 4.2 3.4 - 5.3 mmol/L    Chloride 105 94 - 109 mmol/L    Carbon Dioxide 27 20 - 32 mmol/L    Anion Gap 8 3 - 14 mmol/L    Glucose 173 (H) 70 - 99 mg/dL    Urea Nitrogen 27 7 - 30 mg/dL    Creatinine 0.97 0.52 - 1.04 mg/dL    GFR Estimate 51 (L) >60 mL/min/[1.73_m2]    GFR Estimate If Black 60 (L) >60 mL/min/[1.73_m2]    Calcium 8.8 8.5 - 10.1 mg/dL    Bilirubin Total 0.3 0.2 - 1.3 mg/dL    Albumin 3.0 (L) 3.4 - 5.0 g/dL    Protein Total 6.6 (L)  6.8 - 8.8 g/dL    Alkaline Phosphatase 98 40 - 150 U/L    ALT 55 (H) 0 - 50 U/L    AST 49 (H) 0 - 45 U/L   Magnesium   Result Value Ref Range    Magnesium 2.1 1.6 - 2.3 mg/dL   Glucose by meter   Result Value Ref Range    Glucose 182 (H) 70 - 99 mg/dL   Glucose by meter   Result Value Ref Range    Glucose 162 (H) 70 - 99 mg/dL   Glucose by meter   Result Value Ref Range    Glucose 217 (H) 70 - 99 mg/dL   INR   Result Value Ref Range    INR 1.75 (H) 0.86 - 1.14   Glucose by meter   Result Value Ref Range    Glucose 240 (H) 70 - 99 mg/dL

## 2019-12-16 NOTE — PROGRESS NOTES
[facility]  Cardiology Progress Note    Date of Service: 12/16/2019       Patient being seen by cardiology in follow up of atrial fibrillation with RVR in the setting of respiratory illness.       Interval history:  Developed afib with RVR again this afternoon (-160s). Amio gtt resumed at 1mg around lunchtime. On and off bipap intermittently for some dyspnea.  I/O near even last 24 hrs. BP marginal.   Seen by palliative care today, pt wishes to remain full code and hopes to go home independently.    Discussed with available staff at bedside, chart reviewed, patient seen and examined.      ASSESSMENT/PLAN:    1. Permanent afib, now with RVR in setting of respiratory illness (RSV).   --Initially required IV diltiazem, though developed hypotension. Amio transiently added as well, though heart rates improved, and they were both stopped. This afternoon she once again developed RVR, and now back on amio gtt. Continue to monitor BP closely.   --Anticoagulated with warfarin as outpt, though INR subtherapeutic on arrival. As I do not see any contraindication for anticoagulation, will ask pharmacy to assist/dose.     2. Severe cardiomyopathy.    --Via Echo this stay EF severely reduced 20-25% while in rapid afib (Previously EF 55-60% Nov 2019). Has severe global hypokinesia of LV, with mild MR, moderate TR, mild AR. RV function also severely decreased. Possibly tachymediated vs stress cardiomyopathy as reportedly there was some sparing of base function.    --Low BP limits medication options, but will trial addition of low dose metoprolol at 12.5mg BID to assist with both rate control and CM.    --NT-proBNP elevated on admission, but not frankly volume overloaded on exam. No diuretics currently.     HPI:   Radha Cunningham is a 89 year old female with past medical history significant for diverticulitis, chronic back pain, and chronic atrial fibrillation, pacemaker placement, who was admitted on 12/15/2019  "with respiratory distress requiring bipap. In afib with RVR on admission. Notably, had hospitalization several weeks ago also for respiratory failure, and discharged to TCU after treated with steroids and antibiotics (previously lived independently).      Patient Active Problem List   Diagnosis     Umbilical hernia     LEWIS HYP HRT/KID W HEART FAIL(aka CHF)     Benign neoplasm of skin     Malignant neoplasm of thyroid gland (H)     Reactive airway disease with acute exacerbation     Acute respiratory failure with hypoxia (H)     Acute bronchitis     Acute respiratory distress       Subjective:  Can feel her \"heart racing\" periodically. Also has some intermittent dyspnea and frequent coughing.       Objective:  Vital signs:  /82   Pulse 126   Temp 98.1  F (36.7  C)   Resp 27   Ht 1.6 m (5' 3\")   Wt 72.8 kg (160 lb 7.9 oz)   SpO2 96%   BMI 28.43 kg/m      Intake/Output Summary (Last 24 hours) at 12/16/2019 1421  Last data filed at 12/16/2019 1127  Gross per 24 hour   Intake 210 ml   Output 200 ml   Net 10 ml       Vitals:    12/15/19 0935 12/16/19 0400   Weight: 72.8 kg (160 lb 7.9 oz) 72.8 kg (160 lb 7.9 oz)       PE:  Gen: In general, this is an elderly  female, resting in bed, mildly uncomfortable appearing on NC. Just came off bipap prior to me entering room.   Neck: Supple with midline trachea. No significant JVD identified with HOB elevated.   CV: Irregular rate, tachycardic. No obvious murmur or rub appreciated.   Resp:Respirations are unlabored without use of accessory muscles. Auscultation of the lungs reveals scattered rales and wheezing.   GI: Soft, nontender, nondistended. BS present.  Extrem: Extremities are warm, without cyanosis or clubbing. No pitting lower extremity edema.   Neuro: Patient is alert, oriented, and cooperative. No obvious focal abnormalities.  Psych: She is slightly anxious appearing.     Current Medications:?    emollient   Topical BID     HYDROcodone-acetaminophen "  1 tablet Oral BID     insulin aspart  1-6 Units Subcutaneous Q4H     insulin glargine  8 Units Subcutaneous QAM AC     ipratropium - albuterol 0.5 mg/2.5 mg/3 mL  3 mL Nebulization 4x daily     lactated ringers  30 mL/kg Intravenous Once         Labs/Imaging/Additional testing:  The following labs and imaging were reviewed during today's visit:    Recent Labs   Lab 12/16/19  0540 12/15/19  0524   WBC 10.2 10.0   HGB 10.0* 10.5*    103*    154   INR  --  1.48*    137   POTASSIUM 4.2 4.2   CHLORIDE 105 104   CO2 27 24   BUN 27 18   CR 0.97 0.88   GFRESTIMATED 51* 57*   GFRESTBLACK 60* 66   ANIONGAP 8 9   SHAN 8.8 8.9   * 287*   ALBUMIN 3.0* 3.2*   PROTTOTAL 6.6* 6.8   BILITOTAL 0.3 0.5   ALKPHOS 98 97   ALT 55* 24   AST 49* 22   TROPI  --  0.045       Recent Labs   Lab 12/15/19  0524   NTBNPI 2,584*       Tele:  Afib with RVR     Imaging:  Recent Results (from the past 48 hour(s))   XR Chest Port 1 View    Narrative    EXAM: XR CHEST PORTABLE 1 VIEW  LOCATION: [facility]  DATE/TIME: 12/15/2019, 5:32 AM    INDICATION: Shortness of breath.  COMPARISON: 11/22/2019.    FINDINGS: Left subclavian cardiac device in place. No pneumothorax. The heart is enlarged. There is no pulmonary edema. The thoracic aorta is calcified and mildly tortuous. There is mild right basilar atelectasis. The lungs are otherwise clear.   Degenerative disease in the spine and shoulders.      Impression    IMPRESSION: Mild right basilar atelectasis.     CT Chest Abdomen Pelvis w/o Contrast    Narrative    CT CHEST, ABDOMEN AND PELVIS WITHOUT CONTRAST  12/15/2019 7:50 AM    HISTORY:  Shortness of breath, cough and severe abdominal pain.    TECHNIQUE: CT scan obtained of the chest, abdomen, and pelvis without  IV contrast. Radiation dose for this scan was reduced using automated  exposure control, adjustment of the mA and/or kV according to patient  size, or iterative reconstruction technique.    COMPARISON:   12/15/2019 chest x-ray    FINDINGS:  Chest: Cardiac device left chest wall with right ventricular and  atrial leads. Prominent atherosclerotic vascular calcification and  aneurysmal ascending thoracic aorta 4.3 cm at the level of right  pulmonary artery.    There is mild right paratracheal and pretracheal adenopathy. Suspect  mild right hilar adenopathy, although difficult to assess with  noncontrast technique. There is subpleural thickening at the  posteromedial right lung base. Bilateral bronchial wall or  peribronchial thickening greatest in the lower lobes and multifocal  patchy airspace opacities greatest in the right upper lobe and mild in  the lingula. Consistent with infectious/inflammatory process.    Abdomen/pelvis: 1.5 cm low-attenuation lesion in the left lobe of the  liver, series 2 image 52, is identified as a cyst on prior abdomen and  pelvic CT of 5/6/2016. Gallbladder difficult to visualize, may be  contracted with small stones. Spleen and adrenal glands demonstrate no  acute-appearing abnormality as seen with noncontrast technique.  Bilateral low-dense kidney lesions consistent with previously seen  renal cysts. Pancreas is very poorly visualized with prominent atrophy  and fat infiltration. Atherosclerotic vascular calcification.    No periaortic or pelvic adenopathy. No free fluid. Mild sigmoid  diverticulosis. No acute-appearing bowel abnormality. Multiple  vertebral body compressions of indeterminate age which are new since  5/6/2016, including superior endplate T11 and T4 and T5.      Impression    IMPRESSION:   1. Pulmonary opacities and adenopathy. Favor infectious or  inflammatory etiology. Recommend follow-up to ensure complete  resolution.  2. No acute-appearing abnormality in the abdomen or pelvis.  Indeterminate age vertebral compressions.    DARWIN BOWIE MD       Echo:  12/15/19  Interpretation Summary     The left ventricle is mildly dilated.  Left ventricular systolic function is  severely reduced.  The visual ejection fraction is estimated at 20-25%.  There is severe global hypokinesia of the left ventricle.  Severely decreased right ventricular systolic function  There is mild (1+) mitral regurgitation.  There is moderate (2+) tricuspid regurgitation.  There is mild (1+) aortic regurgitation.  The rhythm was rapid atrial fibrillation.  There is no comparison study available      Shaneka Sanches PA-C  [facility]  Pager [phone]

## 2019-12-16 NOTE — PROGRESS NOTES
WOC consult placed to possibly remove sutures from RLE skin cancer removal site. Sutures due to be removed 12/17 at Derm appt but pt currently in ICU. Area currently has dried blood- soaked steri strips over sutures- appears intact with no s/sx infection.     Will defer this to ICU MD for tomorrow when they are due to be removed since there is no wound. WOC signing off. Please re-consult if any wounds develop.

## 2019-12-16 NOTE — PROGRESS NOTES
End of Shift Summary Note:   Patient wore BIPAP for short time- placed by RN and took off soon after.  Remains on RA- no distress at this time. Vitals Stable.

## 2019-12-16 NOTE — PROGRESS NOTES
RN CTS following d/t CMS HF lsit with elevated BNP 2,584. No evidence of heart failure documented.  Patient admitted for respiratory distress. Patient has cardiomyopathy thought to be d/t Afib. Patient has a pacemaker and a history of Diabetes, and back pain.    SW is following for discharge planning.  RN CTS available to assist with scheduling any follow up appointments.    Lore Bautista MA/RN Case Manager  Inpatient Care Coordination  [facility]   [phone]

## 2019-12-17 ENCOUNTER — DOCUMENTATION ONLY (OUTPATIENT)
Dept: OTHER | Facility: CLINIC | Age: 84
End: 2019-12-17

## 2019-12-17 ENCOUNTER — AMBULATORY - HEALTHEAST (OUTPATIENT)
Dept: OTHER | Facility: CLINIC | Age: 84
End: 2019-12-17

## 2019-12-17 LAB
ALBUMIN SERPL-MCNC: 2.9 G/DL (ref 3.4–5)
ALP SERPL-CCNC: 100 U/L (ref 40–150)
ALT SERPL W P-5'-P-CCNC: 84 U/L (ref 0–50)
ANION GAP SERPL CALCULATED.3IONS-SCNC: 7 MMOL/L (ref 3–14)
AST SERPL W P-5'-P-CCNC: 73 U/L (ref 0–45)
BILIRUB SERPL-MCNC: 0.3 MG/DL (ref 0.2–1.3)
BUN SERPL-MCNC: 35 MG/DL (ref 7–30)
CALCIUM SERPL-MCNC: 8.8 MG/DL (ref 8.5–10.1)
CHLORIDE SERPL-SCNC: 104 MMOL/L (ref 94–109)
CO2 SERPL-SCNC: 27 MMOL/L (ref 20–32)
CREAT SERPL-MCNC: 1.03 MG/DL (ref 0.52–1.04)
ERYTHROCYTE [DISTWIDTH] IN BLOOD BY AUTOMATED COUNT: 14 % (ref 10–15)
GFR SERPL CREATININE-BSD FRML MDRD: 48 ML/MIN/{1.73_M2}
GLUCOSE BLDC GLUCOMTR-MCNC: 183 MG/DL (ref 70–99)
GLUCOSE BLDC GLUCOMTR-MCNC: 203 MG/DL (ref 70–99)
GLUCOSE BLDC GLUCOMTR-MCNC: 213 MG/DL (ref 70–99)
GLUCOSE BLDC GLUCOMTR-MCNC: 224 MG/DL (ref 70–99)
GLUCOSE BLDC GLUCOMTR-MCNC: 233 MG/DL (ref 70–99)
GLUCOSE BLDC GLUCOMTR-MCNC: 240 MG/DL (ref 70–99)
GLUCOSE SERPL-MCNC: 195 MG/DL (ref 70–99)
HCT VFR BLD AUTO: 33.3 % (ref 35–47)
HGB BLD-MCNC: 10.6 G/DL (ref 11.7–15.7)
INR PPP: 2.04 (ref 0.86–1.14)
MCH RBC QN AUTO: 32.1 PG (ref 26.5–33)
MCHC RBC AUTO-ENTMCNC: 31.8 G/DL (ref 31.5–36.5)
MCV RBC AUTO: 101 FL (ref 78–100)
PLATELET # BLD AUTO: 185 10E9/L (ref 150–450)
POTASSIUM SERPL-SCNC: 4.2 MMOL/L (ref 3.4–5.3)
PROT SERPL-MCNC: 6.3 G/DL (ref 6.8–8.8)
RBC # BLD AUTO: 3.3 10E12/L (ref 3.8–5.2)
SODIUM SERPL-SCNC: 138 MMOL/L (ref 133–144)
WBC # BLD AUTO: 13 10E9/L (ref 4–11)

## 2019-12-17 PROCEDURE — 25000132 ZZH RX MED GY IP 250 OP 250 PS 637: Performed by: SURGERY

## 2019-12-17 PROCEDURE — 40000275 ZZH STATISTIC RCP TIME EA 10 MIN

## 2019-12-17 PROCEDURE — 99232 SBSQ HOSP IP/OBS MODERATE 35: CPT | Performed by: INTERNAL MEDICINE

## 2019-12-17 PROCEDURE — 99233 SBSQ HOSP IP/OBS HIGH 50: CPT | Performed by: INTERNAL MEDICINE

## 2019-12-17 PROCEDURE — 85610 PROTHROMBIN TIME: CPT | Performed by: INTERNAL MEDICINE

## 2019-12-17 PROCEDURE — 20000003 ZZH R&B ICU

## 2019-12-17 PROCEDURE — 25000131 ZZH RX MED GY IP 250 OP 636 PS 637: Performed by: INTERNAL MEDICINE

## 2019-12-17 PROCEDURE — 25800030 ZZH RX IP 258 OP 636: Performed by: INTERNAL MEDICINE

## 2019-12-17 PROCEDURE — 80053 COMPREHEN METABOLIC PANEL: CPT | Performed by: INTERNAL MEDICINE

## 2019-12-17 PROCEDURE — 85027 COMPLETE CBC AUTOMATED: CPT | Performed by: INTERNAL MEDICINE

## 2019-12-17 PROCEDURE — 25000132 ZZH RX MED GY IP 250 OP 250 PS 637: Performed by: INTERNAL MEDICINE

## 2019-12-17 PROCEDURE — 25000125 ZZHC RX 250: Performed by: INTERNAL MEDICINE

## 2019-12-17 PROCEDURE — 94640 AIRWAY INHALATION TREATMENT: CPT | Mod: 76

## 2019-12-17 PROCEDURE — 00000146 ZZHCL STATISTIC GLUCOSE BY METER IP

## 2019-12-17 PROCEDURE — 94640 AIRWAY INHALATION TREATMENT: CPT

## 2019-12-17 PROCEDURE — 25000128 H RX IP 250 OP 636

## 2019-12-17 PROCEDURE — 94660 CPAP INITIATION&MGMT: CPT

## 2019-12-17 PROCEDURE — 25000128 H RX IP 250 OP 636: Performed by: INTERNAL MEDICINE

## 2019-12-17 PROCEDURE — 25000132 ZZH RX MED GY IP 250 OP 250 PS 637: Performed by: PHYSICIAN ASSISTANT

## 2019-12-17 RX ORDER — FUROSEMIDE 10 MG/ML
40 INJECTION INTRAMUSCULAR; INTRAVENOUS ONCE
Status: COMPLETED | OUTPATIENT
Start: 2019-12-17 | End: 2019-12-17

## 2019-12-17 RX ORDER — METOPROLOL TARTRATE 50 MG
50 TABLET ORAL 2 TIMES DAILY
Status: DISCONTINUED | OUTPATIENT
Start: 2019-12-17 | End: 2019-12-21 | Stop reason: HOSPADM

## 2019-12-17 RX ORDER — WARFARIN SODIUM 1 MG/1
2 TABLET ORAL
Status: COMPLETED | OUTPATIENT
Start: 2019-12-17 | End: 2019-12-17

## 2019-12-17 RX ORDER — FUROSEMIDE 10 MG/ML
INJECTION INTRAMUSCULAR; INTRAVENOUS
Status: COMPLETED
Start: 2019-12-17 | End: 2019-12-17

## 2019-12-17 RX ORDER — PREDNISONE 20 MG/1
40 TABLET ORAL DAILY
Status: DISCONTINUED | OUTPATIENT
Start: 2019-12-17 | End: 2019-12-17

## 2019-12-17 RX ORDER — PREDNISONE 20 MG/1
40 TABLET ORAL DAILY
Status: DISCONTINUED | OUTPATIENT
Start: 2019-12-17 | End: 2019-12-18

## 2019-12-17 RX ORDER — METOPROLOL TARTRATE 25 MG/1
25 TABLET, FILM COATED ORAL 2 TIMES DAILY
Status: DISCONTINUED | OUTPATIENT
Start: 2019-12-17 | End: 2019-12-17

## 2019-12-17 RX ADMIN — Medication 12.5 MG: at 12:51

## 2019-12-17 RX ADMIN — AMIODARONE HYDROCHLORIDE 1 MG/MIN: 50 INJECTION, SOLUTION INTRAVENOUS at 08:18

## 2019-12-17 RX ADMIN — PREDNISONE 40 MG: 20 TABLET ORAL at 12:51

## 2019-12-17 RX ADMIN — HYDROCODONE BITARTRATE AND ACETAMINOPHEN 1 TABLET: 5; 325 TABLET ORAL at 07:26

## 2019-12-17 RX ADMIN — Medication 12.5 MG: at 08:16

## 2019-12-17 RX ADMIN — INSULIN GLARGINE 8 UNITS: 100 INJECTION, SOLUTION SUBCUTANEOUS at 07:55

## 2019-12-17 RX ADMIN — FUROSEMIDE 40 MG: 10 INJECTION INTRAMUSCULAR; INTRAVENOUS at 10:18

## 2019-12-17 RX ADMIN — FUROSEMIDE 40 MG: 10 INJECTION, SOLUTION INTRAMUSCULAR; INTRAVENOUS at 14:47

## 2019-12-17 RX ADMIN — Medication: at 07:46

## 2019-12-17 RX ADMIN — Medication 1 G: at 21:41

## 2019-12-17 RX ADMIN — IPRATROPIUM BROMIDE AND ALBUTEROL SULFATE 3 ML: .5; 3 SOLUTION RESPIRATORY (INHALATION) at 11:07

## 2019-12-17 RX ADMIN — SODIUM CHLORIDE 500 ML: 9 INJECTION, SOLUTION INTRAVENOUS at 07:54

## 2019-12-17 RX ADMIN — FUROSEMIDE 40 MG: 10 INJECTION, SOLUTION INTRAMUSCULAR; INTRAVENOUS at 10:18

## 2019-12-17 RX ADMIN — IPRATROPIUM BROMIDE AND ALBUTEROL SULFATE 3 ML: .5; 3 SOLUTION RESPIRATORY (INHALATION) at 07:16

## 2019-12-17 RX ADMIN — WARFARIN SODIUM 2 MG: 1 TABLET ORAL at 17:54

## 2019-12-17 RX ADMIN — METOPROLOL TARTRATE 50 MG: 50 TABLET, FILM COATED ORAL at 21:41

## 2019-12-17 RX ADMIN — FUROSEMIDE 40 MG: 10 INJECTION INTRAMUSCULAR; INTRAVENOUS at 14:47

## 2019-12-17 RX ADMIN — AMIODARONE HYDROCHLORIDE 1 MG/MIN: 50 INJECTION, SOLUTION INTRAVENOUS at 04:01

## 2019-12-17 RX ADMIN — IPRATROPIUM BROMIDE AND ALBUTEROL SULFATE 3 ML: .5; 3 SOLUTION RESPIRATORY (INHALATION) at 19:33

## 2019-12-17 RX ADMIN — IPRATROPIUM BROMIDE AND ALBUTEROL SULFATE 3 ML: .5; 3 SOLUTION RESPIRATORY (INHALATION) at 16:10

## 2019-12-17 RX ADMIN — HYDROCODONE BITARTRATE AND ACETAMINOPHEN 1 TABLET: 5; 325 TABLET ORAL at 21:41

## 2019-12-17 ASSESSMENT — ACTIVITIES OF DAILY LIVING (ADL)
ADLS_ACUITY_SCORE: 22

## 2019-12-17 ASSESSMENT — MIFFLIN-ST. JEOR: SCORE: 1135.13

## 2019-12-17 NOTE — PROGRESS NOTES
Patient complains of shortness of breath, sounds audibly gurgly. Oxygen sats remains stable. bipap placed back on to help with work of breathing.  Tiffani Koo RN

## 2019-12-17 NOTE — PHARMACY-ANTICOAGULATION SERVICE
Addendum:    Goal INR changed to 2-2.5  ____________________________________________________________________________  Clinical Pharmacy - Warfarin Dosing Consult     Pharmacy has been consulted to manage this patient s warfarin therapy.  Indication: Atrial Fibrillation  Therapy Goal: INR 2-2.5  Warfarin Prior to Admission: Yes  Warfarin PTA Regimen: 2mg daily  Significant drug interactions: Zithromax as pta med  Dose Comments: 2mg tonight    INR   Date Value Ref Range Status   12/16/2019 1.75 (H) 0.86 - 1.14 Final   12/15/2019 1.48 (H) 0.86 - 1.14 Final     Pharmacy will monitor Radha P Marisa daily and order warfarin doses to achieve specified goal.      Please contact pharmacy as soon as possible if the warfarin needs to be held for a procedure or if the warfarin goals change.

## 2019-12-17 NOTE — PROGRESS NOTES
[facility]  Palliative Care Progress Note  Text Page     Assessment & Plan   Radha Cunningham is a 89 year old female who was admitted on 12/15/2019. I was asked to see the patient for goals of care conversation.     Palliative Care Assessment:  Radha Cunningham is a 89 year old patient with history of atrial fibrillation with RVR, diabetes and memory impairment admitted with symptoms of progressive worsening cough and shortness of breath for the past 1-2 weeks. She has been treated for RVR and fluid overload with respiratory distress.  She responded to diuresis, bipap and antibiotics with improved mentation and decreased respiratory symptoms.       This is in the setting of recent hospitalization with discharge to transitional care unit on 11/25/2019 for episode respiratory distress.  She has been hospitalized 2 times in the past 6 months for recurrent respiratory decompensations. There is not reported or documented decline in patient's function, patient lives at home in independent apartment with assistance of her two daughters frequently.  There is no reported or documented weight loss.      Symptoms:   Dyspnea - acute, continues to have episodes requiring Bipap.  Fatigue - deconditioning with less tolerance for ADLs due to recent hospitalization, re-hospitalization and acute illness.  Anxiety - appears situational, patient agitated with recent TCU stay and new conversations regarding goals of care.  Patient's daughters state she did not have baseline anxiety.    Recommendations:   Please see assessments below for rationale.  1.Decisional Capacity -  Intact. Health care directive dated 6/1/2017.  If patient is not able to participate in plan of care or medical decision making, daughter Hilton is primary health care agent.    2. Pain- patient denies acute pain.  No active pain issues.  3. Shortness of breath - secondary to fluid imbalance, viral syndrome and atrial fibrillation with RVR.  Now improved  with resolution of heart rate.  Patient requiring intermittent bipap and experiences increased shortness of breath with conversation or emotionally charged situations.   4. Cough - acute with issues as above.  Stable at this time.  5. Nutritional status - patient with less appetite, but eats two meals daily.  No acute concerns.  - inconsistent nutrition during hospital stay due to medical instability  6. Spiritual Care- Oriented to Spiritual Health as part of Palliative Care team.  Appreciate care of Mehrdad Maria.  7. Care Planning- Appreciate Care of Anna Francis.      Goals of care - DNR/DNI, selective restorative measures.  Per discussion with daughters patient wishes to go home again and maintain functional independence.     Findings & plan of care discussed with: Bedside Nurse Tiffani and Hospitalist Dr. Camarena  Thank you for involving us in the patient's care.      Jocy BOYER, CNP  Pain Management and Palliative Care  [facility]  Pgr: [phone]    Time Spent on this Encounter   Total unit/floor time 60 minutes, time consisted of the following, examination of the patient, reviewing the record and completing documentation. >50% of time spent in counseling and coordination of care.  Time spend counseling with patient and family consisted of the following topics, advance care planning and symptom management.  Time spent in coordination of care with those listed above.     Interval History    Patient continues to have significant shortness of breath.  Intermittent anxiety and intermittent use of bipap overnight and today.  Diuresis ordered due to low urine output.    Course of Hospitalization Discussions Data     Decision-Making & Goals of Care Discussion:  December 17th, 2019:  Met with patient and daughter Carolina at the bedside.  Patient alert and able to participate in conversation.  I asked patient if she would be willing to talk about her medical issues and the tender topic of  changing health status.  She stated she would.  I inquired about how she was feeling and through the bipap mask she stated that she feels ok, but cannot stay calm without the oxygen mask.  Daughter Carolina explained that the Bipap was not tolerated at higher setting earlier today, but the current setting appears much more comfortable for her mother.    I asked Radha if she could describe her wishes if she were to get worse and how she felt about us increasing efforts with aggressive interventions.  Radha stated that she did not want anything more than what she is getting now.  She stated that she didn't want to suffer with more aggressive things like machines to keep her alive.  She deferred to her daughter, but Carolina reflected to her mother that it was her decision and that they supported her.  Radha stated she did not want more done and I reviewed that I would change the FULL CODE order to DNR/DNI, but that we would continue with the treatments she is getting in hopes of recovering to a stable state.  Patient agreed to that plan of care.  Daughter denied questions.    Discussed on December 16, 2019 with Jocy Espinoza APRN, CNP:   Met with patient and her two daughters, Carolina and Hilton at the bedside.  Radha was agitated and anxious when I arrived in the room.  Dr. Camarena at the bedside and had been discussing goals of care and code status briefly.  Patient states she does not like these topics of conversation and daughters confirm that she has become tearful with these conversations in the past.  Due to patient's medical instability with RVR and respiratory distress I inquired if I may meet with her daughters separately to discuss goals with them for advanced care planning purposes.  Patient consented to that.     Met with daughters in the ICU conference room to discuss palliative care services and advanced care planning.  They verbalized that their mom is very independent at baseline and doesn't like to talk about  health problems.  Carolina reflected that their mom has been doing fine until the most recent hospitalization and discharge to TCU.  They both discussed dissatisfaction with the TCU stay and that they wished they could have just taken her home.  I reassured them that moving forward we wished to offer support to help them meet their mom's needs and goals for the future.       I reassured them that our goals include getting their mother home, but expressed concern to them that with a re-hospitalization and deconditioning from lying in a hospital bed with acute illness, that she may show some signs of functional decline for some time.  I inquired with them about their plan for support if her functional capacity declines and if she needs more help.  Daughter Carolina stated her mom would want to move in with one of them and that her house would be the most likely place for her to go.  I reflected that if we needed to consider that we help would line up resources appropriately.      Finally I reviewed the FULL CODE status that their mother has chosen and how I had concern that for her it would be more harmful than beneficial if she were to have a cardiac or respiratory arrest.  They verbalized understanding, but stated they would defer to her to make those decisions at this time.       Review of Systems    CONSTITUTIONAL: NEGATIVE for fever, chills, change in weight  ENT/MOUTH: NEGATIVE for ear, mouth and throat problems  RESP:POSITIVE for cough-non productive and SOB/dyspnea  CV: NEGATIVE for chest pain, palpitations or peripheral edema    Palliative Symptom Review (0=no symptom/no concern, 1=mild, 2=moderate, 3=severe):      Pain: 0-none      Fatigue: 1-mild      Nausea: 0-none      Constipation: 0-none      Diarrhea: 0-none      Depressive Symptoms: 0-none      Anxiety: 2-moderate      Drowsiness: 0-none      Poor Appetite: 1-mild      Shortness of Breath: 2-moderate      Insomnia: 0-none    Physical Exam   Temp:  [97.4  F  (36.3  C)-98.2  F (36.8  C)] 98  F (36.7  C)  Pulse:  [] 85  Heart Rate:  [] 85  Resp:  [13-39] 22  BP: ()/() 114/81  FiO2 (%):  [21 %-30 %] 21 %  SpO2:  [89 %-100 %] 97 %  164 lbs 7.41 oz  GEN:  Alert, oriented x 3, on bipap machine.  HEENT:  Normocephalic/atraumatic, no scleral icterus, no nasal discharge, mouth moist.  CV:  RRR, S1, S2; no murmurs or other irregularities noted.  +3 DP/PT pulses bilatererally; no edema BLE.  RESP:  Crackles bilaterally throughout lung fields.  Symmetric chest rise on inhalation noted.  Increased respiratory effort.  ABD:  Rounded, soft, non-tender/non-distended.  +BS  EXT:  Edema & pulses as noted above.  CMS intact x 4.     M/S:   No tenderness.    SKIN:  Dry to touch, no exanthems noted in the visualized areas.    NEURO: Symmetric strength +5/5.  Sensation to touch intact all extremities.   There is no area of allodynia or hyperesthesia.  PAIN BEHAVIOR: Cooperative  Psych:  Normal affect.  Calm, cooperative, conversant appropriately.    Medications     - MEDICATION INSTRUCTIONS -       Warfarin Therapy Reminder         emollient   Topical BID     HYDROcodone-acetaminophen  1 tablet Oral BID     insulin aspart  1-6 Units Subcutaneous Q4H     insulin glargine  8 Units Subcutaneous QAM AC     ipratropium - albuterol 0.5 mg/2.5 mg/3 mL  3 mL Nebulization 4x daily     metoprolol tartrate  50 mg Oral BID     predniSONE  40 mg Oral Daily     warfarin ANTICOAGULANT  2 mg Oral ONCE at 18:00       Data   Recent Labs   Lab 12/17/19  0530 12/16/19  1840 12/16/19  0540 12/15/19  0524   WBC 13.0*  --  10.2 10.0   HGB 10.6*  --  10.0* 10.5*   *  --  100 103*     --  152 154   INR 2.04* 1.75*  --  1.48*     --  140 137   POTASSIUM 4.2  --  4.2 4.2   CHLORIDE 104  --  105 104   CO2 27  --  27 24   BUN 35*  --  27 18   CR 1.03  --  0.97 0.88   ANIONGAP 7  --  8 9   SHAN 8.8  --  8.8 8.9   *  --  173* 287*   ALBUMIN 2.9*  --  3.0* 3.2*   PROTTOTAL  6.3*  --  6.6* 6.8   BILITOTAL 0.3  --  0.3 0.5   ALKPHOS 100  --  98 97   ALT 84*  --  55* 24   AST 73*  --  49* 22   TROPI  --   --   --  0.045

## 2019-12-17 NOTE — PROGRESS NOTES
RT Note:    Patient remains on BiPAP 12/6, 21%. Mepilex foam on bridge of nose. Breath sounds coarse/expiratory wheezes. Will continue to receive Duoneb QID. RT will continue to follow.     Lupe Maza, RT  December 17, 2019.4:51 AM

## 2019-12-17 NOTE — PROGRESS NOTES
[facility]  Hospitalist Progress Note  Grover Camarena MD 12/17/2019    Reason for Stay (Diagnosis): Respiratory distress and A fib with RVR         Assessment and Plan:      Summary of Stay: Radha Cunningham is a 89 year old female admitted on 12/15/2019 with respiratory distress.       She was discharged from [facility] on 11/25/19 after week's hospitalization for respiratory problems.  She did feel significantly improved on discharge noting that she was treated with antibiotics, bronchodilators and steroids.  She went to transitional care and had remained there until the date of admission when she was noted to be in respiratory distress for which reason she was transferred to the ED for evaluation.  The patient describes having been exposed to multiple ill people including workers and residence at [facility].     Prior medical history significant for permanent Atrial Fibrillation s/p pacer placement in April, 2018 for pauses and syncope.  Prior surgeries include Cholecystectomy, tonsillectoy and hernia repair.     En route to the hospital, she was noted to be severely tachycardic with a heart rate of 190.  She was given duo nebs, albuterol nebs and a couple of doses of verapamil by EMS.      Upon arrival at the [facility] emergency department, the heart rate was noted to be in excess of 200, she was febrile to 101.6 and blood pressure generally was stable, generally 100-110/80's and saturating at greater than 96% on room air.  She was noted to be severely tachypneic and with a rate of 40-50.  She was put on BiPAP and this also then helped with decreasing her work of breathing.     Lab evaluation did indicate an elevated N-terminal proBNP, lactic acid 4.1, normal electrolytes and creatinine as well as only a mild left shift with WBC of 10.  Catheterized urinary specimen is without obvious evidence of infection, influenza antigen are negative.  Chest x-ray shows cardiac enlargement.  CT scan chest, abdomen, pelvis  shows only mild scattered infiltrates in the right upper lung.    After admission to the ICU, the pt was started on amiodarone.  The diltiazem was stopped, but she became hypotensive nevertheless.  The amiodarone has been started and stopped at this point couple times; defer further heart rate control to cardiology.    Problem List:   1. Resp distress without true resp failure.  Minimal upper lobe infiltrate and RSV positive.  The patient has episodes of apparent wheeze that seems to be predominantly upper airway source.  The daughter had been quite insistent on the patient getting a trial of steroids and I think it is reasonable to give this a try again.  2. Permanent A fib, presented in RVR, though without dramatic failure.  Rapid rate again developed today.  3. New finding cardiomyopathy, rate-related v stress (considered more likely stress, per Dr. Deshpande).   4. Subtherapeutic INR.   5. NIDDM.   6. Chronic Low back/SI joint pain.   7. Advance directives. Pt remains fairly independent and overall alert and appropriate. Palliative care has been asked to help clarify goals of care.    PLAN:  1.  Push metoprolol dose versus continue with amiodarone load per cardiology.  2.  Anticipate re-initiation of Warfarin.   3.  Anticipate transfer to the floor when the patient's heart rate is adequately controlled.  4.  Due to low urine output, I had initially recommended a fluid bolus but instead cardiology has opted for a dose of Lasix.  5.  Stop abx.   6.  Cont insulin as ordered.  7.  We will need to start physical and Occupational Therapy evaluations as soon as the patient is stable to ambulate.  8.  Start prednisone 40 mg p.o. daily x5 days.      DVT Prophylaxis: Warfarin  Code Status: DNR/DNI was established by the palliative care team.  At this point, the patient and family continue to opt for pursuing restorative cares and independence.  Discharge Dispo: TBD, not clear that return to full independence is  "appropriate.  Estimated Disch Date / # of Days until Disch: TBD, likely a couple of more nights.        Interval History (Subjective):      Again, Ms. Cunningham appears very bright this morning.  She intermittently has episodes of shortness of breath throughout the day.  Otherwise not hemodynamically unstable.                  Physical Exam:      Last Vital Signs:  BP (!) 99/92   Pulse 93   Temp 98  F (36.7  C) (Axillary)   Resp 26   Ht 1.6 m (5' 3\")   Wt 74.6 kg (164 lb 7.4 oz)   SpO2 98%   BMI 29.13 kg/m      I/O last 3 completed shifts:  In: 2118 [P.O.:200; I.V.:1418; IV Piggyback:500]  Out: 1125 [Urine:1125]    Constitutional: Awake, alert, cooperative, no apparent distress   Respiratory: Good air entry bilat. No increased work of breathing initially, but when she became upset, she developed coarse upper airway noise with wheeze.    Cardiovascular: IRRIR,tachy   Abdomen: Normal bowel sounds, soft, non-distended, non-tender   Skin: No rashes, no cyanosis, dry to touch   Neuro: Alert and oriented x3, no weakness, numbness, memory loss   Extremities: No edema   Other(s):        All other systems: Negative          Medications:      All current medications were reviewed with changes reflected in problem list.         Data:      All new lab and imaging data was reviewed.   Labs/Imaging:  Results for orders placed or performed during the hospital encounter of 12/15/19 (from the past 24 hour(s))   Glucose by meter   Result Value Ref Range    Glucose 213 (H) 70 - 99 mg/dL   Glucose by meter   Result Value Ref Range    Glucose 240 (H) 70 - 99 mg/dL   INR   Result Value Ref Range    INR 2.04 (H) 0.86 - 1.14   Comprehensive metabolic panel   Result Value Ref Range    Sodium 138 133 - 144 mmol/L    Potassium 4.2 3.4 - 5.3 mmol/L    Chloride 104 94 - 109 mmol/L    Carbon Dioxide 27 20 - 32 mmol/L    Anion Gap 7 3 - 14 mmol/L    Glucose 195 (H) 70 - 99 mg/dL    Urea Nitrogen 35 (H) 7 - 30 mg/dL    Creatinine 1.03 0.52 - " 1.04 mg/dL    GFR Estimate 48 (L) >60 mL/min/[1.73_m2]    GFR Estimate If Black 55 (L) >60 mL/min/[1.73_m2]    Calcium 8.8 8.5 - 10.1 mg/dL    Bilirubin Total 0.3 0.2 - 1.3 mg/dL    Albumin 2.9 (L) 3.4 - 5.0 g/dL    Protein Total 6.3 (L) 6.8 - 8.8 g/dL    Alkaline Phosphatase 100 40 - 150 U/L    ALT 84 (H) 0 - 50 U/L    AST 73 (H) 0 - 45 U/L   CBC with platelets   Result Value Ref Range    WBC 13.0 (H) 4.0 - 11.0 10e9/L    RBC Count 3.30 (L) 3.8 - 5.2 10e12/L    Hemoglobin 10.6 (L) 11.7 - 15.7 g/dL    Hematocrit 33.3 (L) 35.0 - 47.0 %     (H) 78 - 100 fl    MCH 32.1 26.5 - 33.0 pg    MCHC 31.8 31.5 - 36.5 g/dL    RDW 14.0 10.0 - 15.0 %    Platelet Count 185 150 - 450 10e9/L   Glucose by meter   Result Value Ref Range    Glucose 183 (H) 70 - 99 mg/dL   Glucose by meter   Result Value Ref Range    Glucose 233 (H) 70 - 99 mg/dL   Glucose by meter   Result Value Ref Range    Glucose 224 (H) 70 - 99 mg/dL

## 2019-12-17 NOTE — PLAN OF CARE
ICU End of Shift Summary.  For vital signs and complete assessments, please see documentation flowsheets.     Pertinent assessments: A/Ox4. Afebrile. BP stable. LS coarse. Bipap on/off intermittently for dyspnea. Turns well in bed. Scheduled norco per home routine for neck/ hip pain in AM, effective. Miconazole powder applied to redness in groin folds & under breasts. Bypassing purewick, incontinent of urine. No BM this shift. Anxious at times.     Major Shift Events: Went into a-fib, RVR, rate in 140s-170s while eating lunch. Amio gtt restarted per MD at 60mL/hr. Metoprolol added per cards. Continue with metoprolol & amiodorone gtt at current rate tonight per Dr. Mtz with cardiology, will reassess tomorrow.   Plan (Upcoming Events): CTM  Discharge/Transfer Needs: TBD    Bedside Shift Report Completed : y  Bedside Safety Check Completed: y

## 2019-12-17 NOTE — PROGRESS NOTES
Report was given to Luz Maria Rivera RN at this time.  All questions answered, writer is available if needed. Care was handed over.

## 2019-12-17 NOTE — PROGRESS NOTES
RT: Received patient on BIPAP 12/6, 30%. She complained that there was too much pressure from the mask- titrated to 10/5.    Pt wore BIPAP most of the shift due to tachypnea and labored breathing. RR 40-50 at times off BIPAP. Skin integrity is fair, foam pad in place on the bridge of her nose and a skin barrier on her forehead.    This afternoon she looked better- she was seen resting comfortably on 4L nasal cannula. SPO2 97%. Duoneb given as scheduled.    BS coarse crackles with scattered expiratory wheezes. Will continue to follow and assess.

## 2019-12-17 NOTE — PROGRESS NOTES
Patient states she is ready to go to [facility]. Daughter appears upset at this statement but patient states she is ready to die.  Tiffani Koo RN

## 2019-12-18 ENCOUNTER — APPOINTMENT (OUTPATIENT)
Dept: PHYSICAL THERAPY | Facility: CLINIC | Age: 84
DRG: 314 | End: 2019-12-18
Attending: INTERNAL MEDICINE
Payer: COMMERCIAL

## 2019-12-18 LAB
ANION GAP SERPL CALCULATED.3IONS-SCNC: 7 MMOL/L (ref 3–14)
BUN SERPL-MCNC: 37 MG/DL (ref 7–30)
CALCIUM SERPL-MCNC: 8.6 MG/DL (ref 8.5–10.1)
CHLORIDE SERPL-SCNC: 104 MMOL/L (ref 94–109)
CO2 SERPL-SCNC: 29 MMOL/L (ref 20–32)
CREAT SERPL-MCNC: 1.03 MG/DL (ref 0.52–1.04)
GFR SERPL CREATININE-BSD FRML MDRD: 48 ML/MIN/{1.73_M2}
GLUCOSE BLDC GLUCOMTR-MCNC: 148 MG/DL (ref 70–99)
GLUCOSE BLDC GLUCOMTR-MCNC: 191 MG/DL (ref 70–99)
GLUCOSE BLDC GLUCOMTR-MCNC: 203 MG/DL (ref 70–99)
GLUCOSE BLDC GLUCOMTR-MCNC: 268 MG/DL (ref 70–99)
GLUCOSE BLDC GLUCOMTR-MCNC: 272 MG/DL (ref 70–99)
GLUCOSE BLDC GLUCOMTR-MCNC: 306 MG/DL (ref 70–99)
GLUCOSE SERPL-MCNC: 185 MG/DL (ref 70–99)
INR PPP: 2.34 (ref 0.86–1.14)
MAGNESIUM SERPL-MCNC: 2.1 MG/DL (ref 1.6–2.3)
PHOSPHATE SERPL-MCNC: 2.9 MG/DL (ref 2.5–4.5)
POTASSIUM SERPL-SCNC: 4.5 MMOL/L (ref 3.4–5.3)
SODIUM SERPL-SCNC: 140 MMOL/L (ref 133–144)

## 2019-12-18 PROCEDURE — 00000146 ZZHCL STATISTIC GLUCOSE BY METER IP

## 2019-12-18 PROCEDURE — 94660 CPAP INITIATION&MGMT: CPT

## 2019-12-18 PROCEDURE — 25000132 ZZH RX MED GY IP 250 OP 250 PS 637: Performed by: INTERNAL MEDICINE

## 2019-12-18 PROCEDURE — 25000131 ZZH RX MED GY IP 250 OP 636 PS 637: Performed by: INTERNAL MEDICINE

## 2019-12-18 PROCEDURE — 40000275 ZZH STATISTIC RCP TIME EA 10 MIN

## 2019-12-18 PROCEDURE — 97116 GAIT TRAINING THERAPY: CPT | Mod: GP | Performed by: PHYSICAL THERAPIST

## 2019-12-18 PROCEDURE — 25000128 H RX IP 250 OP 636: Performed by: INTERNAL MEDICINE

## 2019-12-18 PROCEDURE — 97530 THERAPEUTIC ACTIVITIES: CPT | Mod: GP | Performed by: PHYSICAL THERAPIST

## 2019-12-18 PROCEDURE — 85610 PROTHROMBIN TIME: CPT | Performed by: INTERNAL MEDICINE

## 2019-12-18 PROCEDURE — 83735 ASSAY OF MAGNESIUM: CPT | Performed by: INTERNAL MEDICINE

## 2019-12-18 PROCEDURE — 94640 AIRWAY INHALATION TREATMENT: CPT | Mod: 76

## 2019-12-18 PROCEDURE — 25000132 ZZH RX MED GY IP 250 OP 250 PS 637: Performed by: SURGERY

## 2019-12-18 PROCEDURE — 25000125 ZZHC RX 250: Performed by: INTERNAL MEDICINE

## 2019-12-18 PROCEDURE — 99233 SBSQ HOSP IP/OBS HIGH 50: CPT | Performed by: INTERNAL MEDICINE

## 2019-12-18 PROCEDURE — 20000003 ZZH R&B ICU

## 2019-12-18 PROCEDURE — 84100 ASSAY OF PHOSPHORUS: CPT | Performed by: INTERNAL MEDICINE

## 2019-12-18 PROCEDURE — 97162 PT EVAL MOD COMPLEX 30 MIN: CPT | Mod: GP | Performed by: PHYSICAL THERAPIST

## 2019-12-18 PROCEDURE — 80048 BASIC METABOLIC PNL TOTAL CA: CPT | Performed by: INTERNAL MEDICINE

## 2019-12-18 PROCEDURE — 99232 SBSQ HOSP IP/OBS MODERATE 35: CPT | Performed by: INTERNAL MEDICINE

## 2019-12-18 PROCEDURE — 94640 AIRWAY INHALATION TREATMENT: CPT

## 2019-12-18 RX ORDER — FUROSEMIDE 20 MG
20 TABLET ORAL DAILY
Status: DISCONTINUED | OUTPATIENT
Start: 2019-12-19 | End: 2019-12-21 | Stop reason: HOSPADM

## 2019-12-18 RX ORDER — PREDNISONE 20 MG/1
40 TABLET ORAL 2 TIMES DAILY WITH MEALS
Status: DISCONTINUED | OUTPATIENT
Start: 2019-12-18 | End: 2019-12-20

## 2019-12-18 RX ORDER — FUROSEMIDE 10 MG/ML
20 INJECTION INTRAMUSCULAR; INTRAVENOUS DAILY
Status: DISCONTINUED | OUTPATIENT
Start: 2019-12-18 | End: 2019-12-18

## 2019-12-18 RX ORDER — LORAZEPAM 0.5 MG/1
0.25 TABLET ORAL EVERY 4 HOURS PRN
Status: DISCONTINUED | OUTPATIENT
Start: 2019-12-18 | End: 2019-12-21 | Stop reason: HOSPADM

## 2019-12-18 RX ADMIN — METOPROLOL TARTRATE 50 MG: 50 TABLET, FILM COATED ORAL at 20:23

## 2019-12-18 RX ADMIN — PREDNISONE 40 MG: 20 TABLET ORAL at 08:43

## 2019-12-18 RX ADMIN — Medication 0.25 MG: at 14:51

## 2019-12-18 RX ADMIN — Medication 1.5 MG: at 18:14

## 2019-12-18 RX ADMIN — HYDROCODONE BITARTRATE AND ACETAMINOPHEN 1 TABLET: 5; 325 TABLET ORAL at 08:43

## 2019-12-18 RX ADMIN — PREDNISONE 40 MG: 20 TABLET ORAL at 18:13

## 2019-12-18 RX ADMIN — Medication: at 20:23

## 2019-12-18 RX ADMIN — IPRATROPIUM BROMIDE AND ALBUTEROL SULFATE 3 ML: .5; 3 SOLUTION RESPIRATORY (INHALATION) at 20:34

## 2019-12-18 RX ADMIN — METOPROLOL TARTRATE 50 MG: 50 TABLET, FILM COATED ORAL at 08:43

## 2019-12-18 RX ADMIN — IPRATROPIUM BROMIDE AND ALBUTEROL SULFATE 3 ML: .5; 3 SOLUTION RESPIRATORY (INHALATION) at 07:15

## 2019-12-18 RX ADMIN — IPRATROPIUM BROMIDE AND ALBUTEROL SULFATE 3 ML: .5; 3 SOLUTION RESPIRATORY (INHALATION) at 15:08

## 2019-12-18 RX ADMIN — Medication: at 08:42

## 2019-12-18 RX ADMIN — INSULIN GLARGINE 8 UNITS: 100 INJECTION, SOLUTION SUBCUTANEOUS at 06:39

## 2019-12-18 RX ADMIN — HYDROCODONE BITARTRATE AND ACETAMINOPHEN 1 TABLET: 5; 325 TABLET ORAL at 20:23

## 2019-12-18 RX ADMIN — IPRATROPIUM BROMIDE AND ALBUTEROL SULFATE 3 ML: .5; 3 SOLUTION RESPIRATORY (INHALATION) at 11:10

## 2019-12-18 RX ADMIN — FUROSEMIDE 20 MG: 10 INJECTION, SOLUTION INTRAMUSCULAR; INTRAVENOUS at 09:04

## 2019-12-18 ASSESSMENT — ACTIVITIES OF DAILY LIVING (ADL)
ADLS_ACUITY_SCORE: 24
ADLS_ACUITY_SCORE: 25
ADLS_ACUITY_SCORE: 24

## 2019-12-18 ASSESSMENT — MIFFLIN-ST. JEOR: SCORE: 1127.13

## 2019-12-18 NOTE — PROGRESS NOTES
[facility]  Cardiology Progress Note    Osmani Mtz MD   12/18/2019          Assessment and Plan:   Radha Cunningham is a 89 year old female with past medical history significant for diverticulitis, chronic back pain, and chronic atrial fibrillation, pacemaker placement, who was admitted on 12/15/2019 with respiratory distress requiring bipap. In afib with RVR on admission. Notably, had hospitalization several weeks ago also for respiratory failure, and discharged to TCU after treated with steroids and antibiotics (previously lived independently).    Atrial fibrillation.  Patient has tolerated step up of beta-blockers to her baseline of 50 twice daily of metoprolol tartrate.  Patient is adequately anticoagulated.  Blood pressure is excellent today.  I have discussed with family whether patient should continue with anticoagulation.  Also briefly talked about watchman.  She was living independently prior to her recent admissions.    New cardiomyopathy.  Unclear whether this is rate related cardiomyopathy to A. fib versus stress cardiomyopathy versus underlying coronary disease.  Echocardiogram from November was normal left ventricular function.  Current echo is ejection fraction 20 to 25%.  I will probably just repeat echocardiogram down the road.    Volume overload.  Patient was initially thought to be volume depleted.  She was repleted and I suspect over volume loaded.  She has responded well to IV Lasix.  We will switch to low-dose oral Lasix.    Upper respiratory infection.  On steroids.  No longer requiring BiPAP.    She can probably move out of the intensive care unit.  If she continues to be stable I will probably sign off tomorrow.             Interval History:   Patient is much improved today.  Much more comfortable.  Sitting up in a chair.  She is happier as yesterday she states she was ready to die.  She relates her attitude is much better today              Medications:       -  "MEDICATION INSTRUCTIONS -       Warfarin Therapy Reminder         emollient   Topical BID     [START ON 12/19/2019] furosemide  20 mg Oral Daily     HYDROcodone-acetaminophen  1 tablet Oral BID     insulin aspart  1-6 Units Subcutaneous Q4H     insulin glargine  8 Units Subcutaneous QAM AC     ipratropium - albuterol 0.5 mg/2.5 mg/3 mL  3 mL Nebulization 4x daily     metoprolol tartrate  50 mg Oral BID     predniSONE  40 mg Oral BID w/meals     warfarin ANTICOAGULANT  1.5 mg Oral ONCE at 18:00                   Physical Exam:   Blood pressure 125/89, pulse 82, temperature 98  F (36.7  C), temperature source Oral, resp. rate (!) 42, height 1.6 m (5' 3\"), weight 73.8 kg (162 lb 11.2 oz), SpO2 99 %.  Vitals:    12/16/19 0400 12/17/19 0535 12/18/19 0400   Weight: 72.8 kg (160 lb 7.9 oz) 74.6 kg (164 lb 7.4 oz) 73.8 kg (162 lb 11.2 oz)     Vital Signs with Ranges  Temp:  [98  F (36.7  C)-98.2  F (36.8  C)] 98  F (36.7  C)  Pulse:  [] 82  Heart Rate:  [] 90  Resp:  [18-42] 42  BP: ()/(20-98) 125/89  FiO2 (%):  [21 %-30 %] 25 %  SpO2:  [95 %-100 %] 99 %  I/O's Last 24 hours  I/O last 3 completed shifts:  In: 960 [P.O.:920; I.V.:40]  Out: 2200 [Urine:2200]       General: Patient comfortable sitting up in the chair on nasal cannula oxygen.  Calling family members on the phone.  Neck: + JVD, no carotid bruits.  Lungs: Expiratory wheezes.  Positive kyphosis.  Cardiac:  irregularly irregular.  Rate controlled.  Abdomen:  Soft, nontender.  Extremities: No peripheral edema.  2+ pulses.  Skin:  Warm, dry.  Neurologic:  No focal deficits.         Data:        Recent Labs   Lab Test 12/18/19  0550 12/17/19  0530 12/16/19  0540 12/15/19  0524  05/06/16  2054    138 140 137   < > 138   POTASSIUM 4.5 4.2 4.2 4.2   < > 4.1   CHLORIDE 104 104 105 104   < > 107   CO2 29 27 27 24   < > 27   BUN 37* 35* 27 18   < > 29   CR 1.03 1.03 0.97 0.88   < > 1.12*   ANIONGAP 7 7 8 9   < > 4   SHAN 8.6 8.8 8.8 8.9   < > 9.2 "   * 195* 173* 287*   < > 140*   ALBUMIN  --  2.9* 3.0* 3.2*  --  3.8   PROTTOTAL  --  6.3* 6.6* 6.8  --  7.0   GFRESTIMATED 48* 48* 51* 57*   < > 46*   GFRESTBLACK 55* 55* 60* 66   < > 56*   NTBNPI  --   --   --  2,584*  --  666    < > = values in this interval not displayed.     Recent Labs   Lab Test 12/17/19  0530 12/16/19  0540 12/15/19  0524   HGB 10.6* 10.0* 10.5*     Recent Labs   Lab Test 11/18/19  0953   TSH 1.32     Recent Labs   Lab Test 12/15/19  0524 11/18/19  0953 05/06/16  2105 05/06/16  2054   TROPI 0.045 0.024  --  <0.015  The 99th percentile for upper reference range is 0.045 ug/L.  Troponin values in   the range of 0.045 - 0.120 ug/L may be associated with risks of adverse   clinical events.     TROPONIN  --   --  0.01  --      No lab results found.    Invalid input(s): [id number]  No lab results found.

## 2019-12-18 NOTE — PLAN OF CARE
ICU End of Shift Summary.  For vital signs and complete assessments, please see documentation flowsheets.     Pertinent assessments: patient weak needing 2 people with gait belt and walker to transfet to chair in am from bed, improved at lunch time able to walk 10 within  room and return to chair.  C/o pain / discomfort across shoulders and back.  Med for pain, appetite fair, vss, cont at fib.  With activity, talking,  patient is very dyspneic sob although usually maintains sats 95% to 100%  At rest cont on nc 1%.  Lungs diminished.  Responded to lasix and incontinent at time of urine.  After receiving fax  And verbal order from MD, Stitches removed from rt leg w/o difficulty, uncomfortable for patient, used cold pack to minimize swelling at site and discomfort. Steri stitches applied to site for added support to incision. Picking at skin of rt arm, 2 areas redden, slightly, open,removed purewick at this time, getting patient up to commode  Major Shift Events: increased sob with activity, talking, appetite slowly regained, up to chair, tolerates well, transfer to med / surg floor  Plan (Upcoming Events): increase appetite, capacity for activity, stability on feet, breathing, vss,  working with PT, monitor healing wound on rt leg, monitor fro constipation, treat pain as able and tolerated, monitor for BM, monitor for oral intake, remove pic line  Discharge/Transfer Needs: to be determined    Bedside Shift Report Completed :   Bedside Safety Check Completed:

## 2019-12-18 NOTE — PROGRESS NOTES
[facility]  Hospitalist Progress Note  Madeleine Walton MD 12/18/2019    Reason for Stay (Diagnosis): Respiratory distress and A fib with RVR         Assessment and Plan:      Summary of Stay: Radha Cunningham is a 89 year old female admitted on 12/15/2019 with respiratory distress.       She was discharged from [facility] on 11/25/19 after week's hospitalization for respiratory problems.  She did feel significantly improved on discharge noting that she was treated with antibiotics, bronchodilators and steroids.  She went to transitional care and had remained there until the date of admission when she was noted to be in respiratory distress for which reason she was transferred to the ED for evaluation.  The patient describes having been exposed to multiple ill people including workers and residence at [facility].     Prior medical history significant for permanent Atrial Fibrillation s/p pacer placement in April, 2018 for pauses and syncope.  Prior surgeries include Cholecystectomy, tonsillectoy and hernia repair.     En route to the hospital, she was noted to be severely tachycardic with a heart rate of 190.  She was given duo nebs, albuterol nebs and a couple of doses of verapamil by EMS.      Upon arrival at the [facility] emergency department, the heart rate was noted to be in excess of 200, she was febrile to 101.6 and blood pressure generally was stable, generally 100-110/80's and saturating at greater than 96% on room air.  She was noted to be severely tachypneic and with a rate of 40-50.  She was put on BiPAP and this also then helped with decreasing her work of breathing.     Lab evaluation did indicate an elevated N-terminal proBNP, lactic acid 4.1, normal electrolytes and creatinine as well as only a mild left shift with WBC of 10.  Catheterized urinary specimen is without obvious evidence of infection, influenza antigen are negative.  Chest x-ray shows cardiac enlargement.  CT scan chest, abdomen, pelvis  shows only mild scattered infiltrates in the right upper lung.    After admission to the ICU, the pt was started on amiodarone.  The diltiazem was stopped, but she became hypotensive nevertheless.  The amiodarone has been started and stopped at this point couple times; defer further heart rate control to cardiology.    Problem List:   1. Resp distress without true resp failure  2. RSV pneumonia and Reactive airway disease   3. Fluid overload with acute systolic CHF exacerbation with EF 25%  4. .  Minimal upper lobe infiltrate and RSV positive.  The patient has episodes of apparent wheeze that seems to be predominantly upper airway source.  The daughter had been quite insistent on the patient getting a trial of steroids and I think it is reasonable to give this a try again.  5. Permanent A fib, presented in RVR, though without dramatic failure.  6. New finding cardiomyopathy, rate-related v stress (considered more likely stress, per Dr. Deshpande).   7. Subtherapeutic INR.   8. NIDDM.   9. Chronic Low back/SI joint pain.   10. Advance directives. Pt remains fairly independent and overall alert and appropriate. Palliative care has been asked to help clarify goals of care.    PLAN:  1. HR well controlled today. Off of amiodarone drip now on metoprolol 50mg PO BID today. Given lasix 40mg IV times  2 yesterday for CHF exacerbation and feels lot better today. Daughter insistent on prednisone BID so ordered 40mg PO BID. Lasix 20mg IV given today. Lasix 20mg PO daily to continue starting from tomorrow   Monitor BMP closely   Family wants low dose ATIVAN PRN For anxiety ordered 0.25mg ativan PO PRN for anxiety   2.  Restarted Warfarin for AFIB anticoagulation .   3.  Anticipate transfer to the floor when the patient's heart rate is adequately controlled.    5.  Stop abx.   6.  Cont insulin as ordered.  7.  We will need to start physical and Occupational Therapy evaluations as soon as the patient is stable to ambulate.  PT/OT  "ordered today       DVT Prophylaxis: Warfarin  Code Status: DNR/DNI was established by the palliative care team.  At this point, the patient and family continue to opt for pursuing restorative cares and independence.  Discharge Dispo: TBD, not clear that return to full independence is appropriate.  Estimated Disch Date / # of Days until Disch: in the next 1-2days. tranfer to floor today     Madeleine Walton MD   Page [phone](7AM-6PM)          Interval History (Subjective):      Again, Ms. Cunningham appears very bright this morning.  She intermittently has episodes of shortness of breath with exertion  .  Otherwise hemodynamically stable. HR in the 80s today. Felt much better with lasix diuresis and po prednisone                   Physical Exam:      Last Vital Signs:  /89 (BP Location: Left arm)   Pulse 82   Temp 98  F (36.7  C) (Oral)   Resp (!) 42   Ht 1.6 m (5' 3\")   Wt 73.8 kg (162 lb 11.2 oz)   SpO2 99%   BMI 28.82 kg/m      I/O last 3 completed shifts:  In: 960 [P.O.:920; I.V.:40]  Out: 2200 [Urine:2200]    Constitutional: Awake, alert, cooperative, no apparent distress   Respiratory: Good air entry bilat. No increased work of breathing initially. No wheezing today    Cardiovascular: Irregular with controlled HR    Abdomen: Normal bowel sounds, soft, non-distended, non-tender   Skin: No rashes, no cyanosis, dry to touch   Neuro: Alert and oriented x3, no weakness, numbness, memory loss   Extremities: No edema   Other(s):        All other systems: Negative          Medications:      All current medications were reviewed with changes reflected in problem list.         Data:      All new lab and imaging data was reviewed.   Labs/Imaging:  Results for orders placed or performed during the hospital encounter of 12/15/19 (from the past 24 hour(s))   Glucose by meter   Result Value Ref Range    Glucose 224 (H) 70 - 99 mg/dL   Glucose by meter   Result Value Ref Range    Glucose 203 (H) 70 - 99 mg/dL "   Glucose by meter   Result Value Ref Range    Glucose 272 (H) 70 - 99 mg/dL   Glucose by meter   Result Value Ref Range    Glucose 203 (H) 70 - 99 mg/dL   INR   Result Value Ref Range    INR 2.34 (H) 0.86 - 1.14   Basic metabolic panel   Result Value Ref Range    Sodium 140 133 - 144 mmol/L    Potassium 4.5 3.4 - 5.3 mmol/L    Chloride 104 94 - 109 mmol/L    Carbon Dioxide 29 20 - 32 mmol/L    Anion Gap 7 3 - 14 mmol/L    Glucose 185 (H) 70 - 99 mg/dL    Urea Nitrogen 37 (H) 7 - 30 mg/dL    Creatinine 1.03 0.52 - 1.04 mg/dL    GFR Estimate 48 (L) >60 mL/min/[1.73_m2]    GFR Estimate If Black 55 (L) >60 mL/min/[1.73_m2]    Calcium 8.6 8.5 - 10.1 mg/dL   Magnesium   Result Value Ref Range    Magnesium 2.1 1.6 - 2.3 mg/dL   Phosphorus   Result Value Ref Range    Phosphorus 2.9 2.5 - 4.5 mg/dL   Glucose by meter   Result Value Ref Range    Glucose 148 (H) 70 - 99 mg/dL   Glucose by meter   Result Value Ref Range    Glucose 268 (H) 70 - 99 mg/dL

## 2019-12-18 NOTE — PROGRESS NOTES
RT Note:    Patient remained on BiPAP overnight with settings of 10/5, 25%. Breath sounds coarse. Will continue to receive Duoneb QID. RT will continue to follow.    Lupe Maza, RT  December 18, 2019.6:09 AM

## 2019-12-18 NOTE — PROGRESS NOTES
"   12/18/19 1100   Quick Adds   Type of Visit Initial PT Evaluation   Living Environment   Lives With alone   Living Arrangements house   Home Accessibility stairs to enter home   Number of Stairs, Main Entrance 2   Stair Railings, Main Entrance railings on both sides of stairs   Transportation Anticipated family or friend will provide   Living Environment Comment Pt lives alone in a 1 level home with 2 steps to enter with grab bars on both sides.  Pt's daughter, Carolina, stays with pt overnight ~ 5 nights per week.  Pt's other daughter reports she plans to stay the other 2 nights.     Self-Care   Usual Activity Tolerance moderate   Current Activity Tolerance fair   Regular Exercise No   Equipment Currently Used at Home cane, straight;walker, rolling   Activity/Exercise/Self-Care Comment Pt reports using a 4WW with all mobility.  Pt is I with all ADLs and takes a sponge bath.  Pt's daughters assist with cooking meals and getting groceries.  Pt does own laundry.     Functional Level Prior   Ambulation 1-->assistive equipment   Transferring 1-->assistive equipment   Toileting 1-->assistive equipment   Bathing 1-->assistive equipment   Communication 0-->understands/communicates without difficulty   Swallowing 0-->swallows foods/liquids without difficulty   Cognition 0 - no cognition issues reported   Fall history within last six months yes   Number of times patient has fallen within last six months 1   Which of the above functional risks had a recent onset or change? ambulation   Prior Functional Level Comment Lives alone in large house, per pt report all needs met on main level, has tub shower with a grab bar but pt reports she usually sponge bathes in sink and does not get into tub d/t fear of falling, has raised toilet seat   General Information   Onset of Illness/Injury or Date of Surgery - Date 12/15/19   Referring Physician Madeleine Walton   Patient/Family Goals Statement \"Do a little bit of everything.\"   Pertinent " History of Current Problem (include personal factors and/or comorbidities that impact the POC) Radha Cunningham is a 89 year old woman who was discharged from [facility] on 11/25/19 after week's hospitalization for respiratory problems.  She did feel significantly improved on discharge noting that she was treated with antibiotics, bronchodilators and steroids.  She was discharged to transitional care and had remained there until today when she was noted to be in respiratory distress for which reason she was transferred to the ED for evaluation.  The patient describes having been exposed to multiple ill people including workers and residence at [facility].  Upon arrival at the [facility] emergency department, the heart rate was over 200.  Pt dx with respiratory distress, a-fib with RVR.  PmHx:  pacemaker   Precautions/Limitations fall precautions   Heart Disease Risk Factors Lack of physical activity;Medical history;Age   General Observations Pt sitting up in chair with dtr present.  Vitals at rest: HR 69 bpm, O2 sats 97% on 2L, /99   Cognitive Status Examination   Orientation orientation to person, place and time   Level of Consciousness alert   Follows Commands and Answers Questions 100% of the time;able to follow single-step instructions   Personal Safety and Judgment at risk behaviors demonstrated   Memory intact   Pain Assessment   Patient Currently in Pain No   Integumentary/Edema   Integumentary/Edema Comments blackened area on R lateral lower leg. Pt reports it is cancer.   Posture    Posture Forward head position;Protracted shoulders;Kyphosis   Range of Motion (ROM)   ROM Comment B LEs WFL.     Strength   Strength Comments B hip flex 4+/5, knee ext and ankle DF 5/5   Bed Mobility   Bed Mobility Comments Sit <> sup SBA with increased effort to get LEs into bed.   Transfer Skills   Transfer Comments Sit <> stand to FWW with CGA with increased time and pt bracing back of LEs on chair.    Gait   Gait Comments Pt amb  "20' with FWW and CGA/Min A on 2L O2. Gait distance limited by increased SOB and fatigue.  Audible wheezing.  O2 sats 100% on 2L with HR 85 bpm, BP 1128/91.    Balance   Balance Comments Impaired sitting balance with retro lean to the L in sitting.  Min A to return to midline.  Pt requires B UE support on FWW for standing balance and gait.     Coordination   Coordination no deficits were identified   Muscle Tone   Muscle Tone no deficits were identified   General Therapy Interventions   Planned Therapy Interventions balance training;bed mobility training;gait training;ROM;strengthening;transfer training;risk factor education;home program guidelines;progressive activity/exercise   Clinical Impression   Criteria for Skilled Therapeutic Intervention yes, treatment indicated   PT Diagnosis Decreased activity tolerance, impaired gait and balance   Influenced by the following impairments Decreased activity tolerance, impaired gait and balance, Decreased functional LE and core strength   Functional limitations due to impairments Increased falls risk, unable to amb household or community distances or stairs, unable to live alone and care for herself of perform ADLs.   Clinical Presentation Evolving/Changing   Clinical Presentation Rationale Multiple comorbidities with fluctuating vitals and SOB   Clinical Decision Making (Complexity) Moderate complexity   Therapy Frequency Daily   Predicted Duration of Therapy Intervention (days/wks) 5 days   Anticipated Discharge Disposition TCU   Risk & Benefits of therapy have been explained Yes   Patient, Family & other staff in agreement with plan of care Yes   [facility] AM-PAC TM \"6 Clicks\"   2016, Trustees of [facility], under license to [employer].  All rights reserved.   6 Clicks Short Forms Basic Mobility Inpatient Short Form   [facility] AM-PAC  \"6 Clicks\" V.2 Basic Mobility Inpatient Short Form   1. Turning from your back to your side while in a flat bed " without using bedrails? 4 - None   2. Moving from lying on your back to sitting on the side of a flat bed without using bedrails? 4 - None   3. Moving to and from a bed to a chair (including a wheelchair)? 3 - A Little   4. Standing up from a chair using your arms (e.g., wheelchair, or bedside chair)? 3 - A Little   5. To walk in hospital room? 3 - A Little   6. Climbing 3-5 steps with a railing? 2 - A Lot   Basic Mobility Raw Score (Score out of 24.Lower scores equate to lower levels of function) 19   Total Evaluation Time   Total Evaluation Time (Minutes) 15

## 2019-12-18 NOTE — PLAN OF CARE
Discharge Planner PT   PT orders received, eval completed and treatment initiated.  Radha Cunningham is a 89 year old woman who was discharged from [facility] on 11/25/19 after week's hospitalization for respiratory problems.  She did feel significantly improved on discharge noting that she was treated with antibiotics, bronchodilators and steroids.  She was discharged to transitional care and had remained there until today when she was noted to be in respiratory distress and was transferred to the ED for evaluation.  The patient describes having been exposed to multiple ill people including workers and residence at [facility].  Upon arrival at the [facility] emergency department, the heart rate was over 200.  Pt dx with respiratory distress, a-fib with RVR.  PmHx:  pacemaker    Pt lives alone in a 1 level home with 2 steps to enter with grab bars on both sides.  Pt's daughter, Carolina, stays with pt overnight ~ 5 nights per week.  Pt's other daughter reports she plans to stay the other 2 nights.  Pt reports using a 4WW with all mobility.  Pt is I with all ADLs and takes a sponge bath.  Pt's daughters assist with cooking meals and getting groceries.  Pt does own laundry.      Patient plan for discharge: Home with assist of daughters  Current status: Pt sitting up in chair with dtr present.  Vitals at rest: HR 69 bpm, O2 sats low 90s on 1L.  O2 increased to 2L due to increased SOB with transfers.  O2 97% on 2L, /99.  Pt transferred sit <> stand to FWW with CGA, bracing back of LEs on chair.  Sit <> sup with SBA without use of bedrail, with increased effort to lift LEs into bed.  Pt demonstrates poor sitting balance on EOB with retro and L trunk lean with rounded spine and thoracic kyphosis.  Pt amb 20' with FWW and CGA/Min A on 2L O2. Gait distance limited by increased SOB and fatigue.  Audible wheezing.  O2 sats 100% on 2L with HR 85 bpm, BP 1128/91. HR returned to 77 bpm after ~ 5 min seated rest.  O2 decreased to 1L at end  of session with pt resting in recliner.  Barriers to return to prior living situation: Current level of assist, decreased activity tolerance and amb distance, 2 steps to enter home  Recommendations for discharge: TCU.   Rationale for recommendations: Pt below baseline with all functional mobility.  Continued skilled PT(and OT) in the acute and TCU setting to increase overall strength, balance, progress gait and independence with all functional mobility. It all goals met and pt has confirmed 24/7 assist at home, pt may be able to discharge to home with Home PT.         Entered by: Cristal Salazar 12/18/2019 12:34 PM

## 2019-12-18 NOTE — PROGRESS NOTES
"ICU End of Shift Summary.  For vital signs and complete assessments, please see documentation flowsheets.     Pertinent assessments: A/O. Some anxiety near early morning, patient states \"im just thinking about things\". Susan area slightly redenned, barrier paste applied. Denies pain. bipap on throughout the night. purewick in place.   Major Shift Events:   Plan (Upcoming Events): TBD  Discharge/Transfer Needs: TBD    Bedside Shift Report Completed : yes  Bedside Safety Check Completed: yes        "

## 2019-12-18 NOTE — PROGRESS NOTES
Discharge Planner   Discharge Plans in progress: SW following for anticipated TCU needs. Will need PT/OT eval for [employer] ins.   Barriers to discharge plan: has not seen rehab yet. Will need auth..   Follow up plan: Pt's Conservator Janae provided TCU options.   [facility]  [facility]  .  Pt/family was offered the Medicare Compare list for with associated star ratings to assist with choice for referrals/discharge planning     Education was given to pt/family that star ratings are updated/maintained by Medicare and can be reviewed by visiting www.medicare.gov     Corinne White [facility]  Inpatient Care Coordination   [phone]  [facility]       CM: Spoke with Janae today who has full authority for making decisions about placement  and living situation.   At this time Janae has filed a petition to be full Guardian./Conservator for pt    Carolina has special needs and not able to care for pt  Ana who has a HCD per Janae has not been involved until they found out that they are moving forward with full Guardian petition.  Family only become involved when there is a new plan for support or for moving. Janae was actually trying to move pt into an [employer] support.   The current plan is for TCU at this time and Janae will look into the option for paying for care at home but not allowing the family to be the main caregiver.     Plan is for shared room if possible    Corinne White [facility]  Inpatient Care Coordination   [phone]  [facility]                Entered by: Corinne C. White 12/18/2019 10:04 AM

## 2019-12-18 NOTE — PLAN OF CARE
ICU End of Shift Summary.  For vital signs and complete assessments, please see documentation flowsheets.     Pertinent assessments: Patient alert and oriented. Bipap prn. Tolerating diet.  Major Shift Events: good urine output after iv lasix and breathing is much improved.  Plan (Upcoming Events): continue to monitor  Discharge/Transfer Needs: to be determined    Bedside Shift Report Completed : yes  Bedside Safety Check Completed:yes    Tiffani Koo RN

## 2019-12-19 ENCOUNTER — APPOINTMENT (OUTPATIENT)
Dept: OCCUPATIONAL THERAPY | Facility: CLINIC | Age: 84
DRG: 314 | End: 2019-12-19
Attending: INTERNAL MEDICINE
Payer: COMMERCIAL

## 2019-12-19 ENCOUNTER — APPOINTMENT (OUTPATIENT)
Dept: PHYSICAL THERAPY | Facility: CLINIC | Age: 84
DRG: 314 | End: 2019-12-19
Payer: COMMERCIAL

## 2019-12-19 PROBLEM — I48.21 PERMANENT ATRIAL FIBRILLATION (H): Chronic | Status: ACTIVE | Noted: 2019-12-19

## 2019-12-19 PROBLEM — R41.89 COGNITIVE IMPAIRMENT: Status: ACTIVE | Noted: 2019-12-19

## 2019-12-19 LAB
ANION GAP SERPL CALCULATED.3IONS-SCNC: 4 MMOL/L (ref 3–14)
BUN SERPL-MCNC: 41 MG/DL (ref 7–30)
CALCIUM SERPL-MCNC: 8.8 MG/DL (ref 8.5–10.1)
CHLORIDE SERPL-SCNC: 106 MMOL/L (ref 94–109)
CO2 SERPL-SCNC: 31 MMOL/L (ref 20–32)
CREAT SERPL-MCNC: 0.9 MG/DL (ref 0.52–1.04)
GFR SERPL CREATININE-BSD FRML MDRD: 56 ML/MIN/{1.73_M2}
GLUCOSE BLDC GLUCOMTR-MCNC: 171 MG/DL (ref 70–99)
GLUCOSE BLDC GLUCOMTR-MCNC: 178 MG/DL (ref 70–99)
GLUCOSE BLDC GLUCOMTR-MCNC: 190 MG/DL (ref 70–99)
GLUCOSE BLDC GLUCOMTR-MCNC: 204 MG/DL (ref 70–99)
GLUCOSE BLDC GLUCOMTR-MCNC: 240 MG/DL (ref 70–99)
GLUCOSE BLDC GLUCOMTR-MCNC: 244 MG/DL (ref 70–99)
GLUCOSE SERPL-MCNC: 174 MG/DL (ref 70–99)
INR PPP: 2.46 (ref 0.86–1.14)
POTASSIUM SERPL-SCNC: 4.1 MMOL/L (ref 3.4–5.3)
SODIUM SERPL-SCNC: 141 MMOL/L (ref 133–144)

## 2019-12-19 PROCEDURE — 25000132 ZZH RX MED GY IP 250 OP 250 PS 637: Performed by: INTERNAL MEDICINE

## 2019-12-19 PROCEDURE — 97530 THERAPEUTIC ACTIVITIES: CPT | Mod: GP | Performed by: PHYSICAL THERAPIST

## 2019-12-19 PROCEDURE — 12000000 ZZH R&B MED SURG/OB

## 2019-12-19 PROCEDURE — 94640 AIRWAY INHALATION TREATMENT: CPT

## 2019-12-19 PROCEDURE — 40000275 ZZH STATISTIC RCP TIME EA 10 MIN

## 2019-12-19 PROCEDURE — 97110 THERAPEUTIC EXERCISES: CPT | Mod: GO | Performed by: OCCUPATIONAL THERAPIST

## 2019-12-19 PROCEDURE — 25000131 ZZH RX MED GY IP 250 OP 636 PS 637: Performed by: INTERNAL MEDICINE

## 2019-12-19 PROCEDURE — 80048 BASIC METABOLIC PNL TOTAL CA: CPT | Performed by: INTERNAL MEDICINE

## 2019-12-19 PROCEDURE — 00000146 ZZHCL STATISTIC GLUCOSE BY METER IP

## 2019-12-19 PROCEDURE — 97166 OT EVAL MOD COMPLEX 45 MIN: CPT | Mod: GO | Performed by: OCCUPATIONAL THERAPIST

## 2019-12-19 PROCEDURE — 25000125 ZZHC RX 250: Performed by: INTERNAL MEDICINE

## 2019-12-19 PROCEDURE — 97116 GAIT TRAINING THERAPY: CPT | Mod: GP | Performed by: PHYSICAL THERAPIST

## 2019-12-19 PROCEDURE — 40000274 ZZH STATISTIC RCP CONSULT EA 30 MIN

## 2019-12-19 PROCEDURE — 25000132 ZZH RX MED GY IP 250 OP 250 PS 637: Performed by: SURGERY

## 2019-12-19 PROCEDURE — 99233 SBSQ HOSP IP/OBS HIGH 50: CPT | Performed by: INTERNAL MEDICINE

## 2019-12-19 PROCEDURE — 94640 AIRWAY INHALATION TREATMENT: CPT | Mod: 76

## 2019-12-19 PROCEDURE — 99231 SBSQ HOSP IP/OBS SF/LOW 25: CPT | Performed by: INTERNAL MEDICINE

## 2019-12-19 PROCEDURE — 85610 PROTHROMBIN TIME: CPT | Performed by: INTERNAL MEDICINE

## 2019-12-19 RX ORDER — ALBUTEROL SULFATE 0.83 MG/ML
2.5 SOLUTION RESPIRATORY (INHALATION) EVERY 4 HOURS PRN
Status: DISCONTINUED | OUTPATIENT
Start: 2019-12-19 | End: 2019-12-21 | Stop reason: HOSPADM

## 2019-12-19 RX ADMIN — FUROSEMIDE 20 MG: 20 TABLET ORAL at 08:12

## 2019-12-19 RX ADMIN — IPRATROPIUM BROMIDE AND ALBUTEROL SULFATE 3 ML: .5; 3 SOLUTION RESPIRATORY (INHALATION) at 07:13

## 2019-12-19 RX ADMIN — INSULIN GLARGINE 8 UNITS: 100 INJECTION, SOLUTION SUBCUTANEOUS at 08:17

## 2019-12-19 RX ADMIN — METOPROLOL TARTRATE 50 MG: 50 TABLET, FILM COATED ORAL at 20:47

## 2019-12-19 RX ADMIN — IPRATROPIUM BROMIDE AND ALBUTEROL SULFATE 3 ML: .5; 3 SOLUTION RESPIRATORY (INHALATION) at 15:50

## 2019-12-19 RX ADMIN — Medication: at 20:47

## 2019-12-19 RX ADMIN — METOPROLOL TARTRATE 50 MG: 50 TABLET, FILM COATED ORAL at 08:12

## 2019-12-19 RX ADMIN — Medication: at 08:16

## 2019-12-19 RX ADMIN — HYDROCODONE BITARTRATE AND ACETAMINOPHEN 1 TABLET: 5; 325 TABLET ORAL at 08:12

## 2019-12-19 RX ADMIN — PREDNISONE 40 MG: 20 TABLET ORAL at 08:12

## 2019-12-19 RX ADMIN — IPRATROPIUM BROMIDE AND ALBUTEROL SULFATE 3 ML: .5; 3 SOLUTION RESPIRATORY (INHALATION) at 19:46

## 2019-12-19 RX ADMIN — HYDROCODONE BITARTRATE AND ACETAMINOPHEN 1 TABLET: 5; 325 TABLET ORAL at 20:47

## 2019-12-19 RX ADMIN — PREDNISONE 40 MG: 20 TABLET ORAL at 17:52

## 2019-12-19 RX ADMIN — Medication 1.5 MG: at 17:52

## 2019-12-19 ASSESSMENT — ACTIVITIES OF DAILY LIVING (ADL)
ADLS_ACUITY_SCORE: 24
ADLS_ACUITY_SCORE: 22
ADLS_ACUITY_SCORE: 24
ADLS_ACUITY_SCORE: 22

## 2019-12-19 NOTE — PROGRESS NOTES
[facility]    Medicine Progress Note - Hospitalist Service       Date of Admission:  12/15/2019  Assessment & Plan       Summary of Stay: Radha Cunningham is a 89 year old female admitted on 12/15/2019 with respiratory distress.        She was discharged from [facility] on 11/25/19 after week's hospitalization for respiratory problems.  She did feel significantly improved on discharge noting that she was treated with antibiotics, bronchodilators and steroids.  She went to transitional care and had remained there until the date of admission when she was noted to be in respiratory distress for which reason she was transferred to the ED for evaluation.  The patient describes having been exposed to multiple ill people including workers and residence at [facility].     Prior medical history significant for permanent Atrial Fibrillation s/p pacer placement in April, 2018 for pauses and syncope.  Prior surgeries include Cholecystectomy, tonsillectoy and hernia repair.     En route to the hospital, she was noted to be severely tachycardic with a heart rate of 190.  She was given duo nebs, albuterol nebs and a couple of doses of verapamil by EMS.      Upon arrival at the [facility] emergency department, the heart rate was noted to be in excess of 200, she was febrile to 101.6 and blood pressure generally was stable, generally 100-110/80's and saturating at greater than 96% on room air.  She was noted to be severely tachypneic and with a rate of 40-50.  She was put on BiPAP and this also then helped with decreasing her work of breathing.     Lab evaluation did indicate an elevated N-terminal proBNP, lactic acid 4.1, normal electrolytes and creatinine as well as only a mild left shift with WBC of 10.  Catheterized urinary specimen is without obvious evidence of infection, influenza antigen are negative.  Chest x-ray shows cardiac enlargement.  CT scan chest, abdomen, pelvis shows only mild scattered infiltrates in the right  upper lung.        Problem List:   1. Resp distress without true resp failure  2. RSV pneumonia and Reactive airway disease   3. Fluid overload with acute systolic CHF exacerbation with EF 25%  4. .  Minimal upper lobe infiltrate and RSV positive.  The patient has episodes of apparent wheeze that seems to be predominantly upper airway source.  The daughter had been quite insistent on the patient getting a trial of steroids and I think it is reasonable to give this a try again.  5. Permanent A fib, presented in RVR, though without dramatic failure.  6. New finding cardiomyopathy, rate-related v stress (considered more likely stress, per Dr. Deshpande).   7. Subtherapeutic INR.   8. NIDDM.   9. Chronic Low back/SI joint pain.   10. Advance directives. Pt remains fairly independent and overall alert and appropriate. Palliative care has been asked to help clarify goals of care.     PLAN:  1. HR well controlled today. Off of amiodarone drip now on metoprolol 50mg PO BID today.  Daughter insistent on prednisone BID so ordered 40mg PO BID.  Lasix 20mg PO daily to continue  Monitor BMP closely   Family wants low dose ATIVAN PRN For anxiety ordered 0.25mg ativan PO PRN for anxiety   2.  Restarted Warfarin for AFIB anticoagulation .   3.  Stop abx.   4.  Cont insulin as ordered.        Diet: Regular Diet Adult  Room Service  Snacks/Supplements Adult: Other; Ok to order any diet appropriate supplement; With Meals  Snacks/Supplements Adult: Other; Chocolate Plus 2 shake or Chocolate Boost Plus; Between Meals  Room Service    DVT Prophylaxis: Warfarin  Sosa Catheter: not present  Code Status: DNR/DNI      Disposition Plan   Expected discharge: Tomorrow, recommended to transitional care unit once safe disposition plan/ TCU bed available.  Entered: Joseph Wetzel MD 12/19/2019, 12:07 PM       The patient's care was discussed with the Patient and Patient's Family.    Joseph Wetzel MD  Hospitalist Service  [facility]  Hospital    ______________________________________________________________________    Interval History     No chest pain/fever/chills. SOB is better.    Data reviewed today: I reviewed all medications, new labs and imaging results over the last 24 hours. I personally reviewed no images or EKG's today.    Physical Exam   Vital Signs: Temp: 98  F (36.7  C) Temp src: Oral BP: 121/74 Pulse: 82 Heart Rate: 82 Resp: 16 SpO2: 94 % O2 Device: Nasal cannula Oxygen Delivery: 2 LPM  Weight: 162 lbs 11.19 oz    Gen - Alert, awake, in NAD.  Lungs - + mild exp wheezing.  Heart - RR,S1+S2 nml, no m/g/r.  Abd - soft, NT, ND, + BS.  Ext - no edema.    Data   Recent Labs   Lab 12/19/19  0630 12/18/19  0550 12/17/19  0530  12/16/19  0540 12/15/19  0524   WBC  --   --  13.0*  --  10.2 10.0   HGB  --   --  10.6*  --  10.0* 10.5*   MCV  --   --  101*  --  100 103*   PLT  --   --  185  --  152 154   INR 2.46* 2.34* 2.04*   < >  --  1.48*    140 138  --  140 137   POTASSIUM 4.1 4.5 4.2  --  4.2 4.2   CHLORIDE 106 104 104  --  105 104   CO2 31 29 27  --  27 24   BUN 41* 37* 35*  --  27 18   CR 0.90 1.03 1.03  --  0.97 0.88   ANIONGAP 4 7 7  --  8 9   SHAN 8.8 8.6 8.8  --  8.8 8.9   * 185* 195*  --  173* 287*   ALBUMIN  --   --  2.9*  --  3.0* 3.2*   PROTTOTAL  --   --  6.3*  --  6.6* 6.8   BILITOTAL  --   --  0.3  --  0.3 0.5   ALKPHOS  --   --  100  --  98 97   ALT  --   --  84*  --  55* 24   AST  --   --  73*  --  49* 22   TROPI  --   --   --   --   --  0.045    < > = values in this interval not displayed.     No results found for this or any previous visit (from the past 24 hour(s)).  Medications     - MEDICATION INSTRUCTIONS -       Warfarin Therapy Reminder         emollient   Topical BID     furosemide  20 mg Oral Daily     HYDROcodone-acetaminophen  1 tablet Oral BID     insulin aspart  1-6 Units Subcutaneous Q4H     insulin glargine  8 Units Subcutaneous QAM AC     ipratropium - albuterol 0.5 mg/2.5 mg/3 mL  3 mL  Nebulization 4x daily     metoprolol tartrate  50 mg Oral BID     predniSONE  40 mg Oral BID w/meals     warfarin ANTICOAGULANT  1.5 mg Oral ONCE at 18:00

## 2019-12-19 NOTE — PLAN OF CARE
Vss. Irregular apical pulse (afib hx). Afebrile. Lungs coarse with expiratory wheezes posterior Rml/Rll-room air mid 90s. Infrequent, good, np cough. +bs/+gas, no nausea. Tolerating diet. Last bm 12/16/19. Voiding-incontinent (depends on). Saline locked x2-Piv. Double lumen-Rue Picc-locked. Denies pain. Generalized weakness. Skin is pale, dry with bruises. Steri-striped incision to rle-cdi. Blanchable redness to heidi-area. Repositioning self. No other significant issues noted overnight.

## 2019-12-19 NOTE — PLAN OF CARE
"Neuro: A & O x 4. CMS intact.   VS:  Blood pressure 121/74, pulse 82, temperature 98  F (36.7  C), temperature source Oral, resp. rate 16, height 1.6 m (5' 3\"), weight 73.8 kg (162 lb 11.2 oz), SpO2 94 %.  Tele: N/A  Respiratory: LS course crackles, expiratory wheezes. Continues on supplemental oxygen at 1-2 LPM NC. Dyspnea with activity.   GI: WDL  : occasional incontinence.   Skin: Blanchable redness to heidi-area. R LE scabbing - steri strips - WDL  Pain: denies.   IV: PICC to E - [facility] SL.  PIV x 2 SL  Transfer: A1 gait belt and walker.   Diet: Tolerating PO foods and fluids well.   Plan: TBD. Hospitalist, PT/OT following.     "

## 2019-12-19 NOTE — PLAN OF CARE
Discharge Planner OT     Pt is a 89 year old female with recent admission to [facility] in November for respiratory illness,  Discharged to TCU 11/25 then returned with respiratory deistress and HR up around 200.  PMH includes chronic A fibrillation, pacemaker has been placed previously.  Pt also has had a cholecystectomy.  Pt states she gets help from daugthers for household chores and transport, has been dressing herself and doing her sponge baths.  Has 2 daughters that pt reports help her at home.    Patient plan for discharge: Home  Current status: Initial  evaluation complete.  Pt in bed eating breakfast independently.  Had nasal canula on, O2 off or very low.  Chart note indiecates pt has been between 1-2 L O2.  Pt states she does not use O2 at home.  Pt mostly consistent with chart notes about her backrground but did state she had a large 2 story house.  Chart notes indicate it is 1 level.  Pt came up to EOB with contact guard.  Able to don and doff her socks at EOB.  Shortly after being up pt began to wheeze notably, complaining of SOB and bring hand to chest.  No chest pain when asked, pt stated it was with her breathing.  O2 sats dropped to high 80s, O2 turned up to 2L.  Pt needed Mod A to lay back down and scoot up in bed.  Breathing and O2 sats return to aboe 90% quickly once laying down.  HR 90-95.    Barriers to return to prior living situation: Decreased endurance and respiratory distress and SOB with movement  Recommendations for discharge: TCU  Rationale for recommendations: Pt currently is significantly below baseline with limited endurance and respiratory difficulties with activity.  Would benefit from skilled OT here and at TCU to increase endurance and energy for her usual self cares of dressing and sponge baths, functional ambulation around the house.  Anticipate pt may still need ongoing assist from daughters if she returns home including IADLS, transportation, and staying overnight.  Daughters may  also need to manage medications for pt to be safe.       Entered by: Hira Chou 12/19/2019 9:41 AM

## 2019-12-19 NOTE — PROGRESS NOTES
[facility]  Cardiology Progress Note    Osmani Mtz MD   12/19/2019          Assessment and Plan:   Radha Cunningham is a 89 year old female with past medical history significant for diverticulitis, chronic back pain, and chronic atrial fibrillation, pacemaker placement, who was admitted on 12/15/2019 with respiratory distress requiring bipap. In afib with RVR on admission. Notably, had hospitalization several weeks ago also for respiratory failure, and discharged to TCU after treated with steroids and antibiotics (previously lived independently).    Atrial fibrillation.  Patient has tolerated step up of beta-blockers to her baseline of 50 twice daily of metoprolol tartrate.  Patient is adequately anticoagulated.  Blood pressure is excellent today.  I have discussed with family whether patient should continue with anticoagulation.  Also briefly talked about watchman.  She was living independently prior to her recent admissions.  She appears to be quite sturdy on her feet and I think is still a good candidate for anticoagulation.    New cardiomyopathy.  Unclear whether this is rate related cardiomyopathy to A. fib versus stress cardiomyopathy versus underlying coronary disease.  Echocardiogram from November was normal left ventricular function.  Current echo is ejection fraction 20 to 25%.  I will probably just repeat echocardiogram down the road.    Volume overload.  Patient was initially thought to be volume depleted.  She was repleted and I suspect over volume loaded.  She has responded well to IV Lasix.  We will switch to low-dose oral Lasix.    Upper respiratory infection.  On steroids.  No longer requiring BiPAP.    She appears to be ready for discharge.  I will sign off at this time.             Interval History:   Patient is much improved today.  Much more comfortable.  Sitting up in a chair.  Feet actually up on tray.  She states she feels great!                Medications:       -  "MEDICATION INSTRUCTIONS -       Warfarin Therapy Reminder         emollient   Topical BID     furosemide  20 mg Oral Daily     HYDROcodone-acetaminophen  1 tablet Oral BID     insulin aspart  1-6 Units Subcutaneous Q4H     insulin glargine  8 Units Subcutaneous QAM AC     ipratropium - albuterol 0.5 mg/2.5 mg/3 mL  3 mL Nebulization 4x daily     metoprolol tartrate  50 mg Oral BID     predniSONE  40 mg Oral BID w/meals     warfarin ANTICOAGULANT  1.5 mg Oral ONCE at 18:00                   Physical Exam:   Blood pressure 121/74, pulse 82, temperature 98  F (36.7  C), temperature source Oral, resp. rate 16, height 1.6 m (5' 3\"), weight 73.8 kg (162 lb 11.2 oz), SpO2 94 %.  Vitals:    12/16/19 0400 12/17/19 0535 12/18/19 0400   Weight: 72.8 kg (160 lb 7.9 oz) 74.6 kg (164 lb 7.4 oz) 73.8 kg (162 lb 11.2 oz)     Vital Signs with Ranges  Temp:  [95.7  F (35.4  C)-98  F (36.7  C)] 98  F (36.7  C)  Pulse:  [52-82] 82  Heart Rate:  [73-96] 82  Resp:  [16-18] 16  BP: (118-129)/(74-94) 121/74  SpO2:  [93 %-99 %] 94 %  I/O's Last 24 hours  I/O last 3 completed shifts:  In: 1580 [P.O.:1540; I.V.:40]  Out: 750 [Urine:750]       General: Patient comfortable sitting up in the chair on nasal cannula oxygen.  Calling family members on the phone.  Neck: + JVD, no carotid bruits.  Lungs: Clear to auscultation.  Positive kyphosis.  Cardiac:  irregularly irregular.  Rate controlled.  Abdomen:  Soft, nontender.  Extremities: No peripheral edema.  2+ pulses.  Skin:  Warm, dry.  Neurologic:  No focal deficits.         Data:        Recent Labs   Lab Test 12/19/19  0630 12/18/19  0550 12/17/19  0530  12/15/19  0524  05/06/16  2054    140 138   < > 137   < > 138   POTASSIUM 4.1 4.5 4.2   < > 4.2   < > 4.1   CHLORIDE 106 104 104   < > 104   < > 107   CO2 31 29 27   < > 24   < > 27   BUN 41* 37* 35*   < > 18   < > 29   CR 0.90 1.03 1.03   < > 0.88   < > 1.12*   ANIONGAP 4 7 7   < > 9   < > 4   SHAN 8.8 8.6 8.8   < > 8.9   < > 9.2   GLC " 174* 185* 195*   < > 287*   < > 140*   ALBUMIN  --   --  2.9*   < > 3.2*  --  3.8   PROTTOTAL  --   --  6.3*   < > 6.8  --  7.0   GFRESTIMATED 56* 48* 48*   < > 57*   < > 46*   GFRESTBLACK 65 55* 55*   < > 66   < > 56*   NTBNPI  --   --   --   --  2,584*  --  666    < > = values in this interval not displayed.     Recent Labs   Lab Test 12/17/19  0530 12/16/19  0540 12/15/19  0524   HGB 10.6* 10.0* 10.5*     Recent Labs   Lab Test 11/18/19  0953   TSH 1.32     Recent Labs   Lab Test 12/15/19  0524 11/18/19  0953 05/06/16  2105 05/06/16  2054   TROPI 0.045 0.024  --  <0.015  The 99th percentile for upper reference range is 0.045 ug/L.  Troponin values in   the range of 0.045 - 0.120 ug/L may be associated with risks of adverse   clinical events.     TROPONIN  --   --  0.01  --      No lab results found.    Invalid input(s): [id number]  No lab results found.

## 2019-12-19 NOTE — PROGRESS NOTES
"[facility] RCAT     Date: 12/19/19    Admission Dx: Respiratory Distress/RSV    Pulmonary History: CHF    Home Nebulizer/MDI Use: Duoneb QID    Home Oxygen: n/a    Acuity Level (RCAT flow sheet): Level 3    Aerosol Therapy initiated: Duoneb QID, Albuterol Q4 prn    Pulmonary Hygiene initiated: cough and deep breathing    Volume Expansion initiated: incentive spirometry    Current Oxygen Requirements: room air    Current SpO2: 94%    Re-evaluation date: 12/22/19    Patient Education: Indications for nebulizers discussed      See \"RT Assessments\" flow sheet for patient assessment scoring and Acuity Level Details.             "

## 2019-12-19 NOTE — PROGRESS NOTES
AMMY     D: Discharge planing continuing... calls placed this morning to [facility] and [facility], messages left requesting call for update on their review of assessment and bed availability. [facility] admissions coordinator requested refax of clinical information which was done.      A/P: Will await facility determination of acceptance, will then discuss with conservator regarding facilities available, then facility will need to obtain insurance authorization.         .    [facility]     [phone]    Addendum:     D: [facility] has assessed and will be able to accept pt, [facility] has no openings, no contact back yet from [facility].     I: HARVEY has been communicating today vis email with conservator, Sandra, informed her of above. She has requested that planning continue for pt's transfer to [facility], contact made to [facility] who will pursue the insurance authorization.     A/P: Will continue planning per insurance authorization, awaiting return contact from Sandra to discuss transportation options for pt. Will meet with pt and discuss plan.

## 2019-12-19 NOTE — PROGRESS NOTES
12/19/19 6216   Quick Adds   Type of Visit Initial Occupational Therapy Evaluation   Living Environment   Lives With alone   Living Arrangements house  (pt states it is 2 level large home.  Chart notes 1 level [place])   Home Accessibility stairs to enter home   Number of Stairs, Main Entrance 2   Stair Railings, Main Entrance railings on both sides of stairs   Transportation Anticipated family or friend will provide   Living Environment Comment PT eval indicated pt has 2 daughters, one stays with pt 5 nights a week.  Other daugther planning to take the other 2 nights   Self-Care   Usual Activity Tolerance moderate   Current Activity Tolerance poor   Regular Exercise No   Equipment Currently Used at Home cane, straight;raised toilet;walker, rolling   Activity/Exercise/Self-Care Comment pt has tub shower that she does not use, does sponge baths   Functional Level   Ambulation 1-->assistive equipment   Transferring 1-->assistive equipment   Toileting 1-->assistive equipment   Bathing 1-->assistive equipment   Dressing 0-->independent   Eating 0-->independent   Communication 0-->understands/communicates without difficulty   Swallowing 0-->swallows foods/liquids without difficulty   Cognition 0 - no cognition issues reported   Fall history within last six months yes   Number of times patient has fallen within last six months 1   Which of the above functional risks had a recent onset or change? ambulation;transferring;toileting;bathing;dressing   Prior Functional Level Comment pt reports daugthers helping allot   General Information   Onset of Illness/Injury or Date of Surgery - Date 12/18/19   Referring Physician Akbar Walton   Patient/Family Goals Statement return home   Additional Occupational Profile Info/Pertinent History of Current Problem Pt is a 89 year old female with recent admission to [facility] in November for respiratory illness,  Discharged to TCU 11/25 then returned with respiratory deistress and HR up around  200.  PMH includes chronic A fibrillation, pacemaker has been placed previously.  Pt also has had a cholecystectomy.  Pt states she gets help from daugthers for household chores and transport, has been dressing herself and doing her sponge baths   Precautions/Limitations fall precautions;oxygen therapy device and L/min  (nasal canula in, O2 not on.  Report it has been set at 1-2 L)   General Observations Pt in bed finishing breakfast.  Willing to participate   Cognitive Status Examination   Orientation orientation to person, place and time   Level of Consciousness alert   Follows Commands (Cognition) WNL   Memory intact   Cognitive Comment pt was inconsistent about reporting the size of her house, stating it was 2 level rather than 1.  Will monitor and screen as needed.   Visual Perception   Visual Perception No deficits were identified   Pain Assessment   Patient Currently in Pain Yes, see Vital Sign flowsheet   Range of Motion (ROM)   ROM Quick Adds No deficits were identified   ROM Comment B UEs   Strength   Manual Muscle Testing Quick Adds Other   Strength Comments general weakness and deconditioning B UEs   Hand Strength   Hand Strength Comments gross grasp fair and equal   Coordination   Upper Extremity Coordination No deficits were identified   Mobility   Bed Mobility Bed mobility skill: Sit to supine;Bed mobility skill: Supine to sit   Bed Mobility Skill: Sit to Supine   Level of [place]: Sit/Supine minimum assist (75% patients effort)   Physical Assist/Nonphysical Assist: Sit/Supine verbal cues;1 person assist   Assistive Device: Sit/Supine bedrail   Bed Mobility Skill: Supine to Sit   Level of [place]: Supine/Sit stand-by assist   Physical Assist/Nonphysical Assist: Supine/Sit verbal cues;1 person assist   Assistive Device: Supine/Sit bedrail   Transfer Skill: Sit to Stand   Level of [place]: Sit/Stand minimum assist (75% patients effort)   Physical Assist/Nonphysical Assist: Sit/Stand  "verbal cues;1 person assist   Transfer Skill: Sit to Stand weight-bearing as tolerated   Assistive Device for Transfer: Sit/Stand rolling walker   Lower Body Dressing   Level of Emanuel: Dress Lower Body contact guard   Physical Assist/Nonphysical Assist: Dress Lower Body verbal cues;1 person assist   Eating/Self Feeding   Level of Emanuel: Eating independent   Activities of Daily Living Analysis   Impairments Contributing to Impaired Activities of Daily Living strength decreased   ADL Comments monitor cognition   Clinical Impression   Criteria for Skilled Therapeutic Interventions Met yes, treatment indicated   OT Diagnosis decreased endurance and independence in ADLS   Influenced by the following impairments strength, respiratory distress with mvoement   Assessment of Occupational Performance 5 or more Performance Deficits   Identified Performance Deficits decreased independence in dressing, grooming, bathing, household chores, functional mobility   Clinical Decision Making (Complexity) Moderate complexity   Therapy Frequency Daily   Predicted Duration of Therapy Intervention (days/wks) 3 days   Anticipated Discharge Disposition Transitional Care Facility   Risks and Benefits of Treatment have been explained. Yes   Patient, Family & other staff in agreement with plan of care Yes   [facility] TM \"6 Clicks\"   2016, Trustees of [facility], under license to "[employer]  All rights reserved.   6 Clicks Short Forms Daily Activity Inpatient Short Form   [facility] AM-PAC  \"6 Clicks\" Daily Activity Inpatient Short Form   1. Putting on and taking off regular lower body clothing? 3 - A Little   2. Bathing (including washing, rinsing, drying)? 2 - A Lot   3. Toileting, which includes using toilet, bedpan or urinal? 3 - A Little   4. Putting on and taking off regular upper body clothing? 3 - A Little   5. Taking care of personal grooming such as brushing teeth? 3 - A Little   6. Eating " meals? 4 - None   Daily Activity Raw Score (Score out of 24.Lower scores equate to lower levels of function) 18   Total Evaluation Time   Total Evaluation Time (Minutes) 15

## 2019-12-19 NOTE — PLAN OF CARE
Discharge Planner PT   Patient plan for discharge: Home with assist of daughters  Current status: Pt supine upon arrival, agreeable to PT. Transfers supine<>sit with SBA, increased time and effort noted; pt required two breaks in between to catch her breath, prior to scooting to the edge of the bed. Pt transfers sit<>stand x4 reps from bed with FWW, CGA, seated breaks in between. Pt tolerates standing x 2 minutes to change brief and perform pericares with total assist. Pt ambulates 6' within room with FWW, CGA, 2 L O2. Ambulates with forward flexed posture. SOB following, VC for pursed lip breathing. Sx decreased after one minute in sitting.Transfers stand<>sit in bedside chair at end of session with good eccentric control. Sitting with all needs in reach, alarm on for safety.  Barriers to return to prior living situation: Current level of assist, decreased activity tolerance and amb distance, 2 steps to enter home  Recommendations for discharge: TCU.   Rationale for recommendations: Pt below baseline with all functional mobility.  Continued skilled PT(and OT) in the acute and TCU setting to increase overall strength, balance, progress gait and independence with all functional mobility. It all goals met and pt has confirmed 24/7 assist at home, pt may be able to discharge to home with Home PT.         Entered by: Evita Freed 12/19/2019 12:01 PM

## 2019-12-19 NOTE — PROGRESS NOTES
Left a message at this time on daughter Hilton's voicemail telling her that we moved her mother to room 641.

## 2019-12-20 ENCOUNTER — APPOINTMENT (OUTPATIENT)
Dept: OCCUPATIONAL THERAPY | Facility: CLINIC | Age: 84
DRG: 314 | End: 2019-12-20
Payer: COMMERCIAL

## 2019-12-20 LAB
GLUCOSE BLDC GLUCOMTR-MCNC: 177 MG/DL (ref 70–99)
GLUCOSE BLDC GLUCOMTR-MCNC: 183 MG/DL (ref 70–99)
GLUCOSE BLDC GLUCOMTR-MCNC: 197 MG/DL (ref 70–99)
GLUCOSE BLDC GLUCOMTR-MCNC: 199 MG/DL (ref 70–99)
GLUCOSE BLDC GLUCOMTR-MCNC: 354 MG/DL (ref 70–99)
INR PPP: 2.42 (ref 0.86–1.14)

## 2019-12-20 PROCEDURE — 94640 AIRWAY INHALATION TREATMENT: CPT | Mod: 76

## 2019-12-20 PROCEDURE — 99207 ZZC CDG-MDM COMPONENT: MEETS LOW - DOWN CODED: CPT | Performed by: HOSPITALIST

## 2019-12-20 PROCEDURE — 00000146 ZZHCL STATISTIC GLUCOSE BY METER IP

## 2019-12-20 PROCEDURE — 94640 AIRWAY INHALATION TREATMENT: CPT

## 2019-12-20 PROCEDURE — 25000131 ZZH RX MED GY IP 250 OP 636 PS 637: Performed by: INTERNAL MEDICINE

## 2019-12-20 PROCEDURE — 25000132 ZZH RX MED GY IP 250 OP 250 PS 637: Performed by: INTERNAL MEDICINE

## 2019-12-20 PROCEDURE — 85610 PROTHROMBIN TIME: CPT | Performed by: INTERNAL MEDICINE

## 2019-12-20 PROCEDURE — 25000125 ZZHC RX 250: Performed by: INTERNAL MEDICINE

## 2019-12-20 PROCEDURE — 97530 THERAPEUTIC ACTIVITIES: CPT | Mod: GO | Performed by: REHABILITATION PRACTITIONER

## 2019-12-20 PROCEDURE — 40000275 ZZH STATISTIC RCP TIME EA 10 MIN

## 2019-12-20 PROCEDURE — 99232 SBSQ HOSP IP/OBS MODERATE 35: CPT | Performed by: HOSPITALIST

## 2019-12-20 PROCEDURE — 12000000 ZZH R&B MED SURG/OB

## 2019-12-20 RX ORDER — PREDNISONE 20 MG/1
20 TABLET ORAL DAILY
Status: DISCONTINUED | OUTPATIENT
Start: 2019-12-21 | End: 2019-12-21 | Stop reason: HOSPADM

## 2019-12-20 RX ADMIN — IPRATROPIUM BROMIDE AND ALBUTEROL SULFATE 3 ML: .5; 3 SOLUTION RESPIRATORY (INHALATION) at 15:28

## 2019-12-20 RX ADMIN — Medication: at 20:44

## 2019-12-20 RX ADMIN — Medication 1.5 MG: at 17:44

## 2019-12-20 RX ADMIN — IPRATROPIUM BROMIDE AND ALBUTEROL SULFATE 3 ML: .5; 3 SOLUTION RESPIRATORY (INHALATION) at 08:59

## 2019-12-20 RX ADMIN — IPRATROPIUM BROMIDE AND ALBUTEROL SULFATE 3 ML: .5; 3 SOLUTION RESPIRATORY (INHALATION) at 11:47

## 2019-12-20 RX ADMIN — Medication: at 09:27

## 2019-12-20 RX ADMIN — METOPROLOL TARTRATE 50 MG: 50 TABLET, FILM COATED ORAL at 20:44

## 2019-12-20 RX ADMIN — FUROSEMIDE 20 MG: 20 TABLET ORAL at 09:30

## 2019-12-20 RX ADMIN — IPRATROPIUM BROMIDE AND ALBUTEROL SULFATE 3 ML: .5; 3 SOLUTION RESPIRATORY (INHALATION) at 19:46

## 2019-12-20 RX ADMIN — METOPROLOL TARTRATE 50 MG: 50 TABLET, FILM COATED ORAL at 09:27

## 2019-12-20 RX ADMIN — HYDROCODONE BITARTRATE AND ACETAMINOPHEN 1 TABLET: 5; 325 TABLET ORAL at 20:44

## 2019-12-20 RX ADMIN — HYDROCODONE BITARTRATE AND ACETAMINOPHEN 1 TABLET: 5; 325 TABLET ORAL at 09:27

## 2019-12-20 RX ADMIN — INSULIN GLARGINE 8 UNITS: 100 INJECTION, SOLUTION SUBCUTANEOUS at 09:26

## 2019-12-20 RX ADMIN — PREDNISONE 40 MG: 20 TABLET ORAL at 09:27

## 2019-12-20 ASSESSMENT — ACTIVITIES OF DAILY LIVING (ADL)
ADLS_ACUITY_SCORE: 22
ADLS_ACUITY_SCORE: 23
ADLS_ACUITY_SCORE: 22
ADLS_ACUITY_SCORE: 22

## 2019-12-20 NOTE — PLAN OF CARE
Pt A/O x4 - forgetful at times.  Very pleasant and conversational throughout shift, however sleepy at bedtime approximately an hour after scheduled Norco dose.  VSS and afebrile. Continues on 2L of oxygen via nasal canula with o2 sats between 93-95%.  Lung sounds are course with expiratory wheezes.  Nebs continued as scheduled.  Telemetry in place - Afib.  Pain managed adequately with scheduled PO Norco - reports pain only to back and very mild.  CMS intact. PICC line in place and SL, removed peripheral IV's this shift.  Dressing CDI on RLE.  Up A1 with walker and gait belt.  Voiding adequately - incontinent at times but staff are encouraging her to call to void.  Tolerating regular diet well.   and 240 - still covering and checking sugars according to q4 hour order. PO warfarin continued. Continued contact and droplet precautions.  Plan is TCU on discharge.  Will continue to monitor.

## 2019-12-20 NOTE — PROGRESS NOTES
[facility]    Medicine Progress Note - Hospitalist Service       Date of Admission:  12/15/2019  Assessment & Plan      89 yo female with history asthma, HTN, atrial fibrillation (on coumadin, s/p pacemaker), PUD, DM (diet controlled), chronic pain admitted on 12/15/2019 with respiratory distress and found to be in rapid a fib and with a new CMP    New cardiomyopathy with acute systolic CHF exacerbation    - most recent ECHO as outpt was normal, now it is 20-25%    - seen by cards    - rate related CMP vs stress CMP vs underlying CAD    - no plans for aggressive treatment    - responded to IV lasix and now maintained on oral lasix 20mg daily    - cont to titrate oxygen down    - mobilize    A fib    - rate now controlled    - cont metoprolol    - on coumadin    S/p pacemaker    - placed in April 2018 due to pauses and syncope    DM    - diet controlled    - not well controlled (also getting steroids)    - on ISS    Reactive airway    - has been on Prednisone 40mg BID    - titrate down to 20mg daily and cont to titrate    Chronic pain    - cont home [place]    Daughter in room and updated. Questions answered      Diet: Regular Diet Adult  Room Service  Snacks/Supplements Adult: Other; Ok to order any diet appropriate supplement; With Meals  Snacks/Supplements Adult: Other; Chocolate Plus 2 shake or Chocolate Boost Plus; Between Meals  Room Service    DVT Prophylaxis: Warfarin  Sosa Catheter: not present  Code Status: DNR/DNI      Disposition Plan   Expected discharge: Tomorrow, recommended to transitional care unit once titrate oxygen off.  Entered: Zenon Solis MD 12/20/2019, 1:24 PM       The patient's care was discussed with the Bedside Nurse, Care Coordinator/, Patient and Patient's Family.    Zenon Solis MD  Hospitalist Service  [facility]    ______________________________________________________________________    Interval History   Patient in chair eating. She  states she feels well. Denies chest pain. She states she does not feel sob. No abdo pain, n/v/d    Data reviewed today: I reviewed all medications, new labs and imaging results over the last 24 hours. I personally reviewed no images or EKG's today.    Physical Exam   Vital Signs: Temp: 97.2  F (36.2  C) Temp src: Axillary BP: 133/81 Pulse: 67 Heart Rate: 70 Resp: 18 SpO2: 97 % O2 Device: Nasal cannula Oxygen Delivery: 2 LPM  Weight: 162 lbs 11.19 oz  Constitutional: awake, alert, cooperative, no apparent distress, and appears stated age  Eyes: Lids and lashes normal, pupils equal, round and reactive to light, extra ocular muscles intact, sclera clear, conjunctiva normal  ENT: Normocephalic, without obvious abnormality, atraumatic, sinuses nontender on palpation, external ears without lesions, oral pharynx with moist mucous membranes, tonsils without erythema or exudates, gums normal and good dentition.  Respiratory: has upper airway  Cardiovascular: Normal apical impulse,irreg/irreg, normal S1 and S2, no S3 or S4, and no murmur noted  GI: No scars, normal bowel sounds, soft, non-distended, non-tender, no masses palpated, no hepatosplenomegally  Skin: no bruising or bleeding  Musculoskeletal: no lower extremity pitting edema present    Data   Recent Labs   Lab 12/20/19  0600 12/19/19  0630 12/18/19  0550 12/17/19  0530  12/16/19  0540 12/15/19  0524   WBC  --   --   --  13.0*  --  10.2 10.0   HGB  --   --   --  10.6*  --  10.0* 10.5*   MCV  --   --   --  101*  --  100 103*   PLT  --   --   --  185  --  152 154   INR 2.42* 2.46* 2.34* 2.04*   < >  --  1.48*   NA  --  141 140 138  --  140 137   POTASSIUM  --  4.1 4.5 4.2  --  4.2 4.2   CHLORIDE  --  106 104 104  --  105 104   CO2  --  31 29 27  --  27 24   BUN  --  41* 37* 35*  --  27 18   CR  --  0.90 1.03 1.03  --  0.97 0.88   ANIONGAP  --  4 7 7  --  8 9   SHAN  --  8.8 8.6 8.8  --  8.8 8.9   GLC  --  174* 185* 195*  --  173* 287*   ALBUMIN  --   --   --  2.9*  --   3.0* 3.2*   PROTTOTAL  --   --   --  6.3*  --  6.6* 6.8   BILITOTAL  --   --   --  0.3  --  0.3 0.5   ALKPHOS  --   --   --  100  --  98 97   ALT  --   --   --  84*  --  55* 24   AST  --   --   --  73*  --  49* 22   TROPI  --   --   --   --   --   --  0.045    < > = values in this interval not displayed.     No results found for this or any previous visit (from the past 24 hour(s)).

## 2019-12-20 NOTE — PLAN OF CARE
Pt is up with 1 assist and walker.  Pt has denied SOB this shift.  We are weaning pt off the 02.  Pts daughter is at bedside.  Pts daughter is concerned when the pt was alarming when she was resting with her mouth open . Pt  also had norco which pt states was making her sleepy.  Pt sats would go back up when she would take a breath or close her mouth. Pts daughter requested to have bipap ordered as needed. MD notified.   The rest of shift pt was awake and  on 2 L sats at 96-98% . We will continue to wean her off.

## 2019-12-20 NOTE — PLAN OF CARE
A/O x4, vital signs stable, 1L O2. Course/diminished lungs. Incontinent- purewick in place. Drinking adequate amounts. Up with assist of 1 walker/gait belt. Denied pain during shift. Contact and Droplet precautions maintained. Continue to monitor.

## 2019-12-20 NOTE — PLAN OF CARE
Discharge Planner OT   Patient plan for discharge: per chart, home   Current status: supine>sit with SBA and HOB raised ~60 degrees. Pt sat at EOB for ~7 minutes to help improve trunk stability/static balance, required CGA and frequent verbal cues to maintain upright posture and prevent backwards tilt, pt was significantly SOB during portions of sitting. Shoes donned with mod A from daughter. Sit>stand with min A/fww, CGA/fww with ambulation to transfer from bed to chair. Pt stood for ~90 seconds with CGA/fww, reported being significantly fatigued by task. O2 sats monitored throughout session, sats fluctuated between 84% and 94% on 2L of O2. Pt/daughter instructed in PLB technique to help decrease SOB and help increase O2 sats.   Barriers to return to prior living situation: Decreased endurance and respiratory distress and SOB with movement  Recommendations for discharge: TCU  Rationale for recommendations: Pt currently is significantly below baseline with limited endurance and respiratory difficulties with activity.  Would benefit from skilled OT while IP and at TCU to increase endurance and energy for her usual self cares of dressing and sponge baths, functional ambulation around the house. If pt discharges home, anticipated pt would require ongoing assist from daughters with ADLs, IADLs, transportation, and staying overnight. Daughters may also need to manage medications for pt to be safe.       Entered by: John Lopez 12/20/2019 9:52 AM

## 2019-12-20 NOTE — PROGRESS NOTES
AMMY     D: Discharge planning continuing... spoke to admissions coordinator at [facility] who informs that they have not yet obtained [employer] authorization, they will notify SW when received.      I: SW has spoken to Sandra lynch with information noted above. Discussed also with Sandra the options for pt's transport to rehab facility.. she has indicated that medical transport would be likely needed especially if o2 is needed. Discussed with her that the options for this would be w/c if pt would be able to self-manage the 02 vs stretcher transport where there would be tech monitoring her respiratory status, and discussed also with her that the w/c transport is not covered by [employer] ( $75/base an d$5/mi), with stretcher transport billed to insurance for consideration ( $1040/base and $25/mi) however SW unable to guarantee coverage. She acknowledges understanding and asks that if o2 is needed that the stretcher be arranged as she does not feel pt would be able to self-manage the o2.      A/P: Will await MD determination of discharge status, insurance authorization and continue planning accordingly.       .    [facility]     [phone]    Addendum:     D: Call received from admissions coordinator at [facility], they have obtained insurance authorization.     Addendum:     D: Noted per MD the anticipation of discharge tomorrow once titrate of o2, discussed also with RN who informs that pt is able to sit in w/c. Therefore based on discussion today with Sandra lynch and update this afternoon SW has arranged [facility], w/c transport for 1400 ( per time of bed availability tomorrow at [facility], [place]).     A/P: SW will continue to follow, if pt does need o2 on discharge, will arrange stretcher transport as noted in above documentation.

## 2019-12-20 NOTE — PROGRESS NOTES
"  [facility]  Spiritual Assessment Progress Note  [facility] Unit: Med Surg 6    Primary Focus:    Goals of care    Support for coping    Symptom/pain management    Palliative Care consultation    Illness Narrative:    Pt is a 89 year old female seated in bedside chair with daughter, Carolina, present.    Pt alert and denies discomfort. Shared context of admission and concerns about breathing difficulties.  Pt daughter shared at length about family history and challenges of recent cares for mother.     Offered reflective conversion and emotional/spiritual support through validation of feelings and experience, integrating elements of illness and family narratives.    Resources for Support :      Radha referenced support from her three children and grandchildren who live nearby and are attentive to her needs.    Mentioned supportive danita community and the prayers of friends and family as being a comfort.    Distress:     Pt primary source of distress today is concern about discharging to TCU and not knowing when.  Daughter states they had \"some challenges\" during last TCU stay and want to be thoughtful as they make decisions about plan of care.    Emotional/Spiritual/Existential/Confucianist Distress - Not Discussed    Coping:    Adventist/Danita/spiritual practices - Jean is Cheondoism and shared about earlier experiences of having receiving the gift of \"Laying on of Hands\" from the Holy Spirit.  That gift has been deeply formative in her experience as a Cheondoism.    Radha welcomed prayer and offered prayers of her own of gratitude to Philip for the gifts of her life.     Meaning Making:    Radha engaged in life review about growing up on her family farm in [place].     Radha mostly remains positive about her future and engaged in therapies to \"get well and get home\"    Plan:     Will continue to follow while on unit.      [facility] remains available for additional emotional/spiritual " support.    Saeid Rehman MA  Staff   Pager: [phone]  Phone: [phone]

## 2019-12-20 NOTE — PROVIDER NOTIFICATION
Text paged dr Solis daughter would like you to order prn bipap for the patient for comfort. she is currently on 3L

## 2019-12-21 VITALS
DIASTOLIC BLOOD PRESSURE: 69 MMHG | TEMPERATURE: 97.7 F | OXYGEN SATURATION: 95 % | WEIGHT: 161 LBS | HEIGHT: 63 IN | SYSTOLIC BLOOD PRESSURE: 119 MMHG | RESPIRATION RATE: 18 BRPM | HEART RATE: 87 BPM | BODY MASS INDEX: 28.53 KG/M2

## 2019-12-21 LAB
ANION GAP SERPL CALCULATED.3IONS-SCNC: 3 MMOL/L (ref 3–14)
BACTERIA SPEC CULT: NO GROWTH
BACTERIA SPEC CULT: NO GROWTH
BASOPHILS # BLD AUTO: 0 10E9/L (ref 0–0.2)
BASOPHILS NFR BLD AUTO: 0 %
BUN SERPL-MCNC: 37 MG/DL (ref 7–30)
CALCIUM SERPL-MCNC: 8.9 MG/DL (ref 8.5–10.1)
CHLORIDE SERPL-SCNC: 105 MMOL/L (ref 94–109)
CO2 SERPL-SCNC: 32 MMOL/L (ref 20–32)
CREAT SERPL-MCNC: 0.84 MG/DL (ref 0.52–1.04)
DIFFERENTIAL METHOD BLD: ABNORMAL
EOSINOPHIL # BLD AUTO: 0 10E9/L (ref 0–0.7)
EOSINOPHIL NFR BLD AUTO: 0 %
ERYTHROCYTE [DISTWIDTH] IN BLOOD BY AUTOMATED COUNT: 13.9 % (ref 10–15)
GFR SERPL CREATININE-BSD FRML MDRD: 61 ML/MIN/{1.73_M2}
GLUCOSE BLDC GLUCOMTR-MCNC: 142 MG/DL (ref 70–99)
GLUCOSE BLDC GLUCOMTR-MCNC: 155 MG/DL (ref 70–99)
GLUCOSE BLDC GLUCOMTR-MCNC: 189 MG/DL (ref 70–99)
GLUCOSE BLDC GLUCOMTR-MCNC: 276 MG/DL (ref 70–99)
GLUCOSE SERPL-MCNC: 141 MG/DL (ref 70–99)
HCT VFR BLD AUTO: 31.1 % (ref 35–47)
HGB BLD-MCNC: 9.9 G/DL (ref 11.7–15.7)
INR PPP: 2.49 (ref 0.86–1.14)
LYMPHOCYTES # BLD AUTO: 2.2 10E9/L (ref 0.8–5.3)
LYMPHOCYTES NFR BLD AUTO: 18 %
Lab: NORMAL
Lab: NORMAL
MCH RBC QN AUTO: 32.6 PG (ref 26.5–33)
MCHC RBC AUTO-ENTMCNC: 31.8 G/DL (ref 31.5–36.5)
MCV RBC AUTO: 102 FL (ref 78–100)
METAMYELOCYTES # BLD: 0.1 10E9/L
METAMYELOCYTES NFR BLD MANUAL: 1 %
MONOCYTES # BLD AUTO: 1.1 10E9/L (ref 0–1.3)
MONOCYTES NFR BLD AUTO: 9 %
MYELOCYTES # BLD: 0.2 10E9/L
MYELOCYTES NFR BLD MANUAL: 2 %
NEUTROPHILS # BLD AUTO: 8.5 10E9/L (ref 1.6–8.3)
NEUTROPHILS NFR BLD AUTO: 70 %
NRBC # BLD AUTO: 0.1 10*3/UL
NRBC BLD AUTO-RTO: 1 /100
PLATELET # BLD AUTO: 240 10E9/L (ref 150–450)
PLATELET # BLD EST: ABNORMAL 10*3/UL
POTASSIUM SERPL-SCNC: 3.9 MMOL/L (ref 3.4–5.3)
RBC # BLD AUTO: 3.04 10E12/L (ref 3.8–5.2)
RBC MORPH BLD: ABNORMAL
SODIUM SERPL-SCNC: 140 MMOL/L (ref 133–144)
SPECIMEN SOURCE: NORMAL
SPECIMEN SOURCE: NORMAL
WBC # BLD AUTO: 12.2 10E9/L (ref 4–11)

## 2019-12-21 PROCEDURE — 25000132 ZZH RX MED GY IP 250 OP 250 PS 637: Performed by: INTERNAL MEDICINE

## 2019-12-21 PROCEDURE — 25000125 ZZHC RX 250: Performed by: INTERNAL MEDICINE

## 2019-12-21 PROCEDURE — 25000131 ZZH RX MED GY IP 250 OP 636 PS 637: Performed by: INTERNAL MEDICINE

## 2019-12-21 PROCEDURE — 85025 COMPLETE CBC W/AUTO DIFF WBC: CPT | Performed by: INTERNAL MEDICINE

## 2019-12-21 PROCEDURE — 94640 AIRWAY INHALATION TREATMENT: CPT | Mod: 76

## 2019-12-21 PROCEDURE — 40000275 ZZH STATISTIC RCP TIME EA 10 MIN

## 2019-12-21 PROCEDURE — 00000146 ZZHCL STATISTIC GLUCOSE BY METER IP

## 2019-12-21 PROCEDURE — 80048 BASIC METABOLIC PNL TOTAL CA: CPT | Performed by: INTERNAL MEDICINE

## 2019-12-21 PROCEDURE — 25000131 ZZH RX MED GY IP 250 OP 636 PS 637: Performed by: HOSPITALIST

## 2019-12-21 PROCEDURE — 85610 PROTHROMBIN TIME: CPT | Performed by: INTERNAL MEDICINE

## 2019-12-21 PROCEDURE — 99239 HOSP IP/OBS DSCHRG MGMT >30: CPT | Performed by: HOSPITALIST

## 2019-12-21 PROCEDURE — 94640 AIRWAY INHALATION TREATMENT: CPT

## 2019-12-21 RX ORDER — FUROSEMIDE 20 MG
20 TABLET ORAL DAILY
Status: ON HOLD | DISCHARGE
Start: 2019-12-22 | End: 2020-09-14

## 2019-12-21 RX ORDER — ALBUTEROL SULFATE 0.83 MG/ML
2.5 SOLUTION RESPIRATORY (INHALATION) EVERY 4 HOURS PRN
DISCHARGE
Start: 2019-12-21 | End: 2021-09-22

## 2019-12-21 RX ORDER — PREDNISONE 20 MG/1
20 TABLET ORAL 2 TIMES DAILY
Status: ON HOLD | DISCHARGE
Start: 2019-12-21 | End: 2020-09-06

## 2019-12-21 RX ORDER — HYDROCODONE BITARTRATE AND ACETAMINOPHEN 5; 325 MG/1; MG/1
1 TABLET ORAL 2 TIMES DAILY
Qty: 30 TABLET | Refills: 0 | Status: ON HOLD | OUTPATIENT
Start: 2019-12-21 | End: 2020-09-06

## 2019-12-21 RX ADMIN — IPRATROPIUM BROMIDE AND ALBUTEROL SULFATE 3 ML: .5; 3 SOLUTION RESPIRATORY (INHALATION) at 07:28

## 2019-12-21 RX ADMIN — METOPROLOL TARTRATE 50 MG: 50 TABLET, FILM COATED ORAL at 08:55

## 2019-12-21 RX ADMIN — PREDNISONE 20 MG: 20 TABLET ORAL at 08:55

## 2019-12-21 RX ADMIN — HYDROCODONE BITARTRATE AND ACETAMINOPHEN 1 TABLET: 5; 325 TABLET ORAL at 07:06

## 2019-12-21 RX ADMIN — IPRATROPIUM BROMIDE AND ALBUTEROL SULFATE 3 ML: .5; 3 SOLUTION RESPIRATORY (INHALATION) at 11:20

## 2019-12-21 RX ADMIN — INSULIN GLARGINE 8 UNITS: 100 INJECTION, SOLUTION SUBCUTANEOUS at 08:51

## 2019-12-21 RX ADMIN — Medication: at 08:54

## 2019-12-21 RX ADMIN — FUROSEMIDE 20 MG: 20 TABLET ORAL at 08:55

## 2019-12-21 ASSESSMENT — MIFFLIN-ST. JEOR: SCORE: 1119.42

## 2019-12-21 ASSESSMENT — ACTIVITIES OF DAILY LIVING (ADL)
ADLS_ACUITY_SCORE: 23

## 2019-12-21 NOTE — PHARMACY-ANTICOAGULATION SERVICE
Clinical Pharmacy- Warfarin Discharge Note  This patient is currently on warfarin for the treatment of Atrial fibrillation.  INR Goal= 2-2.5  Expected length of therapy lifetime.    Warfarin PTA Regimen: 2mg daily      Anticoagulation Dose History     Recent Dosing and Labs Latest Ref Rng & Units 12/15/2019 12/16/2019 12/17/2019 12/18/2019 12/19/2019 12/20/2019 12/21/2019    Warfarin 1 mg - - 2 mg 2 mg - - - -    Warfarin 1.5 mg - - - - 1.5 mg 1.5 mg 1.5 mg -    INR 0.86 - 1.14 1.48(H) 1.75(H) 2.04(H) 2.34(H) 2.46(H) 2.42(H) 2.49(H)            Warfarin Discharge Reconciliation Complete  It is reasonable to continue warfarin 2mg daily and adjust based upon INR to be monitored by TCU.

## 2019-12-21 NOTE — PLAN OF CARE
Occupational Therapy Discharge Summary    Reason for therapy discharge:    Discharged to transitional care facility.    Progress towards therapy goal(s). See goals on Care Plan in [facility] electronic health record for goal details.  Goals partially met.  Barriers to achieving goals:   discharge from facility.    Therapy recommendation(s):    Continued therapy is recommended.  Rationale/Recommendations:  Pt. Demonstrates below baseline independence and tolerance for ADLs and would benefit from continued skilled intervention.    Pt. Not seen by this reporting therapist.

## 2019-12-21 NOTE — DISCHARGE SUMMARY
[facility]  Hospitalist Discharge Summary       Date of Admission:  12/15/2019  Date of Discharge:  12/21/2019  Discharging Provider: Zenon Solis MD      Discharge Diagnoses   Rapid atrial fibrillation with acute systolic CHF exacerbation and new cardiomyopathy. Bronchospasm. Non-severe protein malnutrition    Follow-ups Needed After Discharge   Follow-up Appointments     Follow Up and recommended labs and tests      Follow up with retirement physician.  The following labs/tests are   recommended: follow-up on blood sugars and insulin needs. Follow-up on INR   and coumadin dosing. Follow-up on blood pressure. Chem 7 in 2-3 days to   check kidney function  Follow up with primary care provider in 7-10 days.  Follow-up on kidney   function             Unresulted Labs Ordered in the Past 30 Days of this Admission     No orders found from 11/15/2019 to 12/16/2019.      These results will be followed up by NA    Discharge Disposition   Discharged to short-term care facility  Condition at discharge: Stable    Hospital Course   Cough, fever, respiratory distress      History is obtained from the patient who was on BiPAP at the time that I spoke with her both in the ED as well as in the ICU.  I also spoke with 2 of her daughters present at the bedside in the ICU, emergency room physician and reviewed electronic medical record.         Radha Cunningham is a 89-year-old woman who typically lives by herself but has a couple of daughters who are very involved in her day-to-day health who had been directed into the emergency department after several days of cough and shortness of breath from [facility].  The patient clearly describes having been exposed to multiple other residents as well as employees who had cough.  She apparently had an episode of rather severe shortness of breath yesterday but I believe this quieted down.  The patient is not 100% clear on whether she had palpitations, but she does describe  awareness of relatively rapid heart rate.     At this time, the patient endorses fevers but without shaking chills or drenching sweats.  She describes rather small volume nonpurulent cough with associated rhinorrhea.  She has been eating okay.  No nausea, vomiting.  Patient apparently had a single episode of loose stool but this is not terribly unusual for her.  She has mild diffuse abdominal pain.     Apparently did have significant bronchospasm during the last hospitalization but is not typically managed with bronchodilators.  I note in the medical record (care everywhere) that she has been evaluated fairly extensively for shortness of breath by pulmonary medicine.  Most noteworthy, echocardiogram completed in October 2019 showed normal LV function, mild right ventricular chamber enlargement and severe biatrial enlargement.  Patient has mild to moderate valvular abnormalities also incidentally noted.     Noted in the emergency department, the patient was sent for CT scan chest abdomen and pelvis.  This did not reveal abdominal pathology but the patient does seem to have at least mild right upper lobe infiltrates.  In addition, a cardiogram was obtained stat in the emergency department that shows markedly diminished LV function (LVEF less than 25%) and right ventricular dilatation with severely decreased function.     New cardiomyopathy with acute systolic CHF exacerbation    - most recent ECHO as outpt was normal, now it is 20-25%    - seen by cards    - rate related CMP vs stress CMP vs underlying CAD    - no plans for aggressive treatment    - responded to IV lasix and now maintained on oral lasix 20mg daily    - cont to titrate oxygen down: now off    - mobilize     A fib    - rate now controlled    - cont metoprolol    - on coumadin     S/p pacemaker    - placed in April 2018 due to pauses and syncope     DM    - diet controlled    - not well controlled (also getting steroids)    - on ISS, lantus until titrate  off steroids, then re-assess insulin needs     Reactive airway    - has been on Prednisone 40mg BID    - titrate down to 20mg BID and cont to titrate     Chronic pain    - cont home Norco     Patient doing well today. Daughter in room. No complaints. Eating. Off oxygen. Ready for discharge.    Consultations This Hospital Stay   CARDIOLOGY IP CONSULT  VASCULAR ACCESS ADULT IP CONSULT  PALLIATIVE CARE ADULT IP CONSULT  WOUND OSTOMY CONTINENCE NURSE  IP CONSULT  SOCIAL WORK IP CONSULT  PHARMACY TO DOSE MEDICATION  PHARMACY TO DOSE WARFARIN  PHARMACY TO DOSE WARFARIN  PHYSICAL THERAPY ADULT IP CONSULT  OCCUPATIONAL THERAPY ADULT IP CONSULT  PHYSICAL THERAPY ADULT IP CONSULT  OCCUPATIONAL THERAPY ADULT IP CONSULT    Code Status   DNR/DNI    Time Spent on this Encounter   I, Zenon Solis MD, personally saw the patient today and spent greater than 30 minutes discharging this patient.       Zenon Solis MD  [facility]  ______________________________________________________________________    Physical Exam   Vital Signs: Temp: 97.5  F (36.4  C) Temp src: Oral BP: 124/80 Pulse: 87 Heart Rate: 77 Resp: 16 SpO2: 96 % O2 Device: None (Room air) Oxygen Delivery: 1 LPM  Weight: 161 lbs 0 oz  Constitutional: awake, alert, cooperative, no apparent distress, and appears stated age  Eyes: Lids and lashes normal, pupils equal, round and reactive to light, extra ocular muscles intact, sclera clear, conjunctiva normal  ENT: Normocephalic, without obvious abnormality, atraumatic, sinuses nontender on palpation, external ears without lesions, oral pharynx with moist mucous membranes, tonsils without erythema or exudates, gums normal and good dentition.  Respiratory: No increased work of breathing, good air exchange, clear to auscultation bilaterally, no crackles or wheezing  Cardiovascular: Normal apical impulse, regular rate and rhythm, normal S1 and S2, no S3 or S4, and no murmur noted  GI: No scars, normal bowel  sounds, soft, non-distended, non-tender, no masses palpated, no hepatosplenomegally  Skin: no bruising or bleeding  Musculoskeletal: no lower extremity pitting edema present       Primary Care Physician   Samaria Chappell    Discharge Orders      General info for SNF    Length of Stay Estimate: Short Term Care: Estimated # of Days 31-90  Condition at Discharge: Stable  Level of care:skilled   Rehabilitation Potential: Fair  Admission H&P remains valid and up-to-date: Yes  Recent Chemotherapy: N/A  Use Nursing Home Standing Orders: Yes     Mantoux instructions    Give two-step Mantoux (PPD) Per Facility Policy Yes     Reason for your hospital stay    Rapid atrial fibrillation, acute systolic CHF, COPD exacerbation     Glucose monitor nursing POCT    Before meals and at bedtime     Follow Up and recommended labs and tests    Follow up with [facility] physician.  The following labs/tests are recommended: follow-up on blood sugars and insulin needs. Follow-up on INR and coumadin dosing. Follow-up on blood pressure. Chem 7 in 2-3 days to check kidney function  Follow up with primary care provider in 7-10 days.  Follow-up on kidney function     Activity - Up with nursing assistance     DNR/DNI     Physical Therapy Adult Consult    Evaluate and treat as clinically indicated.    Reason:  weakness     Occupational Therapy Adult Consult    Evaluate and treat as clinically indicated.    Reason:  weakness     Fall precautions     Advance Diet as Tolerated    Follow this diet upon discharge: Moderate consistent carbohydrate (3052-0754 marisa / 4-6 CHO units per meal)       Significant Results and Procedures   Most Recent 3 CBC's:  Recent Labs   Lab Test 12/21/19  0642 12/17/19  0530 12/16/19  0540   WBC 12.2* 13.0* 10.2   HGB 9.9* 10.6* 10.0*   * 101* 100    185 152     Most Recent 3 BMP's:  Recent Labs   Lab Test 12/21/19  0642 12/19/19  0630 12/18/19  0550    141 140   POTASSIUM 3.9 4.1 4.5   CHLORIDE 105 106 104    CO2 32 31 29   BUN 37* 41* 37*   CR 0.84 0.90 1.03   ANIONGAP 3 4 7   SHAN 8.9 8.8 8.6   * 174* 185*     Most Recent 2 LFT's:  Recent Labs   Lab Test 12/17/19  0530 12/16/19  0540   AST 73* 49*   ALT 84* 55*   ALKPHOS 100 98   BILITOTAL 0.3 0.3     Most Recent 3 Troponin's:  Recent Labs   Lab Test 12/15/19  0524 11/18/19  0953 05/06/16  2105 05/06/16 2054   TROPI 0.045 0.024  --  <0.015  The 99th percentile for upper reference range is 0.045 ug/L.  Troponin values in   the range of 0.045 - 0.120 ug/L may be associated with risks of adverse   clinical events.     TROPONIN  --   --  0.01  --    ,   Results for orders placed or performed during the hospital encounter of 12/15/19   XR Chest Port 1 View    Narrative    EXAM: XR CHEST PORTABLE 1 VIEW  LOCATION: [facility]  DATE/TIME: 12/15/2019, 5:32 AM    INDICATION: Shortness of breath.  COMPARISON: 11/22/2019.    FINDINGS: Left subclavian cardiac device in place. No pneumothorax. The heart is enlarged. There is no pulmonary edema. The thoracic aorta is calcified and mildly tortuous. There is mild right basilar atelectasis. The lungs are otherwise clear.   Degenerative disease in the spine and shoulders.      Impression    IMPRESSION: Mild right basilar atelectasis.     CT Chest Abdomen Pelvis w/o Contrast    Narrative    CT CHEST, ABDOMEN AND PELVIS WITHOUT CONTRAST  12/15/2019 7:50 AM    HISTORY:  Shortness of breath, cough and severe abdominal pain.    TECHNIQUE: CT scan obtained of the chest, abdomen, and pelvis without  IV contrast. Radiation dose for this scan was reduced using automated  exposure control, adjustment of the mA and/or kV according to patient  size, or iterative reconstruction technique.    COMPARISON:  12/15/2019 chest x-ray    FINDINGS:  Chest: Cardiac device left chest wall with right ventricular and  atrial leads. Prominent atherosclerotic vascular calcification and  aneurysmal ascending thoracic aorta 4.3 cm at the level  of right  pulmonary artery.    There is mild right paratracheal and pretracheal adenopathy. Suspect  mild right hilar adenopathy, although difficult to assess with  noncontrast technique. There is subpleural thickening at the  posteromedial right lung base. Bilateral bronchial wall or  peribronchial thickening greatest in the lower lobes and multifocal  patchy airspace opacities greatest in the right upper lobe and mild in  the lingula. Consistent with infectious/inflammatory process.    Abdomen/pelvis: 1.5 cm low-attenuation lesion in the left lobe of the  liver, series 2 image 52, is identified as a cyst on prior abdomen and  pelvic CT of 5/6/2016. Gallbladder difficult to visualize, may be  contracted with small stones. Spleen and adrenal glands demonstrate no  acute-appearing abnormality as seen with noncontrast technique.  Bilateral low-dense kidney lesions consistent with previously seen  renal cysts. Pancreas is very poorly visualized with prominent atrophy  and fat infiltration. Atherosclerotic vascular calcification.    No periaortic or pelvic adenopathy. No free fluid. Mild sigmoid  diverticulosis. No acute-appearing bowel abnormality. Multiple  vertebral body compressions of indeterminate age which are new since  5/6/2016, including superior endplate T11 and T4 and T5.      Impression    IMPRESSION:   1. Pulmonary opacities and adenopathy. Favor infectious or  inflammatory etiology. Recommend follow-up to ensure complete  resolution.  2. No acute-appearing abnormality in the abdomen or pelvis.  Indeterminate age vertebral compressions.    DARWIN BOWIE MD   XR Chest Port 1 View    Narrative    EXAM: XR CHEST PORT 1 VW  LOCATION: [facility]  DATE/TIME: 12/15/2019 5:46 PM    INDICATION: Verify PICC line placement  COMPARISON: 12/15/2019      Impression    IMPRESSION: Right upper extremity PICC line terminates at the cavoatrial junction. Left chest wall pacer with right atrial and ventricular  leads, stable. No pneumothorax. No pleural effusion. Mild cardiomegaly.   Echocardiogram Limited    Narrative    [id number]  [id number]  [id number]  [id number]^MARJORIE^SIENNA^R           [facility]  Echocardiography Laboratory  [address] [place] [address]        Name: SANDRO ECHEVERRIA  MRN: [id number]  : 1929  Study Date: 12/15/2019 08:09 AM  Age: 89 yrs  Gender: Female  Patient Location: [facility]  Reason For Study: Respiratory Failure  Ordering Physician: SIENNA AMADOR  Referring Physician: Samaria Chappell  Performed By: Marjan Swift RDCS     BSA: 1.8 m2  Height: 63 in  Weight: 160 lb  HR: 134  BP: 100/75 mmHg  _____________________________________________________________________________  __        Procedure  Limited Portable Echo Adult. (Emergent exam, abbreviated study performed).  _____________________________________________________________________________  __        Interpretation Summary     The left ventricle is mildly dilated.  Left ventricular systolic function is severely reduced.  The visual ejection fraction is estimated at 20-25%.  There is severe global hypokinesia of the left ventricle.  Severely decreased right ventricular systolic function  There is mild (1+) mitral regurgitation.  There is moderate (2+) tricuspid regurgitation.  There is mild (1+) aortic regurgitation.  The rhythm was rapid atrial fibrillation.  There is no comparison study available.  _____________________________________________________________________________  __        Left Ventricle  The left ventricle is mildly dilated. There is normal left ventricular wall  thickness. Left ventricular systolic function is severely reduced. The visual  ejection fraction is estimated at 20-25%. There is severe global hypokinesia  of the left ventricle.     Right Ventricle  The right ventricle is mildly dilated. Severely decreased right ventricular  systolic function. There is a pacemaker lead in the right ventricle.      Atria  There is moderate biatrial enlargement.     Mitral Valve  The mitral valve leaflets are mildly thickened. There is moderate mitral  annular calcification. There is mild (1+) mitral regurgitation.        Tricuspid Valve  There is moderate (2+) tricuspid regurgitation. The right ventricular systolic  pressure is approximated at 22.4 mmHg plus the right atrial pressure. IVC  diameter >2.1 cm collapsing <50% with sniff suggests a high RA pressure  estimated at 15 mmHg or greater. Right ventricular systolic pressure is  elevated, consistent with mild pulmonary hypertension.     Aortic Valve  There is moderate trileaflet aortic sclerosis. There is mild (1+) aortic  regurgitation. No hemodynamically significant valvular aortic stenosis.     Pulmonic Valve  There is mild (1+) pulmonic valvular regurgitation.     Vessels  The ascending aorta is Mildly dilated.     Pericardium  There is no pericardial effusion.        Rhythm  The rhythm was rapid atrial fibrillation.  _____________________________________________________________________________  __  MMode/2D Measurements & Calculations  asc Aorta Diam: 4.1 cm           Doppler Measurements & Calculations  TR max werner: 236.1 cm/sec  TR max P.4 mmHg           _____________________________________________________________________________  __           Report approved by: Rosa Ghotra 12/15/2019 09:14 AM            Discharge Medications   Current Discharge Medication List      START taking these medications    Details   albuterol (PROVENTIL) (2.5 MG/3ML) 0.083% neb solution Take 1 vial (2.5 mg) by nebulization every 4 hours as needed for wheezing    Associated Diagnoses: Asthma with acute exacerbation, unspecified asthma severity, unspecified whether persistent; Acute respiratory distress; Acute respiratory failure with hypoxia (H)      furosemide (LASIX) 20 MG tablet Take 1 tablet (20 mg) by mouth daily    Associated Diagnoses: Acute respiratory failure with  hypoxia (H)      insulin aspart (NOVOLOG PEN) 100 UNIT/ML pen Inject 1-6 Units Subcutaneous 3 times daily (with meals) Correction Scale - MEDIUM INSULIN RESISTANCE DOSING     Do Not give Correction Insulin if BG less than 140.  For  - 189 give 1 unit.  For  - 239 give 2 units.  For  - 289 give 3 units.  For  - 339 give 4 units.  For  - 399 give 5 units.  For BG greater than or equal to 400 give 6 units.  Check blood glucose Q4H and administer based on blood glucose.  Notify provider if glucose greater than or equal to 350 mg/dL after administration of correction dose.  If given at mealtime, administer within 30 minutes of start of meal    Associated Diagnoses: Other specified diabetes mellitus with other specified complication, unspecified whether long term insulin use (H)      insulin glargine (LANTUS SOLOSTAR) 100 UNIT/ML pen Inject 5 Units Subcutaneous every morning Lantus Solostar Pen. Will need to re-assess insulin needs once off steroids  Qty: 15 mL, Refills: 0    Associated Diagnoses: Other specified diabetes mellitus with other specified complication, unspecified whether long term insulin use (H)      predniSONE (DELTASONE) 20 MG tablet Take 1 tablet (20 mg) by mouth 2 times daily Take 20mg BID for 3 days then 20mg daily for 3 days then 10mg daily for 3 days, then stop    Associated Diagnoses: Asthma with acute exacerbation, unspecified asthma severity, unspecified whether persistent; Acute respiratory failure with hypoxia (H)         CONTINUE these medications which have NOT CHANGED    Details   acetaminophen (TYLENOL) 325 MG tablet Take 1-2 tablets (325-650 mg) by mouth every 6 hours as needed for mild pain    Associated Diagnoses: Pain      atorvastatin (LIPITOR) 10 MG tablet Take 10 mg by mouth At Bedtime       calcium carbonate (OS-SHAN 500 MG [place]) 500 MG tablet Take 500 mg by mouth daily       emollient (VANICREAM) external cream Apply topically 2 times daily To whole  "body      HYDROcodone-acetaminophen (NORCO) 5-325 MG tablet Take 1 tablet by mouth 2 times daily At 0800 and 2000  Qty: 30 tablet, Refills: 0    Comments: Dose reduction  Associated Diagnoses: Chronic pain syndrome      !! ipratropium - albuterol 0.5 mg/2.5 mg/3 mL (DUONEB) 0.5-2.5 (3) MG/3ML neb solution Take 1 vial (3 mLs) by nebulization every 4 hours as needed for shortness of breath / dyspnea or wheezing (In addition to QID scheduled)    Associated Diagnoses: Wheezing      !! ipratropium - albuterol 0.5 mg/2.5 mg/3 mL (DUONEB) 0.5-2.5 (3) MG/3ML neb solution Take 1 vial (3 mLs) by nebulization 4 times daily    Associated Diagnoses: Reactive airway disease with acute exacerbation, unspecified asthma severity, unspecified whether persistent      melatonin 1 MG TABS tablet Take 1 tablet (1 mg) by mouth nightly as needed for sleep    Associated Diagnoses: Insomnia, unspecified type      metoprolol tartrate (LOPRESSOR) 50 MG tablet Take 50 mg by mouth 2 times daily      Nutritional Supplements (NUTRITIONAL SUPPLEMENT PLUS) LIQD Take by mouth 2 times daily \"Magic Cup\" brand nutritional supplement      nystatin (MYCOSTATIN) 540144 UNIT/GM external powder Apply topically 2 times daily Under right breast and groin area      omeprazole (PRILOSEC) 20 MG DR capsule Take 20 mg by mouth daily      polyethylene glycol (MIRALAX/GLYCOLAX) packet Take 17 g by mouth daily as needed for constipation    Associated Diagnoses: Constipation, unspecified constipation type      senna-docusate (SENOKOT-S/PERICOLACE) 8.6-50 MG tablet Take 1 tablet by mouth 2 times daily as needed for constipation    Associated Diagnoses: Constipation, unspecified constipation type      vitamin D2 (ERGOCALCIFEROL) 44555 units (1250 mcg) capsule Take 50,000 Units by mouth once a week Tuesdays      warfarin ANTICOAGULANT (COUMADIN) 2 MG tablet Take 2 mg by mouth daily      fluticasone (FLONASE) 50 MCG/ACT nasal spray Spray 1 spray into both nostrils daily as " needed for rhinitis or allergies       !! - Potential duplicate medications found. Please discuss with provider.      STOP taking these medications       azithromycin (ZITHROMAX) 250 MG tablet Comments:   Reason for Stopping:         cephALEXin (KEFLEX) 500 MG capsule Comments:   Reason for Stopping:             Allergies   Allergies   Allergen Reactions     Contrast Dye

## 2019-12-21 NOTE — PLAN OF CARE
"VS /75 (BP Location: Left arm)   Pulse 67   Temp 96.6  F (35.9  C) (Oral)   Resp 18   Ht 1.6 m (5' 3\")   Wt 73.8 kg (162 lb 11.2 oz)   SpO2 97%   BMI 28.82 kg/m    Lung sounds Diminished and expiratory wheezes in the middle lobe and lower lobes  O2 1 L NC titrated down from 2L  Tele AFIB CVR- has pacemaker  Bowel sounds Active and audible in all quadrants; passing gas  Tolerating Regular with Supplements diet  IVF Saline Locked- has PICC  Dressings Steri Strips on right lower calf from a excised skin cancer site- WNL  Tests BS- 354- Igles[facility] FOWLER received 3 U  order one time- waiting for pharmacy to verify.  CMS Intact- numbness baseline right fingers  Drains none  Activity up with Ax1 GB Walker  Pain 8/10 Scheduled Carpenter  Patient/family centered care support given and questions answered- Daughter at bedside- staying the night.  Discharge plan   TBD- Possibly tomorrow to [facility] with possible stretcher and EMS  transport d/t oxygen need. Continue POC.  "

## 2019-12-21 NOTE — PROGRESS NOTES
VSS.  Tolerating room air. PICC removed.  OOB SBA.  Denies pain.  Tolerating meals.  Daughter assisted patient with dressing into clothing.  Discharge paperwork reviewed with patient and daughter.  All questions answered.  All belonging returned.  Discharged without difficulty.

## 2019-12-21 NOTE — PLAN OF CARE
PT-attempted to see declined as leaving for TCU this afternoon. Per chart review goals were not met and MD planned for TCU. Best walking effort recorded was 6 feet. Recommend to PT for discharge to TCU.

## 2019-12-21 NOTE — PLAN OF CARE
0008 - MD paged - Glucose now 276, was 354. Insulin just got verified for 3 units but was not administer for the 354. Still administer or adjust insulin? Thank you!  0011 - Dr. Vega called back. Okay to give the 3 units.     Pt up with 1 assist, walker, and gait belt to and from bathroom. Needs encouragement to CADB and use IS. LS diminished with exp wheezes. BS present, passing flatus. LBM - maybe 2 days ago per pt - instructed the idea of prune juice and softners today. CMS intact with weaker dorsal pulses. Incontinent of void. Plan is to discharge to [facility] today via HE transport at 1400 via w/c. If needing O2, will need stretcher for transport.    Pt more aware this AM and demonstrated IS use and CADB exercises.

## 2019-12-21 NOTE — PROGRESS NOTES
Discharge Planner   Discharge Plans in progress: Pt to discharge to [facility] and daughter is going to transport.   Barriers to discharge plan: none  Follow up plan: Sw will continue to be available until discharge         Entered by: Melissa Lakhani 12/21/2019 12:20 PM     SALONI Antony  [facility]  [phone]

## 2020-09-06 ENCOUNTER — APPOINTMENT (OUTPATIENT)
Dept: CT IMAGING | Facility: CLINIC | Age: 85
DRG: 064 | End: 2020-09-06
Attending: INTERNAL MEDICINE
Payer: COMMERCIAL

## 2020-09-06 ENCOUNTER — HOSPITAL ENCOUNTER (INPATIENT)
Facility: CLINIC | Age: 85
LOS: 8 days | Discharge: HOME | DRG: 064 | End: 2020-09-14
Attending: EMERGENCY MEDICINE | Admitting: INTERNAL MEDICINE
Payer: COMMERCIAL

## 2020-09-06 ENCOUNTER — APPOINTMENT (OUTPATIENT)
Dept: CT IMAGING | Facility: CLINIC | Age: 85
DRG: 064 | End: 2020-09-06
Attending: EMERGENCY MEDICINE
Payer: COMMERCIAL

## 2020-09-06 DIAGNOSIS — I63.9 CEREBROVASCULAR ACCIDENT (CVA), UNSPECIFIED MECHANISM (H): ICD-10-CM

## 2020-09-06 LAB
ALBUMIN UR-MCNC: 10 MG/DL
AMMONIA PLAS-SCNC: 14 UMOL/L (ref 10–50)
ANION GAP SERPL CALCULATED.3IONS-SCNC: 5 MMOL/L (ref 3–14)
APPEARANCE UR: CLEAR
BACTERIA #/AREA URNS HPF: ABNORMAL /HPF
BASOPHILS # BLD AUTO: 0 10E9/L (ref 0–0.2)
BASOPHILS NFR BLD AUTO: 0.4 %
BILIRUB UR QL STRIP: NEGATIVE
BUN SERPL-MCNC: 16 MG/DL (ref 7–30)
CALCIUM SERPL-MCNC: 9 MG/DL (ref 8.5–10.1)
CHLORIDE SERPL-SCNC: 105 MMOL/L (ref 94–109)
CO2 SERPL-SCNC: 27 MMOL/L (ref 20–32)
COLOR UR AUTO: ABNORMAL
CREAT SERPL-MCNC: 0.88 MG/DL (ref 0.52–1.04)
DIFFERENTIAL METHOD BLD: ABNORMAL
EOSINOPHIL # BLD AUTO: 0 10E9/L (ref 0–0.7)
EOSINOPHIL NFR BLD AUTO: 0.1 %
ERYTHROCYTE [DISTWIDTH] IN BLOOD BY AUTOMATED COUNT: 13.7 % (ref 10–15)
GFR SERPL CREATININE-BSD FRML MDRD: 57 ML/MIN/{1.73_M2}
GLUCOSE SERPL-MCNC: 110 MG/DL (ref 70–99)
GLUCOSE UR STRIP-MCNC: NEGATIVE MG/DL
HCT VFR BLD AUTO: 36.5 % (ref 35–47)
HGB BLD-MCNC: 11.8 G/DL (ref 11.7–15.7)
HGB UR QL STRIP: ABNORMAL
HYALINE CASTS #/AREA URNS LPF: 1 /LPF (ref 0–2)
IMM GRANULOCYTES # BLD: 0.1 10E9/L (ref 0–0.4)
IMM GRANULOCYTES NFR BLD: 1.6 %
INR PPP: 1.08 (ref 0.86–1.14)
KETONES UR STRIP-MCNC: NEGATIVE MG/DL
LEUKOCYTE ESTERASE UR QL STRIP: NEGATIVE
LYMPHOCYTES # BLD AUTO: 1.2 10E9/L (ref 0.8–5.3)
LYMPHOCYTES NFR BLD AUTO: 13.7 %
MCH RBC QN AUTO: 32.5 PG (ref 26.5–33)
MCHC RBC AUTO-ENTMCNC: 32.3 G/DL (ref 31.5–36.5)
MCV RBC AUTO: 101 FL (ref 78–100)
MONOCYTES # BLD AUTO: 1.1 10E9/L (ref 0–1.3)
MONOCYTES NFR BLD AUTO: 11.8 %
MUCOUS THREADS #/AREA URNS LPF: PRESENT /LPF
NEUTROPHILS # BLD AUTO: 6.5 10E9/L (ref 1.6–8.3)
NEUTROPHILS NFR BLD AUTO: 72.4 %
NITRATE UR QL: POSITIVE
NRBC # BLD AUTO: 0 10*3/UL
NRBC BLD AUTO-RTO: 0 /100
PH UR STRIP: 7.5 PH (ref 5–7)
PLATELET # BLD AUTO: 166 10E9/L (ref 150–450)
POTASSIUM SERPL-SCNC: 3.8 MMOL/L (ref 3.4–5.3)
RBC # BLD AUTO: 3.63 10E12/L (ref 3.8–5.2)
RBC #/AREA URNS AUTO: 9 /HPF (ref 0–2)
SODIUM SERPL-SCNC: 137 MMOL/L (ref 133–144)
SOURCE: ABNORMAL
SP GR UR STRIP: 1.02 (ref 1–1.03)
SQUAMOUS #/AREA URNS AUTO: <1 /HPF (ref 0–1)
TROPONIN I SERPL-MCNC: <0.015 UG/L (ref 0–0.04)
UROBILINOGEN UR STRIP-MCNC: NORMAL MG/DL (ref 0–2)
WBC # BLD AUTO: 9 10E9/L (ref 4–11)
WBC #/AREA URNS AUTO: 6 /HPF (ref 0–5)

## 2020-09-06 PROCEDURE — 87086 URINE CULTURE/COLONY COUNT: CPT | Performed by: EMERGENCY MEDICINE

## 2020-09-06 PROCEDURE — 87186 SC STD MICRODIL/AGAR DIL: CPT | Performed by: EMERGENCY MEDICINE

## 2020-09-06 PROCEDURE — 25000128 H RX IP 250 OP 636: Performed by: INTERNAL MEDICINE

## 2020-09-06 PROCEDURE — U0003 INFECTIOUS AGENT DETECTION BY NUCLEIC ACID (DNA OR RNA); SEVERE ACUTE RESPIRATORY SYNDROME CORONAVIRUS 2 (SARS-COV-2) (CORONAVIRUS DISEASE [COVID-19]), AMPLIFIED PROBE TECHNIQUE, MAKING USE OF HIGH THROUGHPUT TECHNOLOGIES AS DESCRIBED BY CMS-2020-01-R: HCPCS | Performed by: EMERGENCY MEDICINE

## 2020-09-06 PROCEDURE — 87088 URINE BACTERIA CULTURE: CPT | Performed by: EMERGENCY MEDICINE

## 2020-09-06 PROCEDURE — 81001 URINALYSIS AUTO W/SCOPE: CPT | Performed by: EMERGENCY MEDICINE

## 2020-09-06 PROCEDURE — 70496 CT ANGIOGRAPHY HEAD: CPT

## 2020-09-06 PROCEDURE — 25000125 ZZHC RX 250: Performed by: INTERNAL MEDICINE

## 2020-09-06 PROCEDURE — C9803 HOPD COVID-19 SPEC COLLECT: HCPCS

## 2020-09-06 PROCEDURE — 99285 EMERGENCY DEPT VISIT HI MDM: CPT | Mod: 25

## 2020-09-06 PROCEDURE — 70450 CT HEAD/BRAIN W/O DYE: CPT

## 2020-09-06 PROCEDURE — 80048 BASIC METABOLIC PNL TOTAL CA: CPT | Performed by: EMERGENCY MEDICINE

## 2020-09-06 PROCEDURE — 96360 HYDRATION IV INFUSION INIT: CPT

## 2020-09-06 PROCEDURE — 25000132 ZZH RX MED GY IP 250 OP 250 PS 637: Performed by: INTERNAL MEDICINE

## 2020-09-06 PROCEDURE — 82140 ASSAY OF AMMONIA: CPT | Performed by: EMERGENCY MEDICINE

## 2020-09-06 PROCEDURE — 12000000 ZZH R&B MED SURG/OB

## 2020-09-06 PROCEDURE — 84484 ASSAY OF TROPONIN QUANT: CPT | Performed by: EMERGENCY MEDICINE

## 2020-09-06 PROCEDURE — 25800030 ZZH RX IP 258 OP 636: Performed by: INTERNAL MEDICINE

## 2020-09-06 PROCEDURE — 85025 COMPLETE CBC W/AUTO DIFF WBC: CPT | Performed by: EMERGENCY MEDICINE

## 2020-09-06 PROCEDURE — 25000132 ZZH RX MED GY IP 250 OP 250 PS 637: Performed by: EMERGENCY MEDICINE

## 2020-09-06 PROCEDURE — 25800030 ZZH RX IP 258 OP 636: Performed by: EMERGENCY MEDICINE

## 2020-09-06 PROCEDURE — 85610 PROTHROMBIN TIME: CPT | Performed by: EMERGENCY MEDICINE

## 2020-09-06 PROCEDURE — 93005 ELECTROCARDIOGRAM TRACING: CPT

## 2020-09-06 PROCEDURE — 99223 1ST HOSP IP/OBS HIGH 75: CPT | Mod: AI | Performed by: INTERNAL MEDICINE

## 2020-09-06 RX ORDER — ACETAMINOPHEN 325 MG/1
325-650 TABLET ORAL EVERY 6 HOURS PRN
Status: DISCONTINUED | OUTPATIENT
Start: 2020-09-06 | End: 2020-09-08

## 2020-09-06 RX ORDER — HYDROCODONE BITARTRATE AND ACETAMINOPHEN 5; 325 MG/1; MG/1
1 TABLET ORAL EVERY 8 HOURS PRN
COMMUNITY

## 2020-09-06 RX ORDER — METHYLPREDNISOLONE SODIUM SUCCINATE 125 MG/2ML
125 INJECTION, POWDER, LYOPHILIZED, FOR SOLUTION INTRAMUSCULAR; INTRAVENOUS ONCE
Status: DISCONTINUED | OUTPATIENT
Start: 2020-09-06 | End: 2020-09-06

## 2020-09-06 RX ORDER — NALOXONE HYDROCHLORIDE 0.4 MG/ML
.1-.4 INJECTION, SOLUTION INTRAMUSCULAR; INTRAVENOUS; SUBCUTANEOUS
Status: DISCONTINUED | OUTPATIENT
Start: 2020-09-06 | End: 2020-09-14 | Stop reason: HOSPADM

## 2020-09-06 RX ORDER — EMOLLIENT BASE
CREAM (GRAM) TOPICAL 2 TIMES DAILY
Status: DISCONTINUED | OUTPATIENT
Start: 2020-09-06 | End: 2020-09-14 | Stop reason: HOSPADM

## 2020-09-06 RX ORDER — CEFTRIAXONE 1 G/1
1 INJECTION, POWDER, FOR SOLUTION INTRAMUSCULAR; INTRAVENOUS EVERY 24 HOURS
Status: DISCONTINUED | OUTPATIENT
Start: 2020-09-06 | End: 2020-09-11

## 2020-09-06 RX ORDER — ASPIRIN 300 MG/1
300 SUPPOSITORY RECTAL ONCE
Status: COMPLETED | OUTPATIENT
Start: 2020-09-06 | End: 2020-09-06

## 2020-09-06 RX ORDER — DIPHENHYDRAMINE HYDROCHLORIDE 50 MG/ML
25 INJECTION INTRAMUSCULAR; INTRAVENOUS ONCE
Status: DISCONTINUED | OUTPATIENT
Start: 2020-09-06 | End: 2020-09-06

## 2020-09-06 RX ORDER — FLUOCINONIDE TOPICAL SOLUTION USP, 0.05% 0.5 MG/ML
SOLUTION TOPICAL 2 TIMES DAILY PRN
COMMUNITY
End: 2021-09-22

## 2020-09-06 RX ORDER — MULTIPLE VITAMINS W/ MINERALS TAB 9MG-400MCG
1 TAB ORAL DAILY
COMMUNITY

## 2020-09-06 RX ORDER — LIDOCAINE 40 MG/G
CREAM TOPICAL
Status: DISCONTINUED | OUTPATIENT
Start: 2020-09-06 | End: 2020-09-14 | Stop reason: HOSPADM

## 2020-09-06 RX ORDER — METHYLPREDNISOLONE SODIUM SUCCINATE 125 MG/2ML
125 INJECTION, POWDER, LYOPHILIZED, FOR SOLUTION INTRAMUSCULAR; INTRAVENOUS ONCE
Status: COMPLETED | OUTPATIENT
Start: 2020-09-06 | End: 2020-09-06

## 2020-09-06 RX ORDER — SODIUM CHLORIDE 9 MG/ML
1000 INJECTION, SOLUTION INTRAVENOUS CONTINUOUS
Status: DISCONTINUED | OUTPATIENT
Start: 2020-09-06 | End: 2020-09-06

## 2020-09-06 RX ORDER — ASPIRIN 300 MG/1
300 SUPPOSITORY RECTAL DAILY
Status: DISCONTINUED | OUTPATIENT
Start: 2020-09-07 | End: 2020-09-11

## 2020-09-06 RX ORDER — ATORVASTATIN CALCIUM 10 MG/1
10 TABLET, FILM COATED ORAL AT BEDTIME
Status: DISCONTINUED | OUTPATIENT
Start: 2020-09-06 | End: 2020-09-14 | Stop reason: HOSPADM

## 2020-09-06 RX ORDER — ONDANSETRON 2 MG/ML
4 INJECTION INTRAMUSCULAR; INTRAVENOUS EVERY 6 HOURS PRN
Status: DISCONTINUED | OUTPATIENT
Start: 2020-09-06 | End: 2020-09-14 | Stop reason: HOSPADM

## 2020-09-06 RX ORDER — IPRATROPIUM BROMIDE AND ALBUTEROL SULFATE 2.5; .5 MG/3ML; MG/3ML
3 SOLUTION RESPIRATORY (INHALATION) 4 TIMES DAILY
Status: DISCONTINUED | OUTPATIENT
Start: 2020-09-06 | End: 2020-09-14 | Stop reason: HOSPADM

## 2020-09-06 RX ORDER — ONDANSETRON 4 MG/1
4 TABLET, ORALLY DISINTEGRATING ORAL EVERY 6 HOURS PRN
Status: DISCONTINUED | OUTPATIENT
Start: 2020-09-06 | End: 2020-09-14 | Stop reason: HOSPADM

## 2020-09-06 RX ORDER — DIPHENHYDRAMINE HYDROCHLORIDE 50 MG/ML
25 INJECTION INTRAMUSCULAR; INTRAVENOUS ONCE
Status: COMPLETED | OUTPATIENT
Start: 2020-09-06 | End: 2020-09-06

## 2020-09-06 RX ORDER — SODIUM CHLORIDE 9 MG/ML
INJECTION, SOLUTION INTRAVENOUS CONTINUOUS
Status: DISCONTINUED | OUTPATIENT
Start: 2020-09-06 | End: 2020-09-09

## 2020-09-06 RX ORDER — IOPAMIDOL 755 MG/ML
500 INJECTION, SOLUTION INTRAVASCULAR ONCE
Status: COMPLETED | OUTPATIENT
Start: 2020-09-06 | End: 2020-09-06

## 2020-09-06 RX ORDER — WARFARIN SODIUM 2 MG/1
4 TABLET ORAL
COMMUNITY
End: 2021-05-28

## 2020-09-06 RX ORDER — ALBUTEROL SULFATE 0.83 MG/ML
2.5 SOLUTION RESPIRATORY (INHALATION) EVERY 4 HOURS PRN
Status: DISCONTINUED | OUTPATIENT
Start: 2020-09-06 | End: 2020-09-14 | Stop reason: HOSPADM

## 2020-09-06 RX ADMIN — Medication: at 22:01

## 2020-09-06 RX ADMIN — SODIUM CHLORIDE 80 ML: 9 INJECTION, SOLUTION INTRAVENOUS at 20:48

## 2020-09-06 RX ADMIN — CEFTRIAXONE SODIUM 1 G: 1 INJECTION, POWDER, FOR SOLUTION INTRAMUSCULAR; INTRAVENOUS at 19:44

## 2020-09-06 RX ADMIN — METHYLPREDNISOLONE SODIUM SUCCINATE 125 MG: 125 INJECTION, POWDER, FOR SOLUTION INTRAMUSCULAR; INTRAVENOUS at 19:43

## 2020-09-06 RX ADMIN — DIPHENHYDRAMINE HYDROCHLORIDE 25 MG: 50 INJECTION, SOLUTION INTRAMUSCULAR; INTRAVENOUS at 19:42

## 2020-09-06 RX ADMIN — MICONAZOLE NITRATE: 20 POWDER TOPICAL at 22:01

## 2020-09-06 RX ADMIN — ASPIRIN 300 MG: 300 SUPPOSITORY RECTAL at 15:18

## 2020-09-06 RX ADMIN — SODIUM CHLORIDE: 9 INJECTION, SOLUTION INTRAVENOUS at 19:31

## 2020-09-06 RX ADMIN — IOPAMIDOL 70 ML: 755 INJECTION, SOLUTION INTRAVENOUS at 20:48

## 2020-09-06 RX ADMIN — SODIUM CHLORIDE 500 ML: 9 INJECTION, SOLUTION INTRAVENOUS at 15:51

## 2020-09-06 ASSESSMENT — ACTIVITIES OF DAILY LIVING (ADL): ADLS_ACUITY_SCORE: 21

## 2020-09-06 NOTE — PHARMACY-ADMISSION MEDICATION HISTORY
Admission medication history interview status for this patient is complete. See [facility] admission navigator for allergy information, prior to admission medications and immunization status.     Medication history interview done via telephone during Covid-19 pandemic, indicate source(s): Family  Medication history resources (including written lists, pill bottles, clinic record): Daughter looked at pill bottles and read off the labels.  [facility]: [facility]    Changes made to PTA medication list:  Added: vitamin D3 + calcium, multivit, fluocinonide solution  Deleted: OTC Tylenol, Novolog, Lantus Solostar, melatonin, Miralax, prednisone, senna-docusate (all were listed on PTA med list as transitional and Hilton stated she is not taking these), also removed duplicate entry for Duoneb, omeprazole (not taking), calcium, and vitamin D 50,000 units weekly (changed to calcium + D daily dose)  Changed: hydrocodone-APAP (changed from twice daily to every 8 hours as needed), warfarin (changed from 2 mg daily to 5 mg M, W, F & 4 mg all other days), metoprolol (changed from 50 mg twice daily to 37.5 mg twice daily)    Actions taken by pharmacist (provider contacted, etc): Called Radha's daughter, Hilton, who read off the pill bottles and confirmed last doses for each medication. Hilotn also confirmed Radha is not using the transitional medications, except DuoNeb, and is not using any insulin. Warfarin last doses:  Thur - 4 mg (two 2 mg tabs)  Fri - 5 mg (two and one-half 2 mg tabs)  Sat - 4 mg     Additional medication history information: Hilton also wanted me to note that Radha was walking with assistance from her walker at approximately 12:30 PM today before her stroke. Hilton said she is waiting for a call back from the attending physician and I informed her I would pass the message along to the nursing station.    Medication reconciliation/reorder completed by provider prior to medication history?  Y    Prior to Admission medications     Medication Sig Last Dose Taking? Auth Provider   albuterol (PROVENTIL) (2.5 MG/3ML) 0.083% neb solution Take 1 vial (2.5 mg) by nebulization every 4 hours as needed for wheezing 9/5/2020 Yes Zenon Solis MD   atorvastatin (LIPITOR) 10 MG tablet Take 10 mg by mouth At Bedtime  9/5/2020 at 1700 Yes Reported, Patient   calcium carbonate-vitamin D (OSCAL W/D) 500-200 MG-UNIT tablet Take 1 tablet by mouth daily Past Week Yes Reported, Patient   emollient (VANICREAM) external cream Apply topically 2 times daily To whole body 9/6/2020 Yes Unknown, Entered By History   fluocinonide (LIDEX) 0.05 % external solution Apply topically 2 times daily Apply to affected areas of the scalp twice daily for 2 weeks at a time as needed for dryness. Past Month Yes Reported, Patient   furosemide (LASIX) 20 MG tablet Take 1 tablet (20 mg) by mouth daily Past Week Yes Zenon Solis MD   HYDROcodone-acetaminophen (NORCO) 5-325 MG tablet Take 1 tablet by mouth every 8 hours as needed for severe pain 9/5/2020 Yes Reported, Patient   ipratropium - albuterol 0.5 mg/2.5 mg/3 mL (DUONEB) 0.5-2.5 (3) MG/3ML neb solution Take 1 vial (3 mLs) by nebulization 4 times daily 9/5/2020 Yes Dean Tavarez MD   metoprolol tartrate (LOPRESSOR) 50 MG tablet Take 37.5 mg by mouth 2 times daily  9/5/2020 at 1700 Yes Unknown, Entered By History   multivitamin w/minerals (THERA-VIT-M) tablet Take 1 tablet by mouth daily 9/5/2020 Yes Reported, Patient   nystatin (MYCOSTATIN) 368851 UNIT/GM external powder Apply topically 2 times daily Under right breast and groin area Past Week Yes Unknown, Entered By History   warfarin ANTICOAGULANT (COUMADIN) 2 MG tablet Take 5 mg by mouth Take 5 mg by mouth on Mon, Wed, Fri. Take 4 mg all other days of the week. 9/4/2020 at 1700 (5 mg) Yes Reported, Patient   warfarin ANTICOAGULANT (COUMADIN) 2 MG tablet Take 4 mg by mouth Take 4 mg by mouth Tue, Thur, Sat, and Sun. Take 5 mg Mon, Wed, Fri. 9/5/2020 at 1700 (4 mg)  "Yes Reported, Patient   fluticasone (FLONASE) 50 MCG/ACT nasal spray Spray 1 spray into both nostrils daily as needed for rhinitis or allergies Unknown  Unknown, Entered By History   Nutritional Supplements (NUTRITIONAL SUPPLEMENT PLUS) LIQD Take by mouth 2 times daily \"Magic Cup\" brand nutritional supplement   Unknown, Entered By History         "

## 2020-09-06 NOTE — PROVIDER NOTIFICATION
MD paged. CT called to clarify contrast Dye.  pt is allergic for contrast Dye. Please advise?  Thank you.

## 2020-09-06 NOTE — ED PROVIDER NOTES
History     Chief Complaint:  Altered Mental Status    HPI   Radha Cunningham is a 89 year old female who presents with altered mental status for last 2 days.  History of atrial fibrillation on Coumadin.  Patient unable to provide history at bedside given altered mental status and is unable to follow commands.  Per daughter at bedside, she lives with another daughter at home and the other daughter had noticed decreased responsiveness 2 days prior.  Patient otherwise normally able to do her ADLs and have a conversation without difficulty.  Given persistent altered state, they present to the ER for evaluation.  No history of fever, complaint of headache, chest pain, abdominal pain or falls.    Allergies:  Contrast dye     Medications:    Calcium  Flonase  Proventil  Miralax  Senna-S  Novolog U-100  Lancets  Mycostatin  Triamcinolone  Warfarin  Norco  Metoprolol  Nystatin  Prednisone  Duoneb  Lipitor  Omeprazole  Lasix  Lantus  Lidex    Past Medical History:    Asthma  Cholithiasis  Diverticulosis  Hiatal hernia  HTN  Paroxysmal A-fib  PUD  Skin cancer  Malignant neoplasm of thyroid gland  CHF  Reactive airway disease  ARF  Cognitive impairment  Type 2 diabetes  Peptic ulcer  Anemia  Cardiomegaly  Syncope  Osteoporosis  Chronic diarrhea  DJD  TIA    Past Surgical History:    Hernia repair  Tonsillectomy  Left fractured ankle repair  Cataract removal - right  Cholecystectomy  Pacemaker placement - left    Family History:    Heart attack  Stroke  Diabetes  Obesity  Anxiety  Asthma  HTN  Aneurysm  Heart failure  Dementia    Social History:  Smoking status: Never  Alcohol use: No  Drug use: No  Marital Status:   [2]     Review of Systems   Unable to perform ROS: Mental status change         Physical Exam     Patient Vitals for the past 24 hrs:   BP Temp Temp src Pulse Resp SpO2 Weight   09/06/20 1731 119/77 98.4  F (36.9  C) Axillary 70 14 100 % 71.9 kg (158 lb 8 oz)   09/06/20 1630 (!) 139/95 -- -- 58 12 97 % --    09/06/20 1615 (!) 145/87 -- -- 88 18 96 % --   09/06/20 1545 (!) 133/98 -- -- 70 21 95 % --   09/06/20 1530 (!) 144/86 -- -- 85 24 -- --   09/06/20 1515 (!) 147/90 -- -- 64 21 96 % --   09/06/20 1445 133/82 -- -- 60 26 100 % --   09/06/20 1415 (!) 138/90 -- -- 64 18 95 % --   09/06/20 1333 (!) 141/81 -- -- -- -- -- --   09/06/20 1330 -- 97.8  F (36.6  C) Oral -- 16 97 % --       Physical Exam   General: Alert, unable to follow commands  Neuro:  Dysarthric, garbled speech.  No appreciable facial droop. Symmetrically withdraws all 4 extremities to pain.  Leftward eye gaze noted, pupils 2 mm round and sluggish bilaterally.  GCS E 4, M 4, V 2 = 10  HEENT:  Dry mucous membranes.  Conjunctiva normal.   CV:  Irregular, no r/g, skin warm and well perfused  Pulm:  CTAB, no wheezes/ronchi/rales.  No acute distress, breathing comfortably  GI:  Soft, nontender, nondistended.  No rebound or guarding.  Normal bowel sounds  MSK:  No focal areas of edema, erythema, or tenderness  Skin:  WWP, no rashes, no lower extremity edema, skin color normal, no diaphoresis    Emergency Department Course   ECG (13:35:13):  Rate 83 bpm. WV interval *. QRS duration 102. QT/QTc 394/462. P-R-T axes * -51 -24. A-fib with occasional ventricular-paced complexes. Left anterior fascicular block. Abnormal ECG when compared with ECG of 12/15/2019. Electronic ventricular pacemaker has replaced A-fib. Vent rate has decrease BY 53 BPM. Interpreted at 1400 by Deon Patel MD.    Imaging:  Radiographic findings were communicated with the patient who voiced understanding of the findings.    Head CT wo Contrast  IMPRESSION:  1. Decreased attenuation involving gray and white matter in the medial left temporal occipital lobe. This most likely represents an acute ischemic infarct although other etiologies including encephalitis could also give a similar pattern. If clinically indicated, MRI might be helpful in further evaluation.  2. Cerebral atrophy  with scattered nonspecific white matter changes which are most likely due to chronic small vessel ischemic disease given the patient's age.  3. 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  4. No evidence for intracranial hemorrhage.    As read by Radiology.    Laboratory:  CBC: WNL (WBC 9.0, HGB 11.8, )  BMP: Glucose 110 (H), GFR 57 (L), o/w WNL (Creatinine 0.88)  Ammonia 14  INR 1.08  1344 Troponin I <0.015  UA: Blood trace, pH 7.5 (H), Protein 10, Nitrite positive, WBC 6 (H), RBC 9 (H), bacteria many, mucous present, o/w WNL    COVID-19 PCR pending       Interventions:  1518 Aspirin 300 mg Rectal  1551 NS 1L IV Bolus    Emergency Department Course:  The patient arrived in the emergency department via EMS.    Past medical records, nursing notes, and vitals reviewed.  1400: I performed an exam of the patient and obtained history, as documented above.    EKG obtained in the ED, see results above.     IV was inserted and blood was drawn for laboratory testing, results above.    The patient provided a urine sample here in the emergency department. This was sent for laboratory testing, findings above.    The patient was sent for a head CT while in the emergency department, findings above.     1456: Discussed patient with stroke neuro, Dr. Velasquez.    Findings and plan explained to the Patient and relative who consents to admission.     1532: Discussed the patient with Dr. Camarena, who will admit the patient to a [facility] bed for further monitoring, evaluation, and treatment.     Impression & Plan        Medical Decision Making:  Radha Cunningham is a 89 year old female with history significant for atrial fibrillation on Coumadin who presents for evaluation of altered mental status reportedly over the last 2 days.  In ER, patient is unable to provide history and has garbled speech with leftward gaze.  Work-up ultimately reveals concerning findings for acute ischemic infarct to the left temporal occipital lobe. Unfortunately,  given timing of symptoms, she is not a candidate for tPA.  EKG shows atrial fibrillation without any acute ST changes concerning for ischemia or infarct and troponin is within normal limits.  INR is subtherapeutic and daughter states that Coumadin was held last week for skin biopsy.  The rest of the patient's blood studies are unremarkable.  Urinalysis concerning for possible urinary tract infection, otherwise patient does not appear septic.  Urine culture is added.  Discussed case with stroke neurology.  Unfortunately, given pacemaker, patient unable to get MRI.  Rectal aspirin was given in the ER.  We will admit the patient to medicine for continued monitoring and ongoing care.    Covid-19  June P Marisa was evaluated during a global COVID-19 pandemic, which necessitated consideration that the patient might be at risk for infection with the SARS-CoV-2 virus that causes COVID-19.   Applicable protocols for evaluation were followed during the patient's care.   COVID-19 was considered as part of the patient's evaluation. The plan for testing is:  a test was obtained during this visit      Diagnosis:    ICD-10-CM    1. Cerebrovascular accident (CVA), unspecified mechanism (H)  I63.9 UA with Microscopic     Troponin I     Troponin I     Asymptomatic COVID-19 Virus (Coronavirus) by PCR     Urine Culture Aerobic Bacterial     CANCELED: Troponin I       Disposition:  Admitted to med tele.    Discharge Medications:  Current Discharge Medication List            Scribe Disclosure:  Sharan BARRIOS, am serving as a scribe at 2:18 PM on 9/6/2020 to document services personally performed by Deon Patel MD based on my observations and the provider's statements to me.    [facility] EMERGENCY DEPARTMENT       Deon Patel MD  09/06/20 [id number]

## 2020-09-06 NOTE — H&P
"    [facility] History and Physical    Radha Cunningham MRN# [id number]   Age: 89 year old YOB: 1929     Date of Admission:  9/6/2020    Home clinic: [facility]  Primary care provider: Samaria Chappell          Assessment and Plan:   Assessment:   Radha Cunningham is a 89-year-old woman who was brought in by EMS today with report of the patient's behavior having changed over the last 24 to 48 hours.  I met with her daughter Carolina in the emergency department and spoke also with the patient's daughter Ana by telephone for additional information.    On presentation to the emergency department, Ms. Cunningham was noted to be hemodynamically stable with rate controlled atrial fibrillation but unable to give meaningful history.  Dr. Patel indicates that he was unable to get her to follow directions though she was alert.  Her speech was \"garbled\", suspect that she had some degree of expressive aphasia and she was noted to have a leftward gaze.  CT scan of the head without contrast showed suspected large left MCA territory stroke in the setting of multiple other strokes and generalized volume loss.  He then spoke with stroke neurology, Dr. Pancho Velasquez, [facility] who recommended an aspirin and follow-up with an MRI.  Based on the time course of the patient's symptoms, it was not felt that she would be a candidate for any type of thrombolytic therapy.  Labs: Creatinine 0.88, normal electrolytes, troponin less than the measurable threshold.  WBC 9.0 with normal differential, hemoglobin 11.8, platelet count 166.  INR 1.08, urinalysis (obtained after catheter placement) is potentially suggestive of UTI.    Dx:  1.  Left posterior circulation stroke, subacute.    2.  Chronic atrial fibrillation with chronotropic incompetence, status post pacemaker placement.  The patient had previously been anticoagulated for this but Coumadin was held last week due to planned skin biopsies.  3.  Possible urinary " tract infection.  4.  Asthma.      Plan:   1.  Admit to inpatient on telemetry.  2.  OT/PT/speech therapy is consulted.  3.  Certainly social work will need to be involved.  Unfortunately, the patient's family believes that the patient will not be well served in a nursing home and I am very concerned that she will need more care than can be given at home without a lot of help.  4.  Ceftriaxone 1 g IV every 24 hours.  5.  Patient will be n.p.o. until cleared by speech therapy tomorrow.  6.  Aspirin 300 mg daily p.o. or UT.  7.  Formal stroke neurology consultation.  CT angiography head and neck has been requested by stroke neurology.  They have recommended that I not pursue anticoagulation at this point due to the risk of hemorrhagic transformation of this CVA.             Chief Complaint:   Decrease in interaction     Unable to obtain a history from the patient due to apparent somnolence.  The patient's daughter, Carolina was at the bedside and I also spoke with daughter Ana by telephone.  I discussed the case with Dr. Patel as well as Dr. Velasquez and reviewed the electronic medical record.    Ms. Cunningham lives with her daughter, Ana, and is typically fully independent.  She sometimes stays up very late and ends up sleeping through the day and that was what her daughter had assumed occurred earlier yesterday, when the patient seemed excessively somnolent.    Today at approximately 3:30 AM, Ana was awakened by the sound of the nebulizer machine.  She found her mother sitting on the steps reaching for her keys and being incoherent.  She recalled some of the odd behaviors that had occurred earlier in the day and became concerned.  Ultimately, she called EMS at around 12:30 today at which time the patient was brought in.    The patient is unable to give me additional history.  Talking with the patient's daughter, Carolina in the emergency department, I am very concerned the general distrust of medical care may become a  "problem.  Carolina is convinced that the patient would not be adequately cared for at a nursing home.  When I talked about DNR/DNI status and hospice, she commented that \"that is where you go to die\".    Review of systems: The patient apparently is typically able to ambulate with a walker or a cane.  She also usually is articulate, feeds herself and although she has some incontinence typically is able to toilet herself.  She does have some asthma but is not currently having any major symptoms.  The patient has not evidently had fevers, sweats or chills.  No trouble with complaints of chest pain or dramatic shortness of breath.  No witnessed nausea, vomiting or diarrhea.        Past Medical History:     Past Medical History:   Diagnosis Date     Asthma      Cholelithiasis      Diverticulosis      Hiatal hernia      Hypertension      Permanent A-fib (H)      PUD (peptic ulcer disease)      Skin cancer              Past Surgical History:      Past Surgical History:   Procedure Laterality Date     HERNIA REPAIR  2004    umbilical     Skin cancer removal - forehead  2005             Social History:     Social History     Tobacco Use     Smoking status: Never Smoker   Substance Use Topics     Alcohol use: No             Family History:     Family History   Problem Relation Age of Onset     C.A.D. Mother      Hypertension Mother      Family history reviewed and considered noncontributory in the setting of the patient advanced age.          Allergies:     Allergies   Allergen Reactions     Contrast Dye              Medications:     Medications Prior to Admission   Medication Sig Dispense Refill Last Dose     albuterol (PROVENTIL) (2.5 MG/3ML) 0.083% neb solution Take 1 vial (2.5 mg) by nebulization every 4 hours as needed for wheezing   9/5/2020     atorvastatin (LIPITOR) 10 MG tablet Take 10 mg by mouth At Bedtime    9/5/2020 at 1700     calcium carbonate-vitamin D (OSCAL W/D) 500-200 MG-UNIT tablet Take 1 tablet by mouth " "daily   Past Week     emollient (VANICREAM) external cream Apply topically 2 times daily To whole body   9/6/2020     fluocinonide (LIDEX) 0.05 % external solution Apply topically 2 times daily Apply to affected areas of the scalp twice daily for 2 weeks at a time as needed for dryness.   Past Month     furosemide (LASIX) 20 MG tablet Take 1 tablet (20 mg) by mouth daily   Past Week     HYDROcodone-acetaminophen (NORCO) 5-325 MG tablet Take 1 tablet by mouth every 8 hours as needed for severe pain   9/5/2020     ipratropium - albuterol 0.5 mg/2.5 mg/3 mL (DUONEB) 0.5-2.5 (3) MG/3ML neb solution Take 1 vial (3 mLs) by nebulization 4 times daily   9/5/2020     metoprolol tartrate (LOPRESSOR) 50 MG tablet Take 37.5 mg by mouth 2 times daily    9/5/2020 at 1700     multivitamin w/minerals (THERA-VIT-M) tablet Take 1 tablet by mouth daily   9/5/2020     nystatin (MYCOSTATIN) 553878 UNIT/GM external powder Apply topically 2 times daily Under right breast and groin area   Past Week     warfarin ANTICOAGULANT (COUMADIN) 2 MG tablet Take 5 mg by mouth Take 5 mg by mouth on Mon, Wed, Fri. Take 4 mg all other days of the week.   9/4/2020 at 1700 (5 mg)     warfarin ANTICOAGULANT (COUMADIN) 2 MG tablet Take 4 mg by mouth Take 4 mg by mouth Tue, Thur, Sat, and Sun. Take 5 mg Mon, Wed, Fri.   9/5/2020 at 1700 (4 mg)     fluticasone (FLONASE) 50 MCG/ACT nasal spray Spray 1 spray into both nostrils daily as needed for rhinitis or allergies   Unknown     Nutritional Supplements (NUTRITIONAL SUPPLEMENT PLUS) LIQD Take by mouth 2 times daily \"Magic Cup\" brand nutritional supplement                Review of Systems:   A comprehensive review of systems was performed and found to be negative except as described in this note           Physical Exam:   Vitals were reviewed  Temp: 98.4  F (36.9  C) Temp src: Axillary BP: 119/77 Pulse: 70   Resp: 14 SpO2: 100 % O2 Device: None (Room air)    Constitutional: somnolent and minimally responsive " to voice  Eyes: pupils equal, round and reactive to light and leftward gaze of both eyes.  ENT: normocepalic, without obvious abnormality, atraumatic  Neck: Supple, symmetrical, trachea midline, no adenopathy, thyroid symmetric, not enlarged and no tenderness, skin normal.  Hematologic / Lymphatic: No cervical lymphadenopathy and no supraclavicular lymphadenopathy.  Lungs: No increased work of breathing, good air exchange, clear to auscultation bilaterally, no crackles or wheezing.  Cardiovascular: Irregularly irregular rate and rhythm  Abdomen: Soft without remarkable tenderness.  No masses appreciated.  No hepatosplenomegaly.  Musculoskeletal: Unable to test tone.  Muscle bulk appears normal.  No obviously inflamed or swollen joints appreciated over the distal arms and legs.  Neurologic: Not interactive with me at all.  Patient reportedly had been talking with her daughter both before and after I saw her.  Neuropsychiatric: Unable due to somnolence.  Skin: No rashes, erythema, pallor, petechia or purpura.          Data:     Results for orders placed or performed during the hospital encounter of 09/06/20 (from the past 24 hour(s))   EKG 12-lead, tracing only   Result Value Ref Range    Interpretation ECG Click View Image link to view waveform and result    CBC with platelets differential   Result Value Ref Range    WBC 9.0 4.0 - 11.0 10e9/L    RBC Count 3.63 (L) 3.8 - 5.2 10e12/L    Hemoglobin 11.8 11.7 - 15.7 g/dL    Hematocrit 36.5 35.0 - 47.0 %     (H) 78 - 100 fl    MCH 32.5 26.5 - 33.0 pg    MCHC 32.3 31.5 - 36.5 g/dL    RDW 13.7 10.0 - 15.0 %    Platelet Count 166 150 - 450 10e9/L    Diff Method Automated Method     % Neutrophils 72.4 %    % Lymphocytes 13.7 %    % Monocytes 11.8 %    % Eosinophils 0.1 %    % Basophils 0.4 %    % Immature Granulocytes 1.6 %    Nucleated RBCs 0 0 /100    Absolute Neutrophil 6.5 1.6 - 8.3 10e9/L    Absolute Lymphocytes 1.2 0.8 - 5.3 10e9/L    Absolute Monocytes 1.1 0.0 -  1.3 10e9/L    Absolute Eosinophils 0.0 0.0 - 0.7 10e9/L    Absolute Basophils 0.0 0.0 - 0.2 10e9/L    Abs Immature Granulocytes 0.1 0 - 0.4 10e9/L    Absolute Nucleated RBC 0.0    Basic metabolic panel   Result Value Ref Range    Sodium 137 133 - 144 mmol/L    Potassium 3.8 3.4 - 5.3 mmol/L    Chloride 105 94 - 109 mmol/L    Carbon Dioxide 27 20 - 32 mmol/L    Anion Gap 5 3 - 14 mmol/L    Glucose 110 (H) 70 - 99 mg/dL    Urea Nitrogen 16 7 - 30 mg/dL    Creatinine 0.88 0.52 - 1.04 mg/dL    GFR Estimate 57 (L) >60 mL/min/[1.73_m2]    GFR Estimate If Black 66 >60 mL/min/[1.73_m2]    Calcium 9.0 8.5 - 10.1 mg/dL   INR   Result Value Ref Range    INR 1.08 0.86 - 1.14   Ammonia   Result Value Ref Range    Ammonia 14 10 - 50 umol/L   Troponin I   Result Value Ref Range    Troponin I ES <0.015 0.000 - 0.045 ug/L   Head CT w/o contrast    Narrative    CT SCAN OF THE HEAD WITHOUT CONTRAST September 6, 2020 2:03 PM     HISTORY: Altered mental status unexplained. Elderly, altered mental  status < on warfarin, left gaze. Weakness. Patient unable to supply  history.    TECHNIQUE: Axial images of the head and coronal reformations without  IV contrast material. Radiation dose for this scan was reduced using  automated exposure control, adjustment of the mA and/or kV according  to patient size, or iterative reconstruction technique.    COMPARISON: None.    FINDINGS: There is some decreased attenuation in the medial left  temporal occipital lobe with subtle mass effect. Mild to moderate  cerebral atrophy is present. Moderately extensive white matter changes  in both hemispheres without mass effect or enhancement. There are old  lacunar infarcts in the right basal ganglia region. There is no  evidence for intracranial hemorrhage or skull fracture. Vascular  calcifications are noted. Visualized paranasal sinuses and mastoid air  cells are clear.      Impression    IMPRESSION:  1. Decreased attenuation involving gray and white matter  in the medial  left temporal occipital lobe. This most likely represents an acute  ischemic infarct although other etiologies including encephalitis  could also give a similar pattern. If clinically indicated, MRI might  be helpful in further evaluation.  2. Cerebral atrophy with scattered nonspecific white matter changes  which are most likely due to chronic small vessel ischemic disease  given the patient's age.  3. 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  4. No evidence for intracranial hemorrhage.   UA with Microscopic   Result Value Ref Range    Color Urine Light Yellow     Appearance Urine Clear     Glucose Urine Negative NEG^Negative mg/dL    Bilirubin Urine Negative NEG^Negative    Ketones Urine Negative NEG^Negative mg/dL    Specific Gravity Urine 1.019 1.003 - 1.035    Blood Urine Trace (A) NEG^Negative    pH Urine 7.5 (H) 5.0 - 7.0 pH    Protein Albumin Urine 10 (A) NEG^Negative mg/dL    Urobilinogen mg/dL Normal 0.0 - 2.0 mg/dL    Nitrite Urine Positive (A) NEG^Negative    Leukocyte Esterase Urine Negative NEG^Negative    Source Catheterized Urine     WBC Urine 6 (H) 0 - 5 /HPF    RBC Urine 9 (H) 0 - 2 /HPF    Bacteria Urine Many (A) NEG^Negative /HPF    Squamous Epithelial /HPF Urine <1 0 - 1 /HPF    Mucous Urine Present (A) NEG^Negative /LPF    Hyaline Casts 1 0 - 2 /LPF      EKG results:   Performed on admission        Atrial fibrillation with occasional ventricular paced beats.      All imaging studies reviewed by me.      Attestation:  I have reviewed today's vital signs, notes, medications, labs and imaging.  Total time: 40 minutes     Grover Camarena MD

## 2020-09-06 NOTE — ED NOTES
[facility]  ED Nurse Handoff Report    Radha Cunningham is a 89 year old female   ED Chief complaint: Altered Mental Status  . ED Diagnosis:   Final diagnoses:   Cerebrovascular accident (CVA), unspecified mechanism (H)     Allergies:   Allergies   Allergen Reactions     Contrast Dye        Code Status: Full Code  Activity level - Baseline/Home:  Stand by Assist. Activity Level - Current:   Total Care. Lift room needed: No. Bariatric: No   Needed: No   Isolation: No. Infection: Not Applicable.     Vital Signs:   Vitals:    09/06/20 1330 09/06/20 1333 09/06/20 1415 09/06/20 1445   BP:  (!) 141/81 (!) 138/90 133/82   Pulse:   64 60   Resp: 16  18 26   Temp: 97.8  F (36.6  C)      TempSrc: Oral      SpO2: 97%  95% 100%       Cardiac Rhythm:  ,      Pain level:    Patient confused: Yes. Patient Falls Risk: Yes.   Elimination Status: Urethral catheter (tsai) in place; refer to orders to discontinue as per protocol    Patient Report - Initial Complaint: Altered mental status. Focused Assessment: Cognitive - Cognitive/Neuro/Behavioral WDL: all (Pt has been less responsive over the last couple of days. Pt is less responsive throughout shift in ER. Pt only responds to painful stimuli and flexs with stimuli.) Level Of Consciousness: somnolent  Arousal Level: arouses to pain  Follows Commands: no  Speech: garbled   [place] Coma Scale - Best Eye Response: 2-->(E2) to pain  Best Motor Response: 3-->(M3) flexion to pain  Best Verbal Response: 2-->(V2) incomprehensible speech  [place] Coma Scale Score: 7    Tests Performed: blood work, CT . Abnormal Results:   Labs Ordered and Resulted from Time of ED Arrival Up to the Time of Departure from the ED   CBC WITH PLATELETS DIFFERENTIAL - Abnormal; Notable for the following components:       Result Value    RBC Count 3.63 (*)      (*)     All other components within normal limits   BASIC METABOLIC PANEL - Abnormal; Notable for the following components:     Glucose 110 (*)     GFR Estimate 57 (*)     All other components within normal limits   INR   AMMONIA   TROPONIN I   ROUTINE UA WITH MICROSCOPIC   GLUCOSE MONITOR NURSING POCT   COVID-19 VIRUS (CORONAVIRUS) BY PCR   VITAL SIGNS AND NEURO CHECKS   ACTIVITY   PULSE OXIMETRY NURSING   IP ACUTE INDWELLING URINARY CATHETER ([place])   DISCONTINUE INDWELLING URINARY CATHETER ([place])   BLADDER SCAN AND STRAIGHT CATH   CARDIAC CONTINUOUS MONITORING   NURSING DRAW AND HOLD   ASSESSMENT     Head CT w/o contrast   Preliminary Result   IMPRESSION:   1. Decreased attenuation involving gray and white matter in the medial   left temporal occipital lobe. This most likely represents an acute   ischemic infarct although other etiologies including encephalitis   could also give a similar pattern. If clinically indicated, MRI might   be helpful in further evaluation.   2. Cerebral atrophy with scattered nonspecific white matter changes   which are most likely due to chronic small vessel ischemic disease   given the patient's age.   3. 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   4. No evidence for intracranial hemorrhage.           Treatments provided: see MAR  Family Comments: Daughter at bedside  OBS brochure/video discussed/provided to patient:  N/A  ED Medications:   Medications   0.9% sodium chloride BOLUS (has no administration in time range)   sodium chloride 0.9% infusion (has no administration in time range)   aspirin (ASA) Suppository 300 mg (300 mg Rectal Given 9/6/20 9858)     Drips infusing:  Yes  For the majority of the shift, the patient's behavior Green. Interventions performed were N/A.    Sepsis treatment initiated: No     Patient tested for COVID 19 prior to admission: YES    ED Nurse Name/Phone Number: Angeline Argueta RN,   3:44 PM  RECEIVING UNIT ED HANDOFF REVIEW    Above ED Nurse Handoff Report was reviewed: Yes  Reviewed by: Ana Brandon RN on September 6, 2020 at 4:42 PM

## 2020-09-06 NOTE — ED NOTES
Bed: ED11  Expected date: 9/6/20  Expected time: 1:11 PM  Means of arrival: Ambulance  Comments:  [place] [phone]

## 2020-09-06 NOTE — ED TRIAGE NOTES
Pt presents to ED via EMS. Pt lives with her family and they report that pt has been less responsive over the last 2 days. EMS states that pt  and she is vitally stable. EMS states that pt can respond appropriately to some questions. ABC intact.

## 2020-09-06 NOTE — ED NOTES
89-year-old female here via EMS for altered mental status.  Patient unable to provide any meaningful history but family reports worsening weakness over the last 2 days.  No reports of family regarding fever, falls, seizure-like activity.  EMS stated she was intermittently responding to simple yes/no questions.    Vitals stable, leftward gaze, no evidence of trauma on a skeletal survey, benign abdomen, clear lungs.    Reportedly on warfarin and with altered mental status and limited ability to participate in exam we will have her get a noncontrast head CT, EKG, basic blood tests and urinalysis.  The oncoming evening provider will assume care.    MD Ata Martinez Jerome Richard, MD  09/06/20 2514

## 2020-09-07 ENCOUNTER — APPOINTMENT (OUTPATIENT)
Dept: PHYSICAL THERAPY | Facility: CLINIC | Age: 85
DRG: 064 | End: 2020-09-07
Attending: INTERNAL MEDICINE
Payer: COMMERCIAL

## 2020-09-07 ENCOUNTER — APPOINTMENT (OUTPATIENT)
Dept: CT IMAGING | Facility: CLINIC | Age: 85
DRG: 064 | End: 2020-09-07
Attending: PSYCHIATRY & NEUROLOGY
Payer: COMMERCIAL

## 2020-09-07 LAB
ANION GAP SERPL CALCULATED.3IONS-SCNC: 7 MMOL/L (ref 3–14)
BUN SERPL-MCNC: 19 MG/DL (ref 7–30)
CALCIUM SERPL-MCNC: 8.6 MG/DL (ref 8.5–10.1)
CHLORIDE SERPL-SCNC: 107 MMOL/L (ref 94–109)
CHOLEST SERPL-MCNC: 192 MG/DL
CO2 SERPL-SCNC: 23 MMOL/L (ref 20–32)
CREAT SERPL-MCNC: 0.85 MG/DL (ref 0.52–1.04)
ERYTHROCYTE [DISTWIDTH] IN BLOOD BY AUTOMATED COUNT: 13.4 % (ref 10–15)
GFR SERPL CREATININE-BSD FRML MDRD: 60 ML/MIN/{1.73_M2}
GLUCOSE BLDC GLUCOMTR-MCNC: 118 MG/DL (ref 70–99)
GLUCOSE BLDC GLUCOMTR-MCNC: 127 MG/DL (ref 70–99)
GLUCOSE BLDC GLUCOMTR-MCNC: 143 MG/DL (ref 70–99)
GLUCOSE BLDC GLUCOMTR-MCNC: 157 MG/DL (ref 70–99)
GLUCOSE SERPL-MCNC: 149 MG/DL (ref 70–99)
HBA1C MFR BLD: 6.5 % (ref 0–5.6)
HCT VFR BLD AUTO: 37.5 % (ref 35–47)
HDLC SERPL-MCNC: 71 MG/DL
HGB BLD-MCNC: 12.3 G/DL (ref 11.7–15.7)
INTERPRETATION ECG - MUSE: NORMAL
LABORATORY COMMENT REPORT: NORMAL
LDLC SERPL CALC-MCNC: 108 MG/DL
MCH RBC QN AUTO: 32.4 PG (ref 26.5–33)
MCHC RBC AUTO-ENTMCNC: 32.8 G/DL (ref 31.5–36.5)
MCV RBC AUTO: 99 FL (ref 78–100)
NONHDLC SERPL-MCNC: 121 MG/DL
PLATELET # BLD AUTO: 168 10E9/L (ref 150–450)
POTASSIUM SERPL-SCNC: 3.7 MMOL/L (ref 3.4–5.3)
RBC # BLD AUTO: 3.8 10E12/L (ref 3.8–5.2)
SARS-COV-2 RNA SPEC QL NAA+PROBE: NEGATIVE
SARS-COV-2 RNA SPEC QL NAA+PROBE: NORMAL
SODIUM SERPL-SCNC: 137 MMOL/L (ref 133–144)
SPECIMEN SOURCE: NORMAL
SPECIMEN SOURCE: NORMAL
TRIGL SERPL-MCNC: 66 MG/DL
WBC # BLD AUTO: 6.4 10E9/L (ref 4–11)

## 2020-09-07 PROCEDURE — 97530 THERAPEUTIC ACTIVITIES: CPT | Mod: GP

## 2020-09-07 PROCEDURE — C9113 INJ PANTOPRAZOLE SODIUM, VIA: HCPCS | Performed by: INTERNAL MEDICINE

## 2020-09-07 PROCEDURE — 25000128 H RX IP 250 OP 636: Performed by: INTERNAL MEDICINE

## 2020-09-07 PROCEDURE — G0426 INPT/ED TELECONSULT50: HCPCS | Mod: G0 | Performed by: PHYSICIAN ASSISTANT

## 2020-09-07 PROCEDURE — 80061 LIPID PANEL: CPT | Performed by: INTERNAL MEDICINE

## 2020-09-07 PROCEDURE — 00000146 ZZHCL STATISTIC GLUCOSE BY METER IP

## 2020-09-07 PROCEDURE — 94640 AIRWAY INHALATION TREATMENT: CPT | Mod: 76

## 2020-09-07 PROCEDURE — 85027 COMPLETE CBC AUTOMATED: CPT | Performed by: INTERNAL MEDICINE

## 2020-09-07 PROCEDURE — 36415 COLL VENOUS BLD VENIPUNCTURE: CPT | Performed by: INTERNAL MEDICINE

## 2020-09-07 PROCEDURE — 80048 BASIC METABOLIC PNL TOTAL CA: CPT | Performed by: INTERNAL MEDICINE

## 2020-09-07 PROCEDURE — 25800030 ZZH RX IP 258 OP 636: Performed by: INTERNAL MEDICINE

## 2020-09-07 PROCEDURE — 97162 PT EVAL MOD COMPLEX 30 MIN: CPT | Mod: GP

## 2020-09-07 PROCEDURE — 25000132 ZZH RX MED GY IP 250 OP 250 PS 637: Performed by: INTERNAL MEDICINE

## 2020-09-07 PROCEDURE — 70450 CT HEAD/BRAIN W/O DYE: CPT

## 2020-09-07 PROCEDURE — 83036 HEMOGLOBIN GLYCOSYLATED A1C: CPT | Performed by: INTERNAL MEDICINE

## 2020-09-07 PROCEDURE — 99233 SBSQ HOSP IP/OBS HIGH 50: CPT | Performed by: INTERNAL MEDICINE

## 2020-09-07 PROCEDURE — 40000275 ZZH STATISTIC RCP TIME EA 10 MIN

## 2020-09-07 PROCEDURE — 12000000 ZZH R&B MED SURG/OB

## 2020-09-07 PROCEDURE — 25000125 ZZHC RX 250: Performed by: INTERNAL MEDICINE

## 2020-09-07 PROCEDURE — 94640 AIRWAY INHALATION TREATMENT: CPT

## 2020-09-07 RX ORDER — DEXTROSE MONOHYDRATE 25 G/50ML
25-50 INJECTION, SOLUTION INTRAVENOUS
Status: DISCONTINUED | OUTPATIENT
Start: 2020-09-07 | End: 2020-09-14 | Stop reason: HOSPADM

## 2020-09-07 RX ORDER — NICOTINE POLACRILEX 4 MG
15-30 LOZENGE BUCCAL
Status: DISCONTINUED | OUTPATIENT
Start: 2020-09-07 | End: 2020-09-14 | Stop reason: HOSPADM

## 2020-09-07 RX ADMIN — Medication: at 22:32

## 2020-09-07 RX ADMIN — MICONAZOLE NITRATE: 20 POWDER TOPICAL at 22:32

## 2020-09-07 RX ADMIN — PANTOPRAZOLE SODIUM 40 MG: 40 INJECTION, POWDER, FOR SOLUTION INTRAVENOUS at 14:39

## 2020-09-07 RX ADMIN — IPRATROPIUM BROMIDE AND ALBUTEROL SULFATE 3 ML: .5; 3 SOLUTION RESPIRATORY (INHALATION) at 15:00

## 2020-09-07 RX ADMIN — Medication: at 14:39

## 2020-09-07 RX ADMIN — IPRATROPIUM BROMIDE AND ALBUTEROL SULFATE 3 ML: .5; 3 SOLUTION RESPIRATORY (INHALATION) at 07:45

## 2020-09-07 RX ADMIN — CEFTRIAXONE SODIUM 1 G: 1 INJECTION, POWDER, FOR SOLUTION INTRAMUSCULAR; INTRAVENOUS at 18:30

## 2020-09-07 RX ADMIN — MICONAZOLE NITRATE: 20 POWDER TOPICAL at 14:39

## 2020-09-07 RX ADMIN — SODIUM CHLORIDE: 9 INJECTION, SOLUTION INTRAVENOUS at 10:01

## 2020-09-07 RX ADMIN — IPRATROPIUM BROMIDE AND ALBUTEROL SULFATE 3 ML: .5; 3 SOLUTION RESPIRATORY (INHALATION) at 11:28

## 2020-09-07 RX ADMIN — IPRATROPIUM BROMIDE AND ALBUTEROL SULFATE 3 ML: .5; 3 SOLUTION RESPIRATORY (INHALATION) at 19:50

## 2020-09-07 RX ADMIN — ASPIRIN 300 MG: 300 SUPPOSITORY RECTAL at 14:34

## 2020-09-07 ASSESSMENT — ACTIVITIES OF DAILY LIVING (ADL)
ADLS_ACUITY_SCORE: 21
ADLS_ACUITY_SCORE: 25
ADLS_ACUITY_SCORE: 25
ADLS_ACUITY_SCORE: 21

## 2020-09-07 NOTE — PROGRESS NOTES
"   09/07/20 1022   Quick Adds   Type of Visit Initial PT Evaluation   Living Environment   Lives With child(ahmet), adult  (per chart lives with daughter Ana)   Self-Care   Usual Activity Tolerance   (unknown; \"fully independent\" per chart/ uses walker or cane)   Current Activity Tolerance fair   Equipment Currently Used at Home walker, rolling;cane, straight   Functional Level Prior   Ambulation 1-->assistive equipment   Transferring 1-->assistive equipment   Fall history within last six months   (unknown)   Which of the above functional risks had a recent onset or change? ambulation;transferring;communication/speech;toileting;bathing;dressing   Prior Functional Level Comment Per chart pt typically ambulates with walkker or cane & is \"fully indepedent\". PT eval from 12/2019 reported pt uses 4WW for all mobility at baseline at that time.   General Information   Onset of Illness/Injury or Date of Surgery - Date 09/06/20   Referring Physician Grover Camarena MD    Patient/Family Goals Statement not stated; per chart, family resistant to \"nursing home\"/ TCU   Pertinent History of Current Problem (include personal factors and/or comorbidities that impact the POC) Pt admitted 9/6 due to confusion/AMS 1-2 days, found to have evolving ischemic infarct in left temporal occipital lobe.    Precautions/Limitations fall precautions   General Observations Appears to have visual impairment; gaze non purposeful, tends to gaze L   General Info Comments Activity: Up with assist   Cognitive Status Examination   Orientation   (not able to assess, pt nonverbal during session)   Level of Consciousness confused;alert   Follows Commands and Answers Questions 25% of the time;other (see comments);unable to answer questions  (does not follow verbal commands but tactile cues and assiste)   Personal Safety and Judgment impaired   Pain Assessment   Patient Currently in Pain   (no nonverbal signs of pain witnessed)   Posture    Posture Forward " head position;Protracted shoulders   Range of Motion (ROM)   ROM Comment LE ROM appears WFL, pt able to perform AROM B LEs after PROM demo. UE PROM WFL   Strength   Strength Comments Pt demos significant functional weakness with bed mobility and transfers. Difficulty fully assessing due to impaired command following. Pt is able to perform AROM knee & hip flexion of B LEs while supine, but in standing does have limited WB of L LE initially   Bed Mobility   Bed Mobility Comments MaxA supine>sit   Transfer Skills   Transfer Comments MaxAx2 sit>stand with walker and elevated bed.    Gait   Gait Comments Able to step forward/back with L LE unprompted (with walker support and MinAx2 support in standing), does not unweight R LE to step   Balance   Balance Comments Needs SBA for sitting balance, MinAx2 standing balance with walker support   Sensory Examination   Sensory Perception Comments responds to tactile cues on LEs indicating intact to light touch   General Therapy Interventions   Planned Therapy Interventions balance training;bed mobility training;gait training;ROM;strengthening;transfer training;stretching;home program guidelines;progressive activity/exercise;neuromuscular re-education   Clinical Impression   Criteria for Skilled Therapeutic Intervention yes, treatment indicated   PT Diagnosis impaired functional mobility   Influenced by the following impairments decreased strength, decreased activity tolerance, impaired balance, decreased command following, impaired communication, likely vision impairment   Functional limitations due to impairments difficulty with bed mobility, transfers, ambulation, stairs, self cares   Clinical Presentation Evolving/Changing   Clinical Presentation Rationale evolving infarct per imaging   Clinical Decision Making (Complexity) Moderate complexity  (unclear baseline, unclear home support)   Therapy Frequency Daily   Predicted Duration of Therapy Intervention (days/wks) 1 week  "  Anticipated Discharge Disposition Transitional Care Facility   Risk & Benefits of therapy have been explained Yes   Patient, Family & other staff in agreement with plan of care Other (comment)  (pt nonverbal, does engage in session)   Clinical Impression Comments Patient is far below baseline mobility and will benefit from continued skilled therapies in TCU to progress safety and independence with mobility; pt will ultimately likely need significant assist of family or more supportive living environment.   [facility] AM-PAC TM \"6 Clicks\"   2016, Trustees of [facility], under license to [employer].  All rights reserved.   6 Clicks Short Forms Basic Mobility Inpatient Short Form   [facility] AM-PAC  \"6 Clicks\" V.2 Basic Mobility Inpatient Short Form   1. Turning from your back to your side while in a flat bed without using bedrails? 2 - A Lot   2. Moving from lying on your back to sitting on the side of a flat bed without using bedrails? 2 - A Lot   3. Moving to and from a bed to a chair (including a wheelchair)? 1 - Total   4. Standing up from a chair using your arms (e.g., wheelchair, or bedside chair)? 2 - A Lot   5. To walk in hospital room? 1 - Total   6. Climbing 3-5 steps with a railing? 1 - Total   Basic Mobility Raw Score (Score out of 24.Lower scores equate to lower levels of function) 9   Total Evaluation Time   Total Evaluation Time (Minutes) 4     "

## 2020-09-07 NOTE — CONSULTS
"  [facility]    Stroke Consult Note    Reason for Consult: \"CVA\"    Chief Complaint: Altered Mental Status       HPI  Radha GRETEL Cunningham is a 89 year old female with PMH of atrial fibrillation on Warfarin, HTN, CHF, DM2, history of TIA, and history or cancer. Patient presents with altered mental status, and was unable to provide a history in the ED. Per chart review, patient lives with a daughter at home, who noticed decreased responsiveness over the last 2 days. Today patient was up sitting in the chair with Carolina who is daughter at bedside.  Daughter states that she has not been with her mother recently due to a shoulder surgery.  Patient was nonverbal throughout this exam, so daughter was the primary historian.  Daughter states that patient was scheduled to have a skin biopsy last Thursday, patient was instructed to hold Coumadin 5 days prior to the procedure.  Patient's daughter states her sister started to notice change in mentation Friday.  Patient's daughter at bedside states patient was independent prior to this stroke.  Patient became tearful intermittently throughout exam.    Stroke Evaluation summarized:  MRI/Head CT: Decreased attenuation involving gray and white matter in the medial left temporal/occipital lobe. This most likely represents an acute ischemic infarct  Intracranial Vascular Imaging: Occlusion of the left posterior cerebral artery at the P2 segment.  Extensive atherosclerotic plaquing is present involving the carotid siphons  Cervical Carotid and Vertebral Artery Vascular Imaging:  No evidence of large vessel occlusion or high-grade stenosis.  Echocardiogram: Ordered, awaiting results  EKG/Telemetry: A.Fib  LDL: 108  A1c: 7.0  Troponin: <0.015  Other testing: INR: 1.08    Impression  Ischemic Stroke due to cardioembolism likely secondary to subtherapeutic INR, patient PTA anticoagulated with Warfarin      Recommendations  - Continue  mg, OK to restart Warfarin on 09/20/2020 " "  - Abnormal UA ; treat possible UTI per primary hopsitalist team   - Echocardiogram ordered, awaiting results. If unremarkable ok to discharge from a stroke/neurology standpoint.  - LDL (108) with goal LDL < 100 ; increase Lipitor to 20 mg daily, recheck LDL in 4-6 weeks with PCP, titrate medication as needed to reach target goal.   - A1c (7.0) Tighter long term glucose control needed to achieve goal <7.0   - Goal BP <140/90 with tighter control associated with decreased overall CV risk, if tolerated  - PLC    - Therapies as needed     Patient Follow-up    - in the next 1-2 week(s) with PCP   - Follow up with [facility] in 6-8 Weeks     Thank you for this consult. No further stroke evaluation is recommended, so we will sign off. Please contact us with any additional questions.    Avelina Hall PA-C   Neurology  To page me or covering stroke neurology team member, click here: [facility]   Choose \"On Call\" tab at top, then search dropdown box for \"Neurology Adult\", select location, press Enter, then look for stroke/neuro ICU/telestroke.  _____________________________________________________    Past Medical History   Past Medical History:   Diagnosis Date     Asthma      Cholelithiasis      Diverticulosis      Hiatal hernia      Hypertension      Paroxysmal A-fib (H)      PUD (peptic ulcer disease)      Skin cancer      Past Surgical History   Past Surgical History:   Procedure Laterality Date     HERNIA REPAIR  2004    umbilical     Skin cancer removal - forehead  2005     Medications   Home Meds  Prior to Admission medications    Medication Sig Start Date End Date Taking? Authorizing Provider   albuterol (PROVENTIL) (2.5 MG/3ML) 0.083% neb solution Take 1 vial (2.5 mg) by nebulization every 4 hours as needed for wheezing 12/21/19  Yes Zenon Solis MD   atorvastatin (LIPITOR) 10 MG tablet Take 10 mg by mouth At Bedtime    Yes Reported, Patient   calcium carbonate-vitamin D (OSCAL W/D) 500-200 " "MG-UNIT tablet Take 1 tablet by mouth daily   Yes Reported, Patient   emollient (VANICREAM) external cream Apply topically 2 times daily To whole body   Yes Unknown, Entered By History   fluocinonide (LIDEX) 0.05 % external solution Apply topically 2 times daily Apply to affected areas of the scalp twice daily for 2 weeks at a time as needed for dryness.   Yes Reported, Patient   furosemide (LASIX) 20 MG tablet Take 1 tablet (20 mg) by mouth daily 12/22/19  Yes Zenon Solis MD   HYDROcodone-acetaminophen (NORCO) 5-325 MG tablet Take 1 tablet by mouth every 8 hours as needed for severe pain   Yes Reported, Patient   ipratropium - albuterol 0.5 mg/2.5 mg/3 mL (DUONEB) 0.5-2.5 (3) MG/3ML neb solution Take 1 vial (3 mLs) by nebulization 4 times daily 11/25/19  Yes Dean Tavarez MD   metoprolol tartrate (LOPRESSOR) 50 MG tablet Take 37.5 mg by mouth 2 times daily    Yes Unknown, Entered By History   multivitamin w/minerals (THERA-VIT-M) tablet Take 1 tablet by mouth daily   Yes Reported, Patient   nystatin (MYCOSTATIN) 777592 UNIT/GM external powder Apply topically 2 times daily Under right breast and groin area   Yes Unknown, Entered By History   warfarin ANTICOAGULANT (COUMADIN) 2 MG tablet Take 5 mg by mouth Take 5 mg by mouth on Mon, Wed, Fri. Take 4 mg all other days of the week.   Yes Reported, Patient   warfarin ANTICOAGULANT (COUMADIN) 2 MG tablet Take 4 mg by mouth Take 4 mg by mouth Tue, Thur, Sat, and Sun. Take 5 mg Mon, Wed, Fri.   Yes Reported, Patient   fluticasone (FLONASE) 50 MCG/ACT nasal spray Spray 1 spray into both nostrils daily as needed for rhinitis or allergies    Unknown, Entered By History   Nutritional Supplements (NUTRITIONAL SUPPLEMENT PLUS) LIQD Take by mouth 2 times daily \"Magic Cup\" brand nutritional supplement    Unknown, Entered By History       Scheduled Meds    aspirin  325 mg Oral Daily    Or     aspirin  300 mg Rectal Daily     atorvastatin  10 mg Oral At Bedtime     " cefTRIAXone  1 g Intravenous Q24H     emollient   Topical BID     ipratropium - albuterol 0.5 mg/2.5 mg/3 mL  3 mL Nebulization 4x Daily     miconazole   Topical BID     sodium chloride (PF)  3 mL Intracatheter Q8H       Infusion Meds    sodium chloride 75 mL/hr at 09/07/20 0051       PRN Meds  acetaminophen, albuterol, lidocaine 4%, lidocaine (buffered or not buffered), melatonin, naloxone, ondansetron **OR** ondansetron, sodium chloride (PF)    Allergies   Allergies   Allergen Reactions     Contrast Dye      Family History   Family History   Problem Relation Age of Onset     C.A.D. Mother      Hypertension Mother      Social History   Social History     Tobacco Use     Smoking status: Never Smoker   Substance Use Topics     Alcohol use: No     Drug use: Not on file       Review of Systems   Review of systems not obtained due to patient factors - confusion and mental status       PHYSICAL EXAMINATION   Temp:  [97.8  F (36.6  C)-98.8  F (37.1  C)] 97.9  F (36.6  C)  Pulse:  [58-88] 72  Resp:  [12-26] 20  BP: (109-147)/(60-98) 109/61  SpO2:  [94 %-100 %] 95 %    Neurologic   Mental Status: awake, not following commands, non-verbal   Cranial Nerves:  hearing not formally tested but intact to conversation, left gaze preference, 3 mm pupils fixed (assessed by nurse)   Motor:  no abnormal movements, spontanously moving all limbs   Reflexes:  unable to test (telestroke)  Sensory:  Unable to test due to mental status/patient not cooperating   Coordination:  unable to test due to mental status/patient not cooperating   Station/Gait:  unable to test due to telestroke    Dysphagia Screen  Per Nursing    Stroke Scales  Unable to get NIH stroke scale due patients current mental status    Imaging  I personally reviewed all imaging; relevant findings per HPI.    Labs Data   CBC  Recent Labs   Lab 09/06/20  1344   WBC 9.0   RBC 3.63*   HGB 11.8   HCT 36.5        Basic Metabolic Panel   Recent Labs   Lab 09/06/20  1344   NA  137   POTASSIUM 3.8   CHLORIDE 105   CO2 27   BUN 16   CR 0.88   *   SHAN 9.0     Liver Panel  No results for input(s): PROTTOTAL, ALBUMIN, BILITOTAL, ALKPHOS, AST, ALT, BILIDIRECT in the last 168 hours.  INR    Recent Labs   Lab Test 09/06/20  1344 12/21/19  0642 12/20/19  0600   INR 1.08 2.49* 2.42*      Lipid Profile  No lab results found.  A1C    Recent Labs   Lab Test 12/15/19  0524   A1C 6.7*     Troponin I    Recent Labs   Lab 09/06/20  1344   TROPI <0.015          Stroke Code / Stroke Consult Data Data Telestroke Service Details  (for non-emergent stroke consult with tele)  Video start time 09/07/20   1223   Video end time 09/07/20   1310   Type of service telemedicine diagnostic assessment of acute neurological changes   Reason telemedicine is appropriate patient requires assessment with a specialist for diagnosis and treatment of neurological symptoms   Mode of transmission secure interactive audio and video communication per Marcos   Originating site (patient location) [facility]    Distant site (provider location) [facility]       I have personally spent a total of 50 minutes providing critical care services supervising this patient's stroke code activation.  I personally reviewed all lab values and radiology images.  I evaluated and directed care for the patient's critical condition.

## 2020-09-07 NOTE — PLAN OF CARE
OT- Orders received. Discussed with IP PT. Per discussion, pt not following commands consistently and unable to tolerate two therapy disciplines this date. Will hold OT evaluation this date.

## 2020-09-07 NOTE — PLAN OF CARE
"Discharge Planner PT   Patient plan for discharge: pt nonverbal during session; per chart, family is resistant to \"nursing home\"/TCU  Current status: Pt admitted 9/6 due to confusion/AMS 1-2 days, found to have evolving ischemic infarct in left temporal occipital lobe. Pt nonverbal during session, living environment info & PLOF per chart. Per chart, pt lives with her daughter Ana & typically ambulates with walker or cane.     Currently, pt received supine in bed. Pt seems to have significant vision impairment as gaze is not purposeful but difficulty assessing due to limited command following & nonverbal. Pt does not follow verbal commands but is able to engage in LE AROM after PROM demo in supine & throughout session participates with mobility after heavy assist of 1st trial. Completed supine>sit with MaxA. Pt able to follow cue to lean forward but not to scoot forward, needs MaxA to scoot to EOB. Sit>stand with FWW and MaxAx2, once standing, pt needs MinAx2 support, with forward flexion at trunk & hips back. Pt initially bearing weight mostly on R LE, with L LE toe touching only, more even with time. Pt steps fwd/back with L LE without prompting. Not safe to step away from bed due to limited command following. Sit>stand with MaxAx2 and Adina Stedy. In adina stedy, pt able to lift each foot up with tactile cue. Transferred to chair with adina stedy. Sit>stand from recliner with ModAx2, pt more actively engaged with sit>stand after a few repetitions. Pt sitting up in recliner upon departure, RN updated, alarm on.    Recommend Ax2 Adina Stedy transfers with nursing.    Barriers to return to prior living situation: Needs Ax2 for transfers, not able to ambulate at this time, limited communication, seems to have vision impairment  Recommendations for discharge: TCU  Rationale for recommendations: Patient is far below baseline mobility and will benefit from continued skilled therapies in TCU to progress safety and " independence with mobility; pt will ultimately likely need significant assist of family or more supportive living environment.       Entered by: Cindy Peoples 09/07/2020 10:46 AM

## 2020-09-07 NOTE — PLAN OF CARE
KENDRA orientation, pt not answering any q's or talking. VSS on RA. No reported pain, no e/o pain. Tele a-fib CVR / occ PVCs / invert T's , no reported or e/o CP . LS dim, no e/o SOB. PIV to right intact, infusing w/ NS @ 75 ml/hr. Sosa removed per orders, purewick placed. Up to chair and bed Ax2 GB Martha Steady, up in chair most of shift. Daughter requests pt always wears shoes when standing or with transfers because of reported hammer toes and extreme pain. Maintains strict NPO status, daughter aware but attempting to give pt sponges/sips of water. Education provided on importance of NPO until speech eval completed. Daughter at bedside. Second attempt at swallow study tomorrow per speech therapy. Will continue POC.

## 2020-09-07 NOTE — PROGRESS NOTES
"    [facility]  Hospitalist Progress Note  Grover Camarena MD 09/07/2020    Reason for Stay (Diagnosis): CVA         Assessment and Plan:      Summary of Stay: Rahda Cunningham is a 89 year old female who came to attention on 9/6/2020 with confusion, garbled speech and decreased interaction.    On presentation to the emergency department, Ms. Cunningham was noted to be hemodynamically stable with rate controlled atrial fibrillation but unable to give meaningful history.  Dr. Patel indicates that he was unable to get her to follow directions though she was alert.  Her speech was \"garbled\", suspect that she had some degree of expressive aphasia and she was noted to have a leftward gaze.  CT scan of the head without contrast showed suspected large left MCA territory stroke in the setting of multiple other strokes and generalized volume loss.  He then spoke with stroke neurology, Dr. Pancho Velasquez, Health system who recommended an aspirin and follow-up with an MRI.  Based on the time course of the patient's symptoms, it was not felt that she would be a candidate for any type of thrombolytic therapy.  Labs: Creatinine 0.88, normal electrolytes, troponin less than the measurable threshold.  WBC 9.0 with normal differential, hemoglobin 11.8, platelet count 166.  INR 1.08, urinalysis (obtained after catheter placement) is mildly abnormal, potentially suggestive of UTI.    Today, Ms. Cunningham more alert though continues nonverbal.  She is more interactive with me.  She was able to get up with heavy 2 person assist and the Martha steady.      Problem List:   1. 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  Old right basal ganglia infarct as well as SCIV are also noted.   2. Chronic atrial fibrillation with chronotropic incompetence, s/p acemaker placement, 4/2018.    3. Possible UTI.  4. Asthma.    PLAN:  1.  Repeat Head " "CT this am confirms stability of ischemic stroke. Currently on Aspirin alone. Await further direction from Stroke Neuro.   2.  Continues empiric ceftriaxone to cover UTI.     3.  I anticipate disposition will be difficult the patient's daughter is perseverative about not liking nursing homes etc.    DVT Prophylaxis: Anticipate resumption of full anticoagulation in 14 days.  Aspirin 81 mg daily at this time.  PCD's.  Code Status: DNR  Discharge Dispo: Possibly to acute rehab  Estimated Disch Date / # of Days until Disch: Likely 2 to 4 days.        Interval History (Subjective):        Today, the patient is more interactive than she was.  She however, remains minimally verbal.  It is not entirely clear that she is able to even accurately state \"yes\" and \"no\".    I observed her get up both from the bed to the chair and from the chair to the bed with heavy 2 person assist.  She required the Martha steady but was able to participate to some degree.    I met with the patient's daughter, Carolina, at the bedside.    SLP indicates the patient refused evaluation but was nonverbal.  They have recommended reconsulting palliative care.                  Physical Exam:      Last Vital Signs:  /61 (BP Location: Right arm)   Pulse 64   Temp 97.9  F (36.6  C) (Axillary)   Resp 18   Wt 71.9 kg (158 lb 8 oz)   SpO2 96%   BMI 28.08 kg/m      I/O last 3 completed shifts:  In: 818.1 [I.V.:318.1; IV Piggyback:500]  Out: 475 [Urine:475]    Constitutional: Awake, alert, cooperative, no apparent distress   Respiratory: Clear to auscultation bilaterally, no crackles or wheezing   Cardiovascular:  Mildly irregularly irregular rate and rhythm.   Abdomen: Normal bowel sounds, soft, non-distended, non-tender   Skin: No rashes, no cyanosis, dry to touch   Neuro: Alert and seems to engage with me but does not answer questions reliably indicating \"yes\" to all questions whether it is an appropriate answer.   Extremities: No edema.   Other(s):  " "Extraocular motions are abnormal with persistent gaze to the left.       All other systems: Negative          Medications:      All current medications were reviewed with changes reflected in problem list.         Data:      All new lab and imaging data was reviewed.   Labs/Imaging:  Results for orders placed or performed during the hospital encounter of 09/06/20 (from the past 24 hour(s))   Neurology IP Consult: Stroke (potential or actual); Patient to be seen: Routine - within 24 hours; CVA; Consultant may enter orders: Yes; Requesting provider? Hospitalist (if different from attending physician)    Narrative    Avelina Hall PA-C     9/7/2020  3:22 PM    [facility]    Stroke Consult Note    Reason for Consult: \"CVA\"    Chief Complaint: Altered Mental Status       HPI  Radha GRETEL Cunningham is a 89 year old female with PMH of atrial   fibrillation on Warfarin, HTN, CHF, DM2, history of TIA, and   history or cancer. Patient presents with altered mental status,   and was unable to provide a history in the ED. Per chart review,   patient lives with a daughter at home, who noticed decreased   responsiveness over the last 2 days. Today patient was up sitting   in the chair with Carolina who is daughter at bedside.  Daughter   states that she has not been with her mother recently due to a   shoulder surgery.  Patient was nonverbal throughout this exam, so   daughter was the primary historian.  Daughter states that patient   was scheduled to have a skin biopsy last Thursday, patient was   instructed to hold Coumadin 5 days prior to the procedure.    Patient's daughter states her sister started to notice change in   mentation Friday.  Patient's daughter at bedside states patient   was independent prior to this stroke.  Patient became tearful   intermittently throughout exam.    Stroke Evaluation summarized:  MRI/Head CT: Decreased attenuation involving gray and white   matter in the medial left temporal/occipital " "lobe. This most   likely represents an acute ischemic infarct  Intracranial Vascular Imaging: Occlusion of the left posterior   cerebral artery at the P2 segment.  Extensive atherosclerotic   plaquing is present involving the carotid siphons  Cervical Carotid and Vertebral Artery Vascular Imaging:  No   evidence of large vessel occlusion or high-grade stenosis.  Echocardiogram: Ordered, awaiting results  EKG/Telemetry: A.Fib  LDL: 108  A1c: 7.0  Troponin: <0.015  Other testing: INR: 1.08    Impression  Ischemic Stroke due to cardioembolism likely secondary to   subtherapeutic INR, patient PTA anticoagulated with Warfarin      Recommendations  - Continue  mg, OK to restart Warfarin on 09/20/2020   - Abnormal UA ; treat possible UTI per primary hopsitalist team   - Echocardiogram ordered, awaiting results. If unremarkable ok to   discharge from a stroke/neurology standpoint.  - LDL (108) with goal LDL < 100 ; increase Lipitor to 20 mg   daily, recheck LDL in 4-6 weeks with PCP, titrate medication as   needed to reach target goal.   - A1c (7.0) Tighter long term glucose control needed to achieve   goal <7.0   - Goal BP <140/90 with tighter control associated with decreased   overall CV risk, if tolerated  - PLC    - Therapies as needed     Patient Follow-up    - in the next 1-2 week(s) with PCP   - Follow up with [facility] in 6-8 Weeks     Thank you for this consult. No further stroke evaluation is   recommended, so we will sign off. Please contact us with any   additional questions.    Avelina Hall PA-C   Neurology  To page me or covering stroke neurology team member, click here:   AMCOM   Choose \"On Call\" tab at top, then search dropdown box for   \"Neurology Adult\", select location, press Enter, then look for   stroke/neuro ICU/telestroke.  _____________________________________________________    Past Medical History   Past Medical History:   Diagnosis Date     Asthma      Cholelithiasis  "     Diverticulosis      Hiatal hernia      Hypertension      Paroxysmal A-fib (H)      PUD (peptic ulcer disease)      Skin cancer      Past Surgical History   Past Surgical History:   Procedure Laterality Date     HERNIA REPAIR  2004    umbilical     Skin cancer removal - forehead  2005     Medications   Home Meds  Prior to Admission medications    Medication Sig Start Date End Date Taking? Authorizing Provider   albuterol (PROVENTIL) (2.5 MG/3ML) 0.083% neb solution Take 1   vial (2.5 mg) by nebulization every 4 hours as needed for   wheezing 12/21/19  Yes Zenon Solis MD   atorvastatin (LIPITOR) 10 MG tablet Take 10 mg by mouth At   Bedtime    Yes Reported, Patient   calcium carbonate-vitamin D (OSCAL W/D) 500-200 MG-UNIT tablet   Take 1 tablet by mouth daily   Yes Reported, Patient   emollient (VANICREAM) external cream Apply topically 2 times   daily To whole body   Yes Unknown, Entered By History   fluocinonide (LIDEX) 0.05 % external solution Apply topically 2   times daily Apply to affected areas of the scalp twice daily for   2 weeks at a time as needed for dryness.   Yes Reported, Patient   furosemide (LASIX) 20 MG tablet Take 1 tablet (20 mg) by mouth   daily 12/22/19  Yes Zenon Solis MD   HYDROcodone-acetaminophen (NORCO) 5-325 MG tablet Take 1 tablet   by mouth every 8 hours as needed for severe pain   Yes Reported,   Patient   ipratropium - albuterol 0.5 mg/2.5 mg/3 mL (DUONEB) 0.5-2.5 (3)   MG/3ML neb solution Take 1 vial (3 mLs) by nebulization 4 times   daily 11/25/19  Yes Dean Tavarez MD   metoprolol tartrate (LOPRESSOR) 50 MG tablet Take 37.5 mg by   mouth 2 times daily    Yes Unknown, Entered By History   multivitamin w/minerals (THERA-VIT-M) tablet Take 1 tablet by   mouth daily   Yes Reported, Patient   nystatin (MYCOSTATIN) 586053 UNIT/GM external powder Apply   topically 2 times daily Under right breast and groin area   Yes   Unknown, Entered By History   warfarin  "ANTICOAGULANT (COUMADIN) 2 MG tablet Take 5 mg by mouth   Take 5 mg by mouth on Mon, Wed, Fri. Take 4 mg all other days of   the week.   Yes Reported, Patient   warfarin ANTICOAGULANT (COUMADIN) 2 MG tablet Take 4 mg by mouth   Take 4 mg by mouth Tue, Thur, Sat, and Sun. Take 5 mg Mon, Wed,   Fri.   Yes Reported, Patient   fluticasone (FLONASE) 50 MCG/ACT nasal spray Spray 1 spray into   both nostrils daily as needed for rhinitis or allergies      Unknown, Entered By History   Nutritional Supplements (NUTRITIONAL SUPPLEMENT PLUS) LIQD Take   by mouth 2 times daily \"Magic Cup\" brand nutritional supplement      Unknown, Entered By History       Scheduled Meds    aspirin  325 mg Oral Daily    Or     aspirin  300 mg Rectal Daily     atorvastatin  10 mg Oral At Bedtime     cefTRIAXone  1 g Intravenous Q24H     emollient   Topical BID     ipratropium - albuterol 0.5 mg/2.5 mg/3 mL  3 mL Nebulization   4x Daily     miconazole   Topical BID     sodium chloride (PF)  3 mL Intracatheter Q8H       Infusion Meds    sodium chloride 75 mL/hr at 09/07/20 0051       PRN Meds  acetaminophen, albuterol, lidocaine 4%, lidocaine (buffered or   not buffered), melatonin, naloxone, ondansetron **OR**   ondansetron, sodium chloride (PF)    Allergies   Allergies   Allergen Reactions     Contrast Dye      Family History   Family History   Problem Relation Age of Onset     C.A.D. Mother      Hypertension Mother      Social History   Social History     Tobacco Use     Smoking status: Never Smoker   Substance Use Topics     Alcohol use: No     Drug use: Not on file       Review of Systems   Review of systems not obtained due to patient factors - confusion   and mental status       PHYSICAL EXAMINATION   Temp:  [97.8  F (36.6  C)-98.8  F (37.1  C)] 97.9  F (36.6  C)  Pulse:  [58-88] 72  Resp:  [12-26] 20  BP: (109-147)/(60-98) 109/61  SpO2:  [94 %-100 %] 95 %    Neurologic   Mental Status: awake, not following commands, non-verbal   Cranial " Nerves:  hearing not formally tested but intact to   conversation, left gaze preference, 3 mm pupils fixed (assessed   by nurse)   Motor:  no abnormal movements, spontanously moving all limbs   Reflexes:  unable to test (telestroke)  Sensory:  Unable to test due to mental status/patient not   cooperating   Coordination:  unable to test due to mental status/patient not   cooperating   Station/Gait:  unable to test due to telestroke    Dysphagia Screen  Per Nursing    Stroke Scales  Unable to get NIH stroke scale due patients current mental status    Imaging  I personally reviewed all imaging; relevant findings per HPI.    Labs Data   CBC  Recent Labs   Lab 09/06/20  1344   WBC 9.0   RBC 3.63*   HGB 11.8   HCT 36.5        Basic Metabolic Panel   Recent Labs   Lab 09/06/20  1344      POTASSIUM 3.8   CHLORIDE 105   CO2 27   BUN 16   CR 0.88   *   SHAN 9.0     Liver Panel  No results for input(s): PROTTOTAL, ALBUMIN, BILITOTAL, ALKPHOS,   AST, ALT, BILIDIRECT in the last 168 hours.  INR    Recent Labs   Lab Test 09/06/20  1344 12/21/19  0642 12/20/19  0600   INR 1.08 2.49* 2.42*      Lipid Profile  No lab results found.  A1C    Recent Labs   Lab Test 12/15/19  0524   A1C 6.7*     Troponin I    Recent Labs   Lab 09/06/20  1344   TROPI <0.015          Stroke Code / Stroke Consult Data Data Telestroke Service Details    (for non-emergent stroke consult with tele)  Video start time 09/07/20   1223   Video end time 09/07/20   1310   Type of service telemedicine diagnostic assessment of acute   neurological changes   Reason telemedicine is appropriate patient requires assessment   with a specialist for diagnosis and treatment of neurological   symptoms   Mode of transmission secure interactive audio and video   communication per Marcos   Originating site (patient location) [facility]    Distant site (provider location) [facility]       I have personally spent a total of 50  minutes providing critical   care services supervising this patient's stroke code activation.    I personally reviewed all lab values and radiology images.  I   evaluated and directed care for the patient's critical condition.     CTA Head Neck with Contrast    Narrative    EXAM: CTA  HEAD NECK WITH CONTRAST  LOCATION: [facility]  DATE/TIME: 9/6/2020 8:37 PM    INDICATION: Stroke, altered mental status.  COMPARISON: None.  CONTRAST: 70 mL [id number].  TECHNIQUE: Axial helical CT images of the head and neck vessels obtained during the arterial phase of intravenous contrast administration. Axial 2D reconstructed images and multiplanar 3D MIP reconstructed images of the head and neck vessels were   performed by the technologist. Dose reduction techniques were used. All stenosis measurements made according to NASCET criteria unless otherwise specified.    Acute/subacute infarct is present involving the left posterior cerebral artery distribution.    HEAD CTA:  ANTERIOR CIRCULATION: The internal carotid arteries, anterior cerebral arteries, and middle cerebral arteries are patent. Extensive atherosclerotic plaquing is present involving the carotid siphons. No evidence of large vessel occlusion or high-grade   stenosis. No evidence of aneurysm or vascular malformation.    POSTERIOR CIRCULATION: Right vertebral artery is diminutive predominantly terminating in posterior inferior cerebellar artery. Left vertebral artery, basilar artery, and right posterior cerebral arteries are patent. Left posterior cerebral artery is   occluded at the P2 segment with possible reconstitution of more peripheral branches. Right posterior communicating artery is present.    DURAL VENOUS SINUSES: Expected enhancement of the major dural venous sinuses.    NECK CTA: A 3 vessel aortic arch is present. The bilateral common carotid, internal carotid, external carotid, and vertebral arteries are patent. Moderate plaque is present at  the bilateral carotid bifurcations without evidence of flow limiting stenosis.   Bilateral internal carotid artery tonsillar loops are present.    Mild plaquing at the bilateral vertebral artery origins without evidence of flow limiting stenosis. Right vertebral artery is diminutive with dominant left vertebral artery.    NONVASCULAR STRUCTURES: Moderate to severe cervical spine degenerative change is present. At least moderate stenosis at the level of the C1 ring with probable old C1 ring fractures. Left submandibular gland is small or absent.      Impression    IMPRESSION:    HEAD CTA:   1.  Occlusion of the left posterior cerebral artery at the P2 segment.    NECK CTA:  1.  No evidence of large vessel occlusion or high-grade stenosis.  2.  Old C1 ring fractures with moderate spinal canal stenosis.   Glucose by meter   Result Value Ref Range    Glucose 143 (H) 70 - 99 mg/dL   Glucose by meter   Result Value Ref Range    Glucose 157 (H) 70 - 99 mg/dL   Basic metabolic panel   Result Value Ref Range    Sodium 137 133 - 144 mmol/L    Potassium 3.7 3.4 - 5.3 mmol/L    Chloride 107 94 - 109 mmol/L    Carbon Dioxide 23 20 - 32 mmol/L    Anion Gap 7 3 - 14 mmol/L    Glucose 149 (H) 70 - 99 mg/dL    Urea Nitrogen 19 7 - 30 mg/dL    Creatinine 0.85 0.52 - 1.04 mg/dL    GFR Estimate 60 (L) >60 mL/min/[1.73_m2]    GFR Estimate If Black 70 >60 mL/min/[1.73_m2]    Calcium 8.6 8.5 - 10.1 mg/dL   CBC with platelets   Result Value Ref Range    WBC 6.4 4.0 - 11.0 10e9/L    RBC Count 3.80 3.8 - 5.2 10e12/L    Hemoglobin 12.3 11.7 - 15.7 g/dL    Hematocrit 37.5 35.0 - 47.0 %    MCV 99 78 - 100 fl    MCH 32.4 26.5 - 33.0 pg    MCHC 32.8 31.5 - 36.5 g/dL    RDW 13.4 10.0 - 15.0 %    Platelet Count 168 150 - 450 10e9/L   Lipid panel reflex to direct LDL   Result Value Ref Range    Cholesterol 192 <200 mg/dL    Triglycerides 66 <150 mg/dL    HDL Cholesterol 71 >49 mg/dL    LDL Cholesterol Calculated 108 (H) <100 mg/dL    Non HDL  Cholesterol 121 <130 mg/dL   CT Head w/o Contrast    Narrative    CT SCAN OF THE HEAD WITHOUT CONTRAST   9/7/2020 9:42 AM     HISTORY: Focal neurological deficit, greater than 6 hours, stroke  suspected. Gaze abnormality.    TECHNIQUE:  Axial images of the head and coronal reformations without  IV contrast material.  Radiation dose for this scan was reduced using  automated exposure control, adjustment of the mA and/or kV according  to patient size, or iterative reconstruction technique.    COMPARISON: 9/6/2020    FINDINGS: During the interval, the left temporal occipital low-density  has become more clearly defined consistent with an evolving ischemic  infarct. There is no evidence for any hemorrhage. There is minimal  localized mass effect but no shift. Old right basal ganglia infarct  noted. There is cerebral atrophy and some patchy nonspecific white  matter changes. Vascular calcification seen at the skull base.  Visualized paranasal sinuses and mastoid air cells are clear.      Impression    IMPRESSION:  1. Evolving ischemic infarct in left temporal occipital lobe.  2. Old right basal ganglia infarct.  3. Cerebral atrophy with some chronic-appearing white matter changes  most likely due to chronic small vessel ischemic disease.  4. No evidence for intracranial hemorrhage or any significant mass  effect.    SMA KENDALL MD

## 2020-09-07 NOTE — PLAN OF CARE
End of Shift Summary  For vital signs and complete assessments, please see documentation flowsheets.     Pertinent assessments: Tele: Afib (60-80's). Somnolent t/o shift, does not respond to questions, appears comfortable. Repositioned.      Major Shift Event: Dysphagia screening not done, pt somnolent t/o, NPO.   Treatment plan:  eulalio RODRIGUES, neurology consult.  Disposition: TBD.

## 2020-09-07 NOTE — PROGRESS NOTES
This writer attempted to call SLP x 2 per daughter request. Daughter requesting speech eval now w/ daughter present at bedside. Explained to daughter speech will be by tomorrow for further eval.

## 2020-09-07 NOTE — PLAN OF CARE
Admitting Diagnosis: Stroke  Pertinent History: Chronic Afib, post pacemaker placement, Asthma, diverticulosis, CHF.  For vital signs and assessment please see flow sheet.  Living Situation: with family  Pain plan: KENDRA   Mobility: assistance,,lift team assistance  Baseline activity:SBA/ with assistive equipment   Alarms/Safety: BA  LDA's: Two PIV  Pertinent test results: K+ 3.8, cr: 0.88, Trop: 0.015  Consults: Dysphagia screening/Neuro/PT/OT/speech following  Abnormals/Pending: none  Other Cares/Comments:pt unable to follow commands, lift , tele Afib, CVR, o2 100% RA, IFV 75 ml/hr NS.  Discharge Disposition: medically active  Discharge Time: medically active

## 2020-09-07 NOTE — PLAN OF CARE
SLP: Swallow evaluation orders received. Evaluation attempted. Pt up in chair, sleeping. Initially did not respond to therapist voice, touch but awoke, eyes open in response to IV beeping. Pt did not follow any commands for oral motor assessment. Lips very dry. Pt pursed lips and pulled away at each attempt for ice chip on lips, spoon with ice chip, cup sip of water. No verbalizations. Will re-attempt evaluation tomorrow 9/8/2020. Consider Palliative Care consult to address overall goals of care, specifically nutrition, with consideration for pt's overall medical status, prognosis.

## 2020-09-07 NOTE — CONSULTS
"Brief Note:    I was called by ED for patient with confusion x1-2 days.  Head CT reveals evolving PCA stroke.  CTA shows corresponding P2 occlusion.  INR subtherapeutic.      Impression:  1. Cardioembolic stroke    Recommendations  1. Aspirin only for now  2. Repeat Head CT in am, will determine when to re-start anticoagulation based upon stroke evolution  3. Stroke work-up, Will see formally on 9/7.    Bladimir Velasquez MD, MS  Vascular Neurology    To page me or covering stroke neurology team member, click here: AMCOM   Choose \"On Call\" tab at top, then search dropdown box for \"Neurology Adult\", select location, press Enter, then look for stroke/neuro ICU/telestroke.    "

## 2020-09-08 ENCOUNTER — APPOINTMENT (OUTPATIENT)
Dept: PHYSICAL THERAPY | Facility: CLINIC | Age: 85
DRG: 064 | End: 2020-09-08
Payer: COMMERCIAL

## 2020-09-08 ENCOUNTER — APPOINTMENT (OUTPATIENT)
Dept: SPEECH THERAPY | Facility: CLINIC | Age: 85
DRG: 064 | End: 2020-09-08
Attending: INTERNAL MEDICINE
Payer: COMMERCIAL

## 2020-09-08 ENCOUNTER — DOCUMENTATION ONLY (OUTPATIENT)
Dept: OTHER | Facility: CLINIC | Age: 85
End: 2020-09-08

## 2020-09-08 ENCOUNTER — APPOINTMENT (OUTPATIENT)
Dept: CARDIOLOGY | Facility: CLINIC | Age: 85
DRG: 064 | End: 2020-09-08
Attending: PHYSICIAN ASSISTANT
Payer: COMMERCIAL

## 2020-09-08 ENCOUNTER — ANESTHESIA EVENT (OUTPATIENT)
Dept: MEDSURG UNIT | Facility: CLINIC | Age: 85
End: 2020-09-08

## 2020-09-08 ENCOUNTER — APPOINTMENT (OUTPATIENT)
Dept: OCCUPATIONAL THERAPY | Facility: CLINIC | Age: 85
DRG: 064 | End: 2020-09-08
Attending: INTERNAL MEDICINE
Payer: COMMERCIAL

## 2020-09-08 ENCOUNTER — ANESTHESIA (OUTPATIENT)
Dept: MEDSURG UNIT | Facility: CLINIC | Age: 85
End: 2020-09-08

## 2020-09-08 LAB
BACTERIA SPEC CULT: ABNORMAL
GLUCOSE BLDC GLUCOMTR-MCNC: 103 MG/DL (ref 70–99)
GLUCOSE BLDC GLUCOMTR-MCNC: 105 MG/DL (ref 70–99)
GLUCOSE BLDC GLUCOMTR-MCNC: 116 MG/DL (ref 70–99)
GLUCOSE BLDC GLUCOMTR-MCNC: 123 MG/DL (ref 70–99)
GLUCOSE BLDC GLUCOMTR-MCNC: 95 MG/DL (ref 70–99)
GLUCOSE BLDC GLUCOMTR-MCNC: 96 MG/DL (ref 70–99)
Lab: ABNORMAL
SPECIMEN SOURCE: ABNORMAL

## 2020-09-08 PROCEDURE — 93306 TTE W/DOPPLER COMPLETE: CPT | Mod: 26 | Performed by: INTERNAL MEDICINE

## 2020-09-08 PROCEDURE — 93306 TTE W/DOPPLER COMPLETE: CPT

## 2020-09-08 PROCEDURE — 97166 OT EVAL MOD COMPLEX 45 MIN: CPT | Mod: GO

## 2020-09-08 PROCEDURE — 37000011 ZZH ANESTHESIA WARD SERVICE

## 2020-09-08 PROCEDURE — 94640 AIRWAY INHALATION TREATMENT: CPT | Mod: 76

## 2020-09-08 PROCEDURE — 92526 ORAL FUNCTION THERAPY: CPT | Mod: GN | Performed by: SPEECH-LANGUAGE PATHOLOGIST

## 2020-09-08 PROCEDURE — 00000146 ZZHCL STATISTIC GLUCOSE BY METER IP

## 2020-09-08 PROCEDURE — 40000671 ZZH STATISTIC ANESTHESIA CASE

## 2020-09-08 PROCEDURE — 25000128 H RX IP 250 OP 636: Performed by: INTERNAL MEDICINE

## 2020-09-08 PROCEDURE — 94640 AIRWAY INHALATION TREATMENT: CPT

## 2020-09-08 PROCEDURE — 25800030 ZZH RX IP 258 OP 636: Performed by: INTERNAL MEDICINE

## 2020-09-08 PROCEDURE — 25000132 ZZH RX MED GY IP 250 OP 250 PS 637: Performed by: INTERNAL MEDICINE

## 2020-09-08 PROCEDURE — 92610 EVALUATE SWALLOWING FUNCTION: CPT | Mod: GN | Performed by: SPEECH-LANGUAGE PATHOLOGIST

## 2020-09-08 PROCEDURE — 25000125 ZZHC RX 250: Performed by: INTERNAL MEDICINE

## 2020-09-08 PROCEDURE — 97535 SELF CARE MNGMENT TRAINING: CPT | Mod: GO

## 2020-09-08 PROCEDURE — C9113 INJ PANTOPRAZOLE SODIUM, VIA: HCPCS | Performed by: INTERNAL MEDICINE

## 2020-09-08 PROCEDURE — 99232 SBSQ HOSP IP/OBS MODERATE 35: CPT | Performed by: INTERNAL MEDICINE

## 2020-09-08 PROCEDURE — 40000275 ZZH STATISTIC RCP TIME EA 10 MIN

## 2020-09-08 PROCEDURE — 12000000 ZZH R&B MED SURG/OB

## 2020-09-08 PROCEDURE — 97530 THERAPEUTIC ACTIVITIES: CPT | Mod: GP | Performed by: PHYSICAL THERAPY ASSISTANT

## 2020-09-08 PROCEDURE — 99223 1ST HOSP IP/OBS HIGH 75: CPT | Performed by: CLINICAL NURSE SPECIALIST

## 2020-09-08 RX ORDER — ACETAMINOPHEN 650 MG/1
650 SUPPOSITORY RECTAL EVERY 4 HOURS PRN
Status: DISCONTINUED | OUTPATIENT
Start: 2020-09-08 | End: 2020-09-14 | Stop reason: HOSPADM

## 2020-09-08 RX ORDER — CARBOXYMETHYLCELLULOSE SODIUM 5 MG/ML
2 SOLUTION/ DROPS OPHTHALMIC
Status: DISCONTINUED | OUTPATIENT
Start: 2020-09-08 | End: 2020-09-14 | Stop reason: HOSPADM

## 2020-09-08 RX ORDER — HYDROMORPHONE HYDROCHLORIDE 1 MG/ML
0.2 INJECTION, SOLUTION INTRAMUSCULAR; INTRAVENOUS; SUBCUTANEOUS
Status: DISCONTINUED | OUTPATIENT
Start: 2020-09-08 | End: 2020-09-14 | Stop reason: HOSPADM

## 2020-09-08 RX ORDER — SALIVA STIMULANT COMB. NO.3
2 SPRAY, NON-AEROSOL (ML) MUCOUS MEMBRANE 4 TIMES DAILY
Status: DISCONTINUED | OUTPATIENT
Start: 2020-09-08 | End: 2020-09-14 | Stop reason: HOSPADM

## 2020-09-08 RX ADMIN — SODIUM CHLORIDE: 9 INJECTION, SOLUTION INTRAVENOUS at 17:54

## 2020-09-08 RX ADMIN — SODIUM CHLORIDE: 9 INJECTION, SOLUTION INTRAVENOUS at 05:05

## 2020-09-08 RX ADMIN — Medication: at 10:12

## 2020-09-08 RX ADMIN — IPRATROPIUM BROMIDE AND ALBUTEROL SULFATE 3 ML: .5; 3 SOLUTION RESPIRATORY (INHALATION) at 15:23

## 2020-09-08 RX ADMIN — IPRATROPIUM BROMIDE AND ALBUTEROL SULFATE 3 ML: .5; 3 SOLUTION RESPIRATORY (INHALATION) at 07:49

## 2020-09-08 RX ADMIN — CEFTRIAXONE SODIUM 1 G: 1 INJECTION, POWDER, FOR SOLUTION INTRAMUSCULAR; INTRAVENOUS at 21:28

## 2020-09-08 RX ADMIN — ASPIRIN 300 MG: 300 SUPPOSITORY RECTAL at 10:12

## 2020-09-08 RX ADMIN — MICONAZOLE NITRATE: 20 POWDER TOPICAL at 21:02

## 2020-09-08 RX ADMIN — Medication: at 21:02

## 2020-09-08 RX ADMIN — MICONAZOLE NITRATE: 20 POWDER TOPICAL at 10:13

## 2020-09-08 RX ADMIN — PANTOPRAZOLE SODIUM 40 MG: 40 INJECTION, POWDER, FOR SOLUTION INTRAVENOUS at 10:13

## 2020-09-08 ASSESSMENT — ACTIVITIES OF DAILY LIVING (ADL)
ADLS_ACUITY_SCORE: 25
ADLS_ACUITY_SCORE: 28
ADLS_ACUITY_SCORE: 21
ADLS_ACUITY_SCORE: 24
ADLS_ACUITY_SCORE: 25
ADLS_ACUITY_SCORE: 25

## 2020-09-08 NOTE — PLAN OF CARE
Discharge Planner PT   Patient plan for discharge: not able to state family thinking home  Current status: unable to participate in PT for standing, given 4ww and able to hold but not able initiate standing  despite many cues was able to sit self up in chair with leaning forward but would often fall to L side  Barriers to return to prior living situation: Needs Ax2 for transfers, not able to ambulate at this time, limited communication, seems to have vision impairment  Recommendations for discharge: TCU per plan established by the PT.   Rationale for recommendations: Patient is far below baseline mobility and will benefit from continued skilled therapies in TCU to progress safety and independence with mobility; pt will ultimately likely need significant assist of family or more supportive living environment.       Entered by: Brandie Goldstein 09/08/2020 3:13 PM

## 2020-09-08 NOTE — PLAN OF CARE
Presentation/Diagnosis: Pt admitted on 9/6 for evaluation of altered mental status. Dx: Stroke   History: Afib, HTN, DM type 2, TIA, See chart for complete list  Labs/Protocols: Electrolytes WNL   Vitals: Temp: 98.2  F (36.8  C) Temp src: Axillary BP: 118/58 Pulse: 83   Resp: 18 SpO2: 97 % O2 Device: None (Room air)    Cardiac: Irregular rate and rhythm  Telemetry: Afib CVR   Respiratory: Lungs are diminished-Scheduled nebs   Neuro: Unable to assess due to Pt not responding to questions-Pupils are fixed-Neuro signed off  GI/: Sosa removed on day shift-Purewick in place-Still no void this shift-Bladder scanned for 150-IV Abx Rocephin for UTI   Skin: Red groin-Powder for the area   LDAs: PIV-Running NS @75ml/hr  Diet: Strict NPO-Unable to follow directions to swallow safely-SP is following-Blood glucose Q 4hrs while NPO   Activity: Max A 2 with adina steady and gait belt-Show to be worn when getting out of bed-PT/OT consulted   Teaching: Pt daughter educated on current POC-Monitor mentation and vital signs, Palliative consulted for goals of care, SW consulted for discharge planning and to verify if Pt still has a guardian/Conservator-Daughter is refusing TCU believes she care take care of her mother at home with PT/OT and nurse coming to the house to assist   Plan: Discharge possibly 2-4 days

## 2020-09-08 NOTE — PROGRESS NOTES
09/08/20 1145   Quick Adds   Type of Visit Initial Occupational Therapy Evaluation   Living Environment   Lives With child(ahmet), adult   Living Arrangements house   Home Accessibility stairs to enter home   Living Environment Comment Pt resides with daughter where there is a split entry with all needs then met on one level and step-over tub. Plan is for pt to go to her sisters with one level, ramp to enter and walk-in bathtub.    Self-Care   Usual Activity Tolerance moderate   Current Activity Tolerance fair   Equipment Currently Used at Home walker, rolling;cane, straight   Functional Level   Ambulation 1-->assistive equipment  (fww)   Transferring 1-->assistive equipment  (fww)   Toileting 0-->independent   Bathing 0-->independent   Dressing 0-->independent   Eating 0-->independent   Communication 0-->understands/communicates without difficulty   Cognition 1 - attention or memory deficits  (mild impairment)   Which of the above functional risks had a recent onset or change? ambulation;transferring;communication/speech;toileting;bathing;dressing   Prior Functional Level Comment Per daughter, pt is ind at baseline with 4WW   General Information   Onset of Illness/Injury or Date of Surgery - Date 09/08/20   Referring Physician Grover Camarena MD    Patient/Family Goals Statement not stated   Additional Occupational Profile Info/Pertinent History of Current Problem Pt admitted 9/6 due to confusion/AMS 1-2 days, found to have evolving ischemic infarct in left temporal occipital lobe.    Precautions/Limitations fall precautions   Cognitive Status Examination   Orientation other (see comments)   Cognitive Comment not able to assess, pt non verbal, miinimally responsive   Visual Perception   Visual Perception Comments gaze noted to be to the left entirely, at times to central.    Sensory Examination   Sensory Comments not able to assess- pt able to grasp object placed in hands   Pain Assessment   Patient Currently in  Pain   (did not appear to be in pain)   Range of Motion (ROM)   ROM Comment 90 degress shoulder flexion-limited but WFL   Strength   Strength Comments unable to assess, pt not engaging BUE appears WFL   Transfer Skills   Transfer Comments Mod A for sit>stand with 4WW.    Transfer Skill: Toilet Transfer   Level of Sanders: Toilet moderate assist (50% patients effort)   Physical Assist/Nonphysical Assist: Toilet 2 persons;supervision   Bathing   Level of Sanders maximum assist (25% patients effort)   Physical Assist/Nonphysical Assist 2 person assist   Upper Body Dressing   Level of Sanders: Dress Upper Body moderate assist (50% patients effort)   Physical Assist/Nonphysical Assist: Dress Upper Body 1 person assist   Lower Body Dressing   Level of Sanders: Dress Lower Body moderate assist (50% patients effort)   Physical Assist/Nonphysical Assist: Dress Lower Body 2 person assist   Grooming   Level of Sanders: Grooming moderate assist (50% patients effort)   Physical Assist/Nonphysical Assist: Grooming 1 person assist   Activities of Daily Living Analysis   Impairments Contributing to Impaired Activities of Daily Living balance impaired;cognition impaired;strength decreased   General Therapy Interventions   Planned Therapy Interventions ADL retraining;strengthening;transfer training;cognition   Clinical Impression   Criteria for Skilled Therapeutic Interventions Met yes, treatment indicated   OT Diagnosis decreased ind in ADLs/IADLs   Influenced by the following impairments impaired cognition, decreased strength, decreased balance, neglect?   Assessment of Occupational Performance 3-5 Performance Deficits   Identified Performance Deficits dressing, bathing, toilet tranfser   Clinical Decision Making (Complexity) Moderate complexity   Predicted Duration of Therapy Intervention (days/wks) 4   Anticipated Equipment Needs at Discharge wheelchair;toileting equipment;bathing equipment   Anticipated  "Discharge Disposition Transitional Care Facility  (family declining)   Risks and Benefits of Treatment have been explained. Yes   Patient, Family & other staff in agreement with plan of care Yes   [facility] AM-PAC  \"6 Clicks\" Daily Activity Inpatient Short Form   1. Putting on and taking off regular lower body clothing? 1 - Total   2. Bathing (including washing, rinsing, drying)? 1 - Total   3. Toileting, which includes using toilet, bedpan or urinal? 1 - Total   4. Putting on and taking off regular upper body clothing? 2 - A Lot   5. Taking care of personal grooming such as brushing teeth? 2 - A Lot   6. Eating meals? 2 - A Lot   Daily Activity Raw Score (Score out of 24.Lower scores equate to lower levels of function) 9   Total Evaluation Time   Total Evaluation Time (Minutes) 8     "

## 2020-09-08 NOTE — PLAN OF CARE
KENDRA orientation, pt not speaking or acknowledging questions. VSS on RA. No e/o or reported pain.  and 96. Tele a-fib CVR, no e/o or c/o CP . LS dim, no e/o SOB. PIV to left intact, infusing w/ NS @ 75 ml/hr. PIV to right intact, SL . Purewick placed for incontinence w/ good UOP, heidi and cath cares provided. Scheduled powder to groin, critic-aid paste to coccyx. Turns in bed. Up in chair most of shift, Ax2 GB Martha steady and shoes on for pain prevention. Notably increased purposeful movements to all extremities today. Remains strict NPO, speech eval again 9/9. Daughter reports helping pt w/ oral cares via sponge. Stroke education completed at bedside w/ dgtr. Had ECHO today. Palliative consult today. Discharge plan pending. Will continue POC.
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
## 2020-09-08 NOTE — PLAN OF CARE
OT: Orders received, eval completed and treatment initiated. Pt is a 89-year-old female  admitted 9/6/2020 with confusion, garbled speech and decreased interaction found to have occipital lobe infarct. At baseline, pt resides with her daughter in a split level home, all needs met once up 8 stairs. Per daughter, pt was previously ind for all ADLs With 4WW. DaughterAna, states pt can go to pt's other daugther's home with a ramp, all needs met on one level and walk-in bathtub.   Discharge Planner OT   Patient plan for discharge: pt does not say, daughter says home to pt's other daughters house.  Current status: Daughter, Ana present Pt noted to have gaze directed to left outside of the window. Left arm up on pillow with right arm down to the side. Throughout eval, continued to demonstrate right sided neglect. Pt tearful also throughout assessment. Encourage to come sit>stand to prep for ADL and toilet transfer. Pt completes with mod A with 4ww. Able to march slowly in place alternating each foot 1-2x's. Provided education to daughter on AE in the home, as they plan to return her home  Barriers to return to prior living situation: current level of A, cognition, falls risk  Recommendations for discharge: TCU  Rationale for recommendations: Pt would benefit from ongoing skilled IP OT for improved safety and ind in ADLs. TCU upon discharge for continued progress toward ind.        Entered by: Renuka Hardin 09/08/2020 3:23 PM
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
## 2020-09-08 NOTE — PLAN OF CARE
Discharge Planner SLP   Patient plan for discharge: unable to state  Current status: SLP: Pt appeared alert and tracked SLP around the room. Pt unable to verbalize but did nod head to 1/5 yes/no questions. Suspect significant oral apraxia vs oral defensiveness as pt kept lips tightly shut for oral cares. Attempted tsp of honey thick liquids and hand over hand assist with honey thick water by cup. Limited labial opening and suspect aspiration of limited water that did enter oral cavity with delayed cough and throat clearing.     Pt not ready for diet advancement and suspect need for alternative nutrition. Continue NPO with frequent oral cares with lighty moistened swab in thin water.     Barriers to return to prior living situation: Dysphagia; aphasia vs apraxia  Recommendations for discharge: TCU  Rationale for recommendations: Continue ST daily for PO trials as indicated. Will hold speech/language evaluation until pt able to participate       Entered by: Lili Martinez 09/08/2020 9:46 AM     Addendum: Returned to speak with pt's daughter per Palliative Care request. Education provided on severity of dysphagia with no swallow response observed and limited ability to even complete oral cares. Daughter reported improved progress with PT today. Daughter requested SLP return tomorrow when family is present, however, was unable to provide a specific time. Discussed that pt will likely need a feeding tube if goals of care remain restorative, however, specific questions and timing on feeding tube were deferred to MD.

## 2020-09-08 NOTE — CONSULTS
Stroke Education Note    The following information has been reviewed with the family:    1. Warning signs of stroke    2. Calling 911 if having warning signs of stroke    3. All modifiable risk factors: hypertension, CAD, atrial fib, diabetes, hypercholesterolemia, smoking, substance abuse, diet, physical inactivity, obesity, sleep apnea.    4. Patient's risk factors for stroke which include: HLD, Afib    5. Follow-up plan for after discharge    6. Discharge medications which include: TPA    In addition, the [facility] Stroke Class Handout has been given to the family.    Learner's response to risk factors / lifestyle modification education: NA - Precontemplative. Class was done with both daughters. Pt.unable to verbalize and participate.      Hermila Cooper RN

## 2020-09-08 NOTE — PLAN OF CARE
Code status: DNR/DNI- Prearrest intubation ok.   COVID-19 result: NEGATIVE     Pertinent assessment: KENDRA orientation. Pt. Nonverbal; not able to follow commands. KENDRA pain. Tele: Afib CVR. LS: clear, diminished. GI: BS normoactive. : incontinent; purewick in place. Skin: blanchable redness on groin. Up Ax2 with lift. Q2 repos. Strict NPO. NS infusing @ 75mL/hr. B    Treatment plan: Continue ABX and ASA. Neuro , tele-stroke, PT, OT, SW, P/P, and SLP following. See notes. Continue w/ POC.   Discharge dispo: 2-4 days.

## 2020-09-08 NOTE — CONSULTS
[facility]    Palliative Care Consultation   Text Page    Date of Admission:  9/6/2020    Assessment & Plan   Radha Cunningham is a 89 year old female who was admitted on 9/6/2020. I was asked by Hospitalist Grover Camarena MD to see the patient for goals of care need to be readdressed following stroke, Unclear whether the patient will recover swallow function.     Recommendations:  1.  Decisional Capacity -  Unreliable, as patient is not verbal. Patient has an advance directive dated 6/1/2017.  Consents and decision making should be done by her daughter Ana Cunningham, the primary Health Care Agent.      2.  Dysphagia - Appreciate input of Speech Language pathologist Lili Martinez, SABINE recommendation for NPO with frequent oral cares.  The patient's daughter would like to discuss options with Speech Therapy before considering feeding tube. Her daughter acknowledged that she had needed temporary tube feeding with a surgical procedure in the past (may 2016) .     3.  Chronic pain - Bilateral shoulder and hip pain due to OA. 8/28/2020 underwent bilateral hip trochanteric bursa injections. Patient chronically on Hydrocodone-Acetaminophen 5-325 mg up to three tablets per day prescribed by PCP Samaria Chappell MD. Patient followed by Daniel Sipple DO at [facility]. Reasonable to use Tylenol for mild pain and offer IV Dilaudid for moderate pain.     4. Spiritual Care - Consultation placed for  to follow.    5. Care Planning - Daughter acknowledged she is needing time to consider options    Goals of Care: No CPR - Pre-arrest intubation OK.     Disease Process/es & Symptoms:  Radha Cunningham is a 89 year old patient admitted 9/6/2020 with behavior changing over the 24 to 48 hours prior. CT scan demonstrated evolving large left PCA stroke and CTA demonstrates corresponding P2 occlusion. The patient has history of atrial fibrillation and left MCA likely cardio embolic as coumadin was stopped for skin  "biopsy. Neurology saw in consultation and the patient was placed on  mg daily with plan to restart Warfarin 9/20/2020.    This is the patient's third hospitalization in the past year as she was hospitalized November 18 through 25 with a acute viral bronchitis and December 15 through 21 for rapid atrial fibrillation with acute systolic CHF exacerbation and new cardiomyopathy. During the past year she has lost 7 pounds at she was documented at 165 pounds on November 19 and her current weight is 158 pounds.     Findings & plan of care discussed with: Hospitalist Grover camarena MD and bedside nurse Farrah Hdz RN.  Follow-up plan from palliative team: Will follow closely during this hospitalization.   Thank you for involving us in the patient's care.     Simran Almanzar MS, RN, CNS, APRN, ACHPN, FAACVPR  Pain and Palliative Care  Pager [phone]  Office [phone]       Time Spent on this Encounter   Total unit/floor time 11 AM until 12:10 PM, time consisted of the following, examination of the patient, reviewing the record and completing documentation. >50% of time spent in counseling and coordination of care.  Time spend counseling with family consisted of the following topics, goals of care, education about prognosis and symptom management.  Time spent in coordination of care with Bedside Nurse Farrah Hdz, MALINA, Hospitalist Grover Camarena MD and Speech Therapist Lili Martinez, SLP.     Primary Care Physician   Samaria Chappell    Chief Complaint   Altered Mental Status    History is obtained from the electronic health record and patient's daughter    Decision-Making & Goals of Care:  Discussed on September 8, 2020 with Simran BOYER, CNS:     Met with the patient's daughter/healthcare agent Hilton Cunningham at bedside. I asked Hilton what the doctors had told her. \"They haven't told me anything. You're the first person who's come in here since mom had the echocardiogram this morning.\" I explained my " "roll in palliative care and offered concern regarding the patient's history of osteoarthritis pain. Hilton acknowledged her mother could take up to three Hydrocodone-Acetaminophen a day \"She would start off the day and take one and then if she needed it later on she would take one at noon and later in the day.\" Hilton unfortunately missed meeting Speech Therapy this morning, but she acknowledged her mother had a feeding tube after surgery years ago (May 2016 per [facility] data). Hilton is interested in meeting with Speech Therapy and as the patient seemed to be trying to get out of bed, I called nursing to assist and spoke with SABINE Watts about meeting with the patient's daughter to discuss her mother's care.      Past Medical History   I have reviewed this patient's medical history and updated it with pertinent information if needed.   Past Medical History:   Diagnosis Date     Asthma      Cholelithiasis      Diverticulosis      Hiatal hernia      Hypertension      Paroxysmal A-fib (H)      PUD (peptic ulcer disease)      Skin cancer        Past Surgical History   I have reviewed this patient's surgical history and updated it with pertinent information if needed.  Past Surgical History:   Procedure Laterality Date     HERNIA REPAIR  2004    umbilical     Skin cancer removal - forehead  2005       Prior to Admission Medications   Prior to Admission Medications   Prescriptions Last Dose Informant Patient Reported? Taking?   HYDROcodone-acetaminophen (NORCO) 5-325 MG tablet 9/5/2020 Daughter Yes Yes   Sig: Take 1 tablet by mouth every 8 hours as needed for severe pain   Nutritional Supplements (NUTRITIONAL SUPPLEMENT PLUS) LIQD   Yes No   Sig: Take by mouth 2 times daily \"Magic Cup\" brand nutritional supplement   albuterol (PROVENTIL) (2.5 MG/3ML) 0.083% neb solution 9/5/2020  No Yes   Sig: Take 1 vial (2.5 mg) by nebulization every 4 hours as needed for wheezing   atorvastatin (LIPITOR) 10 MG tablet " 9/5/2020 at 1700  Yes Yes   Sig: Take 10 mg by mouth At Bedtime    calcium carbonate-vitamin D (OSCAL W/D) 500-200 MG-UNIT tablet Past Week Daughter Yes Yes   Sig: Take 1 tablet by mouth daily   emollient (VANICREAM) external cream 9/6/2020  Yes Yes   Sig: Apply topically 2 times daily To whole body   fluocinonide (LIDEX) 0.05 % external solution Past Month Daughter Yes Yes   Sig: Apply topically 2 times daily Apply to affected areas of the scalp twice daily for 2 weeks at a time as needed for dryness.   fluticasone (FLONASE) 50 MCG/ACT nasal spray Unknown  Yes No   Sig: Spray 1 spray into both nostrils daily as needed for rhinitis or allergies   furosemide (LASIX) 20 MG tablet Past Week  No Yes   Sig: Take 1 tablet (20 mg) by mouth daily   ipratropium - albuterol 0.5 mg/2.5 mg/3 mL (DUONEB) 0.5-2.5 (3) MG/3ML neb solution 9/5/2020  No Yes   Sig: Take 1 vial (3 mLs) by nebulization 4 times daily   metoprolol tartrate (LOPRESSOR) 50 MG tablet 9/5/2020 at 1700  Yes Yes   Sig: Take 37.5 mg by mouth 2 times daily    multivitamin w/minerals (THERA-VIT-M) tablet 9/5/2020 Daughter Yes Yes   Sig: Take 1 tablet by mouth daily   nystatin (MYCOSTATIN) 003179 UNIT/GM external powder Past Week  Yes Yes   Sig: Apply topically 2 times daily Under right breast and groin area   warfarin ANTICOAGULANT (COUMADIN) 2 MG tablet 9/5/2020 at 1700 (4 mg) Daughter Yes Yes   Sig: Take 4 mg by mouth Take 4 mg by mouth Tue, Thur, Sat, and Sun. Take 5 mg Mon, Wed, Fri.   warfarin ANTICOAGULANT (COUMADIN) 2 MG tablet 9/4/2020 at 1700 (5 mg) Daughter Yes Yes   Sig: Take 5 mg by mouth Take 5 mg by mouth on Mon, Wed, Fri. Take 4 mg all other days of the week.      Facility-Administered Medications: None     Allergies   Allergies   Allergen Reactions     Contrast Dye        Social History   Living situation: Lives at home with the support of her daughter  Family system: Three children. Her daughters Hilton and Carolina are involved with her care at home.    Functional status: Now needs help with ADLs   History of substance use/abuse:  reports that she has never smoked. She does not have any smokeless tobacco history on file.  reports no history of alcohol use.  Mandaen affiliation: [employer]aeism      Family History   I have reviewed this patient's family history and updated it with pertinent information if needed.   Family History   Problem Relation Age of Onset     C.A.D. Mother      Hypertension Mother        Review of Systems   Review of systems not obtained due to patient factors - confusion and mental status      Physical Exam   Temp:  [97.8  F (36.6  C)-98.4  F (36.9  C)] 98.4  F (36.9  C)  Pulse:  [71-96] 96  Resp:  [16-20] 18  BP: (103-129)/(54-73) 129/73  SpO2:  [94 %-98 %] 96 %  158 lbs 8 oz  GEN:  Elderly  female. Non-verbal and does not respond to commands. Appears comfortable, no apparent distress.  HEENT:  Normocephalic/atraumatic, no scleral icterus with left gaze, no nasal discharge, mouth dry.  CV:  Irregular at 84, S1, S2; systolic murmur.  +3 DP/PT pulses bilaterally; no edema bilaterally.  RESP:  Clear to auscultation bilaterally without rales/rhonchi/wheezing/retractions.  Symmetric chest rise on inhalation noted.  Normal respiratory effort.  ABD:  Rounded, soft, non-tender/non-distended.  +BS  EXT:  Edema & pulses as noted above.  CMS intact x 4.     M/S:   No grimace or wincing with palpation of extremities.    SKIN:  Dry to touch, no exanthems noted in the visualized areas.    NEURO: Does not follow commands. Moves extremities.  Psych:  Normal affect.  Calm, cooperative, conversant appropriately.       Data   Results for orders placed or performed during the hospital encounter of 09/06/20   Head CT w/o contrast     Status: None    Narrative    CT SCAN OF THE HEAD WITHOUT CONTRAST September 6, 2020 2:03 PM     HISTORY: Altered mental status unexplained. Elderly, altered mental  status < on warfarin, left gaze. Weakness. Patient unable to  supply  history.    TECHNIQUE: Axial images of the head and coronal reformations without  IV contrast material. Radiation dose for this scan was reduced using  automated exposure control, adjustment of the mA and/or kV according  to patient size, or iterative reconstruction technique.    COMPARISON: None.    FINDINGS: There is some decreased attenuation in the medial left  temporal occipital lobe with subtle mass effect. Mild to moderate  cerebral atrophy is present. Moderately extensive white matter changes  in both hemispheres without mass effect or enhancement. There are old  lacunar infarcts in the right basal ganglia region. There is no  evidence for intracranial hemorrhage or skull fracture. Vascular  calcifications are noted. Visualized paranasal sinuses and mastoid air  cells are clear.      Impression    IMPRESSION:  1. Decreased attenuation involving gray and white matter in the medial  left temporal occipital lobe. This most likely represents an acute  ischemic infarct although other etiologies including encephalitis  could also give a similar pattern. If clinically indicated, MRI might  be helpful in further evaluation.  2. Cerebral atrophy with scattered nonspecific white matter changes  which are most likely due to chronic small vessel ischemic disease  given the patient's age.  3. 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  4. No evidence for intracranial hemorrhage.    SAM KENDALL MD   CTA Head Neck with Contrast     Status: None    Narrative    EXAM: CTA  HEAD NECK WITH CONTRAST  LOCATION: [facility]  DATE/TIME: 9/6/2020 8:37 PM    INDICATION: Stroke, altered mental status.  COMPARISON: None.  CONTRAST: 70 mL [id number].  TECHNIQUE: Axial helical CT images of the head and neck vessels obtained during the arterial phase of intravenous contrast administration. Axial 2D reconstructed images and multiplanar 3D MIP reconstructed images of the head and neck vessels were   performed by the  technologist. Dose reduction techniques were used. All stenosis measurements made according to NASCET criteria unless otherwise specified.    Acute/subacute infarct is present involving the left posterior cerebral artery distribution.    HEAD CTA:  ANTERIOR CIRCULATION: The internal carotid arteries, anterior cerebral arteries, and middle cerebral arteries are patent. Extensive atherosclerotic plaquing is present involving the carotid siphons. No evidence of large vessel occlusion or high-grade   stenosis. No evidence of aneurysm or vascular malformation.    POSTERIOR CIRCULATION: Right vertebral artery is diminutive predominantly terminating in posterior inferior cerebellar artery. Left vertebral artery, basilar artery, and right posterior cerebral arteries are patent. Left posterior cerebral artery is   occluded at the P2 segment with possible reconstitution of more peripheral branches. Right posterior communicating artery is present.    DURAL VENOUS SINUSES: Expected enhancement of the major dural venous sinuses.    NECK CTA: A 3 vessel aortic arch is present. The bilateral common carotid, internal carotid, external carotid, and vertebral arteries are patent. Moderate plaque is present at the bilateral carotid bifurcations without evidence of flow limiting stenosis.   Bilateral internal carotid artery tonsillar loops are present.    Mild plaquing at the bilateral vertebral artery origins without evidence of flow limiting stenosis. Right vertebral artery is diminutive with dominant left vertebral artery.    NONVASCULAR STRUCTURES: Moderate to severe cervical spine degenerative change is present. At least moderate stenosis at the level of the C1 ring with probable old C1 ring fractures. Left submandibular gland is small or absent.      Impression    IMPRESSION:    HEAD CTA:   1.  Occlusion of the left posterior cerebral artery at the P2 segment.    NECK CTA:  1.  No evidence of large vessel occlusion or high-grade  stenosis.  2.  Old C1 ring fractures with moderate spinal canal stenosis.   CT Head w/o Contrast     Status: None    Narrative    CT SCAN OF THE HEAD WITHOUT CONTRAST   9/7/2020 9:42 AM     HISTORY: Focal neurological deficit, greater than 6 hours, stroke  suspected. Gaze abnormality.    TECHNIQUE:  Axial images of the head and coronal reformations without  IV contrast material.  Radiation dose for this scan was reduced using  automated exposure control, adjustment of the mA and/or kV according  to patient size, or iterative reconstruction technique.    COMPARISON: 9/6/2020    FINDINGS: During the interval, the left temporal occipital low-density  has become more clearly defined consistent with an evolving ischemic  infarct. There is no evidence for any hemorrhage. There is minimal  localized mass effect but no shift. Old right basal ganglia infarct  noted. There is cerebral atrophy and some patchy nonspecific white  matter changes. Vascular calcification seen at the skull base.  Visualized paranasal sinuses and mastoid air cells are clear.      Impression    IMPRESSION:  1. Evolving ischemic infarct in left temporal occipital lobe.  2. Old right basal ganglia infarct.  3. Cerebral atrophy with some chronic-appearing white matter changes  most likely due to chronic small vessel ischemic disease.  4. No evidence for intracranial hemorrhage or any significant mass  effect.    SAM KENDALL MD   CBC with platelets differential     Status: Abnormal   Result Value Ref Range    WBC 9.0 4.0 - 11.0 10e9/L    RBC Count 3.63 (L) 3.8 - 5.2 10e12/L    Hemoglobin 11.8 11.7 - 15.7 g/dL    Hematocrit 36.5 35.0 - 47.0 %     (H) 78 - 100 fl    MCH 32.5 26.5 - 33.0 pg    MCHC 32.3 31.5 - 36.5 g/dL    RDW 13.7 10.0 - 15.0 %    Platelet Count 166 150 - 450 10e9/L    Diff Method Automated Method     % Neutrophils 72.4 %    % Lymphocytes 13.7 %    % Monocytes 11.8 %    % Eosinophils 0.1 %    % Basophils 0.4 %    % Immature  Granulocytes 1.6 %    Nucleated RBCs 0 0 /100    Absolute Neutrophil 6.5 1.6 - 8.3 10e9/L    Absolute Lymphocytes 1.2 0.8 - 5.3 10e9/L    Absolute Monocytes 1.1 0.0 - 1.3 10e9/L    Absolute Eosinophils 0.0 0.0 - 0.7 10e9/L    Absolute Basophils 0.0 0.0 - 0.2 10e9/L    Abs Immature Granulocytes 0.1 0 - 0.4 10e9/L    Absolute Nucleated RBC 0.0    Basic metabolic panel     Status: Abnormal   Result Value Ref Range    Sodium 137 133 - 144 mmol/L    Potassium 3.8 3.4 - 5.3 mmol/L    Chloride 105 94 - 109 mmol/L    Carbon Dioxide 27 20 - 32 mmol/L    Anion Gap 5 3 - 14 mmol/L    Glucose 110 (H) 70 - 99 mg/dL    Urea Nitrogen 16 7 - 30 mg/dL    Creatinine 0.88 0.52 - 1.04 mg/dL    GFR Estimate 57 (L) >60 mL/min/[1.73_m2]    GFR Estimate If Black 66 >60 mL/min/[1.73_m2]    Calcium 9.0 8.5 - 10.1 mg/dL   INR     Status: None   Result Value Ref Range    INR 1.08 0.86 - 1.14   Ammonia     Status: None   Result Value Ref Range    Ammonia 14 10 - 50 umol/L   UA with Microscopic     Status: Abnormal   Result Value Ref Range    Color Urine Light Yellow     Appearance Urine Clear     Glucose Urine Negative NEG^Negative mg/dL    Bilirubin Urine Negative NEG^Negative    Ketones Urine Negative NEG^Negative mg/dL    Specific Gravity Urine 1.019 1.003 - 1.035    Blood Urine Trace (A) NEG^Negative    pH Urine 7.5 (H) 5.0 - 7.0 pH    Protein Albumin Urine 10 (A) NEG^Negative mg/dL    Urobilinogen mg/dL Normal 0.0 - 2.0 mg/dL    Nitrite Urine Positive (A) NEG^Negative    Leukocyte Esterase Urine Negative NEG^Negative    Source Catheterized Urine     WBC Urine 6 (H) 0 - 5 /HPF    RBC Urine 9 (H) 0 - 2 /HPF    Bacteria Urine Many (A) NEG^Negative /HPF    Squamous Epithelial /HPF Urine <1 0 - 1 /HPF    Mucous Urine Present (A) NEG^Negative /LPF    Hyaline Casts 1 0 - 2 /LPF   Troponin I     Status: None   Result Value Ref Range    Troponin I ES <0.015 0.000 - 0.045 ug/L   Asymptomatic COVID-19 Virus (Coronavirus) by PCR     Status: None     Specimen: Nasopharyngeal   Result Value Ref Range    COVID-19 Virus PCR to [facility] - Source Nasopharyngeal     COVID-19 Virus PCR to [facility] - Result       Test received-See reflex to IDDL test SARS CoV2 (COVID-19) Virus RT-PCR   Basic metabolic panel     Status: Abnormal   Result Value Ref Range    Sodium 137 133 - 144 mmol/L    Potassium 3.7 3.4 - 5.3 mmol/L    Chloride 107 94 - 109 mmol/L    Carbon Dioxide 23 20 - 32 mmol/L    Anion Gap 7 3 - 14 mmol/L    Glucose 149 (H) 70 - 99 mg/dL    Urea Nitrogen 19 7 - 30 mg/dL    Creatinine 0.85 0.52 - 1.04 mg/dL    GFR Estimate 60 (L) >60 mL/min/[1.73_m2]    GFR Estimate If Black 70 >60 mL/min/[1.73_m2]    Calcium 8.6 8.5 - 10.1 mg/dL   CBC with platelets     Status: None   Result Value Ref Range    WBC 6.4 4.0 - 11.0 10e9/L    RBC Count 3.80 3.8 - 5.2 10e12/L    Hemoglobin 12.3 11.7 - 15.7 g/dL    Hematocrit 37.5 35.0 - 47.0 %    MCV 99 78 - 100 fl    MCH 32.4 26.5 - 33.0 pg    MCHC 32.8 31.5 - 36.5 g/dL    RDW 13.4 10.0 - 15.0 %    Platelet Count 168 150 - 450 10e9/L   Glucose by meter     Status: Abnormal   Result Value Ref Range    Glucose 143 (H) 70 - 99 mg/dL   SARS-CoV-2 COVID-19 Virus (Coronavirus) RT-PCR Nasopharyngeal     Status: None    Specimen: Nasopharyngeal   Result Value Ref Range    SARS-CoV-2 Virus Specimen Source Nasopharyngeal     SARS-CoV-2 PCR Result NEGATIVE     SARS-CoV-2 PCR Comment       Testing was performed using the [employer] SARS-CoV-2 Assay on the [employer] Instrument System.   Additional information about this Emergency Use Authorization (EUA) assay can be found via   the Lab Guide.     Glucose by meter     Status: Abnormal   Result Value Ref Range    Glucose 157 (H) 70 - 99 mg/dL   Lipid panel reflex to direct LDL     Status: Abnormal   Result Value Ref Range    Cholesterol 192 <200 mg/dL    Triglycerides 66 <150 mg/dL    HDL Cholesterol 71 >49 mg/dL    LDL Cholesterol Calculated 108 (H) <100 mg/dL    Non HDL Cholesterol 121 <130 mg/dL  "  Hemoglobin A1c     Status: Abnormal   Result Value Ref Range    Hemoglobin A1C 6.5 (H) 0 - 5.6 %   Glucose by meter     Status: Abnormal   Result Value Ref Range    Glucose 127 (H) 70 - 99 mg/dL   Glucose by meter     Status: Abnormal   Result Value Ref Range    Glucose 118 (H) 70 - 99 mg/dL   Glucose by meter     Status: Abnormal   Result Value Ref Range    Glucose 123 (H) 70 - 99 mg/dL   Glucose by meter     Status: Abnormal   Result Value Ref Range    Glucose 116 (H) 70 - 99 mg/dL   Glucose by meter     Status: Abnormal   Result Value Ref Range    Glucose 105 (H) 70 - 99 mg/dL   EKG 12-lead, tracing only     Status: None   Result Value Ref Range    Interpretation ECG Click View Image link to view waveform and result    Neurology IP Consult: Stroke (potential or actual); Patient to be seen: Routine - within 24 hours; CVA; Consultant may enter orders: Yes; Requesting provider? Hospitalist (if different from attending physician)     Status: None ()    Avelina Carmichael PA-C     9/7/2020  3:22 PM    [facility]    Stroke Consult Note    Reason for Consult: \"CVA\"    Chief Complaint: Altered Mental Status       HPI  Radha GRETEL Cunningham is a 89 year old female with PMH of atrial   fibrillation on Warfarin, HTN, CHF, DM2, history of TIA, and   history or cancer. Patient presents with altered mental status,   and was unable to provide a history in the ED. Per chart review,   patient lives with a daughter at home, who noticed decreased   responsiveness over the last 2 days. Today patient was up sitting   in the chair with Carolina who is daughter at bedside.  Daughter   states that she has not been with her mother recently due to a   shoulder surgery.  Patient was nonverbal throughout this exam, so   daughter was the primary historian.  Daughter states that patient   was scheduled to have a skin biopsy last Thursday, patient was   instructed to hold Coumadin 5 days prior to the procedure.    Patient's " daughter states her sister started to notice change in   mentation Friday.  Patient's daughter at bedside states patient   was independent prior to this stroke.  Patient became tearful   intermittently throughout exam.    Stroke Evaluation summarized:  MRI/Head CT: Decreased attenuation involving gray and white   matter in the medial left temporal/occipital lobe. This most   likely represents an acute ischemic infarct  Intracranial Vascular Imaging: Occlusion of the left posterior   cerebral artery at the P2 segment.  Extensive atherosclerotic   plaquing is present involving the carotid siphons  Cervical Carotid and Vertebral Artery Vascular Imaging:  No   evidence of large vessel occlusion or high-grade stenosis.  Echocardiogram: Ordered, awaiting results  EKG/Telemetry: A.Fib  LDL: 108  A1c: 7.0  Troponin: <0.015  Other testing: INR: 1.08    Impression  Ischemic Stroke due to cardioembolism likely secondary to   subtherapeutic INR, patient PTA anticoagulated with Warfarin      Recommendations  - Continue  mg, OK to restart Warfarin on 09/20/2020   - Abnormal UA ; treat possible UTI per primary hopsitalist team   - Echocardiogram ordered, awaiting results. If unremarkable ok to   discharge from a stroke/neurology standpoint.  - LDL (108) with goal LDL < 100 ; increase Lipitor to 20 mg   daily, recheck LDL in 4-6 weeks with PCP, titrate medication as   needed to reach target goal.   - A1c (7.0) Tighter long term glucose control needed to achieve   goal <7.0   - Goal BP <140/90 with tighter control associated with decreased   overall CV risk, if tolerated  - PLC    - Therapies as needed     Patient Follow-up    - in the next 1-2 week(s) with PCP   - Follow up with [facility] in 6-8 Weeks     Thank you for this consult. No further stroke evaluation is   recommended, so we will sign off. Please contact us with any   additional questions.    Avelina Hall PA-C   Neurology  To page me or  "covering stroke neurology team member, click here:   [facility]   Choose \"On Call\" tab at top, then search dropdown box for   \"Neurology Adult\", select location, press Enter, then look for   stroke/neuro ICU/telestroke.  _____________________________________________________    Past Medical History   Past Medical History:   Diagnosis Date     Asthma      Cholelithiasis      Diverticulosis      Hiatal hernia      Hypertension      Paroxysmal A-fib (H)      PUD (peptic ulcer disease)      Skin cancer      Past Surgical History   Past Surgical History:   Procedure Laterality Date     HERNIA REPAIR  2004    umbilical     Skin cancer removal - forehead  2005     Medications   Home Meds  Prior to Admission medications    Medication Sig Start Date End Date Taking? Authorizing Provider   albuterol (PROVENTIL) (2.5 MG/3ML) 0.083% neb solution Take 1   vial (2.5 mg) by nebulization every 4 hours as needed for   wheezing 12/21/19  Yes Zenon Solis MD   atorvastatin (LIPITOR) 10 MG tablet Take 10 mg by mouth At   Bedtime    Yes Reported, Patient   calcium carbonate-vitamin D (OSCAL W/D) 500-200 MG-UNIT tablet   Take 1 tablet by mouth daily   Yes Reported, Patient   emollient (VANICREAM) external cream Apply topically 2 times   daily To whole body   Yes Unknown, Entered By History   fluocinonide (LIDEX) 0.05 % external solution Apply topically 2   times daily Apply to affected areas of the scalp twice daily for   2 weeks at a time as needed for dryness.   Yes Reported, Patient   furosemide (LASIX) 20 MG tablet Take 1 tablet (20 mg) by mouth   daily 12/22/19  Yes Zenon Solis MD   HYDROcodone-acetaminophen (NORCO) 5-325 MG tablet Take 1 tablet   by mouth every 8 hours as needed for severe pain   Yes Reported,   Patient   ipratropium - albuterol 0.5 mg/2.5 mg/3 mL (DUONEB) 0.5-2.5 (3)   MG/3ML neb solution Take 1 vial (3 mLs) by nebulization 4 times   daily 11/25/19  Yes Dean Tavarez MD   metoprolol tartrate (LOPRESSOR) " "50 MG tablet Take 37.5 mg by   mouth 2 times daily    Yes Unknown, Entered By History   multivitamin w/minerals (THERA-VIT-M) tablet Take 1 tablet by   mouth daily   Yes Reported, Patient   nystatin (MYCOSTATIN) 534450 UNIT/GM external powder Apply   topically 2 times daily Under right breast and groin area   Yes   Unknown, Entered By History   warfarin ANTICOAGULANT (COUMADIN) 2 MG tablet Take 5 mg by mouth   Take 5 mg by mouth on Mon, Wed, Fri. Take 4 mg all other days of   the week.   Yes Reported, Patient   warfarin ANTICOAGULANT (COUMADIN) 2 MG tablet Take 4 mg by mouth   Take 4 mg by mouth Tue, Thur, Sat, and Sun. Take 5 mg Mon, Wed,   Fri.   Yes Reported, Patient   fluticasone (FLONASE) 50 MCG/ACT nasal spray Spray 1 spray into   both nostrils daily as needed for rhinitis or allergies      Unknown, Entered By History   Nutritional Supplements (NUTRITIONAL SUPPLEMENT PLUS) LIQD Take   by mouth 2 times daily \"Magic Cup\" brand nutritional supplement      Unknown, Entered By History       Scheduled Meds    aspirin  325 mg Oral Daily    Or     aspirin  300 mg Rectal Daily     atorvastatin  10 mg Oral At Bedtime     cefTRIAXone  1 g Intravenous Q24H     emollient   Topical BID     ipratropium - albuterol 0.5 mg/2.5 mg/3 mL  3 mL Nebulization   4x Daily     miconazole   Topical BID     sodium chloride (PF)  3 mL Intracatheter Q8H       Infusion Meds    sodium chloride 75 mL/hr at 09/07/20 0051       PRN Meds  acetaminophen, albuterol, lidocaine 4%, lidocaine (buffered or   not buffered), melatonin, naloxone, ondansetron **OR**   ondansetron, sodium chloride (PF)    Allergies   Allergies   Allergen Reactions     Contrast Dye      Family History   Family History   Problem Relation Age of Onset     C.A.D. Mother      Hypertension Mother      Social History   Social History     Tobacco Use     Smoking status: Never Smoker   Substance Use Topics     Alcohol use: No     Drug use: Not on file       Review of Systems "   Review of systems not obtained due to patient factors - confusion   and mental status       PHYSICAL EXAMINATION   Temp:  [97.8  F (36.6  C)-98.8  F (37.1  C)] 97.9  F (36.6  C)  Pulse:  [58-88] 72  Resp:  [12-26] 20  BP: (109-147)/(60-98) 109/61  SpO2:  [94 %-100 %] 95 %    Neurologic   Mental Status: awake, not following commands, non-verbal   Cranial Nerves:  hearing not formally tested but intact to   conversation, left gaze preference, 3 mm pupils fixed (assessed   by nurse)   Motor:  no abnormal movements, spontanously moving all limbs   Reflexes:  unable to test (telestroke)  Sensory:  Unable to test due to mental status/patient not   cooperating   Coordination:  unable to test due to mental status/patient not   cooperating   Station/Gait:  unable to test due to telestroke    Dysphagia Screen  Per Nursing    Stroke Scales  Unable to get NIH stroke scale due patients current mental status    Imaging  I personally reviewed all imaging; relevant findings per HPI.    Labs Data   CBC  Recent Labs   Lab 09/06/20  1344   WBC 9.0   RBC 3.63*   HGB 11.8   HCT 36.5        Basic Metabolic Panel   Recent Labs   Lab 09/06/20  1344      POTASSIUM 3.8   CHLORIDE 105   CO2 27   BUN 16   CR 0.88   *   SHAN 9.0     Liver Panel  No results for input(s): PROTTOTAL, ALBUMIN, BILITOTAL, ALKPHOS,   AST, ALT, BILIDIRECT in the last 168 hours.  INR    Recent Labs   Lab Test 09/06/20  1344 12/21/19  0642 12/20/19  0600   INR 1.08 2.49* 2.42*      Lipid Profile  No lab results found.  A1C    Recent Labs   Lab Test 12/15/19  0524   A1C 6.7*     Troponin I    Recent Labs   Lab 09/06/20  1344   TROPI <0.015          Stroke Code / Stroke Consult Data Data Telestroke Service Details    (for non-emergent stroke consult with tele)  Video start time 09/07/20   1223   Video end time 09/07/20   1310   Type of service telemedicine diagnostic assessment of acute   neurological changes   Reason telemedicine is appropriate  patient requires assessment   with a specialist for diagnosis and treatment of neurological   symptoms   Mode of transmission secure interactive audio and video   communication per Marcos   Originating site (patient location) [facility]    Distant site (provider location) [facility]       I have personally spent a total of 50 minutes providing critical   care services supervising this patient's stroke code activation.    I personally reviewed all lab values and radiology images.  I   evaluated and directed care for the patient's critical condition.     Urine Culture Aerobic Bacterial     Status: Abnormal (Preliminary result)    Specimen: Catheterized Urine   Result Value Ref Range    Specimen Description Catheterized Urine     Special Requests Specimen received in preservative     Culture Micro (A)      >100,000 colonies/mL  Escherichia coli  Susceptibility testing in progress

## 2020-09-09 ENCOUNTER — APPOINTMENT (OUTPATIENT)
Dept: OCCUPATIONAL THERAPY | Facility: CLINIC | Age: 85
DRG: 064 | End: 2020-09-09
Payer: COMMERCIAL

## 2020-09-09 ENCOUNTER — APPOINTMENT (OUTPATIENT)
Dept: SPEECH THERAPY | Facility: CLINIC | Age: 85
DRG: 064 | End: 2020-09-09
Payer: COMMERCIAL

## 2020-09-09 LAB
GLUCOSE BLDC GLUCOMTR-MCNC: 130 MG/DL (ref 70–99)
GLUCOSE BLDC GLUCOMTR-MCNC: 69 MG/DL (ref 70–99)
GLUCOSE BLDC GLUCOMTR-MCNC: 83 MG/DL (ref 70–99)
GLUCOSE BLDC GLUCOMTR-MCNC: 85 MG/DL (ref 70–99)
GLUCOSE BLDC GLUCOMTR-MCNC: 94 MG/DL (ref 70–99)
GLUCOSE BLDC GLUCOMTR-MCNC: 95 MG/DL (ref 70–99)
GLUCOSE BLDC GLUCOMTR-MCNC: 96 MG/DL (ref 70–99)

## 2020-09-09 PROCEDURE — 25000128 H RX IP 250 OP 636: Performed by: INTERNAL MEDICINE

## 2020-09-09 PROCEDURE — 94640 AIRWAY INHALATION TREATMENT: CPT | Mod: 76

## 2020-09-09 PROCEDURE — 25000132 ZZH RX MED GY IP 250 OP 250 PS 637: Performed by: CLINICAL NURSE SPECIALIST

## 2020-09-09 PROCEDURE — 25800025 ZZH RX 258: Performed by: INTERNAL MEDICINE

## 2020-09-09 PROCEDURE — 00000146 ZZHCL STATISTIC GLUCOSE BY METER IP

## 2020-09-09 PROCEDURE — C9113 INJ PANTOPRAZOLE SODIUM, VIA: HCPCS | Performed by: INTERNAL MEDICINE

## 2020-09-09 PROCEDURE — 25000132 ZZH RX MED GY IP 250 OP 250 PS 637: Performed by: INTERNAL MEDICINE

## 2020-09-09 PROCEDURE — 40000275 ZZH STATISTIC RCP TIME EA 10 MIN

## 2020-09-09 PROCEDURE — 25000125 ZZHC RX 250: Performed by: INTERNAL MEDICINE

## 2020-09-09 PROCEDURE — 92526 ORAL FUNCTION THERAPY: CPT | Mod: GN

## 2020-09-09 PROCEDURE — 99233 SBSQ HOSP IP/OBS HIGH 50: CPT | Performed by: INTERNAL MEDICINE

## 2020-09-09 PROCEDURE — 99358 PROLONG SERVICE W/O CONTACT: CPT | Performed by: NURSE PRACTITIONER

## 2020-09-09 PROCEDURE — 25800030 ZZH RX IP 258 OP 636: Performed by: INTERNAL MEDICINE

## 2020-09-09 PROCEDURE — 12000000 ZZH R&B MED SURG/OB

## 2020-09-09 PROCEDURE — 94640 AIRWAY INHALATION TREATMENT: CPT

## 2020-09-09 PROCEDURE — 97530 THERAPEUTIC ACTIVITIES: CPT | Mod: GO

## 2020-09-09 RX ORDER — LIDOCAINE HYDROCHLORIDE 20 MG/ML
5 SOLUTION OROPHARYNGEAL ONCE
Status: DISCONTINUED | OUTPATIENT
Start: 2020-09-09 | End: 2020-09-14 | Stop reason: HOSPADM

## 2020-09-09 RX ORDER — HYDROMORPHONE HYDROCHLORIDE 1 MG/ML
0.2 INJECTION, SOLUTION INTRAMUSCULAR; INTRAVENOUS; SUBCUTANEOUS AT BEDTIME
Status: DISCONTINUED | OUTPATIENT
Start: 2020-09-09 | End: 2020-09-14 | Stop reason: HOSPADM

## 2020-09-09 RX ORDER — DEXTROSE MONOHYDRATE, SODIUM CHLORIDE, AND POTASSIUM CHLORIDE 50; 1.49; 4.5 G/1000ML; G/1000ML; G/1000ML
INJECTION, SOLUTION INTRAVENOUS CONTINUOUS
Status: DISCONTINUED | OUTPATIENT
Start: 2020-09-09 | End: 2020-09-11

## 2020-09-09 RX ADMIN — MICONAZOLE NITRATE: 20 POWDER TOPICAL at 09:58

## 2020-09-09 RX ADMIN — IPRATROPIUM BROMIDE AND ALBUTEROL SULFATE 3 ML: .5; 3 SOLUTION RESPIRATORY (INHALATION) at 20:02

## 2020-09-09 RX ADMIN — IPRATROPIUM BROMIDE AND ALBUTEROL SULFATE 3 ML: .5; 3 SOLUTION RESPIRATORY (INHALATION) at 11:05

## 2020-09-09 RX ADMIN — HYDROMORPHONE HYDROCHLORIDE 0.2 MG: 1 INJECTION, SOLUTION INTRAMUSCULAR; INTRAVENOUS; SUBCUTANEOUS at 21:23

## 2020-09-09 RX ADMIN — IPRATROPIUM BROMIDE AND ALBUTEROL SULFATE 3 ML: .5; 3 SOLUTION RESPIRATORY (INHALATION) at 15:41

## 2020-09-09 RX ADMIN — MICONAZOLE NITRATE: 20 POWDER TOPICAL at 20:34

## 2020-09-09 RX ADMIN — Medication: at 20:34

## 2020-09-09 RX ADMIN — ACETAMINOPHEN 650 MG: 650 SUPPOSITORY RECTAL at 06:18

## 2020-09-09 RX ADMIN — SODIUM CHLORIDE: 9 INJECTION, SOLUTION INTRAVENOUS at 10:22

## 2020-09-09 RX ADMIN — CEFTRIAXONE SODIUM 1 G: 1 INJECTION, POWDER, FOR SOLUTION INTRAMUSCULAR; INTRAVENOUS at 18:21

## 2020-09-09 RX ADMIN — POTASSIUM CHLORIDE, DEXTROSE MONOHYDRATE AND SODIUM CHLORIDE: 150; 5; 450 INJECTION, SOLUTION INTRAVENOUS at 18:42

## 2020-09-09 RX ADMIN — DEXTROSE MONOHYDRATE 25 ML: 25 INJECTION, SOLUTION INTRAVENOUS at 17:43

## 2020-09-09 RX ADMIN — IPRATROPIUM BROMIDE AND ALBUTEROL SULFATE 3 ML: .5; 3 SOLUTION RESPIRATORY (INHALATION) at 07:54

## 2020-09-09 RX ADMIN — Medication: at 09:58

## 2020-09-09 RX ADMIN — ASPIRIN 300 MG: 300 SUPPOSITORY RECTAL at 09:57

## 2020-09-09 RX ADMIN — PANTOPRAZOLE SODIUM 40 MG: 40 INJECTION, POWDER, FOR SOLUTION INTRAVENOUS at 09:59

## 2020-09-09 ASSESSMENT — ACTIVITIES OF DAILY LIVING (ADL)
ADLS_ACUITY_SCORE: 26
ADLS_ACUITY_SCORE: 24
ADLS_ACUITY_SCORE: 26
ADLS_ACUITY_SCORE: 26

## 2020-09-09 NOTE — PROGRESS NOTES
[facility]  Palliative Care Progress Note  Text Page     Assessment & Plan   Radha Cunningham is a 89 year old female who was admitted on 9/6/2020. I was asked by Hospitalist Grvoer Camarena MD to see the patient for goals of care need to be readdressed following stroke, Unclear whether the patient will recover swallow function.      Recommendations:  1.  Decisional Capacity -  Unreliable, as patient is not verbal. Patient has an advance directive dated 6/1/2017.  Consents and decision making should be done by her daughter Ana Cunningham, the primary Health Care Agent.    Unable to fully assess due to non verbal status. Daughters and mainly primary health care agent wanting to give their mom a chance with GT placement.        2.  Dysphagia - Appreciate input of Speech Language pathologist Lili Martinez, SABINE recommendation for NPO with frequent oral cares.  The patient's daughter would like to discuss options with Speech Therapy before considering feeding tube. Her daughter acknowledged that she had needed temporary tube feeding with a surgical procedure in the past (may 2016) . plans for placement of GT in am stabilize feeding and likely discharge home     3.  Chronic pain - Bilateral shoulder and hip pain due to OA. 8/28/2020 underwent bilateral hip trochanteric bursa injections. Patient chronically on Hydrocodone-Acetaminophen 5-325 mg up to three tablets per day prescribed by PCP Samaria Chappell MD. Patient followed by Daniel Sipple DO at [facility]. Reasonable to use Tylenol for mild pain and offer IV Dilaudid for moderate pain.   Opioid this past 24 hrs 0 mg  Morphine  EQ     4. Spiritual Care - Consultation placed for  to follow.     5. Care Planning - Daughter acknowledged she is needing time to consider options, wants to give mom a chance.  GT placement in am with transition home with home care support.     Goals of Care: No CPR - Pre-arrest intubation OK.      Disease  Process/es & Symptoms:  Radha Cunningham is a 89 year old patient admitted 9/6/2020 with behavior changing over the 24 to 48 hours prior. CT scan demonstrated evolving large left PCA stroke and CTA demonstrates corresponding P2 occlusion. The patient has history of atrial fibrillation and left MCA likely cardio embolic as coumadin was stopped for skin biopsy. Neurology saw in consultation and the patient was placed on  mg daily with plan to restart Warfarin 9/20/2020.     This is the patient's third hospitalization in the past year as she was hospitalized November 18 through 25 with a acute viral bronchitis and December 15 through 21 for rapid atrial fibrillation with acute systolic CHF exacerbation and new cardiomyopathy. During the past year she has lost 7 pounds at she was documented at 165 pounds on November 19 and her current weight is 158 pounds.     Azalea BOYER CNP  Pain Management and Palliative Care  [facility]  Pgr: [phone]    Time Spent on this Encounter   Total unit/floor time 30  Minutes,non face to face time consisted of the following, reviewing the record and completing documentation. >50% of time spent in counseling and coordination of care.  Time spend counseling with nurse, social work and hospitalist  consisted of the following topics, plan of care .  Time spent in coordination of care with those listed above.     Interval History    daughter Carolina at bedside paged by  in regards to discharge plan  Nurse states patient very fatigued today, not out of bed due to fatigue  Daughter stating to Dr. Camarena they want to give their mom a chance       Course of Hospitalization Discussions Data   conferenced with  Magda Reeves she stated that daughters ( Carolina and Hiltno) wanting tube feeding placed. Patient resistant, not wanting placed. Patient non verbal following evolving  Stroke.   Family demanding tube feeding be placed  under general anesthesia.  Then demanding patient be transferred [facility] where they good stroke care.  stated ethic consult placed. Franca Barba at  from ethics called and  I briefed her on situation.    Stated dilemma of NG tube and seeming as forced placement. Unable to assess true mental status of patient due to nonverbal status.   Also briefed Franca on daughter behavior.  Both daughter barraging nurse demanding immediate discharge to [facility] and stating to nurse we are not giving their mom a fighting chance.   Given this information the ethicist advised transfer to [facility] as they are dissatisfied with care.    Updated Ethicist on outcome of meeting between Carolina Cunningham  and 's. Per ' we are unable to give mom a fighting chance if we do not do tube feeding placement and for sure if tube is not placed we have made the decision of a fighting chance. Therefore, GT placement will be done 9/10/20 with plan to establish tolerance of tube feeding and discharge home with home care support as plan for now.        Review of Systems    not done     Palliative Symptom Review   Not assessed     Physical Exam   Not assessed     Medications     sodium chloride 75 mL/hr at 09/09/20 1022       artificial saliva  2 spray Swish & Spit 4x Daily     aspirin  325 mg Oral Daily    Or     aspirin  300 mg Rectal Daily     atorvastatin  10 mg Oral At Bedtime     cefTRIAXone  1 g Intravenous Q24H     emollient   Topical BID     HYDROmorphone  0.2 mg Intravenous At Bedtime     insulin aspart  1-4 Units Subcutaneous Q4H     ipratropium - albuterol 0.5 mg/2.5 mg/3 mL  3 mL Nebulization 4x Daily     lidocaine  5 mL Topical Once     miconazole   Topical BID     pantoprazole (PROTONIX) IV  40 mg Intravenous Daily with breakfast     sodium chloride (PF)  3 mL Intracatheter Q8H       Data   Results for orders placed or performed during the hospital encounter of 09/06/20 (from the past 24 hour(s))    Glucose by meter   Result Value Ref Range    Glucose 95 70 - 99 mg/dL   Glucose by meter   Result Value Ref Range    Glucose 103 (H) 70 - 99 mg/dL   Glucose by meter   Result Value Ref Range    Glucose 96 70 - 99 mg/dL   Glucose by meter   Result Value Ref Range    Glucose 95 70 - 99 mg/dL   Glucose by meter   Result Value Ref Range    Glucose 85 70 - 99 mg/dL   Nutrition Services Adult IP Consult    Narrative    Joan Daily, RD, LD     9/9/2020  3:11 PM  NASOENTERIC FEEDING TUBE PLACEMENT ASSESSMENT    Reason for Feeding Tube Placement: Enteral nutrition - currently   strict NPO x 4 days   Chart reviewed for contraindications or high risk placements: Yes       Placement Successful:  No, patient refused (would require   restraining to place, asked daughter to assist with hand holding)  Face to Face Time With Patient:  20 minutes    Entered room at end of SLP eval - therapist providing clear   instructions for strict NPO, no free water protocol.   During this writer's time in room, daughter (Saeid) verbally   offered patient water multiple times. Asked writer about rational   for no sedation (provider discretion), restraints (MD order and   again, provider discretion) and G tube (greater goals of care   discussion).  Requested RN be present for attempted placement and education   provided to daughter. Daughter seemed to lack ability for   rational provided to questions and able to teach back info from   SLP, RN and this writer regarding current plan of care.  Also discussed with Dr. Camarena.   Will continue to follow per protocol and complete assessment   9/10.    Joan Daily, MS, RDN, LD, [facility]  Pager - 3rd floor/ICU: [phone]  Pager - All other floors: [phone]  Pager - Weekend/holiday: [phone]  Office: [phone]

## 2020-09-09 NOTE — PROCEDURES
NASOENTERIC FEEDING TUBE PLACEMENT ASSESSMENT    Reason for Feeding Tube Placement: Enteral nutrition - currently strict NPO x 4 days   Chart reviewed for contraindications or high risk placements: Yes     Placement Successful:  No, patient refused (would require restraining to place, asked daughter to assist with hand holding)  Face to Face Time With Patient:  20 minutes    Entered room at end of SLP eval - therapist providing clear instructions for strict NPO, no free water protocol.   During this writer's time in room, daughter (Saeid) verbally offered patient water multiple times. Asked writer about rational for no sedation (provider discretion), restraints (MD order and again, provider discretion) and G tube (greater goals of care discussion).  Requested RN be present for attempted placement and education provided to daughter. Daughter seemed to lack ability for rational provided to questions and able to teach back info from SLP, RN and this writer regarding current plan of care.  Also discussed with Dr. Camarena.   Will continue to follow per protocol and complete assessment 9/10.    Joan Daily MS, RDN, LD, [facility]  Pager - 3rd floor/ICU: [phone]  Pager - All other floors: [phone]  Pager - Weekend/holiday: [phone]  Office: [phone]

## 2020-09-09 NOTE — PLAN OF CARE
"Discharge Planner OT   Patient plan for discharge: Not stated by patient, daughter says home to pt's other daughters house.  Current status: Daughter Carolina present for session. Pt required max A x2 for bed mobility supine <> sit EOB, able to sit EOB with CGA, occasional min A required due to lateral lean to L and posterior lean. Pt lethargic, unable to awaken to safely attempt transfers at this time. Gaze directed to L, difficulty scanning to R side. Total A for repositioning/boosting in bed once supine. Lethargic, required maximal tactile and verbal cues to remain awake/alert. Did clearly state \"no\" when asked if wanting to get up to chair with lift.   Barriers to return to prior living situation: current level of A, cognition, falls risk  Recommendations for discharge: TCU  Rationale for recommendations: Pt is below baseline level of functioning with regards to ADLs and mobility tasks. Recommend ongoing skilled OT while IP and in TCU setting to improve strength, functional activity tolerance and safety needed for daily tasks.        Entered by: Shavonne Navarro 09/09/2020 9:56 AM       "

## 2020-09-09 NOTE — PROGRESS NOTES
Provider paged: Patient resisting tube placement, dietician present. Please call for further planning. Thanks    Addendum: new orders received for tube placement tomorrow.  Plan of care reviewed with patient and family by provider and writer.

## 2020-09-09 NOTE — PROGRESS NOTES
Provider paged: FYI: blood sugar 69, treating with dextrose until above 100. Thanks    Addendum: new orders for ivf, dextrose 1/2 NS with KIKI

## 2020-09-09 NOTE — PLAN OF CARE
Discharge Planner SLP   Patient plan for discharge: did not state  Current status: Swallow tx completed, dtr present. Pt stating her name x1, no other verbalizations. Able to track writer. Not following directions for cranial nerve evaluation. Pt not orally accepting to any oral cares or bolus presentations (thin water via tsp/cup/straw, thickened liquids via tsp/cup) - closing lips tightly and moving head away each time despite max encouragement/cues from both writer and dtr.     Cannot comment on cranial nerve function s/p CVA, oropharyngeal swallow function given the above and therefore cannot recommend any PO. Continue NPO status - educated dtr on rationale and how force feeding could have significant adverse effects. Discussed with dtr, MD and dietician in room. Likely plan for alternative nutrition at this time.     Barriers to return to prior living situation: Dysphagia; aphasia vs apraxia  Recommendations for discharge: TCU  Rationale for recommendations: Continue ST daily for PO trials as indicated. Will hold speech/language evaluation until pt able to participate       Entered by: Antionette Ahn 09/09/2020 2:38 PM

## 2020-09-09 NOTE — PROGRESS NOTES
"      [facility]  Hospitalist Progress Note  Grover Camarena MD 09/09/2020    Reason for Stay (Diagnosis): CVA         Assessment and Plan:      Summary of Stay: Radha Cunningham is a 89 year old female who came to attention on 9/6/2020 with confusion, garbled speech and decreased interaction.    On presentation to the emergency department, Ms. Cunningham was noted to be hemodynamically stable with rate controlled atrial fibrillation but unable to give meaningful history.  Dr. Patel indicates that he was unable to get her to follow directions though she was alert.  Her speech was \"garbled\", suspect that she had some degree of expressive aphasia and she was noted to have a leftward gaze.  CT scan of the head without contrast showed suspected large left MCA territory stroke in the setting of multiple other strokes and generalized volume loss.  He then spoke with stroke neurology, Dr. Pancho Velasquez, [facility] who recommended an aspirin and follow-up with an MRI.  Based on the time course of the patient's symptoms, it was not felt that she would be a candidate for any type of thrombolytic therapy.  Labs: Creatinine 0.88, normal electrolytes, troponin less than the measurable threshold.  WBC 9.0 with normal differential, hemoglobin 11.8, platelet count 166.  INR 1.08, urinalysis (obtained after catheter placement) is mildly abnormal, potentially suggestive of UTI.    Today, Ms. Cunningham more alert though continues nonverbal.  She is more interactive with me.  She was able to get up with heavy 2 person assist and the Martha steady.      Problem List:   1. 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  Old right basal ganglia infarct as well as SCIV are also noted.   -At this time has not been able to complete a swallow evaluation.  -Continues with minimal verbal interaction, cries with stimulation but unclear " "significance  -Repeat Head CT on 9/7 confirmed stability of ischemic stroke.   2. Chronic atrial fibrillation with chronotropic incompetence, s/p acemaker placement, 4/2018.    3. Possible UTI.  4. Asthma.  5. I anticipate disposition will be difficult the patient's daughter is perseverative about not liking nursing homes etc. at this time, the family is adamantly advocating for support so that the patient can have \"a chance to get better\".    PLAN:  1.  As the patient failed speech-language pathology evaluations again today, I asked for nutrition to place a feeding tube.  Unfortunately the patient did not tolerate that at all.  The family continues to insist on \"giving the patient a chance\" and to this end I have conceded in having a gastrostomy tube placed tomorrow.  2.  Currently on Aspirin alone with initiation of atorvostatin. Anticipate resumption of Coumadin (or DOAC for A fib) in two weeks (about 9/21).  3.  Continues ceftriaxone to cover UTI.     4.  Although I have been extremely reluctant to have the patient discharged with family, they are very insistent that they prefer to take care of her at home.  Unfortunately, we are put in the position of having her get adequate therapies but no family versus suboptimal therapy with family.    DVT Prophylaxis: Anticipate resumption of full anticoagulation in 14 days (9/21).  Aspirin 81 mg daily at this time.  PCD's.  Code Status: DNR  Discharge Dispo: Anticipate discharge to home with home care.  Estimated Disch Date / # of Days until Disch: Likely 2 to 4 days.        Interval History (Subjective):      Ms. Cunningham remained somewhat more active today, frequently cries with any stimulus.  She does not interact with me much.  Does not talk at all except for rote speech.  I believe that she is not oriented in any sphere.    I spoke with the patient's guardian as well as extensively with the patient's daughter who was at the bedside.  I had initially asked for an " "ethics consult but they recommended that when the patient's family requested to leave for another facility, that I permit him to do that.  That would be a costly move for the family and I think would not serve the patient well at all.  I met with patient's daughter Carolina at the bedside and subsequently independently in private and we have agreed that she would go home with home care.  If she fails, obviously skilled nursing facility remain an option.                  Physical Exam:      Last Vital Signs:  BP (!) 152/79 (BP Location: Left arm)   Pulse 67   Temp 98.5  F (36.9  C) (Axillary)   Resp 20   Wt 71.9 kg (158 lb 8 oz)   SpO2 97%   BMI 28.08 kg/m      I/O last 3 completed shifts:  In: 506 [I.V.:506]  Out: 400 [Urine:400]    Constitutional:  Sleepy, minimally responsive to me.  She does not follow directions and does not offer to interact in any way.   Respiratory: Clear to auscultation bilaterally, no crackles or wheezing   Cardiovascular:  Mildly irregularly irregular rate and rhythm.   Abdomen: Normal bowel sounds, soft, non-distended, non-tender   Skin: No rashes, no cyanosis, dry to touch   Neuro: Alert and seems to engage with me but does not answer questions reliably indicating \"yes\" to all questions whether it is an appropriate answer.  Does not follow directions for me.  Intermittently crying with minimal stimulus.     Extremities: No edema.   Other(s):  Appears to have a less fixed gaze to the left.       All other systems: Negative          Medications:      All current medications were reviewed with changes reflected in problem list.         Data:      All new lab and imaging data was reviewed.   Labs/Imaging:  Results for orders placed or performed during the hospital encounter of 09/06/20 (from the past 24 hour(s))   Glucose by meter   Result Value Ref Range    Glucose 103 (H) 70 - 99 mg/dL   Glucose by meter   Result Value Ref Range    Glucose 96 70 - 99 mg/dL   Glucose by meter   Result " Value Ref Range    Glucose 95 70 - 99 mg/dL   Glucose by meter   Result Value Ref Range    Glucose 85 70 - 99 mg/dL   Nutrition Services Adult IP Consult    Narrative    Joan Daily, RD, LD     9/9/2020  3:11 PM  NASOENTERIC FEEDING TUBE PLACEMENT ASSESSMENT    Reason for Feeding Tube Placement: Enteral nutrition - currently   strict NPO x 4 days   Chart reviewed for contraindications or high risk placements: Yes       Placement Successful:  No, patient refused (would require   restraining to place, asked daughter to assist with hand holding)  Face to Face Time With Patient:  20 minutes    Entered room at end of SLP eval - therapist providing clear   instructions for strict NPO, no free water protocol.   During this writer's time in room, daughter (Saeid) verbally   offered patient water multiple times. Asked writer about rational   for no sedation (provider discretion), restraints (MD order and   again, provider discretion) and G tube (greater goals of care   discussion).  Requested RN be present for attempted placement and education   provided to daughter. Daughter seemed to lack ability for   rational provided to questions and able to teach back info from   SLP, RN and this writer regarding current plan of care.  Also discussed with Dr. Camarena.   Will continue to follow per protocol and complete assessment   9/10.    Joan Daily, MS, RDN, LD, [facility]  Pager - 3rd floor/ICU: [phone]  Pager - All other floors: [phone]  Pager - Weekend/holiday: [phone]  Office: [phone]             Glucose by meter   Result Value Ref Range    Glucose 69 (L) 70 - 99 mg/dL

## 2020-09-09 NOTE — PLAN OF CARE
Code status: DNR/DNI- Prearrest intubation ok.   COVID-19 result: NEGATIVE     Pertinent assessment: KENDRA orientation. Pt. Nonverbal; not able to follow commands. KENDRA pain. Tmax 100.0. PRN supp. Tylenol given. Tele: Afib CVR. LS: clear, diminished. GI: BS normoactive; incontinent. : incontinent. Skin: blanchable redness on groin. Up Ax2 with lift. Q2 repos. Strict NPO. NS infusing @ 75mL/hr. New PIV placed in L arm. B,     Treatment plan: Continue ABX and ASA. Neuro , tele-stroke, PT, OT, SW, P/P, and SLP following. See notes. Continue w/ POC.   Discharge dispo: 2-4 days.

## 2020-09-09 NOTE — PLAN OF CARE
PT:  Attempted multiple times over 25 minutes to see patient.  Patient busy with other providers including dietician, hospitalist and respiratory therapy.  Will reschedule.

## 2020-09-09 NOTE — PROGRESS NOTES
Provider paged: Patients daughter Hilton called and requested patient to be transferred to [place] in [facility].  I see there is a guardian involved also noted in SW note. Thanks    Addedum: Dr. Camarena to the floor to discuss with daughter plan of care.    SW also updated.

## 2020-09-09 NOTE — PROGRESS NOTES
I:  Rosalba spoke with the pt's dtr Carolina who was very upset and was demanding an immediate transfer to [facility] where she will have more specific cares for her stroke.  She also said that she does not want to the pt to go to TCU because she will not do well there.  She said that she knows the pt's guardian for kris, Sandra, will push for the pt to go to TCU, but they do not agree.  Rosalba told her that they will page the physician and request that he come and talk with them.  Rosalba paged the physician requesting a call.  The physician came to the pt's room and spoke with Carolina.    Rosalba spoke with the pt's guardian for Sandra ponce [phone], to update her on the pt's discharge plan of home with HC services.  Sandra said that she would like further discussion with the physician regarding the pt's safety with this plan.  Rosalba also told her that the pt's dtr Carolina is here and is demanding an immediate transfer of the pt to [facility] where she can have specialized care for her stroke and trauma.  Sandra said that it does not appear safe for the pt to discharge and then have the family take her to [facility] (if a lateral transfer is not possible).  Rosalba told her that at this time it would be considered a lateral transfer from one hospital to another and it is undetermined if [facility] would accept the pt as a transfer.    After discussion with the physician, rosalba spoke with the pt's dtr Carolina in person and the pt's dtr/[facility] Ana was on the phone.  They discussed the pt's discharge plan of the pt discharging home to Carolina's [place] with HC.  Ana will be at [facility] tomorrow morning and rosalba will discuss HC agencies and equipment needs at home.  Rosalba was present when the physician called Sandra to update her on the discharge plan.    Rosalba told Carolina and Ana that they will follow-up with Ana tomorrow when she comes to visit her mom.    P:  Rosalba will continue with discharge planning and will be available as needed until  discharge.    ALEJANDRO Peters, [facility]  Inpatient Care Coordination  [facility]  [phone]

## 2020-09-09 NOTE — PLAN OF CARE
Swallow therapy scheduled at 12:00, however, family not present and had requested to be present at sessions. Phone call made to daughter and will return at 1400 for swallow therapy session.

## 2020-09-09 NOTE — PLAN OF CARE
Presentation/Diagnosis: Pt admitted on 9/6 for evaluation of altered mental status. Dx: Stroke   History: Afib, HTN, DM type 2, TIA, See chart for complete list  Labs/Protocols: Electrolytes WNL   Vitals: Temp: 99.2  F (37.3  C) Temp src: Axillary BP: (!) 160/84 Pulse: 95   Resp: 18 SpO2: 95 % O2 Device: None (Room air)    Cardiac: Irregular rate and rhythm-EF 55-60%   Telemetry: Afib CVR   Respiratory: Lungs are diminished-Scheduled nebs   Neuro: Unable to assess due to Pt not responding to questions-This shift Pt was able to answer a few yes/no questions right  GI/: Purewick in place-IV Abx Rocephin for UTI   Skin: Red groin-Powder for the area   LDAs: PIV-Running NS @75ml/hr  Diet: Strict NPO-Unable to follow directions to swallow safely-SP is following-Blood glucose Q 4hrs while NPO   Activity: Pt was restless and started to get up out of the chair-Pt was able to ambulate from chair to out in the hallway back into the bathroom use the toilet and ambulate back to the chair with Min A-2 with 2ww and gait belt  Teaching: Pt daughter educated on current POC-Monitor mentation and vital signs, Palliative following for goals of care, SW following for discharge planning and to verify if Pt still has a guardian/Conservator-Daughter is refusing TCU believes she care take care of her mother at home with PT/OT and nurse coming to the house to assist   Plan: Discharge possibly 2-4 days

## 2020-09-10 ENCOUNTER — APPOINTMENT (OUTPATIENT)
Dept: SPEECH THERAPY | Facility: CLINIC | Age: 85
DRG: 064 | End: 2020-09-10
Payer: COMMERCIAL

## 2020-09-10 ENCOUNTER — APPOINTMENT (OUTPATIENT)
Dept: PHYSICAL THERAPY | Facility: CLINIC | Age: 85
DRG: 064 | End: 2020-09-10
Payer: COMMERCIAL

## 2020-09-10 LAB
ANION GAP SERPL CALCULATED.3IONS-SCNC: 6 MMOL/L (ref 3–14)
BUN SERPL-MCNC: 10 MG/DL (ref 7–30)
CALCIUM SERPL-MCNC: 7.9 MG/DL (ref 8.5–10.1)
CHLORIDE SERPL-SCNC: 106 MMOL/L (ref 94–109)
CO2 SERPL-SCNC: 24 MMOL/L (ref 20–32)
CREAT SERPL-MCNC: 0.84 MG/DL (ref 0.52–1.04)
ERYTHROCYTE [DISTWIDTH] IN BLOOD BY AUTOMATED COUNT: 13.2 % (ref 10–15)
GFR SERPL CREATININE-BSD FRML MDRD: 61 ML/MIN/{1.73_M2}
GLUCOSE BLDC GLUCOMTR-MCNC: 116 MG/DL (ref 70–99)
GLUCOSE BLDC GLUCOMTR-MCNC: 130 MG/DL (ref 70–99)
GLUCOSE BLDC GLUCOMTR-MCNC: 134 MG/DL (ref 70–99)
GLUCOSE BLDC GLUCOMTR-MCNC: 142 MG/DL (ref 70–99)
GLUCOSE BLDC GLUCOMTR-MCNC: 146 MG/DL (ref 70–99)
GLUCOSE BLDC GLUCOMTR-MCNC: 152 MG/DL (ref 70–99)
GLUCOSE BLDC GLUCOMTR-MCNC: 192 MG/DL (ref 70–99)
GLUCOSE BLDC GLUCOMTR-MCNC: 99 MG/DL (ref 70–99)
GLUCOSE SERPL-MCNC: 135 MG/DL (ref 70–99)
HCT VFR BLD AUTO: 31.2 % (ref 35–47)
HGB BLD-MCNC: 10.5 G/DL (ref 11.7–15.7)
INR PPP: 1.21 (ref 0.86–1.14)
MAGNESIUM SERPL-MCNC: 1.9 MG/DL (ref 1.6–2.3)
MCH RBC QN AUTO: 32.8 PG (ref 26.5–33)
MCHC RBC AUTO-ENTMCNC: 33.7 G/DL (ref 31.5–36.5)
MCV RBC AUTO: 98 FL (ref 78–100)
PHOSPHATE SERPL-MCNC: 2.1 MG/DL (ref 2.5–4.5)
PLATELET # BLD AUTO: 147 10E9/L (ref 150–450)
POTASSIUM SERPL-SCNC: 3.3 MMOL/L (ref 3.4–5.3)
RBC # BLD AUTO: 3.2 10E12/L (ref 3.8–5.2)
SODIUM SERPL-SCNC: 136 MMOL/L (ref 133–144)
WBC # BLD AUTO: 7.2 10E9/L (ref 4–11)

## 2020-09-10 PROCEDURE — C9113 INJ PANTOPRAZOLE SODIUM, VIA: HCPCS | Performed by: INTERNAL MEDICINE

## 2020-09-10 PROCEDURE — 25000132 ZZH RX MED GY IP 250 OP 250 PS 637: Performed by: INTERNAL MEDICINE

## 2020-09-10 PROCEDURE — 94640 AIRWAY INHALATION TREATMENT: CPT | Mod: 76

## 2020-09-10 PROCEDURE — 85027 COMPLETE CBC AUTOMATED: CPT | Performed by: INTERNAL MEDICINE

## 2020-09-10 PROCEDURE — 25000128 H RX IP 250 OP 636: Performed by: INTERNAL MEDICINE

## 2020-09-10 PROCEDURE — 85610 PROTHROMBIN TIME: CPT | Performed by: INTERNAL MEDICINE

## 2020-09-10 PROCEDURE — 00000146 ZZHCL STATISTIC GLUCOSE BY METER IP

## 2020-09-10 PROCEDURE — 40000275 ZZH STATISTIC RCP TIME EA 10 MIN

## 2020-09-10 PROCEDURE — 25800030 ZZH RX IP 258 OP 636: Performed by: INTERNAL MEDICINE

## 2020-09-10 PROCEDURE — 97530 THERAPEUTIC ACTIVITIES: CPT | Mod: GP

## 2020-09-10 PROCEDURE — 97116 GAIT TRAINING THERAPY: CPT | Mod: GP

## 2020-09-10 PROCEDURE — 92526 ORAL FUNCTION THERAPY: CPT | Mod: GN

## 2020-09-10 PROCEDURE — 99232 SBSQ HOSP IP/OBS MODERATE 35: CPT | Performed by: INTERNAL MEDICINE

## 2020-09-10 PROCEDURE — 36415 COLL VENOUS BLD VENIPUNCTURE: CPT | Performed by: INTERNAL MEDICINE

## 2020-09-10 PROCEDURE — 84100 ASSAY OF PHOSPHORUS: CPT | Performed by: INTERNAL MEDICINE

## 2020-09-10 PROCEDURE — 12000000 ZZH R&B MED SURG/OB

## 2020-09-10 PROCEDURE — 80048 BASIC METABOLIC PNL TOTAL CA: CPT | Performed by: INTERNAL MEDICINE

## 2020-09-10 PROCEDURE — 25000131 ZZH RX MED GY IP 250 OP 636 PS 637: Performed by: INTERNAL MEDICINE

## 2020-09-10 PROCEDURE — 94640 AIRWAY INHALATION TREATMENT: CPT

## 2020-09-10 PROCEDURE — 25000125 ZZHC RX 250: Performed by: INTERNAL MEDICINE

## 2020-09-10 PROCEDURE — 83735 ASSAY OF MAGNESIUM: CPT | Performed by: INTERNAL MEDICINE

## 2020-09-10 RX ORDER — POTASSIUM CHLORIDE 1.5 G/1.58G
20-40 POWDER, FOR SOLUTION ORAL
Status: DISCONTINUED | OUTPATIENT
Start: 2020-09-10 | End: 2020-09-14 | Stop reason: HOSPADM

## 2020-09-10 RX ORDER — POTASSIUM CHLORIDE 7.45 MG/ML
10 INJECTION INTRAVENOUS
Status: DISCONTINUED | OUTPATIENT
Start: 2020-09-10 | End: 2020-09-14 | Stop reason: HOSPADM

## 2020-09-10 RX ORDER — POTASSIUM CHLORIDE 29.8 MG/ML
20 INJECTION INTRAVENOUS
Status: DISCONTINUED | OUTPATIENT
Start: 2020-09-10 | End: 2020-09-14 | Stop reason: HOSPADM

## 2020-09-10 RX ORDER — ASPIRIN 300 MG/1
300 SUPPOSITORY RECTAL DAILY
Status: DISCONTINUED | OUTPATIENT
Start: 2020-09-10 | End: 2020-09-14 | Stop reason: HOSPADM

## 2020-09-10 RX ORDER — POTASSIUM CHLORIDE 1500 MG/1
20-40 TABLET, EXTENDED RELEASE ORAL
Status: DISCONTINUED | OUTPATIENT
Start: 2020-09-10 | End: 2020-09-14 | Stop reason: HOSPADM

## 2020-09-10 RX ORDER — MAGNESIUM SULFATE HEPTAHYDRATE 40 MG/ML
4 INJECTION, SOLUTION INTRAVENOUS EVERY 4 HOURS PRN
Status: DISCONTINUED | OUTPATIENT
Start: 2020-09-10 | End: 2020-09-14 | Stop reason: HOSPADM

## 2020-09-10 RX ORDER — POTASSIUM CL/LIDO/0.9 % NACL 10MEQ/0.1L
10 INTRAVENOUS SOLUTION, PIGGYBACK (ML) INTRAVENOUS
Status: DISCONTINUED | OUTPATIENT
Start: 2020-09-10 | End: 2020-09-14 | Stop reason: HOSPADM

## 2020-09-10 RX ADMIN — MICONAZOLE NITRATE: 20 POWDER TOPICAL at 20:36

## 2020-09-10 RX ADMIN — IPRATROPIUM BROMIDE AND ALBUTEROL SULFATE 3 ML: .5; 3 SOLUTION RESPIRATORY (INHALATION) at 08:07

## 2020-09-10 RX ADMIN — Medication: at 20:37

## 2020-09-10 RX ADMIN — HYDROMORPHONE HYDROCHLORIDE 0.2 MG: 1 INJECTION, SOLUTION INTRAMUSCULAR; INTRAVENOUS; SUBCUTANEOUS at 20:20

## 2020-09-10 RX ADMIN — IPRATROPIUM BROMIDE AND ALBUTEROL SULFATE 3 ML: .5; 3 SOLUTION RESPIRATORY (INHALATION) at 15:19

## 2020-09-10 RX ADMIN — CEFTRIAXONE SODIUM 1 G: 1 INJECTION, POWDER, FOR SOLUTION INTRAMUSCULAR; INTRAVENOUS at 17:06

## 2020-09-10 RX ADMIN — POTASSIUM CHLORIDE, DEXTROSE MONOHYDRATE AND SODIUM CHLORIDE: 150; 5; 450 INJECTION, SOLUTION INTRAVENOUS at 16:38

## 2020-09-10 RX ADMIN — POTASSIUM CHLORIDE 40 MEQ: 1500 TABLET, EXTENDED RELEASE ORAL at 17:06

## 2020-09-10 RX ADMIN — INSULIN ASPART 1 UNITS: 100 INJECTION, SOLUTION INTRAVENOUS; SUBCUTANEOUS at 00:57

## 2020-09-10 RX ADMIN — POTASSIUM PHOSPHATE, MONOBASIC AND POTASSIUM PHOSPHATE, DIBASIC 15 MMOL: 224; 236 INJECTION, SOLUTION, CONCENTRATE INTRAVENOUS at 18:39

## 2020-09-10 RX ADMIN — IPRATROPIUM BROMIDE AND ALBUTEROL SULFATE 3 ML: .5; 3 SOLUTION RESPIRATORY (INHALATION) at 11:07

## 2020-09-10 RX ADMIN — ASPIRIN 325 MG: 325 TABLET, COATED ORAL at 14:36

## 2020-09-10 RX ADMIN — PANTOPRAZOLE SODIUM 40 MG: 40 INJECTION, POWDER, FOR SOLUTION INTRAVENOUS at 09:22

## 2020-09-10 RX ADMIN — POTASSIUM CHLORIDE, DEXTROSE MONOHYDRATE AND SODIUM CHLORIDE: 150; 5; 450 INJECTION, SOLUTION INTRAVENOUS at 05:49

## 2020-09-10 RX ADMIN — IPRATROPIUM BROMIDE AND ALBUTEROL SULFATE 3 ML: .5; 3 SOLUTION RESPIRATORY (INHALATION) at 20:57

## 2020-09-10 RX ADMIN — Medication: at 09:29

## 2020-09-10 RX ADMIN — ATORVASTATIN CALCIUM 10 MG: 10 TABLET, FILM COATED ORAL at 20:20

## 2020-09-10 RX ADMIN — MICONAZOLE NITRATE: 20 POWDER TOPICAL at 14:36

## 2020-09-10 ASSESSMENT — ACTIVITIES OF DAILY LIVING (ADL)
ADLS_ACUITY_SCORE: 26
ADLS_ACUITY_SCORE: 24
ADLS_ACUITY_SCORE: 23
ADLS_ACUITY_SCORE: 24

## 2020-09-10 NOTE — PLAN OF CARE
Discharge Planner PT   Patient plan for discharge: Family planning to take pt home to [facility] with home care with them providing assist  Current status:   Alert, able to respond verbally with two choices at times. Does not always follow commands for motor cues. Supine>sit with modAx1, increased time. Assist and cues needed for bilat LE and trunk. Good seated balance; able to perform with CGA/SBA. Sit>stand minAx1, CGA of another. Pt does not follow repeated verbal cues to transition hands or remove brakes. Tactile assist needed. Ambulates 5' with 4WW and minAx1 to chair, pt driving walker into objects but no LE buckling noted. Educated daughter on rec to trial performing hands on assist as she will need to at home; she is agreeable. Pt also ambulates an additional 60' with 4WW, Baltazar from daughter and intermittent CGA/cues from PT. Daughter provides cues and assist with walker. With all STS transfers throughout session pt needs Baltazar, tactile and verbal assist for hand placement and brake use.     Significant extra time spent discussing current recommendation for hands on assist with ALL cares/mobility including toileting. Discussed safety concerns with consistency with mobility and intermittent need for Ax2. Family seems very focused that she did well this session rather than how she did yesterday. Discussed safety concerns with command following, vision, balance, activity tolerance and fall risk.     Daughter reports plan is to go to [facility] ([facility]) with a ramp to enter. All needs can be met on the main level.     Barriers to return to prior living situation: Inconsistent with mobility, insight/vision/safety, level of A with self cares, heavy cueing needed.   Recommendations for discharge: TCU  Rationale for recommendations: Continue to recommend TCU; family firmly declining. If they take pt home recommend Ax1 for ALL mobility/cares, 24 hour supervision, home health PT. Pt would also likely benefit from  hospital bed, wheelchair and commode due to anticipated fluctuations with mobility.     Patient requires home PT at this time as attending PT in a clinic setting would be a considerable and significantly taxing effort; limiting the patient's ability to participate in therapy session.       Entered by: Светлана Guthrie 09/10/2020 10:15 AM

## 2020-09-10 NOTE — PROGRESS NOTES
SPIRITUAL HEALTH SERVICES Progress Note  [facility] Med. Surg. 3    Saw pt Radha and her daughter Ana for an emotional support check-in.  Ana expressed optimism about Radha's recovery reporting that she walked this morning and ate some pudding with the Speech Therapist.  She requested prayer, and Radha welcomed prayer by nodding her head.  During the prayer Radha became teary-eyed.  Offered emotional support through reflective listening, affirmation of family support and reassurance of God's love.      Plan: Will continue to follow pt and her family, during pt's admission, for ongoing emotional/spiritual support.    Bradley Johnson M.Div., [facility]  Staff   Phone [phone]

## 2020-09-10 NOTE — PROGRESS NOTES
"Patient alert, non-verbal.  No non-verbal signs or symptoms of pain.  Lung sounds clear.  Bowel sounds active and audible.  Incontinent of urine.  Repositioned Q2H.  Up in chair this evening, assist of 2 with mechanical lift.  NPO Q4H blood sugar checks.  At 1725 result of 69, 1x iv dextrose (see mar) re-check 130.  New orders received for ivf dextrose 1/2 NS and K.  Patient daughters needing extensive support and education.  Feeding tube placement attempted, patient resisted placement.  Patients daughters requesting patient to be moved to [place] \"to have a fighting chance\".  Provider and SW aware.  Ethics consult placed.  Will continue to monitor.     Addendum: Tele: A-fib CVR  "

## 2020-09-10 NOTE — PROGRESS NOTES
"      [facility]  Hospitalist Progress Note  Grover Camarena MD 09/10/2020    Reason for Stay (Diagnosis): CVA         Assessment and Plan:      Summary of Stay: Radha Cunningham is a 89 year old female who came to attention on 9/6/2020 with confusion, garbled speech and decreased interaction.    On presentation to the emergency department, Ms. Cunningham was noted to be hemodynamically stable with rate controlled atrial fibrillation but unable to give meaningful history.  Dr. Patel indicates that he was unable to get her to follow directions though she was alert.  Her speech was \"garbled\", suspect that she had some degree of expressive aphasia and she was noted to have a leftward gaze.  CT scan of the head without contrast showed suspected large left MCA territory stroke in the setting of multiple other strokes and generalized volume loss.  He then spoke with stroke neurology, Dr. Pancho Velasquez, [facility] who recommended an aspirin and follow-up with an MRI.  Based on the time course of the patient's symptoms, it was not felt that she would be a candidate for any type of thrombolytic therapy.  Labs: Creatinine 0.88, normal electrolytes, troponin less than the measurable threshold.  WBC 9.0 with normal differential, hemoglobin 11.8, platelet count 166.  INR 1.08, urinalysis (obtained after catheter placement) is mildly abnormal, potentially suggestive of UTI.    Today, Ms. Cunningham is dramatically improved.  She is able to talk at least in short sentences and seems to be fully appropriate.  She is able to eat some with coaxing and apparently needed encouragement from her daughters in order to achieve that.  Overall, a dramatic improvement.      Problem List:   1. 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  Old right basal ganglia infarct as well as SCIV are also noted.   -At this time has not been able " to complete a swallow evaluation.  -Continues with minimal verbal interaction, cries with stimulation but unclear significance  -Repeat Head CT on 9/7 confirmed stability of ischemic stroke.   2. Chronic atrial fibrillation with chronotropic incompetence, s/p acemaker placement, 4/2018.    3. Possible UTI.  4. Asthma.  5. I had been very concerned about the patient's being unable to eat and the fact that she refused a nasogastric tube.  At this point though she appears to be starting to participate with speech therapy.    PLAN:  1.  No indication currently for feeding tube.  Patient is started on a puréed diet.  2.  Currently on Aspirin alone with initiation of atorvostatin. Anticipate resumption of Coumadin (or DOAC for A fib) in two weeks (about 9/21).  3.  Continues ceftriaxone to cover UTI.     4.  Although I have been extremely reluctant to have the patient discharged with family, they are very insistent that they prefer to take care of her at home.  Unfortunately, we are put in the position of having her get adequate therapies (at transitional care) but no family versus suboptimal therapy (at home) with family.     DVT Prophylaxis: Anticipate resumption of full anticoagulation in 14 days (9/21).  Aspirin 81 mg daily at this time.  PCD's.  Code Status: DNR  Discharge Dispo: Anticipate discharge to home with home care.  Estimated Disch Date / # of Days until Disch: Likely 2 to 4 days.        Interval History (Subjective):      Ms. Cunningham much more interactive.    The patient has 2 daughters who are very involved: Ana and Carolina.  They can be very demanding but also appear to have a consistent message about how involved they want to be with her mother's life.                  Physical Exam:      Last Vital Signs:  /76 (BP Location: Left arm)   Pulse 79   Temp 98  F (36.7  C) (Axillary)   Resp 18   Wt 71.9 kg (158 lb 8 oz)   SpO2 97%   BMI 28.08 kg/m      I/O last 3 completed shifts:  In: 8656  [I.V.:3284]  Out: -     Constitutional:  Interactive and appropriate.  There is a paucity of spontaneous speech.   Respiratory: Clear to auscultation bilaterally, no crackles or wheezing   Cardiovascular:  Mildly irregularly irregular rate and rhythm.   Abdomen: Normal bowel sounds, soft, non-distended, non-tender   Skin: No rashes, no cyanosis, dry to touch   Neuro:  Much more engaged today.  Extraocular motions appear intact though I did not test visual fields.  Has been able to stand and ambulate with standby assist.   Extremities:    Other(s):        All other systems: Negative          Medications:      All current medications were reviewed with changes reflected in problem list.         Data:      All new lab and imaging data was reviewed.   Labs/Imaging:  Results for orders placed or performed during the hospital encounter of 09/06/20 (from the past 24 hour(s))   Glucose by meter   Result Value Ref Range    Glucose 130 (H) 70 - 99 mg/dL   Glucose by meter   Result Value Ref Range    Glucose 94 70 - 99 mg/dL   Glucose by meter   Result Value Ref Range    Glucose 152 (H) 70 - 99 mg/dL   Glucose by meter   Result Value Ref Range    Glucose 116 (H) 70 - 99 mg/dL   Basic metabolic panel   Result Value Ref Range    Sodium 136 133 - 144 mmol/L    Potassium 3.3 (L) 3.4 - 5.3 mmol/L    Chloride 106 94 - 109 mmol/L    Carbon Dioxide 24 20 - 32 mmol/L    Anion Gap 6 3 - 14 mmol/L    Glucose 135 (H) 70 - 99 mg/dL    Urea Nitrogen 10 7 - 30 mg/dL    Creatinine 0.84 0.52 - 1.04 mg/dL    GFR Estimate 61 >60 mL/min/[1.73_m2]    GFR Estimate If Black 71 >60 mL/min/[1.73_m2]    Calcium 7.9 (L) 8.5 - 10.1 mg/dL   CBC with platelets   Result Value Ref Range    WBC 7.2 4.0 - 11.0 10e9/L    RBC Count 3.20 (L) 3.8 - 5.2 10e12/L    Hemoglobin 10.5 (L) 11.7 - 15.7 g/dL    Hematocrit 31.2 (L) 35.0 - 47.0 %    MCV 98 78 - 100 fl    MCH 32.8 26.5 - 33.0 pg    MCHC 33.7 31.5 - 36.5 g/dL    RDW 13.2 10.0 - 15.0 %    Platelet Count 147 (L)  150 - 450 10e9/L   INR   Result Value Ref Range    INR 1.21 (H) 0.86 - 1.14   Magnesium   Result Value Ref Range    Magnesium 1.9 1.6 - 2.3 mg/dL   Phosphorus   Result Value Ref Range    Phosphorus 2.1 (L) 2.5 - 4.5 mg/dL   Glucose by meter   Result Value Ref Range    Glucose 134 (H) 70 - 99 mg/dL   Glucose by meter   Result Value Ref Range    Glucose 192 (H) 70 - 99 mg/dL   Glucose by meter   Result Value Ref Range    Glucose 99 70 - 99 mg/dL

## 2020-09-10 NOTE — PLAN OF CARE
Pt A&O to self only. VSS. Tele Afib CVR. Up with A2, gait belt and walker. Slurred speech, recent MCA Territory stroke. SX improving. DDL1 diet, thin liquids. Good appetite. Walked in hallway with staff x2 today. INC of B&B. IVF infusing. IV Rocephin Q24 for UTI. Daily ASA. . Phos and K+ replaced. Discharge 2-4 days home with home care. Daughter at bedside entire shift. PT/OT/SLP/Palliative following. Will continue POC.

## 2020-09-10 NOTE — PROGRESS NOTES
I:  Rosalba met with the pt and her dtr Ana who was at the bedside.  Rosalba was following up on the discharge plan that was discussed yesterday afternoon.  Rosalba provided them with a list of HC agencies to review.  Rosalba also discussed equipment needed for in the home at discharge and wheelchairs.  Ana brought up the discussion about transferring the pt to [facility] and asked if that was still in process.  Rosalba told her that they were uncertain at this time because they were under the impression that after conversation with the physician yesterday, the plan was for the pt to remain at [facility] and discharge home with HC when stable.  Ana said that they would still like to discuss the pt being transferred to [place] due them having a specialty team for trauma pts.  Rosalba told her that they will update the bedside nurse and talk with the physician.    Pt/family was not provided with the Medicare Compare list for Home Care because they are still discussing which agencies they would like to consider.  Discussed associated Medicare star ratings to assist with choice for referrals/discharge planning - Yes.    Education was given to pt/family that star ratings are updated/maintained by Medicare and can be reviewed by visiting www.medicare.gov - Yes.    Rosalba paged the physician requesting a call to update him on the conversation that rosalba had with Ana.    P:  Rosalba will continue with discharge planning and will be available as needed until discharge.    ALEJANDRO Peters, ROSALBA  Inpatient Care Coordination  [facility]  [phone]

## 2020-09-10 NOTE — PLAN OF CARE
DX: Stroke  Tele: Afib CVR  A&O KENDRA pt non verbal  Activity: A-2  Diet: NPO (strict)  VSS  O2: 97% RA  BG: Q4hrs 94,152,116  PIV: infusing 100mL/hr in JEFFERSON (iv replaced as pt removed ANDRES iv)  Pain: no signs of pain, rested well overnight  GI/: incontinent  Labs: EF 55-60%  Plan: Repo Q2hrs, Speech, Neurology, Pain, Palliative, PT, OT, SW  Discharge: 2-4 days, continue plan of care.

## 2020-09-10 NOTE — PLAN OF CARE
"Discharge Planner SLP   Patient plan for discharge: did not state  Current status: Swallow tx completed; pt upright in chair with dtr present. Significant increase in participation today. Pt re-assessed with 4 oz puree solids, ~ 2 oz thin liquids via cup. Initially pt licking spoon with pudding on it  like a lollipop - required cues to take a bite/close lips around a tsp for more functional feeding. Hand-over-hand assist beneficial. Pt taking extremely small 1/4-1/2 tsp bite sizes with pharyngeal swallow initiated after every 2 \"bites.\" Laryngeal elevation was reduced but achieved. X1 delayed cough during initial swallows, no other signs/sx aspiration noted (clear vocal quality, cough was strong and clear). Educated pt/dtr on dysphagia 2/2 stroke, current diet recommendations/strategies and signs/sx aspiration/silent aspiration.     Recommendations:  - cautious initiation of dysphagia diet level 1 with thin liquids via small single cup sips; meds crushed with puree  - 1:1 assist with feeding and hand-over-hand assist beneficial  - upright to chair for meals, slow pacing, periodic liquid wash  - HOLD PO if any decline in respiratory status or over difficulty with PO noted    Barriers to return to prior living situation: dysphagia, nutrition, communication  Recommendations for discharge: TCU  Rationale for recommendations: ongoing SLP for dysphagia management; add speech/language as appropriate       Entered by: Antionette Ahn 09/10/2020 12:25 PM       "

## 2020-09-10 NOTE — CONSULTS
"CLINICAL NUTRITION SERVICES  -  ASSESSMENT NOTE    RECOMMENDATIONS FOR MD/PROVIDER TO ORDER:     Electrolyte replacement protocols    Recommendations Ordered by Registered Dietitian (RD):     TF orders deferred given diet advancement     Mg and phos add on   Future/Additional Recommendations:    Pending G tube placement, diet advancement, goals of care discussions    Malnutrition:  % Weight Loss:Weight loss does not meet criteria for malnutrition   % Intake:</= 50% for >/= 5 days (severe malnutrition)  Subcutaneous Fat Loss: moderate, as outlined below   Muscle Loss: moderate, as outlined below   Fluid Retention:None noted    Malnutrition Diagnosis: Severe malnutrition  In Context of:  Acute illness or injury with underlying Chronic illness or disease     REASON FOR ASSESSMENT  Radha Cunningham is a 89 year old female seen by Registered Dietitian for Provider Order - Registered Dietitian to Assess and Order TF per Medical Nutrition Therapy Protocol    History of asthma, cholelithiasis, diverticulosis, hiatal hernia, HTN, PUD, A-fib  Admitted with L posterior circulation stroke,     NUTRITION HISTORY    Information obtained from patient's daughter, Saeid 9/9 (with providers 9/10 for follow up and complete diet history)    Food allergies/intolerances: NKFA     Patient is on a regular diet at home.    Living situation: with daughter     Feeds self at baseline    Had a G tube in 2016 ([facility]) that daughter reported \"fell out\" after 2-3 weeks, coincidentally at the same time oral intake improved - unable to pull RD notes from hospitalization     Obtained from Dec 2019 admit, severe malnutrition maxwell met:   - Information obtained from patient and daughter in room.   - Patient with a h/o diverticulitis, a fib, chronic back pain.  Admit 2/2 respiratory distress.    - Noted recent admit for respiratory issues, was admitted from TCU.  Generally resides independently.  Noted has support from daughters.   - Regular diet at " "home and at TCU.  - When feeling well she generally eats meals TID + has a protein drink (either [employer]iere Protein or Boost).    - While at TCU daughter reports overall decreased appetite and consistently eating less than usual for a period of about 3 weeks.  Patient describes she also did not like the food at this facility.  She was not getting a protein drink per their report.    - NKFA.     CURRENT NUTRITION ORDERS    Diet: NPO x 5 days since admit    Factors affecting nutrition intake include: dysphagia, refusing to work with SLP    Advanced to a DD1, thin liquid diet by SLP 9/10    NUTRITION FOCUSED PHYSICAL ASSESSMENT FOR DIAGNOSING MALNUTRITION + PHYSICAL FINDINGS  Completed and Observed:  Yes, limited during FT placement . Unavailable 9/10 AM  Non verbal, responds with a \"no\" head shake to most questions  Dried, cracked lips   At least moderate fat depletion in upper arm and orbital region; at least moderate muscle loss noted in acromion, clavicle, hands, temporal regions.   Lower extremities not fully observed  Fragile looking skin  Obtained from Chart/Interdisciplinary Team    Nikos nutrition score: 2; total score: 13     Last BM:     Skin: red, blanchable     Generalized weakness    Edema: none     Temp (24hrs), Av.9  F (37.2  C), Min:98.4  F (36.9  C), Max:100.1  F (37.8  C)    I/O last 3 completed shifts:    In:  [I.V.:]    Out: -     ANTHROPOMETRICS  Height: 5'3\"  Weight: 71 kg   Body mass index is 28.08 kg/m .  Weight Status:  Overweight BMI 25-29.9  Weight History:  Weight loss does not meet malnutrition criteria (need updated weight)  Wt Readings from Last 10 Encounters:   20 71.9 kg (158 lb 8 oz)   19 73 kg (161 lb)   19 73.7 kg (162 lb 6.4 oz)   19 74.9 kg (165 lb 3.2 oz)   19 74.9 kg (165 lb 3.2 oz)   19 74.9 kg (165 lb 3.2 oz)   19 74.9 kg (165 lb 3.2 oz)   19 74.8 kg (165 lb)   19 75.1 kg (165 lb 7.7 oz)   16 71.2 kg " (157 lb)       ASSESSED NUTRITION NEEDS (PER APPROVED PRACTICE GUIDELINES, Dosing weight: 72 kg):  Estimated Energy Needs: 7029-3242 kcals (25-30 Kcal/Kg)  Justification: maintenance  Estimated Protein Needs:  grams protein (1-1.5 g pro/Kg)  Justification: preservation of lean body mass and advanced age  Estimated Fluid Needs: per MD    LABS  Labs reviewed  Electrolytes  Potassium (mmol/L)   Date Value   09/07/2020 3.7   09/06/2020 3.8   12/21/2019 3.9     Phosphorus (mg/dL)   Date Value   12/18/2019 2.9    Blood Glucose  Glucose (mg/dL)   Date Value   09/07/2020 149 (H)   09/06/2020 110 (H)   12/21/2019 141 (H)   12/19/2019 174 (H)   12/18/2019 185 (H)     Hemoglobin A1C (%)   Date Value   09/07/2020 6.5 (H)   12/15/2019 6.7 (H)    Inflammatory Markers  WBC (10e9/L)   Date Value   09/07/2020 6.4   09/06/2020 9.0     Albumin (g/dL)   Date Value   12/17/2019 2.9 (L)   12/16/2019 3.0 (L)      Magnesium (mg/dL)   Date Value   12/18/2019 2.1   12/16/2019 2.1     Sodium (mmol/L)   Date Value   09/07/2020 137   09/06/2020 137   12/21/2019 140    Renal  Urea Nitrogen (mg/dL)   Date Value   09/07/2020 19   09/06/2020 16   12/21/2019 37 (H)     Creatinine (mg/dL)   Date Value   09/07/2020 0.85   09/06/2020 0.88   12/21/2019 0.84     Additional  Triglycerides (mg/dL)   Date Value   09/07/2020 66   04/25/2005 192 (H)   04/23/2003 100     Ketones Urine (mg/dL)   Date Value   09/06/2020 Negative          MEDICATIONS    Medications reviewed    artificial saliva  2 spray Swish & Spit 4x Daily     aspirin  325 mg Oral Daily    Or     aspirin  300 mg Rectal Daily     atorvastatin  10 mg Oral At Bedtime     cefTRIAXone  1 g Intravenous Q24H     emollient   Topical BID     HYDROmorphone  0.2 mg Intravenous At Bedtime     insulin aspart  1-4 Units Subcutaneous Q4H     ipratropium - albuterol 0.5 mg/2.5 mg/3 mL  3 mL Nebulization 4x Daily     lidocaine  5 mL Topical Once     miconazole   Topical BID     pantoprazole (PROTONIX) IV   40 mg Intravenous Daily with breakfast     sodium chloride (PF)  3 mL Intracatheter Q8H          dextrose 5% and 0.45% NaCl + KCl 20 mEq/L 100 mL/hr at 09/10/20 0549          PROCEDURES WITH NUTRITIONAL IMPLICATIONS  9/9: attempted nasoenteric feeding tube placement  9/10: G tube placement planned --> cancelled with diet advancement     NUTRITION DIAGNOSIS:  Swallowing difficulty related to stroke as evidenced by strict NPO x 5 days, refusing trials with SLP    Malnutrition related to inadequate oral intake as evidenced by intake of <25% of estimated needs for 5 days, fat and muscle wasting    INTERVENTIONS  Recommendations / Nutrition Prescription  Diet consistency/texture per SLP    Recommend TF as follows --> deferred with diet advancement:     Type of Feeding Tube: G tube (placement 9/10 planned)    Enteral Frequency:  Continuous    Enteral Regimen: Isosource 1.5 at 50 mL/hr    Total Enteral Provisions: 1200 mL provides 1800 kcal, 82 gm protein, 211 gm CHO, 18 gm fiber and 912 mL H20.     Meets > 100% of DRI's.    Free water flush of 60 mL q4 hours while IVF on       Daily electrolyte check, Mg and Phos add on    Daily weights    Start at 10 mL/hr, increase by 10 mL q8 hours until goal rate reached    Implementation  Nutrition education: deferred  Biochemical data: Mg and phos add on   Collaboration and Referral of care: Discussed patient during interdisciplinary care rounds this morning and with Dr. Camarena including concerns re: family ability to manage FT at home and later re: cancelled G tube placement    Goals  Patient to consume >/= 50% of meals TID    MONITORING AND EVALUATION:  Progress towards goals will be monitored and evaluated per protocol and Practice Guidelines      Joan Daily, MS, RDN, LD, CNSC  Pager - 3rd floor/ICU: [phone]  Pager - All other floors: [phone]  Pager - Weekend/holiday: [phone]  Office: [phone]

## 2020-09-10 NOTE — PLAN OF CARE
[place]: pt alert to self, able to communicate minimally.   VS: BP (!) 161/108 (BP Location: Left arm)   Pulse 71   Temp 98.6  F (37  C) (Axillary)   Resp 18   Wt 71.9 kg (158 lb 8 oz)   SpO2 97%   BMI 28.08 kg/m    Tele: Afib CVR  LS: dim on RA  GI: incontinent, last BM today  : incontinent  Skin: intact  Activity: x2, walker, GB   Diet: DD1, thin liq, assist to feed   Pain: nonverbal indicators do not indicate pain  Lab: BS 99, 192, 134. K 3.3. ph 2.1, MD notified-- replacing  Plan: PT/OT, SW, palliative, neuro, speech, IVF, rocephin, aspirin, will discharge home w/home services    *daughter at bedside

## 2020-09-11 ENCOUNTER — APPOINTMENT (OUTPATIENT)
Dept: OCCUPATIONAL THERAPY | Facility: CLINIC | Age: 85
DRG: 064 | End: 2020-09-11
Payer: COMMERCIAL

## 2020-09-11 ENCOUNTER — APPOINTMENT (OUTPATIENT)
Dept: SPEECH THERAPY | Facility: CLINIC | Age: 85
DRG: 064 | End: 2020-09-11
Payer: COMMERCIAL

## 2020-09-11 LAB
GLUCOSE BLDC GLUCOMTR-MCNC: 103 MG/DL (ref 70–99)
GLUCOSE BLDC GLUCOMTR-MCNC: 107 MG/DL (ref 70–99)
GLUCOSE BLDC GLUCOMTR-MCNC: 113 MG/DL (ref 70–99)
GLUCOSE BLDC GLUCOMTR-MCNC: 117 MG/DL (ref 70–99)
GLUCOSE BLDC GLUCOMTR-MCNC: 120 MG/DL (ref 70–99)
GLUCOSE BLDC GLUCOMTR-MCNC: 135 MG/DL (ref 70–99)
GLUCOSE BLDC GLUCOMTR-MCNC: 136 MG/DL (ref 70–99)
PHOSPHATE SERPL-MCNC: 2.7 MG/DL (ref 2.5–4.5)
POTASSIUM SERPL-SCNC: 4.2 MMOL/L (ref 3.4–5.3)

## 2020-09-11 PROCEDURE — 97530 THERAPEUTIC ACTIVITIES: CPT | Mod: GO

## 2020-09-11 PROCEDURE — 25000132 ZZH RX MED GY IP 250 OP 250 PS 637: Performed by: INTERNAL MEDICINE

## 2020-09-11 PROCEDURE — 25000125 ZZHC RX 250: Performed by: INTERNAL MEDICINE

## 2020-09-11 PROCEDURE — 99232 SBSQ HOSP IP/OBS MODERATE 35: CPT | Performed by: INTERNAL MEDICINE

## 2020-09-11 PROCEDURE — 12000000 ZZH R&B MED SURG/OB

## 2020-09-11 PROCEDURE — 92526 ORAL FUNCTION THERAPY: CPT | Mod: GN

## 2020-09-11 PROCEDURE — 00000146 ZZHCL STATISTIC GLUCOSE BY METER IP

## 2020-09-11 PROCEDURE — 36415 COLL VENOUS BLD VENIPUNCTURE: CPT | Performed by: INTERNAL MEDICINE

## 2020-09-11 PROCEDURE — 25000128 H RX IP 250 OP 636: Performed by: INTERNAL MEDICINE

## 2020-09-11 PROCEDURE — 94640 AIRWAY INHALATION TREATMENT: CPT | Mod: 76

## 2020-09-11 PROCEDURE — 94640 AIRWAY INHALATION TREATMENT: CPT

## 2020-09-11 PROCEDURE — 84100 ASSAY OF PHOSPHORUS: CPT | Performed by: INTERNAL MEDICINE

## 2020-09-11 PROCEDURE — 40000275 ZZH STATISTIC RCP TIME EA 10 MIN

## 2020-09-11 PROCEDURE — 84132 ASSAY OF SERUM POTASSIUM: CPT | Performed by: INTERNAL MEDICINE

## 2020-09-11 RX ORDER — CEFUROXIME AXETIL 250 MG/1
500 TABLET ORAL EVERY 12 HOURS SCHEDULED
Status: DISCONTINUED | OUTPATIENT
Start: 2020-09-11 | End: 2020-09-14 | Stop reason: HOSPADM

## 2020-09-11 RX ADMIN — IPRATROPIUM BROMIDE AND ALBUTEROL SULFATE 3 ML: .5; 3 SOLUTION RESPIRATORY (INHALATION) at 07:16

## 2020-09-11 RX ADMIN — CEFUROXIME AXETIL 500 MG: 250 TABLET ORAL at 17:33

## 2020-09-11 RX ADMIN — PANTOPRAZOLE SODIUM 40 MG: 40 TABLET, DELAYED RELEASE ORAL at 17:37

## 2020-09-11 RX ADMIN — IPRATROPIUM BROMIDE AND ALBUTEROL SULFATE 3 ML: .5; 3 SOLUTION RESPIRATORY (INHALATION) at 20:41

## 2020-09-11 RX ADMIN — MICONAZOLE NITRATE: 20 POWDER TOPICAL at 10:43

## 2020-09-11 RX ADMIN — MICONAZOLE NITRATE: 20 POWDER TOPICAL at 22:30

## 2020-09-11 RX ADMIN — IPRATROPIUM BROMIDE AND ALBUTEROL SULFATE 3 ML: .5; 3 SOLUTION RESPIRATORY (INHALATION) at 11:36

## 2020-09-11 RX ADMIN — IPRATROPIUM BROMIDE AND ALBUTEROL SULFATE 3 ML: .5; 3 SOLUTION RESPIRATORY (INHALATION) at 15:35

## 2020-09-11 RX ADMIN — Medication: at 11:43

## 2020-09-11 RX ADMIN — ATORVASTATIN CALCIUM 10 MG: 10 TABLET, FILM COATED ORAL at 22:28

## 2020-09-11 RX ADMIN — Medication: at 22:31

## 2020-09-11 RX ADMIN — ASPIRIN 325 MG: 325 TABLET, COATED ORAL at 10:36

## 2020-09-11 ASSESSMENT — ACTIVITIES OF DAILY LIVING (ADL)
ADLS_ACUITY_SCORE: 20
ADLS_ACUITY_SCORE: 20
ADLS_ACUITY_SCORE: 23
ADLS_ACUITY_SCORE: 24
ADLS_ACUITY_SCORE: 20
ADLS_ACUITY_SCORE: 20

## 2020-09-11 NOTE — PLAN OF CARE
Pt A&O to self only, following simple commands.  Up to chair with 2A & walker/GB.  VSS, appetite is improving.  No IV access, meds changed to PO per MD.  Encouraged fluids and nutrition. If inadequate fluids readdress need for IV access to rehydrate.  Bladder scanned at 256mL this afternoon.  , 107. Tele: A-fib CVR.     Plan: 2-4 days.

## 2020-09-11 NOTE — PROVIDER NOTIFICATION
Web based page:   Pt pulled IV out of arm (this has happened multiple times). Extravasion in other arm. Attempted to place another IV, unsuccessful. Flyer unable to come up and place another. Is it OK to wait for vascular access to come in morning? Pt had continuous fluids running.

## 2020-09-11 NOTE — PROGRESS NOTES
[facility]  Hospitalist Progress Note  Name: Radha Cunningham    MRN: [id number]  Physician:  Vishnu Hagen DO, FHM (Text Page)    Summary of Stay: Radha Cunningham is a 89 year old female who came to attention on 9/6/2020 with confusion, garbled speech and decreased interaction.  She had findings since initial presentation suggestive of CVA and a UTI.    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  Old right basal ganglia infarct as well as SCIV are also noted.   -Repeat Head CT on 9/7 confirmed stability of ischemic stroke.  Neuro recommended ASA alone initially and then resume DOAC for afib in 2 weeks (9/21).   -  Therapy following, on modified diet.    Chronic atrial fibrillation with chronotropic incompetence, s/p acemaker placement, 4/2018.    -  DOAC in 2 weeks    UTI:  -  IV lost overnight and taking more oral intake.  I will move to oral ceftin BID.  Prior was on ceftriaxone IV.    Asthma:  -  No new respiratory complaints today.  Does not have an acute exacerbation.  Continue nebs.    Oral intake/malnutrition concern:  -  Patient with poor initial intake.  She didn't appear to want a nasal feeding tube.  She in the distant past had a gastric FT that was prior removed.  Discussions occurred with my partner and family/patient over placement of a new one.  She started eating more 9/10 and again is eating 25%+ of lunch today.  Discussed with daughter Hilton and I would recommend continued work on oral intake for a day or two.  If she continues to improve, would not place feeding tube.  Should she worsen and not meet intake needs over the weekend would reconsider gastric feeding tube placement on Monday.    Metabolic encephalopathy:  -  Multifactorial with CVA and a contribution for UTI.  It is difficult to say how much UTI contributing.  I expect UTI will be treated enough to not  have a major contribution by Monday.           COVID Status:  COVID-19 PCR Results    COVID-19 PCR Results 9/6/20 9/6/20    1516 1516   COVID-19 Virus PCR to [facility] - Result Test received-See reflex to IDDL test SARS CoV2 (COVID-19) Virus RT-PCR    COVID-19 Virus PCR to [facility] - Source Nasopharyngeal    SARS-CoV-2 Virus Specimen Source  Nasopharyngeal   SARS-CoV-2 PCR Result  NEGATIVE      Comments are available for some flowsheets but are not being displayed.         COVID-19 Antibody Results, Testing for Immunity    COVID-19 Antibody Results, Testing for Immunity   No data to display.            Diet: Dysphagia Diet Level 1 Pureed Thin Liquids (water, ice chips, juice, milk gelatin, ice cream, etc)  Snacks/Supplements Adult: Boost Plus; With Meals    DVT Prophylaxis: Pneumatic Compression Devices  Sosa Catheter: not present  Code Status: NO CPR - Pre-arrest intubation OK    Disposition Plan   Patient appears like a TCU/rehab stay may be beneficial but patient/family not very interested in TCU currently.  Home care may be an option.  I expect she will probably need to stay the weekend given variable mentation/intake concerns.     Entered: Vishnu Hagen 09/11/2020, 5:51 PM       Interval History   Assumed care for the day, history reviewed with patient and her daughter Hilton DAN) present.  Patient denies pain, sob, nausea.  Staff report she still is sleepy at times but also has much more wakeful periods than prior days.  Daughter present showed me a picture of her evening meal from last night where she appeared to eat well over 50% of food.    -Data reviewed today: I reviewed all new labs and imaging reports over the last 24 hours. I personally reviewed no images or EKG's today.    Physical Exam   Temp: 98.7  F (37.1  C) Temp src: Axillary BP: 98/65 Pulse: 75   Resp: 18 SpO2: 95 % O2 Device: None (Room air)    Vitals:    09/06/20 1731 09/11/20 0531   Weight: 71.9 kg (158 lb 8 oz) 71.9 kg (158 lb 9.6 oz)      Vital Signs with Ranges  Temp:  [97.8  F (36.6  C)-98.7  F (37.1  C)] 98.7  F (37.1  C)  Pulse:  [59-90] 75  Resp:  [16-20] 18  BP: ()/(62-72) 98/65  SpO2:  [94 %-99 %] 95 %  I/O last 3 completed shifts:  In: 50 [P.O.:50]  Out: 1000 [Urine:1000]    GEN:  Alert, oriented x to self and somewhat placed.  Identified daughter present but confused with details.  Appears ill but comfortable, no overt distress.  HEENT:  Normocephalic/atraumatic, no scleral icterus, no nasal discharge, mouth moist.  CV:  Regular rate and rhythm, distant.  LUNGS:  Clear to auscultation upper, decreased breath sounds mildly bases.  ABD:  Active bowel sounds, soft, non-tender/non-distended.  No guarding/rigidity.  EXT:  Trace edema.  No cyanosis.  No acute joint synovitis noted.  SKIN:  Dry to touch, no exanthems noted in the visualized areas.    Medications       artificial saliva  2 spray Swish & Spit 4x Daily     aspirin  325 mg Oral Daily    Or     aspirin  300 mg Rectal Daily     atorvastatin  10 mg Oral At Bedtime     cefuroxime  500 mg Oral Q12H STEPHEN     emollient   Topical BID     HYDROmorphone  0.2 mg Intravenous At Bedtime     insulin aspart  1-4 Units Subcutaneous Q4H     ipratropium - albuterol 0.5 mg/2.5 mg/3 mL  3 mL Nebulization 4x Daily     lidocaine  5 mL Topical Once     miconazole   Topical BID     pantoprazole  40 mg Oral QAM AC     sodium chloride (PF)  3 mL Intracatheter Q8H     Data     Recent Labs   Lab 09/10/20  0738 09/07/20  0722 09/06/20  1344   WBC 7.2 6.4 9.0   HGB 10.5* 12.3 11.8   HCT 31.2* 37.5 36.5   MCV 98 99 101*   * 168 166     Recent Labs   Lab 09/06/20  1516   CULT >100,000 colonies/mL  Escherichia coli  *     Recent Labs   Lab 09/11/20  0736 09/11/20  0158 09/10/20  0738 09/07/20  0722 09/06/20  1344   NA  --   --  136 137 137   POTASSIUM  --  4.2 3.3* 3.7 3.8   CHLORIDE  --   --  106 107 105   CO2  --   --  24 23 27   ANIONGAP  --   --  6 7 5   GLC  --   --  135* 149* 110*   BUN  --   --   10 19 16   CR  --   --  0.84 0.85 0.88   GFRESTIMATED  --   --  61 60* 57*   GFRESTBLACK  --   --  71 70 66   SHAN  --   --  7.9* 8.6 9.0   MAG  --   --  1.9  --   --    PHOS 2.7  --  2.1*  --   --        No results found for this or any previous visit (from the past 24 hour(s)).

## 2020-09-11 NOTE — PROGRESS NOTES
Pt right AC IV infiltrated. Pharmacy notified. Ice applied. MD aware. Site marked. Pictures obtained. Will continue to monitor.

## 2020-09-11 NOTE — PLAN OF CARE
Alert and confused. Pt has been talking frequently thorughout night. Pt denied pain r/t extravasion on right arm, refused ice pack. Pulled out PIV on left arm, unable to get IV access. MD paged and OK to wait for AMs to reassess IV access. DD1 thin liquids. Tele a-fib CVR. Lung sounds dim. Purewick in place. Discharge TBD. Will continue with POC.

## 2020-09-11 NOTE — PLAN OF CARE
"Discharge Planner OT   Patient plan for discharge: Family planning to take pt home to [facility] [address] with home care with them providing assist  Current status: Daughter present for session. Pt more conversant this date and answering questions intermittently.  Pt required mod A for bed mobility supine > sit EOB, able to tolerate sitting EOB for increased time with SBA. Total A for donning B shoes while seated. Pt initially agreeable to transferring up to chair, however then repeatedly stating \"too early.\" Encouragement and education provided on importance of OOB activity and meals up in chair (breakfast present), however pt declined. Pt required max Ax2 for sit > supine and repositioning in bed once supine.   Barriers to return to prior living situation: current level of A, cognition, falls risk  Recommendations for discharge: TCU  Rationale for recommendations: Pt is below baseline level of functioning with regards to ADLs and mobility tasks. Recommend ongoing skilled OT while IP and in TCU setting to improve strength, functional activity tolerance and safety needed for daily tasks.        Entered by: Shavonne Navarro 09/11/2020 9:25 AM       "

## 2020-09-11 NOTE — PROGRESS NOTES
[facility]:     IV access lost overnight. Unable to obtain new site/access. Ok to leave ok IV out overnight. Day team to assess IV access indication and determine if midline/PICC indicated.

## 2020-09-11 NOTE — PROVIDER NOTIFICATION
Admitting MD paged and notified of right AC extravasation with potassium phosphate infusing.  Supervisor notified and pictures obtained.

## 2020-09-11 NOTE — PLAN OF CARE
PT: Attempted to see patient for PT session; Patient sleeping heavily. Spoke with daughter about scheduling patient in am as daughter reports that is when she is at her best; Patient scheduled for am session tomorrow.

## 2020-09-11 NOTE — PLAN OF CARE
Discharge Planner SLP   Patient plan for discharge: wants to go home with dtr  Current status: Swallow tx completed with pt and dtr. Pt upright in chair. She is significantly more talkative this session, some word finding difficulties noted. Ate 4 oz puree solids, minimal assist with feeding today - x1 throat clear no other signs/sx aspiration noted. Pt declined all liquids today, does not like cold liquids, only likes room temp.     Recommendations:  - continue dysphagia diet level 1 with thin liquids via small single cup sips; meds crushed with puree  - 1:1 supervision/assist with feeding as needed  - upright to chair for meals, slow pacing, periodic liquid wash     Barriers to return to prior living situation: dysphagia, nutrition, communication  Recommendations for discharge: TCU  Rationale for recommendations: ongoing SLP for dysphagia management; per dtr, would like for patient to go home - if plan to discharge home would need  SLP services ordered for both swallow and communication deficits        Entered by: Antionette Ahn 09/11/2020 2:59 PM

## 2020-09-11 NOTE — PLAN OF CARE
SLP: Attempted session though pt lethargic, per dtr letting sleep until 10 then will get up. Dtr reports good PO intake/tolerance since diet initiation yesterday.

## 2020-09-11 NOTE — PLAN OF CARE
VSS. Denies any pain. Up in chair for meal.  DD1 thin liquids. Iv rocephin for possible UTI. Potassium and Phosphorus being replaced. K+ 3.3. Phosphors 2.1.  Pt IV infiltrated during this shift see previous note. Potassium recheck at 0200 as pt has potassium in phosphorous replacement. 325mg daily aspirin. SLP, SW, PT, OT following. Planning for discharge home with home services and family care. Discharge TBD. Will continue to monitor.

## 2020-09-12 ENCOUNTER — APPOINTMENT (OUTPATIENT)
Dept: SPEECH THERAPY | Facility: CLINIC | Age: 85
DRG: 064 | End: 2020-09-12
Payer: COMMERCIAL

## 2020-09-12 ENCOUNTER — APPOINTMENT (OUTPATIENT)
Dept: PHYSICAL THERAPY | Facility: CLINIC | Age: 85
DRG: 064 | End: 2020-09-12
Payer: COMMERCIAL

## 2020-09-12 ENCOUNTER — APPOINTMENT (OUTPATIENT)
Dept: OCCUPATIONAL THERAPY | Facility: CLINIC | Age: 85
DRG: 064 | End: 2020-09-12
Payer: COMMERCIAL

## 2020-09-12 LAB
ANION GAP SERPL CALCULATED.3IONS-SCNC: 5 MMOL/L (ref 3–14)
BUN SERPL-MCNC: 12 MG/DL (ref 7–30)
CALCIUM SERPL-MCNC: 8.5 MG/DL (ref 8.5–10.1)
CHLORIDE SERPL-SCNC: 108 MMOL/L (ref 94–109)
CO2 SERPL-SCNC: 26 MMOL/L (ref 20–32)
CREAT SERPL-MCNC: 0.79 MG/DL (ref 0.52–1.04)
GFR SERPL CREATININE-BSD FRML MDRD: 65 ML/MIN/{1.73_M2}
GLUCOSE BLDC GLUCOMTR-MCNC: 100 MG/DL (ref 70–99)
GLUCOSE BLDC GLUCOMTR-MCNC: 115 MG/DL (ref 70–99)
GLUCOSE BLDC GLUCOMTR-MCNC: 156 MG/DL (ref 70–99)
GLUCOSE BLDC GLUCOMTR-MCNC: 96 MG/DL (ref 70–99)
GLUCOSE SERPL-MCNC: 106 MG/DL (ref 70–99)
POTASSIUM SERPL-SCNC: 3.7 MMOL/L (ref 3.4–5.3)
SODIUM SERPL-SCNC: 139 MMOL/L (ref 133–144)

## 2020-09-12 PROCEDURE — 00000146 ZZHCL STATISTIC GLUCOSE BY METER IP

## 2020-09-12 PROCEDURE — 94640 AIRWAY INHALATION TREATMENT: CPT

## 2020-09-12 PROCEDURE — 94640 AIRWAY INHALATION TREATMENT: CPT | Mod: 76

## 2020-09-12 PROCEDURE — 12000000 ZZH R&B MED SURG/OB

## 2020-09-12 PROCEDURE — 92526 ORAL FUNCTION THERAPY: CPT | Mod: GN | Performed by: SPEECH-LANGUAGE PATHOLOGIST

## 2020-09-12 PROCEDURE — 25000132 ZZH RX MED GY IP 250 OP 250 PS 637: Performed by: INTERNAL MEDICINE

## 2020-09-12 PROCEDURE — 97530 THERAPEUTIC ACTIVITIES: CPT | Mod: GP | Performed by: PHYSICAL THERAPIST

## 2020-09-12 PROCEDURE — 80048 BASIC METABOLIC PNL TOTAL CA: CPT | Performed by: INTERNAL MEDICINE

## 2020-09-12 PROCEDURE — 97535 SELF CARE MNGMENT TRAINING: CPT | Mod: GO | Performed by: STUDENT IN AN ORGANIZED HEALTH CARE EDUCATION/TRAINING PROGRAM

## 2020-09-12 PROCEDURE — 25000125 ZZHC RX 250: Performed by: INTERNAL MEDICINE

## 2020-09-12 PROCEDURE — 36415 COLL VENOUS BLD VENIPUNCTURE: CPT | Performed by: INTERNAL MEDICINE

## 2020-09-12 PROCEDURE — 99232 SBSQ HOSP IP/OBS MODERATE 35: CPT | Performed by: INTERNAL MEDICINE

## 2020-09-12 PROCEDURE — 40000275 ZZH STATISTIC RCP TIME EA 10 MIN

## 2020-09-12 RX ORDER — PANTOPRAZOLE SODIUM 40 MG/1
40 TABLET, DELAYED RELEASE ORAL
Status: DISCONTINUED | OUTPATIENT
Start: 2020-09-12 | End: 2020-09-14 | Stop reason: HOSPADM

## 2020-09-12 RX ADMIN — MICONAZOLE NITRATE: 20 POWDER TOPICAL at 09:56

## 2020-09-12 RX ADMIN — PANTOPRAZOLE SODIUM 40 MG: 40 TABLET, DELAYED RELEASE ORAL at 10:07

## 2020-09-12 RX ADMIN — ATORVASTATIN CALCIUM 10 MG: 10 TABLET, FILM COATED ORAL at 20:32

## 2020-09-12 RX ADMIN — IPRATROPIUM BROMIDE AND ALBUTEROL SULFATE 3 ML: .5; 3 SOLUTION RESPIRATORY (INHALATION) at 08:02

## 2020-09-12 RX ADMIN — Medication: at 09:56

## 2020-09-12 RX ADMIN — CEFUROXIME AXETIL 500 MG: 250 TABLET ORAL at 20:32

## 2020-09-12 RX ADMIN — CEFUROXIME AXETIL 500 MG: 250 TABLET ORAL at 09:55

## 2020-09-12 RX ADMIN — IPRATROPIUM BROMIDE AND ALBUTEROL SULFATE 3 ML: .5; 3 SOLUTION RESPIRATORY (INHALATION) at 21:01

## 2020-09-12 RX ADMIN — INSULIN ASPART 1 UNITS: 100 INJECTION, SOLUTION INTRAVENOUS; SUBCUTANEOUS at 13:11

## 2020-09-12 RX ADMIN — IPRATROPIUM BROMIDE AND ALBUTEROL SULFATE 3 ML: .5; 3 SOLUTION RESPIRATORY (INHALATION) at 16:05

## 2020-09-12 RX ADMIN — Medication: at 20:39

## 2020-09-12 RX ADMIN — ASPIRIN 325 MG: 325 TABLET, COATED ORAL at 09:56

## 2020-09-12 RX ADMIN — MICONAZOLE NITRATE: 20 POWDER TOPICAL at 20:38

## 2020-09-12 ASSESSMENT — ACTIVITIES OF DAILY LIVING (ADL)
ADLS_ACUITY_SCORE: 24
ADLS_ACUITY_SCORE: 20
ADLS_ACUITY_SCORE: 24
ADLS_ACUITY_SCORE: 20

## 2020-09-12 NOTE — PLAN OF CARE
Discharge Planner SLP   Patient plan for discharge: not discussed  Current status: Pt seen for possible upgrade to diet. Pt sitting in chair.  Placed upper denture which pt stated fits well.  Trialed soft peaches with pt but she was unable to masticate them, needed to spit them out.  Trialed DD1 which she tolerated well orally.  Pt sipped thin liquid independently from cup with adequate oral management.  No overt s/s aspiration.  Not appropriate to upgrade diet at this time due to oral weakness. Nursing reported she has been tolerating DD1 very well.  Do not place denture for DD1 foods it seems to get in the way.   Barriers to return to prior living situation: dysphagia, nutrition, communication  Recommendations for discharge: tcu  Rationale for recommendations: ongoing SLP for dysphagia management; per dtr, would like for patient to go home - if plan to discharge home would need  SLP services ordered for both swallow and communication deficits        Entered by: Francesco Espinal 09/12/2020 10:30 AM

## 2020-09-12 NOTE — PLAN OF CARE
Discharge Planner PT   Patient plan for discharge: Family plans to take patient home and provide necessary assist  Current status: Pt sitting upright in bedside chair at time of attempt, agreeable to mobility. Pt completes repeated sit<>stand with Baltazar progressing to CGA with verbal and tactile cues for safe technique. Verbal and tactile cues for safe walker use (utilizing brakes, walker proximity, etc). Pt requires repeated cuing throughout. Pt ambulates 2x15' with close CGA/Baltazar and significant cues for obstacle avoidance. Further gait deferred as other provider attempting to see patient. Pt sitting in bedside chair at end of session, all needs in reach.   Barriers to return to prior living situation: Impaired insight/safety awareness, requires skilled cuing for safe mobility, Requires assist for all mobility skills  Recommendations for discharge: TCU  Rationale for recommendations: Pt is not currently at baseline for mobility, and is unsafe to discharge home. With continued PT, both IP and after discharge, pt is likely to obtain mobility goals. If family does take pt home they will need to provide Ax1 with ALL mobility/ADLs, and 24 hour supervision, HHPT (as leaving the home would be a taxing effort for the pt). Pt would also benefit from a hospital bed, wheelchair, and commode due to fluctuations with mobility.        Entered by: Lili Lockhart 09/12/2020 3:23 PM

## 2020-09-12 NOTE — PLAN OF CARE
Alert to self. Denies pain. LS diminished, crackles BLL. VSS. , 156, 96. Incontinent of urine. Ambulated 110 feet in pham.  Assist of 1 with walker and gait belt. Tele: A. Fib CVR

## 2020-09-12 NOTE — PLAN OF CARE
Discharge Planner OT   Patient plan for discharge: Family planning to take pt home to [facility] with home care with them providing assist  Current status: Pt seated in chair; agreeable to activity, therapist set up environment, pt able to complete sit>stand, 4WW, Min A following verbal and tactile cues for safety; unable to progress physical activity as pt quickly became labile when provided with orientation questions and did not know the answers; pt provided with Mod A for safe return to sitting; pt provided with orientation answers, improved pt's emotional state and anxiety; pt able to complete 3 g/h tasks, seated, SBA following set-up and cues; pt repetitive throughout session however easily redirected and able to follow cues for repositioning self in chair  Barriers to return to prior living situation: current level of A, cognition, falls risk  Recommendations for discharge: TCU  Rationale for recommendations: Pt is below baseline level of functioning with regards to ADLs and mobility tasks. Recommend ongoing skilled OT while IP and in TCU setting to improve strength, functional activity tolerance and safety needed for daily tasks.        Entered by: Lore Hughes 09/12/2020 1:00 PM

## 2020-09-12 NOTE — PROGRESS NOTES
[facility]  Hospitalist Progress Note  Name: Radha Cunningham    MRN: [id number]      Summary of Stay: Radha Cunningham is a 89 year old female who came to attention on 9/6/2020 with confusion, garbled speech and decreased interaction.  She had findings since initial presentation suggestive of CVA and a UTI.    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  Old right basal ganglia infarct as well as SCIV are also noted.   -Repeat Head CT on 9/7 confirmed stability of ischemic stroke.  Neuro recommended ASA alone initially and then resume DOAC for afib in 2 weeks (9/21).   -  Therapy following, on modified diet.    Chronic atrial fibrillation with chronotropic incompetence, s/p acemaker placement, 4/2018.    -  DOAC in 2 weeks    UTI:  -  Continue  oral ceftin BID.      Asthma:  -  No new respiratory complaints today.  Does not have an acute exacerbation.  Continue nebs.    Oral intake/malnutrition concern:  -  Encouraged oral intake , may not need tube feeding     Metabolic encephalopathy:  -  Multifactorial with CVA and a contribution for UTI.  It is difficult to say how much UTI contributing.  I expect UTI will be treated enough to not have a major contribution by Monday.           COVID Status:  COVID-19 PCR Results    COVID-19 PCR Results 9/6/20 9/6/20    1516 1516   COVID-19 Virus PCR to [facility] - Result Test received-See reflex to IDDL test SARS CoV2 (COVID-19) Virus RT-PCR    COVID-19 Virus PCR to [facility] - Source Nasopharyngeal    SARS-CoV-2 Virus Specimen Source  Nasopharyngeal   SARS-CoV-2 PCR Result  NEGATIVE      Comments are available for some flowsheets but are not being displayed.         COVID-19 Antibody Results, Testing for Immunity    COVID-19 Antibody Results, Testing for Immunity   No data to display.            Diet: Dysphagia Diet Level 1 Pureed Thin Liquids  (water, ice chips, juice, milk gelatin, ice cream, etc)  Snacks/Supplements Adult: Boost Plus; With Meals    DVT Prophylaxis: Pneumatic Compression Devices  Sosa Catheter: not present  Code Status: NO CPR - Pre-arrest intubation OK    Disposition Plan   Patient appears like a TCU/rehab stay may be beneficial but patient/family not very interested in TCU currently.  Home care may be an option.  I expect she will probably need to stay the weekend given variable mentation/intake concerns.     Entered: Alejandro Lopez 09/12/2020, 3:13 PM       Interval History   Assumed care for the day, history reviewed   No new complaints .    -Data reviewed today: I reviewed all new labs and imaging reports over the last 24 hours. I personally reviewed no images or EKG's today.    Physical Exam   Temp: 98.3  F (36.8  C) Temp src: Axillary BP: 117/62 Pulse: 76   Resp: 18 SpO2: 94 % O2 Device: None (Room air)    Vitals:    09/06/20 1731 09/11/20 0531   Weight: 71.9 kg (158 lb 8 oz) 71.9 kg (158 lb 9.6 oz)     Vital Signs with Ranges  Temp:  [98.2  F (36.8  C)-98.7  F (37.1  C)] 98.3  F (36.8  C)  Pulse:  [67-83] 76  Resp:  [18] 18  BP: ()/(62-67) 117/62  SpO2:  [94 %-97 %] 94 %  I/O last 3 completed shifts:  In: 250 [P.O.:250]  Out: 250 [Urine:250]    GEN:  Alert, oriented x to self and somewhat placed.  Identified daughter present but confused with details.  Appears ill but comfortable, no overt distress.  HEENT:  Normocephalic/atraumatic, no scleral icterus, no nasal discharge, mouth moist.  CV:  Regular rate and rhythm, distant.  LUNGS:  Clear to auscultation upper, decreased breath sounds mildly bases.  ABD:  Active bowel sounds, soft, non-tender/non-distended.  No guarding/rigidity.  EXT:  Trace edema.  No cyanosis.  No acute joint synovitis noted.  SKIN:  Dry to touch, no exanthems noted in the visualized areas.    Medications       artificial saliva  2 spray Swish & Spit 4x Daily     aspirin  325 mg Oral Daily    Or      aspirin  300 mg Rectal Daily     atorvastatin  10 mg Oral At Bedtime     cefuroxime  500 mg Oral Q12H STEPHEN     emollient   Topical BID     HYDROmorphone  0.2 mg Intravenous At Bedtime     insulin aspart  1-4 Units Subcutaneous Q4H     ipratropium - albuterol 0.5 mg/2.5 mg/3 mL  3 mL Nebulization 4x Daily     lidocaine  5 mL Topical Once     miconazole   Topical BID     pantoprazole  40 mg Oral QAM AC     sodium chloride (PF)  3 mL Intracatheter Q8H     Data     Recent Labs   Lab 09/10/20  0738 09/07/20  0722 09/06/20  1344   WBC 7.2 6.4 9.0   HGB 10.5* 12.3 11.8   HCT 31.2* 37.5 36.5   MCV 98 99 101*   * 168 166     Recent Labs   Lab 09/06/20  1516   CULT >100,000 colonies/mL  Escherichia coli  *     Recent Labs   Lab 09/12/20  0737 09/11/20  0736 09/11/20  0158 09/10/20  0738 09/07/20  0722     --   --  136 137   POTASSIUM 3.7  --  4.2 3.3* 3.7   CHLORIDE 108  --   --  106 107   CO2 26  --   --  24 23   ANIONGAP 5  --   --  6 7   *  --   --  135* 149*   BUN 12  --   --  10 19   CR 0.79  --   --  0.84 0.85   GFRESTIMATED 65  --   --  61 60*   GFRESTBLACK 75  --   --  71 70   SHAN 8.5  --   --  7.9* 8.6   MAG  --   --   --  1.9  --    PHOS  --  2.7  --  2.1*  --        No results found for this or any previous visit (from the past 24 hour(s)).       OB/GYN

## 2020-09-12 NOTE — PLAN OF CARE
VSS Denies pain. Tele: A-Fib CVR. Ax1 w/ walker & gb. Patient alert to year and self, has trouble word finding. No IV access. Purewick in place w/ 250 output. Will cont plan of care.

## 2020-09-13 ENCOUNTER — APPOINTMENT (OUTPATIENT)
Dept: SPEECH THERAPY | Facility: CLINIC | Age: 85
DRG: 064 | End: 2020-09-13
Payer: COMMERCIAL

## 2020-09-13 ENCOUNTER — APPOINTMENT (OUTPATIENT)
Dept: OCCUPATIONAL THERAPY | Facility: CLINIC | Age: 85
DRG: 064 | End: 2020-09-13
Payer: COMMERCIAL

## 2020-09-13 LAB
ANION GAP SERPL CALCULATED.3IONS-SCNC: 4 MMOL/L (ref 3–14)
BUN SERPL-MCNC: 14 MG/DL (ref 7–30)
CALCIUM SERPL-MCNC: 8.5 MG/DL (ref 8.5–10.1)
CHLORIDE SERPL-SCNC: 106 MMOL/L (ref 94–109)
CO2 SERPL-SCNC: 29 MMOL/L (ref 20–32)
CREAT SERPL-MCNC: 0.83 MG/DL (ref 0.52–1.04)
ERYTHROCYTE [DISTWIDTH] IN BLOOD BY AUTOMATED COUNT: 13.7 % (ref 10–15)
GFR SERPL CREATININE-BSD FRML MDRD: 61 ML/MIN/{1.73_M2}
GLUCOSE BLDC GLUCOMTR-MCNC: 113 MG/DL (ref 70–99)
GLUCOSE BLDC GLUCOMTR-MCNC: 116 MG/DL (ref 70–99)
GLUCOSE BLDC GLUCOMTR-MCNC: 136 MG/DL (ref 70–99)
GLUCOSE BLDC GLUCOMTR-MCNC: 143 MG/DL (ref 70–99)
GLUCOSE BLDC GLUCOMTR-MCNC: 96 MG/DL (ref 70–99)
GLUCOSE SERPL-MCNC: 108 MG/DL (ref 70–99)
HCT VFR BLD AUTO: 32.9 % (ref 35–47)
HGB BLD-MCNC: 11 G/DL (ref 11.7–15.7)
MCH RBC QN AUTO: 33.2 PG (ref 26.5–33)
MCHC RBC AUTO-ENTMCNC: 33.4 G/DL (ref 31.5–36.5)
MCV RBC AUTO: 99 FL (ref 78–100)
PLATELET # BLD AUTO: 150 10E9/L (ref 150–450)
POTASSIUM SERPL-SCNC: 3.6 MMOL/L (ref 3.4–5.3)
RBC # BLD AUTO: 3.31 10E12/L (ref 3.8–5.2)
SODIUM SERPL-SCNC: 139 MMOL/L (ref 133–144)
WBC # BLD AUTO: 6.3 10E9/L (ref 4–11)

## 2020-09-13 PROCEDURE — 94640 AIRWAY INHALATION TREATMENT: CPT

## 2020-09-13 PROCEDURE — 12000000 ZZH R&B MED SURG/OB

## 2020-09-13 PROCEDURE — 97535 SELF CARE MNGMENT TRAINING: CPT | Mod: GO | Performed by: STUDENT IN AN ORGANIZED HEALTH CARE EDUCATION/TRAINING PROGRAM

## 2020-09-13 PROCEDURE — 25000125 ZZHC RX 250: Performed by: INTERNAL MEDICINE

## 2020-09-13 PROCEDURE — 92526 ORAL FUNCTION THERAPY: CPT | Mod: GN | Performed by: SPEECH-LANGUAGE PATHOLOGIST

## 2020-09-13 PROCEDURE — 99232 SBSQ HOSP IP/OBS MODERATE 35: CPT | Performed by: INTERNAL MEDICINE

## 2020-09-13 PROCEDURE — 25000132 ZZH RX MED GY IP 250 OP 250 PS 637: Performed by: INTERNAL MEDICINE

## 2020-09-13 PROCEDURE — 40000275 ZZH STATISTIC RCP TIME EA 10 MIN

## 2020-09-13 PROCEDURE — 94640 AIRWAY INHALATION TREATMENT: CPT | Mod: 76

## 2020-09-13 PROCEDURE — 36415 COLL VENOUS BLD VENIPUNCTURE: CPT | Performed by: INTERNAL MEDICINE

## 2020-09-13 PROCEDURE — 85027 COMPLETE CBC AUTOMATED: CPT | Performed by: INTERNAL MEDICINE

## 2020-09-13 PROCEDURE — 80048 BASIC METABOLIC PNL TOTAL CA: CPT | Performed by: INTERNAL MEDICINE

## 2020-09-13 PROCEDURE — 00000146 ZZHCL STATISTIC GLUCOSE BY METER IP

## 2020-09-13 RX ORDER — DEXTROSE MONOHYDRATE 25 G/50ML
25-50 INJECTION, SOLUTION INTRAVENOUS
Status: DISCONTINUED | OUTPATIENT
Start: 2020-09-13 | End: 2020-09-13

## 2020-09-13 RX ORDER — NICOTINE POLACRILEX 4 MG
15-30 LOZENGE BUCCAL
Status: DISCONTINUED | OUTPATIENT
Start: 2020-09-13 | End: 2020-09-13

## 2020-09-13 RX ORDER — ACETAMINOPHEN 325 MG/1
650 TABLET ORAL EVERY 4 HOURS PRN
Status: DISCONTINUED | OUTPATIENT
Start: 2020-09-13 | End: 2020-09-14 | Stop reason: HOSPADM

## 2020-09-13 RX ADMIN — IPRATROPIUM BROMIDE AND ALBUTEROL SULFATE 3 ML: .5; 3 SOLUTION RESPIRATORY (INHALATION) at 19:31

## 2020-09-13 RX ADMIN — CEFUROXIME AXETIL 500 MG: 250 TABLET ORAL at 08:32

## 2020-09-13 RX ADMIN — MICONAZOLE NITRATE: 20 POWDER TOPICAL at 08:31

## 2020-09-13 RX ADMIN — PANTOPRAZOLE SODIUM 40 MG: 40 TABLET, DELAYED RELEASE ORAL at 08:32

## 2020-09-13 RX ADMIN — CEFUROXIME AXETIL 500 MG: 250 TABLET ORAL at 20:17

## 2020-09-13 RX ADMIN — Medication: at 20:17

## 2020-09-13 RX ADMIN — IPRATROPIUM BROMIDE AND ALBUTEROL SULFATE 3 ML: .5; 3 SOLUTION RESPIRATORY (INHALATION) at 15:22

## 2020-09-13 RX ADMIN — ATORVASTATIN CALCIUM 10 MG: 10 TABLET, FILM COATED ORAL at 20:17

## 2020-09-13 RX ADMIN — IPRATROPIUM BROMIDE AND ALBUTEROL SULFATE 3 ML: .5; 3 SOLUTION RESPIRATORY (INHALATION) at 11:55

## 2020-09-13 RX ADMIN — ACETAMINOPHEN 650 MG: 325 TABLET, FILM COATED ORAL at 20:41

## 2020-09-13 RX ADMIN — IPRATROPIUM BROMIDE AND ALBUTEROL SULFATE 3 ML: .5; 3 SOLUTION RESPIRATORY (INHALATION) at 08:01

## 2020-09-13 RX ADMIN — ASPIRIN 325 MG: 325 TABLET, COATED ORAL at 08:32

## 2020-09-13 RX ADMIN — Medication: at 08:32

## 2020-09-13 RX ADMIN — MICONAZOLE NITRATE: 20 POWDER TOPICAL at 20:17

## 2020-09-13 ASSESSMENT — ACTIVITIES OF DAILY LIVING (ADL)
ADLS_ACUITY_SCORE: 24

## 2020-09-13 NOTE — PLAN OF CARE
Discharge Planner SLP   Patient plan for discharge: uncertain  Current status: Pt dtr Sugar in room, who wants to take her mom home to rehab her with HH therapies.  Placed upper denture with denture cream and placed in mouth and attempted to trial DD2 texture, which pt spit out.  She refused to take DD2 or applesauce.  Everything she eats needs to be room temp or warm she will not tolerate cold textures.  DD2 trial was not cold.  Pt appeared very fatigued, suggested to dtr to allow her a cat nap.  Recommend continue with DD1/thin liquids at room temperature.  Feed small amounts at slow rate while pt in chair.  Dentures do not fit without adhesive which pt does not like and is in the way of eating pureed food.  Recommend feeding pureed food without denture present in mouth.  Barriers to return to prior living situation: dysphagia  Recommendations for discharge:   Rationale for recommendations: Family wants to take patient home and have therapies at home.  Patient will be needing ongoing dysphagia treatment and speech and language assessment and separate orders for each for Home Health.       Entered by: Francesco Espinal 09/13/2020 11:28 AM

## 2020-09-13 NOTE — PLAN OF CARE
PT: Pt with another provider at time of attempt.     Attempted this PM. Pt with another provider at time of attempt.

## 2020-09-13 NOTE — PLAN OF CARE
Alert to self. Denies pain. LS diminished. VSS. , 136, 113. Incontinent of urine. Assist of 1 with walker and gait belt. Ambulated pham 110 feet. Tele: DILCIA WATERMAN

## 2020-09-13 NOTE — PROGRESS NOTES
[facility]  Hospitalist Progress Note  Name: Radha Cunningham    MRN: [id number]      Summary of Stay: Radha Cunningham is a 89 year old female who came to attention on 9/6/2020 with confusion, garbled speech and decreased interaction.  She had findings since initial presentation suggestive of CVA and a UTI.    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  Old right basal ganglia infarct as well as SCIV are also noted.   -Repeat Head CT on 9/7 confirmed stability of ischemic stroke.  Neuro recommended ASA alone initially and then resume DOAC for afib in 2 weeks (9/21).   -  Therapy following, on modified diet.    Chronic atrial fibrillation with chronotropic incompetence, s/p acemaker placement, 4/2018.    -  DOAC in 2 weeks    UTI:  -  Continue  oral ceftin BID.      Asthma:  -  No new respiratory complaints today.  Does not have an acute exacerbation.  Continue nebs.    Oral intake/malnutrition concern:  -  Encouraged oral intake , may not need tube feeding     Metabolic encephalopathy:  -  Multifactorial with CVA and a contribution for UTI.  It is difficult to say how much UTI contributing.  I expect UTI will be treated enough to not have a major contribution by Monday.           COVID Status:  COVID-19 PCR Results    COVID-19 PCR Results 9/6/20 9/6/20    1516 1516   COVID-19 Virus PCR to [facility] - Result Test received-See reflex to IDDL test SARS CoV2 (COVID-19) Virus RT-PCR    COVID-19 Virus PCR to [facility] - Source Nasopharyngeal    SARS-CoV-2 Virus Specimen Source  Nasopharyngeal   SARS-CoV-2 PCR Result  NEGATIVE      Comments are available for some flowsheets but are not being displayed.         COVID-19 Antibody Results, Testing for Immunity    COVID-19 Antibody Results, Testing for Immunity   No data to display.            Diet: Dysphagia Diet Level 1 Pureed Thin Liquids  (water, ice chips, juice, milk gelatin, ice cream, etc)  Snacks/Supplements Adult: Boost Plus; With Meals    DVT Prophylaxis: Pneumatic Compression Devices  Sosa Catheter: not present  Code Status: NO CPR - Pre-arrest intubation OK    Disposition Plan   Patient appears like a TCU/rehab stay may be beneficial but patient/family not very interested in TCU currently.  Home care may be an option.  Social service consulted to assist with discharge planning.  May be discharged in the next 1 or 2 days       Entered: Alejandro Lopez 09/13/2020, 6:24 PM       Interval History   Assumed care for the day, history reviewed   No new complaints .    -Data reviewed today: I reviewed all new labs and imaging reports over the last 24 hours. I personally reviewed no images or EKG's today.    Physical Exam   Temp: 97.3  F (36.3  C) Temp src: Oral BP: 118/73 Pulse: 68   Resp: 20 SpO2: 97 % O2 Device: None (Room air)    Vitals:    09/06/20 1731 09/11/20 0531 09/13/20 0524   Weight: 71.9 kg (158 lb 8 oz) 71.9 kg (158 lb 9.6 oz) 69.4 kg (153 lb)     Vital Signs with Ranges  Temp:  [97.3  F (36.3  C)-98.8  F (37.1  C)] 97.3  F (36.3  C)  Pulse:  [65-96] 68  Resp:  [18-20] 20  BP: (114-126)/(62-73) 118/73  SpO2:  [94 %-97 %] 97 %  I/O last 3 completed shifts:  In: 450 [P.O.:450]  Out: 400 [Urine:400]    GEN:  Alert, oriented x to self and somewhat placed.  Identified daughter present but confused with details.  Appears ill but comfortable, no overt distress.  HEENT:  Normocephalic/atraumatic, no scleral icterus, no nasal discharge, mouth moist.  CV:  Regular rate and rhythm, distant.  LUNGS:  Clear to auscultation upper, decreased breath sounds mildly bases.  ABD:  Active bowel sounds, soft, non-tender/non-distended.  No guarding/rigidity.  EXT:  Trace edema.  No cyanosis.  No acute joint synovitis noted.  SKIN:  Dry to touch, no exanthems noted in the visualized areas.    Medications       artificial saliva  2 spray Swish & Spit 4x  Daily     aspirin  325 mg Oral Daily    Or     aspirin  300 mg Rectal Daily     atorvastatin  10 mg Oral At Bedtime     cefuroxime  500 mg Oral Q12H STEPHEN     emollient   Topical BID     HYDROmorphone  0.2 mg Intravenous At Bedtime     insulin aspart  1-4 Units Subcutaneous Q4H     ipratropium - albuterol 0.5 mg/2.5 mg/3 mL  3 mL Nebulization 4x Daily     lidocaine  5 mL Topical Once     miconazole   Topical BID     pantoprazole  40 mg Oral QAM AC     sodium chloride (PF)  3 mL Intracatheter Q8H     Data     Recent Labs   Lab 09/13/20  0759 09/10/20  0738 09/07/20  0722   WBC 6.3 7.2 6.4   HGB 11.0* 10.5* 12.3   HCT 32.9* 31.2* 37.5   MCV 99 98 99    147* 168     No results for input(s): CULT in the last 168 hours.  Recent Labs   Lab 09/13/20  0759 09/12/20  0737 09/11/20  0736 09/11/20  0158 09/10/20  0738    139  --   --  136   POTASSIUM 3.6 3.7  --  4.2 3.3*   CHLORIDE 106 108  --   --  106   CO2 29 26  --   --  24   ANIONGAP 4 5  --   --  6   * 106*  --   --  135*   BUN 14 12  --   --  10   CR 0.83 0.79  --   --  0.84   GFRESTIMATED 61 65  --   --  61   GFRESTBLACK 71 75  --   --  71   SHAN 8.5 8.5  --   --  7.9*   MAG  --   --   --   --  1.9   PHOS  --   --  2.7  --  2.1*       No results found for this or any previous visit (from the past 24 hour(s)).

## 2020-09-13 NOTE — PLAN OF CARE
VSS, Denies pain. Tele: A-Fib CVR. Ax1 w/ walker & gb. Patient alert to self, has trouble word finding. No IV access. Purewick in place w/ 400 output. Pt was more talkative and more engaged tonight vs the previous night. Will cont plan of care.

## 2020-09-13 NOTE — PLAN OF CARE
OT: Pt sleeping, able to arouse, declined out of bed activity at this time, requested to continue to rest, RN updated    Re-attempted and able to work with therapy in late morning  Discharge Planner OT   Patient plan for discharge:  Family planning to take pt home to [facility] with home care with them providing assist  Current status: dtr present and assisting pt with meal; dtr receptive to education on how to grade self feeding activity, provided examples of how to increase pt's level of participation with activity; pt able to complete sit<>stand from chair, 4WW, Min A and repetitive step by step cues for safety and appropriate use of 4WW; pt completed mobility in room, chair<>bathroom, 4WW, Min A including 4WW support and directing; pt frequently leaning forward and moving hands from 4WW to rest forearms, required near constant cues for upright posture and hand placement on 4WW; pt completed toilet transfer, 4WW, grab bar, Min A; pt's dtr able to provide Max A with clothing management including changing to new undergarment; pt completed hand hygiene at sink, CGA for balance and step by step cues for locating items and completing task; pt's dtr able to provide cues to pt following education from therapist on how to simplify cues; pt returned to chair and further caregiver education provided on how to best support pt in recovery including management of visitors to maintain rest/activity balance, recommendation of commode and how to adjust activities based on pt's level of alertness, confusion and energy  Barriers to return to prior living situation: fluctuating level of A (A of 1-2); fluctuating cognition; falls risk  Recommendations for discharge: TCU  Rationale for recommendations: Patient remains below baseline and would benefit from ongoing therapy services while IP and in TCU setting to improve strength, safety, and level of independence prior to return home. If pt to discharge home/to dtr's home, recommend  24/7 supervision and assist of 1-2 for mobility and self cares. Would recommend continuing therapy in home setting as would be significant effort and need for assist to leave home.       Entered by: Lore Hughes 09/13/2020 2:02 PM

## 2020-09-14 ENCOUNTER — APPOINTMENT (OUTPATIENT)
Dept: SPEECH THERAPY | Facility: CLINIC | Age: 85
DRG: 064 | End: 2020-09-14
Payer: COMMERCIAL

## 2020-09-14 ENCOUNTER — APPOINTMENT (OUTPATIENT)
Dept: PHYSICAL THERAPY | Facility: CLINIC | Age: 85
DRG: 064 | End: 2020-09-14
Payer: COMMERCIAL

## 2020-09-14 VITALS
DIASTOLIC BLOOD PRESSURE: 71 MMHG | SYSTOLIC BLOOD PRESSURE: 114 MMHG | RESPIRATION RATE: 18 BRPM | BODY MASS INDEX: 27.49 KG/M2 | OXYGEN SATURATION: 95 % | HEART RATE: 94 BPM | TEMPERATURE: 98 F | WEIGHT: 155.2 LBS

## 2020-09-14 LAB
GLUCOSE BLDC GLUCOMTR-MCNC: 127 MG/DL (ref 70–99)
GLUCOSE BLDC GLUCOMTR-MCNC: 128 MG/DL (ref 70–99)
GLUCOSE BLDC GLUCOMTR-MCNC: 175 MG/DL (ref 70–99)

## 2020-09-14 PROCEDURE — 40000275 ZZH STATISTIC RCP TIME EA 10 MIN

## 2020-09-14 PROCEDURE — 92526 ORAL FUNCTION THERAPY: CPT | Mod: GN | Performed by: SPEECH-LANGUAGE PATHOLOGIST

## 2020-09-14 PROCEDURE — 25000132 ZZH RX MED GY IP 250 OP 250 PS 637: Performed by: INTERNAL MEDICINE

## 2020-09-14 PROCEDURE — 97530 THERAPEUTIC ACTIVITIES: CPT | Mod: GP

## 2020-09-14 PROCEDURE — 00000146 ZZHCL STATISTIC GLUCOSE BY METER IP

## 2020-09-14 PROCEDURE — 25000125 ZZHC RX 250: Performed by: INTERNAL MEDICINE

## 2020-09-14 PROCEDURE — 94640 AIRWAY INHALATION TREATMENT: CPT | Mod: 76

## 2020-09-14 PROCEDURE — 94640 AIRWAY INHALATION TREATMENT: CPT

## 2020-09-14 PROCEDURE — 99239 HOSP IP/OBS DSCHRG MGMT >30: CPT | Performed by: HOSPITALIST

## 2020-09-14 RX ORDER — ASPIRIN 325 MG
325 TABLET, DELAYED RELEASE (ENTERIC COATED) ORAL DAILY
Qty: 5 TABLET | Refills: 0 | Status: SHIPPED | OUTPATIENT
Start: 2020-09-15 | End: 2020-09-20

## 2020-09-14 RX ADMIN — IPRATROPIUM BROMIDE AND ALBUTEROL SULFATE 3 ML: .5; 3 SOLUTION RESPIRATORY (INHALATION) at 08:02

## 2020-09-14 RX ADMIN — Medication: at 08:58

## 2020-09-14 RX ADMIN — MICONAZOLE NITRATE: 20 POWDER TOPICAL at 08:59

## 2020-09-14 RX ADMIN — IPRATROPIUM BROMIDE AND ALBUTEROL SULFATE 3 ML: .5; 3 SOLUTION RESPIRATORY (INHALATION) at 11:16

## 2020-09-14 RX ADMIN — CEFUROXIME AXETIL 500 MG: 250 TABLET ORAL at 08:57

## 2020-09-14 RX ADMIN — PANTOPRAZOLE SODIUM 40 MG: 40 TABLET, DELAYED RELEASE ORAL at 08:57

## 2020-09-14 RX ADMIN — ASPIRIN 325 MG: 325 TABLET, COATED ORAL at 08:57

## 2020-09-14 ASSESSMENT — ACTIVITIES OF DAILY LIVING (ADL)
ADLS_ACUITY_SCORE: 20
ADLS_ACUITY_SCORE: 24

## 2020-09-14 NOTE — PLAN OF CARE
Occupational Therapy Discharge Summary    Reason for therapy discharge:    Discharged to home with home therapy.    Progress towards therapy goal(s). [facility] for goal details.  Goals partially met.  Barriers to achieving goals:   limited tolerance for therapy and discharge from facility.    Therapy recommendation(s):    Continued therapy is recommended.  Rationale/Recommendations:  family declining TCU. Recommend Home Health OT/PT for ongoing skilled services to improve safety and ind..

## 2020-09-14 NOTE — PHARMACY-ANTICOAGULATION SERVICE
Clinical Pharmacy- Warfarin Discharge Note  This patient is currently on warfarin for the treatment of Atrial fibrillation.  INR Goal= 2-3  Expected length of therapy undetermined.           Anticoagulation Dose History     Recent Dosing and Labs Latest Ref Rng & Units 12/17/2019 12/18/2019 12/19/2019 12/20/2019 12/21/2019 9/6/2020 9/10/2020    Warfarin 1 mg - 2 mg - - - - - -    Warfarin 1.5 mg - - 1.5 mg 1.5 mg 1.5 mg - - -    INR 0.86 - 1.14 2.04(H) 2.34(H) 2.46(H) 2.42(H) 2.49(H) 1.08 1.21(H)          Warfarin was held during admission. Restart warfarin on 9/21/2020 with home regimen of 5 mg on Monday, Wednesday, Friday and 4 mg on Tuesday, Thursday, Saturday and Sunday. Check INR in one week.

## 2020-09-14 NOTE — PLAN OF CARE
Discharge Planner PT   Patient plan for discharge: Family plans to take patient home and provide necessary assist  Current status: Long discussion with daughter, Saeid, regarding rationale for current equipment recommendations and methods to obtain. Saeid reports hesitancy and reports she would like to see how things go at home and states she will monitor for w/c and commode need. Educated on where to obtain post DC. Sit>stand with Baltazar. Needs physical assist to stand and physical assist for brake use and hand transition to walker. Pt ambulates 75' with 4WW and Baltazar. Pt demos forward flexed posture, lateral path deviation bilaterally and unsteadiness. Frequent cues needed to not use forearms on walker and for posture. Needs assist for stand>sit in regards to hip placement and hand placement.   Barriers to return to prior living situation: Impaired insight/safety awareness, requires skilled cuing for safe mobility, Requires assist for all mobility skills  Recommendations for discharge: TCU  Rationale for recommendations: Pt is not currently at baseline for mobility, would benefit from TCU to address impairments. If family does take pt home they will need to provide Ax1 with ALL mobility/ADLs, and 24 hour supervision, HHPT (as leaving the home would be a taxing effort for the pt). Pt would also benefit from a hospital bed, wheelchair, and commode due to fluctuations with mobility.        Entered by: Светлана Guthrie 09/14/2020 10:58 AM

## 2020-09-14 NOTE — PLAN OF CARE
VSS, Pain: c/o Left hip pain, Tylenol and ice pack given with relief. Tele: A-Fib occ V-paced CVR. . Ax1 w/ walker & gb. Patient alert to self, has trouble word finding. No IV access. Purewick changed & in place w/ 300 output. Possible discharge today, Home with dtr vs TCU. Will cont plan of care.

## 2020-09-14 NOTE — PLAN OF CARE
VSS on RA, A/O to self only, LS dim, denies pain, assist of 1 w/ walker and gate belt transfer, DD1 thin liquid diet tolerated well, A-fib CVR on tele, reviewed discharge paperwork w/ pt and daughter, daughter verbalized understanding, pt discharge off the unit at 1325 via wheelchair, home via daughter's personal vehicle.

## 2020-09-14 NOTE — PLAN OF CARE
Discharge Planner SLP   Patient plan for discharge: Home with daughterSaeid.  Current status: Patient seen for swallowing treatment. Patient tolerated thin liquid trials, exhibiting increased oral residue and difficult mastication of dysphagia diet level 2 texture trials today, thus, not ready for upgrade at this time.  Recommend dysphagia diet level 1 with thin liquids for home diet.  Educated daughter Saeid re: DD1 textures and safe eating, safe feeding strategies.   Barriers to return to prior living situation: Weakness.   Recommendations for discharge: Home with home SLP services.   Rationale for recommendations: Patient's daughters wish for her to go home with them, recommend consider  SLP orders to ensure safe return to least restrictive diet.       Entered by: Sara Arredondo 09/14/2020 12:03 PM        Speech Language Therapy Discharge Summary    Reason for therapy discharge:    Discharged to home with home therapy.    Progress towards therapy goal(s). See goals on Care Plan in [facility] electronic health record for goal details.  Goals partially met.  Barriers to achieving goals:   discharge from facility.    Therapy recommendation(s):    Continued therapy is recommended.  Rationale/Recommendations:  See above, SLP dysphagia services for safe return to least restrictive oral diet.

## 2020-09-14 NOTE — DISCHARGE SUMMARY
Hospitalist Discharge Summary  [facility]    Radha Cunningham MRN# [id number]   YOB: 1929 Age: 89 year old     Date of Admission:  9/6/2020  Date of Discharge:  9/14/2020  1:28 PM  Admitting Physician:  Grover Camarena MD  Discharge Physician:  Hira vAila DO  Discharging Service:  Hospitalist     Primary Provider: Samaria Chappell          Discharge Diagnosis:   1.  Left posterior cerebral artery at the P2 segment occlusion compatible with a subacute/acute stroke involving the left temporal/occipital lobe.   2.  Old right basal ganglia infarct.   3.  Chronic atrial fibrillation with chronotropic incompetence, status post pacemaker in 2018.   4.  Urinary tract infection.   5.  Asthma.   6.  Severe malnutrition.   7.  Metabolic encephalopathy.              Discharge Disposition:   Discharged to home           Allergies:   Allergies   Allergen Reactions     Contrast Dye               Discharge Medications:   Discharge Medication List as of 9/14/2020 12:18 PM      START taking these medications    Details   aspirin (ASA) 325 MG EC tablet Take 1 tablet (325 mg) by mouth daily for 5 days, Disp-5 tablet,R-0, E-Prescribe         CONTINUE these medications which have NOT CHANGED    Details   albuterol (PROVENTIL) (2.5 MG/3ML) 0.083% neb solution Take 1 vial (2.5 mg) by nebulization every 4 hours as needed for wheezing, Transitional      atorvastatin (LIPITOR) 10 MG tablet Take 10 mg by mouth At Bedtime , Historical      calcium carbonate-vitamin D (OSCAL W/D) 500-200 MG-UNIT tablet Take 1 tablet by mouth daily, Historical      emollient (VANICREAM) external cream Apply topically 2 times daily To whole bodyHistorical      fluocinonide (LIDEX) 0.05 % external solution Apply topically 2 times daily Apply to affected areas of the scalp twice daily for 2 weeks at a time as needed for dryness.Historical      fluticasone (FLONASE) 50 MCG/ACT nasal spray Spray 1 spray into both nostrils daily as needed  "for rhinitis or allergies, Historical      HYDROcodone-acetaminophen (NORCO) 5-325 MG tablet Take 1 tablet by mouth every 8 hours as needed for severe pain, Historical      ipratropium - albuterol 0.5 mg/2.5 mg/3 mL (DUONEB) 0.5-2.5 (3) MG/3ML neb solution Take 1 vial (3 mLs) by nebulization 4 times daily, Transitional      multivitamin w/minerals (THERA-VIT-M) tablet Take 1 tablet by mouth daily, Historical      Nutritional Supplements (NUTRITIONAL SUPPLEMENT PLUS) LIQD Take by mouth 2 times daily \"Magic Cup\" brand nutritional supplement, Historical      nystatin (MYCOSTATIN) 924214 UNIT/GM external powder Apply topically 2 times daily Under right breast and groin areaHistorical      !! warfarin ANTICOAGULANT (COUMADIN) 2 MG tablet Take 5 mg by mouth Take 5 mg by mouth on Mon, Wed, Fri. Take 4 mg all other days of the week., Historical      !! warfarin ANTICOAGULANT (COUMADIN) 2 MG tablet Take 4 mg by mouth Take 4 mg by mouth Tue, Thur, Sat, and Sun. Take 5 mg Mon, Wed, Fri., Historical       !! - Potential duplicate medications found. Please discuss with provider.      STOP taking these medications       furosemide (LASIX) 20 MG tablet Comments:   Reason for Stopping:         metoprolol tartrate (LOPRESSOR) 50 MG tablet Comments:   Reason for Stopping:                      Condition on Discharge:   Discharge condition: Stable   Discharge vitals: Blood pressure 114/71, pulse 94, temperature 98  F (36.7  C), temperature source Oral, resp. rate 18, weight 70.4 kg (155 lb 3.2 oz), SpO2 95 %.      BASIC PHYSICAL EXAMINATION:  GENERAL: No apparent distress.  CARDIOVASCULAR: Regular rate and rhythm without murmurs.  PULMONARY: Clear to auscultation bilaterally.   GASTROINTESTINAL: Abdomen soft, non-tender.  EXTREMITIES: No edema, pulses intact.  NEUROLOGIC: No focal deficits.            History of Illness:   See detailed admission note for full details.               Procedures excluding imaging which is summarized " below:   Please see details in the electronic medical record.           Consultations:   NEUROLOGY IP CONSULT  PHYSICAL THERAPY ADULT IP CONSULT  OCCUPATIONAL THERAPY ADULT IP CONSULT  SPEECH LANGUAGE PATH ADULT IP CONSULT  SWALLOW EVAL SPEECH PATH AT BEDSIDE IP CONSULT  SMOKING CESSATION PROGRAM IP CONSULT  PATIENT LEARNING CENTER IP CONSULT  PALLIATIVE CARE ADULT IP CONSULT  SOCIAL WORK IP CONSULT  SPIRITUAL HEALTH SERVICES IP CONSULT  NUTRITION SERVICES ADULT IP CONSULT  NUTRITION SERVICES ADULT IP CONSULT  ETHICS IP CONSULT  SOCIAL WORK IP CONSULT          Significant Results:   Results for orders placed or performed during the hospital encounter of 09/06/20   Head CT w/o contrast    Narrative    CT SCAN OF THE HEAD WITHOUT CONTRAST September 6, 2020 2:03 PM     HISTORY: Altered mental status unexplained. Elderly, altered mental  status < on warfarin, left gaze. Weakness. Patient unable to supply  history.    TECHNIQUE: Axial images of the head and coronal reformations without  IV contrast material. Radiation dose for this scan was reduced using  automated exposure control, adjustment of the mA and/or kV according  to patient size, or iterative reconstruction technique.    COMPARISON: None.    FINDINGS: There is some decreased attenuation in the medial left  temporal occipital lobe with subtle mass effect. Mild to moderate  cerebral atrophy is present. Moderately extensive white matter changes  in both hemispheres without mass effect or enhancement. There are old  lacunar infarcts in the right basal ganglia region. There is no  evidence for intracranial hemorrhage or skull fracture. Vascular  calcifications are noted. Visualized paranasal sinuses and mastoid air  cells are clear.      Impression    IMPRESSION:  1. Decreased attenuation involving gray and white matter in the medial  left temporal occipital lobe. This most likely represents an acute  ischemic infarct although other etiologies including  encephalitis  could also give a similar pattern. If clinically indicated, MRI might  be helpful in further evaluation.  2. Cerebral atrophy with scattered nonspecific white matter changes  which are most likely due to chronic small vessel ischemic disease  given the patient's age.  3. 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  4. No evidence for intracranial hemorrhage.    SAM KENDALL MD   CTA Head Neck with Contrast    Narrative    EXAM: CTA  HEAD NECK WITH CONTRAST  LOCATION: [facility]  DATE/TIME: 9/6/2020 8:37 PM    INDICATION: Stroke, altered mental status.  COMPARISON: None.  CONTRAST: 70 mL [id number].  TECHNIQUE: Axial helical CT images of the head and neck vessels obtained during the arterial phase of intravenous contrast administration. Axial 2D reconstructed images and multiplanar 3D MIP reconstructed images of the head and neck vessels were   performed by the technologist. Dose reduction techniques were used. All stenosis measurements made according to NASCET criteria unless otherwise specified.    Acute/subacute infarct is present involving the left posterior cerebral artery distribution.    HEAD CTA:  ANTERIOR CIRCULATION: The internal carotid arteries, anterior cerebral arteries, and middle cerebral arteries are patent. Extensive atherosclerotic plaquing is present involving the carotid siphons. No evidence of large vessel occlusion or high-grade   stenosis. No evidence of aneurysm or vascular malformation.    POSTERIOR CIRCULATION: Right vertebral artery is diminutive predominantly terminating in posterior inferior cerebellar artery. Left vertebral artery, basilar artery, and right posterior cerebral arteries are patent. Left posterior cerebral artery is   occluded at the P2 segment with possible reconstitution of more peripheral branches. Right posterior communicating artery is present.    DURAL VENOUS SINUSES: Expected enhancement of the major dural venous sinuses.    NECK CTA: A 3 vessel  aortic arch is present. The bilateral common carotid, internal carotid, external carotid, and vertebral arteries are patent. Moderate plaque is present at the bilateral carotid bifurcations without evidence of flow limiting stenosis.   Bilateral internal carotid artery tonsillar loops are present.    Mild plaquing at the bilateral vertebral artery origins without evidence of flow limiting stenosis. Right vertebral artery is diminutive with dominant left vertebral artery.    NONVASCULAR STRUCTURES: Moderate to severe cervical spine degenerative change is present. At least moderate stenosis at the level of the C1 ring with probable old C1 ring fractures. Left submandibular gland is small or absent.      Impression    IMPRESSION:    HEAD CTA:   1.  Occlusion of the left posterior cerebral artery at the P2 segment.    NECK CTA:  1.  No evidence of large vessel occlusion or high-grade stenosis.  2.  Old C1 ring fractures with moderate spinal canal stenosis.   CT Head w/o Contrast    Narrative    CT SCAN OF THE HEAD WITHOUT CONTRAST   9/7/2020 9:42 AM     HISTORY: Focal neurological deficit, greater than 6 hours, stroke  suspected. Gaze abnormality.    TECHNIQUE:  Axial images of the head and coronal reformations without  IV contrast material.  Radiation dose for this scan was reduced using  automated exposure control, adjustment of the mA and/or kV according  to patient size, or iterative reconstruction technique.    COMPARISON: 9/6/2020    FINDINGS: During the interval, the left temporal occipital low-density  has become more clearly defined consistent with an evolving ischemic  infarct. There is no evidence for any hemorrhage. There is minimal  localized mass effect but no shift. Old right basal ganglia infarct  noted. There is cerebral atrophy and some patchy nonspecific white  matter changes. Vascular calcification seen at the skull base.  Visualized paranasal sinuses and mastoid air cells are clear.      Impression     IMPRESSION:  1. Evolving ischemic infarct in left temporal occipital lobe.  2. Old right basal ganglia infarct.  3. Cerebral atrophy with some chronic-appearing white matter changes  most likely due to chronic small vessel ischemic disease.  4. No evidence for intracranial hemorrhage or any significant mass  effect.    SAM KENDALL MD   Echocardiogram Complete    Narrative    [id number]  [id number]  [id number]  [id number]^CELINE^SAVAGE           [facility]  Echocardiography Laboratory  [address] [place] [address]        Name: SANDRO ECHEVERRIA  MRN: [id number]  : 1929  Study Date: 2020 10:59 AM  Age: 89 yrs  Gender: Female  Patient Location: [facility]  Reason For Study: CVA  Ordering Physician: SAVAGE BERGER  Performed By: Shirley Rodriguez     BSA: 1.8 m2  Height: 65 in  Weight: 158 lb  BP: 129/73 mmHg  _____________________________________________________________________________  __        Procedure  Complete Portable Echo Adult.  _____________________________________________________________________________  __        Interpretation Summary     The visual ejection fraction is estimated at 55-60%.  Left ventricular systolic function is normal.  There is moderate (2+) mitral regurgitation.  There is moderate (2+) tricuspid regurgitation.  There is mild (1+) aortic regurgitation.  The ascending aorta is Mildly dilated.  The rhythm was atrial fibrillation.  _____________________________________________________________________________  __        Left Ventricle  The left ventricle is normal in size. There is normal left ventricular wall  thickness. Diastolic function not assessed due to atrial fibrillation. The  visual ejection fraction is estimated at 55-60%. Left ventricular systolic  function is normal. Septal wall motion abnormality may reflect pacemaker  activation.     Right Ventricle  There is a catheter/pacemaker lead seen in the right ventricle. The right  ventricle is normal in size  and function.     Atria  The left atrium is severely dilated. The right atrium is mild to moderately  dilated. There is no color Doppler evidence of an atrial shunt.        Mitral Valve  There is mild mitral annular calcification. There is moderate (2+) mitral  regurgitation.     Tricuspid Valve  There is moderate (2+) tricuspid regurgitation. The right ventricular systolic  pressure is elevated at 43.4 mmHg.     Aortic Valve  The aortic valve is trileaflet. There is mild trileaflet aortic sclerosis.  There is mild (1+) aortic regurgitation. No hemodynamically significant  valvular aortic stenosis.     Pulmonic Valve  There is no pulmonic valvular regurgitation.     Vessels  The aortic root is normal size. The ascending aorta is Mildly dilated.     Pericardium  There is no pericardial effusion.        Rhythm  The rhythm was atrial fibrillation.  _____________________________________________________________________________  __     MMode/2D Measurements & Calculations  IVSd: 0.88 cm  LVIDd: 4.2 cm  LVIDs: 2.7 cm  LVPWd: 0.86 cm  FS: 35.2 %  LV mass(C)d: 113.1 grams  LV mass(C)dI: 63.2 grams/m2  Ao root diam: 3.3 cm  asc Aorta Diam: 4.3 cm  LVOT diam: 2.0 cm  LVOT area: 3.2 cm2  LA Volume (BP): 85.6 ml  LA Volume Index (BP): 47.8 ml/m2     RWT: 0.41        Doppler Measurements & Calculations  MV E max werner: 75.8 cm/sec  PA acc time: 0.08 sec  TR max werner: 329.6 cm/sec  TR max P.4 mmHg  E/E' av.8  Lateral E/e': 7.1  Medial E/e': 12.4           _____________________________________________________________________________  __           Report approved by: Rosa Duron 2020 12:11 PM            Transthoracic Echocardiogram Results:  No results found for this or any previous visit (from the past 4320 hour(s)).             Pending Results:   Unresulted Labs Ordered in the Past 30 Days of this Admission     No orders found from 2020 to 2020.                      Discharge Instructions and  Follow-Up:   Discharge instructions and follow-up:   Discharge Procedure Orders   Home care nursing referral   Referral Priority: Routine Referral Type: Home Health Therapies & Aides   Number of Visits Requested: 1     Home Care PT Referral for Hospital Discharge   Referral Priority: Routine Referral Type: Home Health Therapies & Aides   Number of Visits Requested: 1     Home Care OT Referral for Hospital Discharge   Referral Priority: Routine   Number of Visits Requested: 1     Home Care SLP Referral for Hospital Discharge   Referral Priority: Routine   Number of Visits Requested: 1     Home Care Social Service Referral for Hospital Discharge   Referral Priority: Routine   Number of Visits Requested: 1     Follow-up and recommended labs and tests    Order Comments: Follow up with primary care provider, Samaria Chappell, within 7 days to evaluate medication change and for hospital follow- up.  INR check in 1 week.  Continue  mg daily, last dose 9/20.  Restart warfarin the following day (9/21).  Follow-up neurology in 6-8 weeks.     Activity   Order Comments: Your activity upon discharge: activity as tolerated     Order Specific Question Answer Comments   Is discharge order? Yes      No CPR- Pre-arrest intubation OK     Order Specific Question Answer Comments   Code status determined by: Discussion with patient/ legal decision maker      Diet   Order Comments: Follow this diet upon discharge: Orders Placed This Encounter      Snacks/Supplements Adult: Boost Plus; With Meals      Dysphagia Diet Level 1 Pureed Thin Liquids (water, ice chips, juice, milk gelatin, ice cream, etc)     Order Specific Question Answer Comments   Is discharge order? Yes              Hospital Course:   This is a 89-year-old female with a history of chronic atrial fibrillation, status post pacemaker, asthma, hypertension, peptic ulcer disease, presenting on 09/06/2020 with her daughter with confusion and garbled speech.  She was thought to  have expressive aphasia and a leftward gaze.  CT of the head without contrast showed a suspected large left MCA territory stroke in the setting of multiple other strokes and generalized volume loss.  Stroke Neurology was contacted and recommended an aspirin with a followup MRI.  She was not thought to be a candidate for thrombolytic therapy.  Her neurological symptoms improved dramatically.  MRI was not performed.  Repeat head CT confirm stability of the ischemic stroke.  She was noted to have left posterior cerebral artery at the P2 segment occlusion, which was compatible with a subacute or acute stroke involving the left temporal occipital lobe.  There was also an old right basal ganglia infarct, as well as FC IV noted.  Neurology was contacted and recommended 2 weeks of full dose aspirin with subsequent transition back to her prior to admission warfarin.        She has worked with physical therapy, OT, and Speech pathology.  She is on a dysphagia diet.  She was found to have a urinary tract infection with greater than 100,000 colonies of E. coli.  She was on IV ceftriaxone, subsequently transitioned to Ceftin.  She has received about 9 days of antibiotics, which I believe is sufficient.  She has had some infectious and metabolic encephalopathy which is also now significantly improved.  She previously refuted a nasogastric tube in the context of elderly age and severe malnutrition.  Her diet has shown some progress; however.  Multiple discussions have taken place with regards to indications for transitional care facility to maximize rehabilitation potential.  The daughter and patient desperately want to discharge home due to the requirements of quarantining at a facility.  Given the patient's age and taking into account other factors, I think it is reasonable to discharge home.  She does have a state-appointed partial decision maker, who is aware of plans to discharge home with full services and is agreeable to  this plan.  The patient's daughter indicates she will be able to provide 24/7 care and is aware that optimal rehabilitation would occur at a facility.        I discussed discharge plans with the patient and her daughter extensively today.  All questions and concerns were answered.  They will follow-up with Neurology in 6-8 weeks.  They will resume warfarin on 2020 and will need an INR checked shortly afterward.  Prior to admission metoprolol and Lasix have been discontinued, as she has not been on these for many days now and I cannot find an indication for them.  The patient currently feels well and appears suitable for discharge home with followup as noted above.          The patient was seen, examined, and counseled on this day. The hospitalization and plan of care was reviewed with the patient extensively. All questions were addressed and the patient agreed to follow-up as noted above.      Total time spent in face to face contact with the patient and coordinating discharge was:  35 Minutes    Hira Avila DO [facility] Hospitalist  [address] [place] [address]  Pager: [phone]  09/15/2020           D: 2020   T: 2020   MT: CHRISTIAN      Name:     JUWAN   MRN:      [id number]        Account:        [id number]   :      1929           Admit Date:     2020                                  Discharge Date:       Document: [id number]       cc: Samaria Chappell MD

## 2020-09-14 NOTE — CONSULTS
Care Coordination:  SW following for discharge plan of care. Pt has orders to discharge home today to her daughter, Donovans, house with orders for home care RN/OT/PT/SW/SP. SW spoke to pt and daughter today regarding discharge plan of care and they are agreeable to home care. They are requesting first choice of [facility]. Pt/family was not provided with the Medicare Compare list for Home Care as they are specifically requesting [facility].  Discussed associated Medicare star ratings to assist with choice for referrals/discharge planning Yes Education was given to pt/family that star ratings are updated/maintained by Medicare and can be reviewed by visiting www.medicare.gov Yes    Call placed to [facility] [phone]. They are able to accept pt for services at her daughter's house in [place] (Address [address]). Referral information was faxed to [facility] at Fax [phone].     HARVEY spoke to pt's Guardian, Sandra, and updated her on discharge plan of care. She is agreeable to plan. HARVEY updated [facility] with pt's Guardian information.    Daughter Carolina will transport for discharge home today.      Brandie Pérez RN BSN CM  Inpatient Care Coordination  [facility]  [phone]

## 2020-09-14 NOTE — DISCHARGE INSTRUCTIONS
You will be followed by [facility] for nursing, Physical therapy, Occupational therapy, Speech and Social Work services at your daugther's house. They will contact you within 24-48 hrs to set up services. If you have not heard from them or have questions, please call [facility] at [phone].

## 2020-12-03 ENCOUNTER — APPOINTMENT (OUTPATIENT)
Dept: CT IMAGING | Facility: CLINIC | Age: 85
End: 2020-12-03
Attending: EMERGENCY MEDICINE
Payer: COMMERCIAL

## 2020-12-03 ENCOUNTER — HOSPITAL ENCOUNTER (EMERGENCY)
Facility: CLINIC | Age: 85
Discharge: HOME | End: 2020-12-03
Attending: EMERGENCY MEDICINE | Admitting: EMERGENCY MEDICINE
Payer: COMMERCIAL

## 2020-12-03 VITALS
DIASTOLIC BLOOD PRESSURE: 57 MMHG | HEART RATE: 75 BPM | SYSTOLIC BLOOD PRESSURE: 115 MMHG | TEMPERATURE: 97.9 F | RESPIRATION RATE: 18 BRPM | OXYGEN SATURATION: 99 %

## 2020-12-03 DIAGNOSIS — R29.90 NEUROLOGICAL COMPLAINT: ICD-10-CM

## 2020-12-03 LAB
ANION GAP SERPL CALCULATED.3IONS-SCNC: 4 MMOL/L (ref 3–14)
BASOPHILS # BLD AUTO: 0.1 10E9/L (ref 0–0.2)
BASOPHILS NFR BLD AUTO: 0.8 %
BUN SERPL-MCNC: 24 MG/DL (ref 7–30)
CALCIUM SERPL-MCNC: 8.9 MG/DL (ref 8.5–10.1)
CHLORIDE SERPL-SCNC: 105 MMOL/L (ref 94–109)
CO2 SERPL-SCNC: 31 MMOL/L (ref 20–32)
CREAT BLD-MCNC: 0.9 MG/DL (ref 0.52–1.04)
CREAT SERPL-MCNC: 0.8 MG/DL (ref 0.52–1.04)
DIFFERENTIAL METHOD BLD: ABNORMAL
EOSINOPHIL # BLD AUTO: 0.1 10E9/L (ref 0–0.7)
EOSINOPHIL NFR BLD AUTO: 1.4 %
ERYTHROCYTE [DISTWIDTH] IN BLOOD BY AUTOMATED COUNT: 14 % (ref 10–15)
GFR SERPL CREATININE-BSD FRML MDRD: 59 ML/MIN/{1.73_M2}
GFR SERPL CREATININE-BSD FRML MDRD: 65 ML/MIN/{1.73_M2}
GLUCOSE BLDC GLUCOMTR-MCNC: 112 MG/DL (ref 70–99)
GLUCOSE SERPL-MCNC: 141 MG/DL (ref 70–99)
HCT VFR BLD AUTO: 38.2 % (ref 35–47)
HGB BLD-MCNC: 12.3 G/DL (ref 11.7–15.7)
IMM GRANULOCYTES # BLD: 0.2 10E9/L (ref 0–0.4)
IMM GRANULOCYTES NFR BLD: 2.7 %
INR PPP: 2.39 (ref 0.86–1.14)
LYMPHOCYTES # BLD AUTO: 1.2 10E9/L (ref 0.8–5.3)
LYMPHOCYTES NFR BLD AUTO: 18.5 %
MCH RBC QN AUTO: 33.7 PG (ref 26.5–33)
MCHC RBC AUTO-ENTMCNC: 32.2 G/DL (ref 31.5–36.5)
MCV RBC AUTO: 105 FL (ref 78–100)
MONOCYTES # BLD AUTO: 0.4 10E9/L (ref 0–1.3)
MONOCYTES NFR BLD AUTO: 6.7 %
NEUTROPHILS # BLD AUTO: 4.4 10E9/L (ref 1.6–8.3)
NEUTROPHILS NFR BLD AUTO: 69.9 %
NRBC # BLD AUTO: 0 10*3/UL
NRBC BLD AUTO-RTO: 0 /100
PLATELET # BLD AUTO: 164 10E9/L (ref 150–450)
POTASSIUM SERPL-SCNC: 4.1 MMOL/L (ref 3.4–5.3)
RBC # BLD AUTO: 3.65 10E12/L (ref 3.8–5.2)
SODIUM SERPL-SCNC: 140 MMOL/L (ref 133–144)
TROPONIN I SERPL-MCNC: <0.015 UG/L (ref 0–0.04)
WBC # BLD AUTO: 6.2 10E9/L (ref 4–11)

## 2020-12-03 PROCEDURE — 250N000011 HC RX IP 250 OP 636: Performed by: EMERGENCY MEDICINE

## 2020-12-03 PROCEDURE — 999N001017 HC STATISTIC GLUCOSE BY METER IP

## 2020-12-03 PROCEDURE — 70450 CT HEAD/BRAIN W/O DYE: CPT

## 2020-12-03 PROCEDURE — 93005 ELECTROCARDIOGRAM TRACING: CPT

## 2020-12-03 PROCEDURE — 80048 BASIC METABOLIC PNL TOTAL CA: CPT | Performed by: EMERGENCY MEDICINE

## 2020-12-03 PROCEDURE — 70496 CT ANGIOGRAPHY HEAD: CPT | Mod: 59

## 2020-12-03 PROCEDURE — 99285 EMERGENCY DEPT VISIT HI MDM: CPT | Mod: 25

## 2020-12-03 PROCEDURE — 84484 ASSAY OF TROPONIN QUANT: CPT | Performed by: EMERGENCY MEDICINE

## 2020-12-03 PROCEDURE — 96374 THER/PROPH/DIAG INJ IV PUSH: CPT

## 2020-12-03 PROCEDURE — 85610 PROTHROMBIN TIME: CPT | Performed by: EMERGENCY MEDICINE

## 2020-12-03 PROCEDURE — 250N000009 HC RX 250: Performed by: EMERGENCY MEDICINE

## 2020-12-03 PROCEDURE — 0042T CT HEAD PERFUSION WITH CONTRAST: CPT | Mod: 59

## 2020-12-03 PROCEDURE — 85025 COMPLETE CBC W/AUTO DIFF WBC: CPT | Performed by: EMERGENCY MEDICINE

## 2020-12-03 PROCEDURE — 82565 ASSAY OF CREATININE: CPT | Mod: 91

## 2020-12-03 RX ORDER — LEVETIRACETAM 15 MG/ML
1500 INJECTION INTRAVASCULAR ONCE
Status: COMPLETED | OUTPATIENT
Start: 2020-12-03 | End: 2020-12-03

## 2020-12-03 RX ORDER — IOPAMIDOL 755 MG/ML
70 INJECTION, SOLUTION INTRAVASCULAR ONCE
Status: COMPLETED | OUTPATIENT
Start: 2020-12-03 | End: 2020-12-03

## 2020-12-03 RX ORDER — LEVETIRACETAM 750 MG/1
750 TABLET ORAL 2 TIMES DAILY
Qty: 42 TABLET | Refills: 0 | Status: SHIPPED | OUTPATIENT
Start: 2020-12-03 | End: 2021-05-28

## 2020-12-03 RX ADMIN — IOPAMIDOL 120 ML: 755 INJECTION, SOLUTION INTRAVENOUS at 15:33

## 2020-12-03 RX ADMIN — SODIUM CHLORIDE 95 ML: 9 INJECTION, SOLUTION INTRAVENOUS at 15:33

## 2020-12-03 RX ADMIN — LEVETIRACETAM 1500 MG: 15 INJECTION INTRAVENOUS at 16:39

## 2020-12-03 ASSESSMENT — ENCOUNTER SYMPTOMS: WEAKNESS: 1

## 2020-12-03 NOTE — ED TRIAGE NOTES
Presents to ED with left-sided weakness and left side eye deviation that began at 1420. Per EMS, symptoms resolved on arrival to ED. Pt alert, disoriented to time and situation. Pt takes Keppra. ABCs intact at this time.

## 2020-12-03 NOTE — DISCHARGE INSTRUCTIONS
RECOMMEND INCREASING YOUR KEPPRA DOSE TODAY (take first dose tomorrow) AS CONCERN FOR POSSIBLE SEIZURE. RETURN TO THE ED FOR WORSENING HEADACHE, FOCAL WEAKNESS. PLAN TO CALL YOUR NEUROLOGIST TOMORROW.

## 2020-12-03 NOTE — ED PROVIDER NOTES
History     Chief Complaint:  One-sided Weakness     The history is provided by the patient, the EMS personnel and a relative. The history is limited by the condition of the patient.     HPI:  Radha Cunningham is a 89 year old female with a history of stroke, cardiomegaly, and chronic atrial fibrillation with pacemaker in place on Keppra and Coumadin who presents via EMS from her daughter's home for left sided weakness. Per EMS, patient's home health nurse noted left eye deviation and left sided weakness 1 hour ago at 1420.  No reported seizure like activity. Blood glucose 112. Per patient's daughter, patient has chronic left ptosis at baseline. However, her left upper extremity weakness is abnormal per patient's baseline per her daughter.  On arrival patient answering minimal questions.  She denies however, headache, blurry vision, paresthesias, chest pain.  Review of records shows recent admission 11/18 through [facility] for CVA.     Allergies:  Contrast Dye  Diagnostic X-Ray Materials     Medications:    Proventil nebulizer  Lipitor  Flonase  Norco  Duoneb nebulizer  Coumadin  Prilosec  Prednisone  Keppra  Levaquin    Past Medical History:    Asthma   Cholelithiasis   Diverticulosis   Hiatal hernia   Hypertension   Paroxysmal A-fib   Peptic ulcer disease  Skin cancer  Umbilical hernia   Benign neoplasm skin  Malignant neoplasm thyroid  Reactive airway disease  Stroke  Cognitive impairment   Osteoarthritis   Hypercholesterolemia   Cardiomegaly    Past Surgical History:    Umbilical hernia repair   Skin cancer removal - forehead     Family History:    CAD  Hypertension     Social History:  Smoking status: never  Alcohol use: no  Patient presents via EMS.  PCP: Samaria Chappell    Marital Status:       Review of Systems   Unable to perform ROS: Acuity of condition   Neurological: Positive for weakness.     Physical Exam     Vitals:  Patient Vitals for the past 24 hrs:   BP Temp Temp src Pulse Resp SpO2   12/03/20  1645 115/84 -- -- 89 -- 100 %   12/03/20 1630 124/77 -- -- 96 -- --   12/03/20 1600 114/62 97.9  F (36.6  C) Oral 86 -- 98 %   12/03/20 1555 (!) 136/97 -- -- 89 -- --   12/03/20 1530 -- -- -- -- -- 99 %   12/03/20 1525 -- -- -- 112 -- 100 %   12/03/20 1520 -- -- -- 116 -- 100 %   12/03/20 1515 126/84 -- -- 112 18 100 %       Physical Exam   General: Well-nourished, nontoxic  Eyes: PERRL, EOMI, no nystagmus.  No scleral icterus or conjunctival injection.  L. Eye ptosis, baseline per daughter  ENT:  Moist mucus membranes, posterior oropharynx clear without erythema or exudates. TM normal bilaterally  Neck: Supple with full range of motion  Respiratory:  Lungs clear to auscultation bilaterally, no crackles/rubs/wheezes.  Good air movement  CV: Irregularly irregular  GI:  Abdomen soft and non-distended.  No tenderness, guarding or rebound  Skin: Warm, dry.  No rashes or petechiae  MSK: No peripheral edema or calf tenderness  Neuro: Alert and oriented to person/place/time.  No aphasia/facial droop/dysarthria.  Tongue midline, normal strength at SCM/trapezius/BUE/BLE.  Normal finger to nose. Normal gait.  Negative romberg, sensation intact over face/BUE/BLE  Psychiatric: Normal affect    Emergency Department Course     ECG (15:23:01):  Rate 114 bpm. AR interval *. QRS duration 96. QT/QTc 344/474. P-R-T axes * -52 56.  Atrial fibrillation with RVR. Left anterior fascicular block. Nonspecific T wave abnormality.    Interpreted at 1523 by Annette Maher DO.    Imaging:  CT Head perfusion w contrast  1. No mismatch.  2. Perfusion alterations consistent with chronic left PCA infarction  Per radiology.    CT Head wo contrast  1. Multiple chronic infarcts bilaterally including temporal evolution  of early chronic left PCA territory infarction.  2. Confluent white matter changes consistent with severe cerebral  small vessel disease. Mild-to-moderate brain atrophy.  Per radiology.    CTA Head neck w contrast  1. Interval  recanalization of left posterior cerebral artery, with  minimal residual stenosis at left T2/P3 junction and within the distal  P3 segment.  2. Mild scattered MCA stenosis as described, suggestive of  intracranial atherosclerotic disease.  3. Carotid and vertebral arteries are widely patent.  4. Chronic C1 posterior arch fracture with anterior luxation of the  posterior ring resulting in moderate spinal canal stenosis at the C2  level.  Per radiology.    Laboratory:  CBC: WBC 6.2, HGB 12.3,   BMP: Glucose 141 (H) o/w WNL (Creatinine 0.80)  INR: 2.39 (H)  Troponin (Collected 1533): <0.015  Creatinine POCT: Creatinine 0.9, GFR 59 (L)  Glucose: 112 (H)    Interventions:  1639 Keppra intermittent, 1500 mg, IV    Emergency Department Course:  The patient arrived in the emergency department via EMS.     1517  Past medical records, nursing notes, and vitals reviewed. I performed an exam of the patient and obtained history, as documented above.     1521 Code stroke called.    1521 I spoke with patient's daughter to obtain additional history. Daughter is on her way to the ED.    1523 EKG was taken in the ED.    1525 I spoke with Dr. Taylor of stroke neurology regarding patient's presentation, findings, and plan of care.    IV was inserted and blood was drawn for laboratory testing, results above.    The patient was sent for a CT head and CTA head/neck while in the emergency department, results above.     1614 I spoke with stroke neurology again who recommended MRI and EEG.    1616 Daughter at bedside. Patient and daughter rechecked and updated. Patient now has full movement of her LUE without difficulty. Daughter reports patient appears at baseline.    1725 Findings and plan explained to the Patient and daughter. Patient discharged home with instructions regarding supportive care, medications, and reasons to return. The importance of close follow-up was reviewed. The patient was prescribed Keppra.    I personally  reviewed the laboratory & imaging results with the Patient and daughter who voiced understanding of the findings. I answered all related questions prior to discharge.     Impression & Plan     [employer] Stroke Scale  Exam Interval: Baseline   Score    Level of consciousness: (0)   Alert, keenly responsive    LOC questions: (0)   Answers both questions correctly    LOC commands: (0)   Performs both tasks correctly    Best gaze: (0)   Normal    Visual: (0)   No visual loss    Facial palsy: (0)   Normal symmetrical movements    Motor arm (left): (4)   No movement    Motor arm (right): (0)   No drift    Motor leg (left): (0)   No drift    Motor leg (right): (0)   No drift    Limb ataxia: (0)   Absent    Sensory: (0)   Normal- no sensory loss    Best language: (0)   Normal- no aphasia    Dysarthria: (0)   Normal    Extinction and inattention: (0)   No abnormality        Total Score:  4       Medical Decision Making:  Radha Cunningham is a 89 year old female who presents to the emergency department today for evaluation of neurologic complaint.  Patient nontoxic on arrival, noted ptosis as well as patient not moving her left upper extremity for me.  There is no family at baseline no upon discussion with patient's daughter this is new.  Code stroke was called given these findings.  I discussed the case with stroke neurology.  CT head as well as CTA head and neck without acute findings.  Findings consistent more with chronic left PCA infarction.  EKG with atrial fibrillation.  Patient is therapeutic on her Coumadin today.  During patient's time in the ED when her daughter arrived to the department, patient began moving her left upper extremity without difficulty. She remained neurologically intact throughout her stay and ambulated with walker. I discussed with daughter unknown if this was a potential seizure that was witnessed by the home health nurse.  Daughter does report good compliance with her Keppra  though upon discussing case with stroke neurology we did agree to load patient with IV Keppra today.  Patient does have a history of pacemaker and unfortunately we are unable to do MRI today based on compatibility at [facility].  I did offer transfer to [facility] for MRI though patient and her family are declining at this point in time.  They expressed understanding of missed or delayed diagnoses.  Clinically I have a lower suspicion for significant CVA based on rapidly resolving symptoms.  Per discussion with stroke neurology they recommended increasing patient's Keppra dose to 750 mg twice daily with plans for close outpatient neurology follow-up.  I discussed that patient may need repeat EEG/MRI in the near future and to return to the ED should patient have worsening headache, focal weakness, paresthesias or should symptoms worsen.  Family and patient agreeable to plan of care.  All questions addressed.    Diagnosis:    ICD-10-CM    1. Neurological complaint  R29.90         Disposition:  Discharged to home.     Discharge Medications:  New Prescriptions    LEVETIRACETAM (KEPPRA) 750 MG TABLET    Take 1 tablet (750 mg) by mouth 2 times daily for 21 days       Scribe Disclosure:  I, Whitney Caldwell, am serving as a scribe at 3:18 PM on 12/3/2020 to document services personally performed by Annette Maher DO based on my observations and the provider's statements to me.       Whitney Caldwell  12/3/2020     Annette Maher DO  12/03/20 1736

## 2020-12-03 NOTE — ED AVS SNAPSHOT
[facility] Emergency Dept  [address]  [facility] [address]  Phone: [phone]  Fax: [phone]                                    June GRETEL Cunningham   MRN: [id number]    Department: [facility] Emergency Dept   Date of Visit: 12/3/2020           After Visit Summary Signature Page    I have received my discharge instructions, and my questions have been answered. I have discussed any challenges I see with this plan with the nurse or doctor.    ..........................................................................................................................................  Patient/Patient Representative Signature      ..........................................................................................................................................  Patient Representative Print Name and Relationship to Patient    ..................................................               ................................................  Date                                   Time    ..........................................................................................................................................  Reviewed by Signature/Title    ...................................................              ..............................................  Date                                               Time          22EPIC Rev 08/18

## 2020-12-03 NOTE — ED NOTES
Bed: ED14  Expected date: 12/3/20  Expected time: 3:01 PM  Means of arrival: Ambulance  Comments:  [id number]  Stroke eval

## 2020-12-04 ENCOUNTER — HOSPITAL ENCOUNTER (EMERGENCY)
Facility: CLINIC | Age: 85
Discharge: HOME | End: 2020-12-04
Attending: EMERGENCY MEDICINE | Admitting: EMERGENCY MEDICINE
Payer: COMMERCIAL

## 2020-12-04 ENCOUNTER — APPOINTMENT (OUTPATIENT)
Dept: CT IMAGING | Facility: CLINIC | Age: 85
End: 2020-12-04
Attending: EMERGENCY MEDICINE
Payer: COMMERCIAL

## 2020-12-04 VITALS
OXYGEN SATURATION: 98 % | RESPIRATION RATE: 16 BRPM | DIASTOLIC BLOOD PRESSURE: 74 MMHG | TEMPERATURE: 96.9 F | HEART RATE: 81 BPM | SYSTOLIC BLOOD PRESSURE: 119 MMHG

## 2020-12-04 DIAGNOSIS — R44.3 HALLUCINATIONS: ICD-10-CM

## 2020-12-04 DIAGNOSIS — R26.89 BALANCE PROBLEMS: ICD-10-CM

## 2020-12-04 DIAGNOSIS — R41.82 ALTERED MENTAL STATUS, UNSPECIFIED ALTERED MENTAL STATUS TYPE: ICD-10-CM

## 2020-12-04 LAB
ALBUMIN UR-MCNC: 20 MG/DL
ANION GAP SERPL CALCULATED.3IONS-SCNC: 4 MMOL/L (ref 3–14)
APPEARANCE UR: CLEAR
BASOPHILS # BLD AUTO: 0.1 10E9/L (ref 0–0.2)
BASOPHILS NFR BLD AUTO: 0.8 %
BILIRUB UR QL STRIP: NEGATIVE
BUN SERPL-MCNC: 22 MG/DL (ref 7–30)
CALCIUM SERPL-MCNC: 9.1 MG/DL (ref 8.5–10.1)
CHLORIDE SERPL-SCNC: 104 MMOL/L (ref 94–109)
CO2 SERPL-SCNC: 31 MMOL/L (ref 20–32)
COLOR UR AUTO: YELLOW
CREAT SERPL-MCNC: 0.8 MG/DL (ref 0.52–1.04)
DIFFERENTIAL METHOD BLD: ABNORMAL
EOSINOPHIL # BLD AUTO: 0.1 10E9/L (ref 0–0.7)
EOSINOPHIL NFR BLD AUTO: 1.6 %
ERYTHROCYTE [DISTWIDTH] IN BLOOD BY AUTOMATED COUNT: 14.1 % (ref 10–15)
GFR SERPL CREATININE-BSD FRML MDRD: 65 ML/MIN/{1.73_M2}
GLUCOSE BLDC GLUCOMTR-MCNC: 128 MG/DL (ref 70–99)
GLUCOSE SERPL-MCNC: 138 MG/DL (ref 70–99)
GLUCOSE UR STRIP-MCNC: NEGATIVE MG/DL
HCT VFR BLD AUTO: 37.4 % (ref 35–47)
HGB BLD-MCNC: 12.3 G/DL (ref 11.7–15.7)
HGB UR QL STRIP: ABNORMAL
IMM GRANULOCYTES # BLD: 0.2 10E9/L (ref 0–0.4)
IMM GRANULOCYTES NFR BLD: 3 %
INTERPRETATION ECG - MUSE: NORMAL
KETONES UR STRIP-MCNC: NEGATIVE MG/DL
LACTATE BLD-SCNC: 1.6 MMOL/L (ref 0.7–2)
LEUKOCYTE ESTERASE UR QL STRIP: NEGATIVE
LYMPHOCYTES # BLD AUTO: 1.2 10E9/L (ref 0.8–5.3)
LYMPHOCYTES NFR BLD AUTO: 19 %
MCH RBC QN AUTO: 34 PG (ref 26.5–33)
MCHC RBC AUTO-ENTMCNC: 32.9 G/DL (ref 31.5–36.5)
MCV RBC AUTO: 103 FL (ref 78–100)
MONOCYTES # BLD AUTO: 0.5 10E9/L (ref 0–1.3)
MONOCYTES NFR BLD AUTO: 8 %
MUCOUS THREADS #/AREA URNS LPF: PRESENT /LPF
NEUTROPHILS # BLD AUTO: 4.3 10E9/L (ref 1.6–8.3)
NEUTROPHILS NFR BLD AUTO: 67.6 %
NITRATE UR QL: NEGATIVE
NRBC # BLD AUTO: 0 10*3/UL
NRBC BLD AUTO-RTO: 0 /100
PH UR STRIP: 6 PH (ref 5–7)
PLATELET # BLD AUTO: 179 10E9/L (ref 150–450)
POTASSIUM SERPL-SCNC: 4.3 MMOL/L (ref 3.4–5.3)
RBC # BLD AUTO: 3.62 10E12/L (ref 3.8–5.2)
RBC #/AREA URNS AUTO: 3 /HPF (ref 0–2)
SODIUM SERPL-SCNC: 139 MMOL/L (ref 133–144)
SOURCE: ABNORMAL
SP GR UR STRIP: 1.02 (ref 1–1.03)
SQUAMOUS #/AREA URNS AUTO: 1 /HPF (ref 0–1)
TSH SERPL DL<=0.005 MIU/L-ACNC: 3.45 MU/L (ref 0.4–4)
UROBILINOGEN UR STRIP-MCNC: NORMAL MG/DL (ref 0–2)
WBC # BLD AUTO: 6.4 10E9/L (ref 4–11)
WBC #/AREA URNS AUTO: 1 /HPF (ref 0–5)

## 2020-12-04 PROCEDURE — 93005 ELECTROCARDIOGRAM TRACING: CPT

## 2020-12-04 PROCEDURE — U0003 INFECTIOUS AGENT DETECTION BY NUCLEIC ACID (DNA OR RNA); SEVERE ACUTE RESPIRATORY SYNDROME CORONAVIRUS 2 (SARS-COV-2) (CORONAVIRUS DISEASE [COVID-19]), AMPLIFIED PROBE TECHNIQUE, MAKING USE OF HIGH THROUGHPUT TECHNOLOGIES AS DESCRIBED BY CMS-2020-01-R: HCPCS | Performed by: EMERGENCY MEDICINE

## 2020-12-04 PROCEDURE — 85025 COMPLETE CBC W/AUTO DIFF WBC: CPT | Performed by: EMERGENCY MEDICINE

## 2020-12-04 PROCEDURE — 81001 URINALYSIS AUTO W/SCOPE: CPT | Performed by: EMERGENCY MEDICINE

## 2020-12-04 PROCEDURE — 80048 BASIC METABOLIC PNL TOTAL CA: CPT | Performed by: EMERGENCY MEDICINE

## 2020-12-04 PROCEDURE — 83605 ASSAY OF LACTIC ACID: CPT | Performed by: EMERGENCY MEDICINE

## 2020-12-04 PROCEDURE — 87086 URINE CULTURE/COLONY COUNT: CPT | Performed by: EMERGENCY MEDICINE

## 2020-12-04 PROCEDURE — C9803 HOPD COVID-19 SPEC COLLECT: HCPCS

## 2020-12-04 PROCEDURE — 70450 CT HEAD/BRAIN W/O DYE: CPT

## 2020-12-04 PROCEDURE — 84443 ASSAY THYROID STIM HORMONE: CPT | Performed by: EMERGENCY MEDICINE

## 2020-12-04 PROCEDURE — 99285 EMERGENCY DEPT VISIT HI MDM: CPT | Mod: 25

## 2020-12-04 PROCEDURE — 999N001017 HC STATISTIC GLUCOSE BY METER IP

## 2020-12-04 NOTE — CONSULTS
[facility]    Stroke Telephone Note    I was called by Dr. Maher  on 12/03/20 at 1521 regarding patient Radha Cunningham. The patient is a 89 year old female with a history of recent left PCA stroke 3 months ago.  Back then, she presented at that time with confusion for a couple of days and stroke was seen on head CT.  She was not able to undergo MRI due to pacemaker.  At the time of this writing I was unable to independently verify if her pacemaker was MRI conditional on not.  In any event, her stroke was felt to be due to subtherapeutic INR, she takes warfarin for atrial fibrillation.    Today she comes into the ED with left arm weakness which began at 2:20 PM.  She was with some home health nurse at that time who witnessed the change.  No reported seizure activity.  She did notice the patient had leftward eye deviation.  The patient is on Keppra, but not sure when this was started.  She was not on it when my colleagues evaluated her for stroke in September.  Apparently she takes 250 mg twice a day.    After CT scan, which did not show any acute findings on CT/CTA/CTP, the patient's left arm suddenly was quite a bit better and she was crying hard per the ED attending.        Stroke Code Data  (for stroke code without tele)  Stroke code activated 12/03/20   1521   First stroke provider response      1523   Last known normal 12/03/20   1419   Time of discovery   (or onset of symptoms) 12/03/20   1420   Head CT read by me 12/03/20   1540   Was stroke code de-escalated? Yes 12/03/20 1607  presence of contraindications for both intravenous and intra-arterial stroke treatments       TPA Treatment   Not given due to head trauma/stroke within the past 3 months and DOAC dose within 48 hours or INR > 1.7.    Endovascular Treatment  Not initiated due to absence of proximal vessel occlusion    Impression  Transient left-sided weakness and leftward gaze, followed by severe crying, probably most  "consistent with seizure.  Even if she did have a new infarct which had caused TIA, i.e. transient left-sided weakness, her medication regimen would probably not be changed because she is already anticoagulated for atrial fibrillation and her INR is therapeutic, although I reviewed a few of her recent INRs, and a couple of weeks ago it was not therapeutic.    Recommendations  -Ideally, with new seizure the patient should be admitted and EEG checked however, the patient's family was reluctant to have her admitted especially due toCOVID-19 pandemic.  I discussed the patient's case extensively with her ED physician Dr. Maher.  It is probably safe to assume it was seizure, treat as such, and send her home with close neurology follow-up.  However the family is aware that it officially admission is the recommendation.  -Load the patient with Keppra, 1500 mg, then increase her home dose from 250 mg twice a day to 750 mg twice a day.  -Follow-up with her outpatient neurologist as soon as possible    I discussed the patient's case with my attending     My recommendations are based on the information provided over the phone by Radha Cunningham's in-person providers. They are not intended to replace the clinical judgment of her in-person providers. I was not requested to personally see or examine the patient at this time.    Ailyn Taylor PA-C  Neurology  12/03/2020 8:10 PM  To page me or covering stroke neurology team member, click here: [email or web address]   Choose \"On Call\" tab at top, then search dropdown box for \"Neurology Adult\", select location, press Enter, then look for stroke/neuro ICU/telestroke.           "

## 2020-12-04 NOTE — ED AVS SNAPSHOT
[facility] Emergency Dept  [address]  [facility] [address]  Phone: [phone]  Fax: [phone]                                    June GRETEL Cunningham   MRN: [id number]    Department: [facility] Emergency Dept   Date of Visit: 12/4/2020           After Visit Summary Signature Page    I have received my discharge instructions, and my questions have been answered. I have discussed any challenges I see with this plan with the nurse or doctor.    ..........................................................................................................................................  Patient/Patient Representative Signature      ..........................................................................................................................................  Patient Representative Print Name and Relationship to Patient    ..................................................               ................................................  Date                                   Time    ..........................................................................................................................................  Reviewed by Signature/Title    ...................................................              ..............................................  Date                                               Time          22EPIC Rev 08/18

## 2020-12-05 LAB
SARS-COV-2 RNA SPEC QL NAA+PROBE: NOT DETECTED
SPECIMEN SOURCE: NORMAL

## 2020-12-05 NOTE — ED TRIAGE NOTES
Pt in with C/O increased confusion and altered mental status. Pt seen yesterday for similar sx and evaluation. Pt daughter reports increased confusion today. Concerned for UTI

## 2020-12-05 NOTE — ED PROVIDER NOTES
History     Chief Complaint:  Altered Mental Status    History limited due to altered mental status. History supplemented by daughter at bedside.     HPI  Radha Cunningham is a 89 year old year old female with a history of diabetes, stroke, and atrial fibrillation who presents for evaluation of altered mental status. The patient's daughter says that yesterday the patient's suddenly went to the side and she went down. They brought the patient to the ED, where she had a reassuring workup and even scheduled an outpatient MRI. The patient was fine after discharge last night. Then today the daughter noticed that the patient was walking abnormally off-balance. She uses a walker at baseline. She also notes hallucinations, and the patient is talking to people who are not there. She states this is worse than yesterday. The patient did have a stroke in September. The patient denies any pains or injuries, nausea, vomiting, diarrhea, abdominal pain, dysuria, chest pain, cough, or fall. The daughter denies any increased falling. The patient lives with the daughter.      Allergies:  Contrast Dye  Iohexol      Medications:   Albuterol inhaler  Atorvastatin  Duoneb  Keppra  Myocstatin  Coumadin  Levaquin  Omeprazole      Medical History:   Asthma  Cholelithiasis  Diverticulosis  Hiatal hernia  Hypertension  Paroxysmal atrial fibrillation  Peptic ulcer disease  Skin cancer  Stroke (cerebrum)  Cognitive impairment  Malignant neoplasm of thyroid gland  Hyperlipidemia  Reactive airway disease  Cholecystitis  Encephalopathy acute  Type II diabetes mellitus  Pacemaker  Osteoarthritis  Scoliosis  Anemia  Degenerative joint disease  Transient ischemic attack  Cataracts      Surgical History:   Hernia repair, umbilical  Skin cancer removal forehead  Cholecystectomy  Pelvic abscess drainage  Gastrotomy tube placement  Cataract removal  Repair of fractured ankle      Family History:   Coronary artery disease  Hypertension      Social  History:  Smoking Status: Negative   Smokeless Tobacco: Negative   Alcohol Use: Negative   Drug Use: Negative   Primary Physician: Samaria Chappell       Review of Systems   Unable to perform ROS: Mental status change     Physical Exam     Patient Vitals for the past 24 hrs:   BP Temp Pulse Resp SpO2   12/04/20 2215 119/74 -- 81 -- --   12/04/20 2200 105/79 -- 76 -- 98 %   12/04/20 2145 115/76 -- 76 -- 100 %   12/04/20 2130 117/75 -- 67 -- --   12/04/20 2100 111/89 -- 78 -- 93 %   12/04/20 2009 (!) 143/92 96.9  F (36.1  C) 87 16 98 %      Physical Exam  General: Pleasantly altered.  Appears elderly and frail.    HEENT:  Head:  Atraumatic  Ears:  External ears are normal  Mouth/Throat:  Oropharynx is without erythema or exudate and mucous membranes are moist.   Eyes:   Conjunctivae normal and EOM are normal. No scleral icterus.    Pupils are equal, round, and reactive to light.   CV:  Irregular rate, irregular rhythm, normal heart sounds and radial pulses are 2+ and symmetric.    Resp:  Breath sounds are clear bilaterally    Non-labored, no retractions or accessory muscle use  GI:  Abdomen is soft, no distension, no tenderness. No rebound or guarding.    MS:  Normal range of motion. No edema.    Normal strength in all 4 extremities.     Back atraumatic.  Skin:  Warm and dry.  No rash or lesions noted.  Neuro: Confused/altered.  Moving all extremities.  Normal strength.  Sensation intact in all 4 extremities. GCS: 14  Psych:  Pleasant mood, but unable to fully evaluated due to confusion.     Emergency Department Course     ECG:  ECG taken at 2028, ECG read at 2040  Atrial fibrillation  Left anterior fascicular blcok  Abnormal ECG  Rate 90 bpm. WA interval * ms. QRS duration 98 ms. QT/QTc 376/459 ms. P-R-T axes * -51 -11.    Imaging:  Radiology results were communicated with the patient and family who voiced understanding of the findings.    CT Head w/o Contrast  1.  No CT finding of a mass, hemorrhage or focal area  suggestive of acute infarct.  2.  Diffuse age related changes along with encephalomalacia posterior medial left temporal lobe to left occipital lobe along with old lacunar infarcts thalami bilaterally and right basal ganglia.    Reading per radiology     Laboratory:  Laboratory findings were communicated with the patient and family who voiced understanding of the findings.    CBC: WBC 6.4, HGB 12.3,   BMP: Glucose 138 (H) (Creatinine 0.80) o/w WNL     Lactic Acid: 1.6  TSH with free T4 reflex: 3.45    Glucose by meter: 128 (H)    UA with micro: Blood small (A), Protein albumin 20 (A), RBC/HPF 3 (H), Mucous present (A) o/w negative  Urine Culture Aerobic Bacteria: Pending    COVID-19 Virus (Coronavirus), PCR NP Swab: pending        Emergency Department Course:    2013 Nursing notes and vitals reviewed.    2015 I performed an exam of the patient as documented above.     2028 EKG obtained as noted above.    2032 IV was inserted and blood was drawn for laboratory testing, results above.    2114 A urine sample was obtained for laboratory testing as documented above.    2118 The patient was sent for CT while in the emergency department, results above.     2141 A nares sample was obtained for laboratory testing as documented above.    2200 Findings and plan explained to the daughter. Patient discharged home with instructions regarding supportive care, medications, and reasons to return. The importance of close follow-up was reviewed.     Impression & Plan   Medical Decision Making:  Radha Cunningham is a 89-year-old female who represents to the emergency department this evening for increasing confusion and abnormal gait.  Of note patient was seen in the emergency department yesterday with concerns for new left-sided weakness.  A stroke code was activated and reassuringly was unremarkable.  Of note patient does have a history of a recent left PCA stroke 3 months ago.  There was thinking that her stroke may have been due  to a subtherapeutic INR as she does take warfarin for atrial fibrillation.  INR yesterday 2.39.  In discussion with stroke neurology yesterday there was concern of whether patient's neurologic presentation may have been secondary to seizure and there was recommendation for admission and EEG upon admission.  Family declined at the time and ultimately patient was loaded with antiepileptics and discharged home with an increase to her antiepileptic regimen.      With her new confusion symptoms today, high concern for infectious encephalopathy such as urinary tract infection or other electrolyte abnormalities.  Reassuringly her work-up is unremarkable with no signs of urinary tract infection.  Patient has a normal white blood cell count and vital signs are stable here.  I did repeat CT imaging of the head and reassuringly this shows no acute intracranial hemorrhage, mass, or focal area suggestive of acute infarct.  Unfortunately we cannot perform an MRI in our department due to presence of pacemaker as well as patient anxiety with MRI historically.  In discussion with daughter at bedside we offered admission to the hospital for further neurologic evaluation and potential EEG monitoring.  This was offered yesterday and family had declined at that time citing concerns for COVID-19 exposure.  Tonight the daughter continues to decline any observation or hospital admission due to concerns for potential COVID-19 exposure.  She feels comfortable helping to take care of her at home.  She is questioning whether the increased dosing of the Keppra may have led to the patient's confusion today.  Unlikely contributory today as she's only been on this increased dose for ~24 hours.  Polypharmacy is certainly on the list of potential etiologies for worsening confusion.  At this time as this is my only encounter with the patient and family, I feel like we need more information prior to making a definitive diagnosis for the patient's  variable neurologic presentations in the recent 72 hours.  Daughter feels comfortable taking her home this evening.  They were certainly encouraged to return at any time if her symptoms worsen or new neurologic symptoms develop.  After all questions answered return precautions understood, discharged home in care of daughter.    Covid-19  Radha P Marisa was evaluated during a global COVID-19 pandemic, which necessitated consideration that the patient might be at risk for infection with the SARS-CoV-2 virus that causes COVID-19.   Applicable protocols for evaluation were followed during the patient's care.   COVID-19 was considered as part of the patient's evaluation. The plan for testing is:  a test was obtained during this visit.      Diagnosis:     ICD-10-CM    1. Altered mental status, unspecified altered mental status type  R41.82 CBC with platelets differential     Basic metabolic panel     Lactic acid whole blood     TSH with free T4 reflex     UA with Microscopic     Urine Culture     Asymptomatic COVID-19 Virus (Coronavirus) by PCR     Glucose by meter     Glucose by meter   2. Hallucinations  R44.3    3. Balance problems  R26.89         Disposition:  Discharged to home in care of daughter    Discharge Medications:  Discharge Medication List as of 12/4/2020 10:08 PM          Scribe Disclosure:  IRenetta, am serving as a scribe at 8:14 PM on 12/4/2020 to document services personally performed by Joce Francis MD based on my observations and the provider's statements to me.      Joce Francis MD  12/05/20 2720

## 2020-12-06 LAB
BACTERIA SPEC CULT: NORMAL
Lab: NORMAL
SPECIMEN SOURCE: NORMAL

## 2020-12-07 LAB — INTERPRETATION ECG - MUSE: NORMAL

## 2021-05-28 ENCOUNTER — APPOINTMENT (OUTPATIENT)
Dept: CT IMAGING | Facility: CLINIC | Age: 86
End: 2021-05-28
Attending: EMERGENCY MEDICINE
Payer: COMMERCIAL

## 2021-05-28 ENCOUNTER — HOSPITAL ENCOUNTER (OUTPATIENT)
Facility: CLINIC | Age: 86
Setting detail: OBSERVATION
Discharge: NURSING, REHAB OR LONG-TERM CARE | End: 2021-06-01
Attending: EMERGENCY MEDICINE | Admitting: INTERNAL MEDICINE
Payer: COMMERCIAL

## 2021-05-28 ENCOUNTER — APPOINTMENT (OUTPATIENT)
Dept: GENERAL RADIOLOGY | Facility: CLINIC | Age: 86
End: 2021-05-28
Attending: PHYSICIAN ASSISTANT
Payer: COMMERCIAL

## 2021-05-28 DIAGNOSIS — M62.81 GENERALIZED MUSCLE WEAKNESS: ICD-10-CM

## 2021-05-28 DIAGNOSIS — U07.1 LAB TEST POSITIVE FOR DETECTION OF COVID-19 VIRUS: ICD-10-CM

## 2021-05-28 DIAGNOSIS — R79.1 SUBTHERAPEUTIC INTERNATIONAL NORMALIZED RATIO (INR): ICD-10-CM

## 2021-05-28 DIAGNOSIS — U07.1 2019 NOVEL CORONAVIRUS DISEASE (COVID-19): Primary | ICD-10-CM

## 2021-05-28 PROBLEM — I63.9 STROKE (H): Status: ACTIVE | Noted: 2021-05-28

## 2021-05-28 PROBLEM — E11.9 TYPE 2 DIABETES MELLITUS WITHOUT COMPLICATION, WITHOUT LONG-TERM CURRENT USE OF INSULIN (H): Status: ACTIVE | Noted: 2018-11-29

## 2021-05-28 PROBLEM — I34.0 NON-RHEUMATIC MITRAL REGURGITATION: Status: ACTIVE | Noted: 2018-03-29

## 2021-05-28 PROBLEM — Z79.01 ANTICOAGULATION MONITORING, INR RANGE 2-3: Status: ACTIVE | Noted: 2018-05-01

## 2021-05-28 PROBLEM — G93.40 ENCEPHALOPATHY ACUTE: Status: ACTIVE | Noted: 2020-11-18

## 2021-05-28 PROBLEM — E78.5 HYPERLIPEMIA: Status: ACTIVE | Noted: 2021-05-28

## 2021-05-28 PROBLEM — Z95.0 PACEMAKER: Status: ACTIVE | Noted: 2018-06-04

## 2021-05-28 LAB
ANION GAP SERPL CALCULATED.3IONS-SCNC: 4 MMOL/L (ref 3–14)
BUN SERPL-MCNC: 17 MG/DL (ref 7–30)
CALCIUM SERPL-MCNC: 8.8 MG/DL (ref 8.5–10.1)
CHLORIDE SERPL-SCNC: 105 MMOL/L (ref 94–109)
CO2 SERPL-SCNC: 28 MMOL/L (ref 20–32)
CREAT SERPL-MCNC: 0.83 MG/DL (ref 0.52–1.04)
ERYTHROCYTE [DISTWIDTH] IN BLOOD BY AUTOMATED COUNT: 13.8 % (ref 10–15)
GFR SERPL CREATININE-BSD FRML MDRD: 61 ML/MIN/{1.73_M2}
GLUCOSE SERPL-MCNC: 139 MG/DL (ref 70–99)
HCT VFR BLD AUTO: 37.4 % (ref 35–47)
HGB BLD-MCNC: 12.6 G/DL (ref 11.7–15.7)
INR PPP: 1.58 (ref 0.86–1.14)
LABORATORY COMMENT REPORT: ABNORMAL
LACTATE BLD-SCNC: 1.4 MMOL/L (ref 0.7–2)
MCH RBC QN AUTO: 32.5 PG (ref 26.5–33)
MCHC RBC AUTO-ENTMCNC: 33.7 G/DL (ref 31.5–36.5)
MCV RBC AUTO: 96 FL (ref 78–100)
PLATELET # BLD AUTO: 117 10E9/L (ref 150–450)
POTASSIUM SERPL-SCNC: 4.1 MMOL/L (ref 3.4–5.3)
RBC # BLD AUTO: 3.88 10E12/L (ref 3.8–5.2)
SARS-COV-2 RNA RESP QL NAA+PROBE: POSITIVE
SODIUM SERPL-SCNC: 137 MMOL/L (ref 133–144)
SPECIMEN SOURCE: ABNORMAL
WBC # BLD AUTO: 4.5 10E9/L (ref 4–11)

## 2021-05-28 PROCEDURE — 258N000003 HC RX IP 258 OP 636: Performed by: NURSE PRACTITIONER

## 2021-05-28 PROCEDURE — 99285 EMERGENCY DEPT VISIT HI MDM: CPT | Mod: 25

## 2021-05-28 PROCEDURE — 70450 CT HEAD/BRAIN W/O DYE: CPT

## 2021-05-28 PROCEDURE — G0378 HOSPITAL OBSERVATION PER HR: HCPCS

## 2021-05-28 PROCEDURE — C9803 HOPD COVID-19 SPEC COLLECT: HCPCS

## 2021-05-28 PROCEDURE — 71045 X-RAY EXAM CHEST 1 VIEW: CPT

## 2021-05-28 PROCEDURE — 96361 HYDRATE IV INFUSION ADD-ON: CPT

## 2021-05-28 PROCEDURE — 80048 BASIC METABOLIC PNL TOTAL CA: CPT | Performed by: NURSE PRACTITIONER

## 2021-05-28 PROCEDURE — 85027 COMPLETE CBC AUTOMATED: CPT | Performed by: NURSE PRACTITIONER

## 2021-05-28 PROCEDURE — 96360 HYDRATION IV INFUSION INIT: CPT

## 2021-05-28 PROCEDURE — 87635 SARS-COV-2 COVID-19 AMP PRB: CPT | Performed by: PHYSICIAN ASSISTANT

## 2021-05-28 PROCEDURE — 85610 PROTHROMBIN TIME: CPT | Performed by: NURSE PRACTITIONER

## 2021-05-28 PROCEDURE — 83605 ASSAY OF LACTIC ACID: CPT | Performed by: NURSE PRACTITIONER

## 2021-05-28 PROCEDURE — 99220 PR INITIAL OBSERVATION CARE,LEVEL III: CPT | Performed by: PHYSICIAN ASSISTANT

## 2021-05-28 RX ORDER — LACOSAMIDE 50 MG/1
50 TABLET ORAL 2 TIMES DAILY
COMMUNITY

## 2021-05-28 RX ORDER — ACETAMINOPHEN 325 MG/1
650 TABLET ORAL EVERY 4 HOURS PRN
Status: DISCONTINUED | OUTPATIENT
Start: 2021-05-28 | End: 2021-06-01 | Stop reason: HOSPADM

## 2021-05-28 RX ORDER — LIDOCAINE 40 MG/G
CREAM TOPICAL
Status: DISCONTINUED | OUTPATIENT
Start: 2021-05-28 | End: 2021-06-01

## 2021-05-28 RX ORDER — ACETAMINOPHEN 650 MG/1
650 SUPPOSITORY RECTAL EVERY 4 HOURS PRN
Status: DISCONTINUED | OUTPATIENT
Start: 2021-05-28 | End: 2021-06-01 | Stop reason: HOSPADM

## 2021-05-28 RX ADMIN — SODIUM CHLORIDE 500 ML: 9 INJECTION, SOLUTION INTRAVENOUS at 19:05

## 2021-05-28 ASSESSMENT — ENCOUNTER SYMPTOMS
FATIGUE: 1
MYALGIAS: 1
ABDOMINAL PAIN: 0
COUGH: 1
NAUSEA: 0
WEAKNESS: 1
VOMITING: 0

## 2021-05-28 NOTE — ED TRIAGE NOTES
Pt presents for complaint of decreased oral intake and increasing weakness. Family states this has been on going for x3 days during which the patient has had very little to eat. Family states the patient will states she is hungry but then not eat. Pt states that she has been feeling increasingly weak and states she is having intermittent nausea. ABC intact, Alert and oriented to self and situation, disoriented to place and time.

## 2021-05-29 ENCOUNTER — APPOINTMENT (OUTPATIENT)
Dept: PHYSICAL THERAPY | Facility: CLINIC | Age: 86
End: 2021-05-29
Attending: INTERNAL MEDICINE
Payer: COMMERCIAL

## 2021-05-29 LAB
ALBUMIN UR-MCNC: 100 MG/DL
AMORPH CRY #/AREA URNS HPF: ABNORMAL /HPF
ANION GAP SERPL CALCULATED.3IONS-SCNC: 6 MMOL/L (ref 3–14)
APPEARANCE UR: ABNORMAL
BILIRUB UR QL STRIP: NEGATIVE
BUN SERPL-MCNC: 16 MG/DL (ref 7–30)
CALCIUM SERPL-MCNC: 8.6 MG/DL (ref 8.5–10.1)
CHLORIDE SERPL-SCNC: 108 MMOL/L (ref 94–109)
CO2 SERPL-SCNC: 27 MMOL/L (ref 20–32)
COLOR UR AUTO: YELLOW
CREAT SERPL-MCNC: 0.76 MG/DL (ref 0.52–1.04)
CRP SERPL-MCNC: 27.1 MG/L (ref 0–8)
ERYTHROCYTE [DISTWIDTH] IN BLOOD BY AUTOMATED COUNT: 13.5 % (ref 10–15)
GFR SERPL CREATININE-BSD FRML MDRD: 68 ML/MIN/{1.73_M2}
GLUCOSE SERPL-MCNC: 86 MG/DL (ref 70–99)
GLUCOSE UR STRIP-MCNC: NEGATIVE MG/DL
HCT VFR BLD AUTO: 34.5 % (ref 35–47)
HGB BLD-MCNC: 11.9 G/DL (ref 11.7–15.7)
HGB UR QL STRIP: NEGATIVE
INR PPP: 1.76 (ref 0.86–1.14)
KETONES UR STRIP-MCNC: 20 MG/DL
LEUKOCYTE ESTERASE UR QL STRIP: NEGATIVE
MCH RBC QN AUTO: 33.1 PG (ref 26.5–33)
MCHC RBC AUTO-ENTMCNC: 34.5 G/DL (ref 31.5–36.5)
MCV RBC AUTO: 96 FL (ref 78–100)
MUCOUS THREADS #/AREA URNS LPF: PRESENT /LPF
NITRATE UR QL: NEGATIVE
PH UR STRIP: 5.5 PH (ref 5–7)
PLATELET # BLD AUTO: 106 10E9/L (ref 150–450)
POTASSIUM SERPL-SCNC: 3.5 MMOL/L (ref 3.4–5.3)
PROCALCITONIN SERPL-MCNC: 0.17 NG/ML
RBC # BLD AUTO: 3.6 10E12/L (ref 3.8–5.2)
RBC #/AREA URNS AUTO: 2 /HPF (ref 0–2)
SODIUM SERPL-SCNC: 140 MMOL/L (ref 133–144)
SOURCE: ABNORMAL
SP GR UR STRIP: 1.03 (ref 1–1.03)
SQUAMOUS #/AREA URNS AUTO: 2 /HPF (ref 0–1)
UROBILINOGEN UR STRIP-MCNC: NORMAL MG/DL (ref 0–2)
WBC # BLD AUTO: 3.3 10E9/L (ref 4–11)
WBC #/AREA URNS AUTO: 3 /HPF (ref 0–5)

## 2021-05-29 PROCEDURE — 36415 COLL VENOUS BLD VENIPUNCTURE: CPT | Performed by: PHYSICIAN ASSISTANT

## 2021-05-29 PROCEDURE — 96365 THER/PROPH/DIAG IV INF INIT: CPT

## 2021-05-29 PROCEDURE — 96372 THER/PROPH/DIAG INJ SC/IM: CPT | Performed by: PHYSICIAN ASSISTANT

## 2021-05-29 PROCEDURE — 97530 THERAPEUTIC ACTIVITIES: CPT | Mod: GP | Performed by: PHYSICAL THERAPIST

## 2021-05-29 PROCEDURE — 80048 BASIC METABOLIC PNL TOTAL CA: CPT | Performed by: PHYSICIAN ASSISTANT

## 2021-05-29 PROCEDURE — 258N000003 HC RX IP 258 OP 636: Performed by: PHYSICIAN ASSISTANT

## 2021-05-29 PROCEDURE — 81001 URINALYSIS AUTO W/SCOPE: CPT | Performed by: NURSE PRACTITIONER

## 2021-05-29 PROCEDURE — 999N000156 HC STATISTIC RCP CONSULT EA 30 MIN

## 2021-05-29 PROCEDURE — 94640 AIRWAY INHALATION TREATMENT: CPT

## 2021-05-29 PROCEDURE — 250N000009 HC RX 250: Performed by: PHYSICIAN ASSISTANT

## 2021-05-29 PROCEDURE — 85027 COMPLETE CBC AUTOMATED: CPT | Performed by: PHYSICIAN ASSISTANT

## 2021-05-29 PROCEDURE — 94640 AIRWAY INHALATION TREATMENT: CPT | Mod: 76

## 2021-05-29 PROCEDURE — C9254 INJECTION, LACOSAMIDE: HCPCS | Performed by: PHYSICIAN ASSISTANT

## 2021-05-29 PROCEDURE — 99226 PR SUBSEQUENT OBSERVATION CARE,LEVEL III: CPT | Performed by: INTERNAL MEDICINE

## 2021-05-29 PROCEDURE — 97162 PT EVAL MOD COMPLEX 30 MIN: CPT | Mod: GP | Performed by: PHYSICAL THERAPIST

## 2021-05-29 PROCEDURE — 85610 PROTHROMBIN TIME: CPT | Performed by: PHYSICIAN ASSISTANT

## 2021-05-29 PROCEDURE — 86140 C-REACTIVE PROTEIN: CPT | Performed by: PHYSICIAN ASSISTANT

## 2021-05-29 PROCEDURE — 999N000157 HC STATISTIC RCP TIME EA 10 MIN

## 2021-05-29 PROCEDURE — 250N000013 HC RX MED GY IP 250 OP 250 PS 637: Performed by: INTERNAL MEDICINE

## 2021-05-29 PROCEDURE — 250N000011 HC RX IP 250 OP 636: Performed by: PHYSICIAN ASSISTANT

## 2021-05-29 PROCEDURE — G0378 HOSPITAL OBSERVATION PER HR: HCPCS

## 2021-05-29 PROCEDURE — 84145 PROCALCITONIN (PCT): CPT | Performed by: PHYSICIAN ASSISTANT

## 2021-05-29 PROCEDURE — 97112 NEUROMUSCULAR REEDUCATION: CPT | Mod: GP | Performed by: PHYSICAL THERAPIST

## 2021-05-29 RX ORDER — FLUTICASONE PROPIONATE 50 MCG
1 SPRAY, SUSPENSION (ML) NASAL DAILY PRN
Status: DISCONTINUED | OUTPATIENT
Start: 2021-05-29 | End: 2021-06-01 | Stop reason: HOSPADM

## 2021-05-29 RX ORDER — IPRATROPIUM BROMIDE AND ALBUTEROL SULFATE 2.5; .5 MG/3ML; MG/3ML
3 SOLUTION RESPIRATORY (INHALATION) 4 TIMES DAILY
Status: DISCONTINUED | OUTPATIENT
Start: 2021-05-29 | End: 2021-05-30

## 2021-05-29 RX ORDER — GUAIFENESIN/DEXTROMETHORPHAN 100-10MG/5
5 SYRUP ORAL EVERY 4 HOURS PRN
Status: DISCONTINUED | OUTPATIENT
Start: 2021-05-29 | End: 2021-06-01 | Stop reason: HOSPADM

## 2021-05-29 RX ORDER — LACOSAMIDE 50 MG/1
50 TABLET ORAL 2 TIMES DAILY
Status: DISCONTINUED | OUTPATIENT
Start: 2021-05-29 | End: 2021-06-01 | Stop reason: HOSPADM

## 2021-05-29 RX ORDER — WARFARIN SODIUM 2 MG/1
4 TABLET ORAL
Status: DISCONTINUED | OUTPATIENT
Start: 2021-05-29 | End: 2021-05-31

## 2021-05-29 RX ORDER — ALBUTEROL SULFATE 0.83 MG/ML
2.5 SOLUTION RESPIRATORY (INHALATION) EVERY 4 HOURS PRN
Status: DISCONTINUED | OUTPATIENT
Start: 2021-05-29 | End: 2021-06-01 | Stop reason: HOSPADM

## 2021-05-29 RX ORDER — ATORVASTATIN CALCIUM 10 MG/1
10 TABLET, FILM COATED ORAL AT BEDTIME
Status: DISCONTINUED | OUTPATIENT
Start: 2021-05-29 | End: 2021-06-01 | Stop reason: HOSPADM

## 2021-05-29 RX ORDER — MULTIPLE VITAMINS W/ MINERALS TAB 9MG-400MCG
1 TAB ORAL DAILY
Status: DISCONTINUED | OUTPATIENT
Start: 2021-05-29 | End: 2021-06-01 | Stop reason: HOSPADM

## 2021-05-29 RX ORDER — BENZONATATE 100 MG/1
100 CAPSULE ORAL 3 TIMES DAILY PRN
Status: DISCONTINUED | OUTPATIENT
Start: 2021-05-29 | End: 2021-06-01 | Stop reason: HOSPADM

## 2021-05-29 RX ADMIN — MULTIPLE VITAMINS W/ MINERALS TAB 1 TABLET: TAB at 12:34

## 2021-05-29 RX ADMIN — IPRATROPIUM BROMIDE AND ALBUTEROL SULFATE 3 ML: .5; 3 SOLUTION RESPIRATORY (INHALATION) at 07:27

## 2021-05-29 RX ADMIN — ENOXAPARIN SODIUM 40 MG: 40 INJECTION SUBCUTANEOUS at 23:00

## 2021-05-29 RX ADMIN — SODIUM CHLORIDE 50 MG: 9 INJECTION, SOLUTION INTRAVENOUS at 22:59

## 2021-05-29 RX ADMIN — IPRATROPIUM BROMIDE AND ALBUTEROL SULFATE 3 ML: .5; 3 SOLUTION RESPIRATORY (INHALATION) at 11:32

## 2021-05-29 RX ADMIN — IPRATROPIUM BROMIDE AND ALBUTEROL SULFATE 3 ML: .5; 3 SOLUTION RESPIRATORY (INHALATION) at 15:19

## 2021-05-29 ASSESSMENT — MIFFLIN-ST. JEOR: SCORE: 1069.51

## 2021-05-29 ASSESSMENT — ACTIVITIES OF DAILY LIVING (ADL): DEPENDENT_IADLS:: CLEANING;COOKING;LAUNDRY;MEAL PREPARATION;MEDICATION MANAGEMENT;TRANSPORTATION

## 2021-05-29 NOTE — PROGRESS NOTES
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 Asthma, diaphragmatic hernia, Type 2 diabetes mellitus, large left MCA territory CVA 2020 , encephalomalacia, cognitive impairment, possible prior seizure on anti-epileptics, who presents with systemic weakness, decreased appetite.  The patient had symptom onset on 5/26/2021 with malaise, poor oral intake, nonproductive cough.  The patient had a positive COVID-19 PCR on 5/28/2021.  The patient remained off supplemental oxygen.  Patient was seen by physical therapy.    Problem List:   1.  Acute COVID 19 Viral Infection -   - Symptom Onset:  5/26/2021 - malaise, decreased oral intake, nonproductive cough  - Positive Covid 19 PCR:  5/28/2021  - On admission no evidence of hypoxic respiratory failure or associated pneumonia. Initial chest x-ray without opacities.  - Continuous O2 monitoring  - Monitor SpO2, if hypoxic start Decadron   - Procal pending  - resume PTA bronchodilators/nebs as needed, currently no significant wheezing on evaluation  - analgesics, supportive cares  - Monitor BP/UO, if PO intake is low would recommend gentle IV fluids  - She is lightly subtherapeutic in INR recommend CT PE study to rule out VTE if ascending supplemental O2 requirements, tachycardic etc. For now will hold off.  May also need to be premedicated if this is pursued as unclear rxn to contrast dye.  - PT/OT evaluation - discussed with daughter that PT may recommend patient go to TCU.  Patient's daughter has plans to enroll patient into a group home starting on June 2.     2. Acute Infectious  "Encephalopathy  Cognitive Impairment -   I see no prior diagnosis of dementia but looks she has scored in the 20 range on SLUMS prior to stroke in 2020, probable element of underlying vascular dementia with contribution of acute infectious encephalopathy with COVID 19 infection. Per daughter has history of \"memory problems\" and at her baseline but weak.  No falls, trauma, or recent medication changes. No seizure like activity.  Pt has had progressive decline in functional status over the last 6 months since a seizure in December 2020.  - recommend OT going forward, more formal testing with a neurologist if they desire to more fully evaluate   - bedside attendant prn, currently calm and cooperative do not think will require given profound global weakness  - will need placement- PT/OT/SW consults     3. Subtherapeutic INR -  INR 1.58 on admission, P.o. intake has been poor.  Looks this has been a bit labile to manage as outpatient, 1.8 <- 5.4 in April.  - Pharmacy consult to resume warfarin  - if difficulty managing INR recommend partial bridge with lovenox in the setting of acute COVID infection      4. Atrial Fibrillation  Bradycardia s/p DPPM  HLD -  Rate controlled. Chronic.   Mod MR, TR, mildly dilated AA, LVEF est 55-60% on TTE 9/2020.   - Resume prior to admission warfarin, pharmacy consult to assist in dosing     5. CVA -   History of large left MCA territory stroke not treated with TPA, prior right basal ganglia infarct.  - resume PTA Lacosamide  - resume statin at discharge (obs status)     6. Asthma v. COPD -does not appear acutely exacerbated, not on chronic oxygen therapy.  Resume PTA nebulizer treatments.     TODAY'S PLAN: Patient with unremarkable vital signs and not requiring supplemental oxygen.  PT evaluation pending.  Discussed with daughter that PT may recommend TCU.  At this point she is agreeable.  Patient has plans to enroll in a group home on June 2.    DVT Prophylaxis: Warfarin  Code Status: " No CPR- Do NOT Intubate  Diet: Combination Diet Regular Diet Adult    Sosa Catheter: not present  Disposition: Expected discharge in 1-2 days to TCU. Goals prior to discharge include stable vital signs, TCU bed available.   Family updated today: Yes      Interval History   Pt seen and examined.  Patient is eating breakfast.  Patient denies any shortness of breath or chest pain.  Patient thinks she is in a barn and that she lives with her parents.    -Data reviewed today: I personally reviewed all new labs and imaging results over the last 24 hours.     Physical Exam   Temp: 99.1  F (37.3  C) Temp src: Oral BP: 119/65 Pulse: 90   Resp: 18 SpO2: 92 % O2 Device: None (Room air)    Vitals:    05/29/21 0420   Weight: 67 kg (147 lb 9.6 oz)     Vital Signs with Ranges  Temp:  [98.6  F (37  C)-99.3  F (37.4  C)] 99.1  F (37.3  C)  Pulse:  [82-99] 90  Resp:  [16-24] 18  BP: (114-141)/() 119/65  SpO2:  [90 %-94 %] 92 %  I/O last 3 completed shifts:  In: 30 [P.O.:30]  Out: 200 [Urine:200]    GENERAL: No apparent distress. Awake, alert, and fully oriented.  HEENT: Normocephalic, atraumatic. Extraocular movements intact.  CARDIOVASCULAR: Regular rate and rhythm without murmurs or rubs. No S3.  PULMONARY: Clear bilaterally.  GASTROINTESTINAL: Soft, non-tender, non-distended. Bowel sounds normoactive.   EXTREMITIES: No cyanosis or clubbing. No edema.  NEUROLOGICAL: CN 2-12 grossly intact, no focal neurological deficits.  DERMATOLOGICAL: No rash, ulcer, bruising, nor jaundice.    Medications     - MEDICATION INSTRUCTIONS -       Warfarin Therapy Reminder         ipratropium - albuterol 0.5 mg/2.5 mg/3 mL  3 mL Nebulization 4x Daily     lacosamide  50 mg Oral BID     sodium chloride (PF)  3 mL Intracatheter Q8H     Data     Laboratory:  Recent Labs   Lab 05/29/21  0633 05/28/21  1902   WBC 3.3* 4.5   HGB 11.9 12.6   HCT 34.5* 37.4   MCV 96 96   * 117*     Recent Labs   Lab 05/29/21  0633 05/28/21  1902    137    POTASSIUM 3.5 4.1   CHLORIDE 108 105   CO2 27 28   ANIONGAP 6 4   GLC 86 139*   BUN 16 17   CR 0.76 0.83   GFRESTIMATED 68 61   GFRESTBLACK 79 71   SHAN 8.6 8.8     Recent Labs   Lab 05/29/21  0633   CRP 27.1*     No results for input(s): CULT in the last 168 hours.    Imaging:  Recent Results (from the past 24 hour(s))   CT Head w/o Contrast    Narrative    EXAM: CT HEAD W/O CONTRAST  LOCATION: [facility]  DATE/TIME: 5/28/2021 7:32 PM    INDICATION: Mental status change, unknown cause, confusion.  COMPARISON: CT head 12/04/2020.  TECHNIQUE: Routine CT Head without IV contrast. Multiplanar reformats. Dose reduction techniques were used.    FINDINGS:  INTRACRANIAL CONTENTS: No intracranial hemorrhage, extraaxial collection, or mass effect.  No CT evidence of acute infarct. Moderate presumed chronic small vessel ischemic changes throughout the cerebral hemispheric white matter bilaterally. Stable   chronic left posterior cerebral artery territory infarct. Stable chronic right basal ganglia and left thalamic lacunar infarcts. Mild to moderate generalized volume loss. No hydrocephalus.     VISUALIZED ORBITS/SINUSES/MASTOIDS: Prior bilateral cataract surgery. Visualized portions of the orbits are otherwise unremarkable. No paranasal sinus mucosal disease. No middle ear or mastoid effusion.    BONES/SOFT TISSUES: No acute abnormality.      Impression    IMPRESSION:  1.  No acute intracranial process. No significant change since 12/04/2020.  2.  Stable moderate chronic small vessel ischemic disease and mild to moderate generalized brain parenchymal volume loss.  3.  Stable chronic left posterior cerebral artery territory infarct.   XR Chest 1 View    Narrative    EXAM: XR CHEST 1 VIEW  LOCATION: [facility]  DATE/TIME: 5/28/2021 8:20 PM    INDICATION: Weakness.  COMPARISON: None.      Impression    IMPRESSION: Left-sided dual chamber cardiac pacemaker. Heart size upper limits of normal. Lungs  abena.       Hemant Avila DO  [facility] Hospitalist  [address] [place] [address]  05/29/2021

## 2021-05-29 NOTE — PLAN OF CARE
[facility]      OUTPATIENT PHYSICAL THERAPY EVALUATION  PLAN OF TREATMENT FOR OUTPATIENT REHABILITATION  (COMPLETE FOR INITIAL CLAIMS ONLY)  Patient's Last Name, First Name, M.I.  YOB: 1929  Marisa,June P                        Provider's Name  [facility] Medical Record No.  [id number]                               Onset Date:  05/28/21   Start of Care Date:  05/29/21      Type:     _X_PT   ___OT   ___SLP Medical Diagnosis:  COVID-19                        PT Diagnosis:  Pt. presents with the aforementioned impairments, necessitating the need for skilled PT services to assist in her return to her PLOF. Given the multiple system impairments, her prognosis to return to her PLOF is fair.   Visits from SOC:  1   _________________________________________________________________________________  Plan of Treatment/Functional Goals    Planned Interventions: balance training, bed mobility training, gait training, motor coordination training, neuromuscular re-education, patient/family education, postural re-education, stair training, strengthening, transfer training     Goals: See Physical Therapy Goals on Care Plan in [email or web address] electronic health record.    Therapy Frequency: Daily  Predicted Duration of Therapy Intervention: 3 days  _________________________________________________________________________________    I CERTIFY THE NEED FOR THESE SERVICES FURNISHED UNDER        THIS PLAN OF TREATMENT AND WHILE UNDER MY CARE     (Physician co-signature of this document indicates review and certification of the therapy plan).              Certification date from: 05/29/21, Certification date to: 06/01/21    Referring Physician: Dr. Hemant Avila            Initial Assessment        See Physical Therapy evaluation dated 05/29/21 in [employer] electronic health record.

## 2021-05-29 NOTE — PLAN OF CARE
PRIMARY DIAGNOSIS: COVID POSITIVE AND GENERALIZED WEAKNESS.  OUTPATIENT/OBSERVATION GOALS TO BE MET BEFORE DISCHARGE:  1. Vital signs stable: Yes    2. Dyspnea improved and O2 sats >88% at RA or at prior home O2 therapy level: Yes      SpO2: 93 %,      4. Short term supplemental O2 needed for use with activity at home:  Unknown mostly in bed resting and maintaining Sats above 90%RA. No drop in O2 with repositioning.     5. Tolerating adequate PO diet and medications: Yes, Able to take sips of water with no complaints.     6. Return to near baseline physical activity: No    Vitals are Temp: 98.9  F (37.2  C) Temp src: Oral BP: 130/86 Pulse: 82   Resp: 18 SpO2: 94 %.  Patient is Disorientated to, Time, and Situation. She denies any pain with interview. Denies any SOB, Sats at 94%RA. Interpretation of cardiac rhythm per telemetry tech: Afib 77. Patient is 2 assist with Air slide transfer from stretcher to bed. Has not been out of bed during shift and required extensive assist of two with bed repositioning.  Covid Isolation Precautions in place.  Pt is a Regular diet. Patient is [place] locked. Plan is she needs placement with Social Work, PT and OT Consult.    Discharge Planner Nurse   Safe discharge environment identified: Yes  Barriers to discharge: Yes       Entered by: Jovita Huggins 05/29/2021 6:01 AM     Please review provider order for any additional goals.   Nurse to notify provider when observation goals have been met and patient is ready for discharge.

## 2021-05-29 NOTE — ED NOTES
[facility]  ED Nurse Handoff Report    Radha Cunningham is a 89 year old female   ED Chief complaint: Generalized Weakness  . ED Diagnosis:   Final diagnoses:   Generalized muscle weakness   Lab test positive for detection of COVID-19 virus   Subtherapeutic international normalized ratio (INR)     Allergies:   Allergies   Allergen Reactions     Contrast Dye      Diagnostic X-Ray Materials Unknown     Itching and eyes swell shut.   She reports reactions to xray contrast material         Code Status: DNR  Activity level - Baseline/Home:  Independent. Activity Level - Current:   Assist X 2. Lift room needed: No. Bariatric: No   Needed: No   Isolation: Yes. Infection: COVID POSITIVE.     Vital Signs:   Vitals:    05/28/21 1732 05/28/21 1900 05/28/21 1945 05/28/21 2015   BP: 124/87 (!) 141/109 114/84 (!) 121/95   Pulse: 98  99 84   Resp: 16      Temp: 99.3  F (37.4  C)      SpO2: 93% 92% 92% 92%       Cardiac Rhythm:  ,      Pain level:    Patient confused: Yes. Patient Falls Risk: Yes.   Elimination Status: Has voided , pure wic, incontinent at baseline  Patient Report - Initial Complaint: Weakness. Focused Assessment:   17:35 ED Triage Notes Filed Pt presents for complaint of decreased oral intake and increasing weakness. Family states this has been on going for x3 days during which the patient has had very little to eat. Family states the patient will states she is hungry but then not eat. Pt states that she has been feeling increasingly weak and states she is having intermittent nausea. ABC intact, Alert and oriented to self and situation, disoriented to place and time.     20:49 Respiratory Respiratory - Respiratory WDL: .WDL except (cough LS diminished) J     20:49 Musculoskeletal Musculoskeletal - Musculoskeletal WDL: .WDL except (body aches and generalized weakness)          Radha Cunningham is a 89 year old female anticoagulated on Coumadin with a history of asthma, hypertension,  "hyperlipidemia, paroxysmal atrial fibrillation, TIA, non-rheumatic mitral regurgitation, stroke, and type II diabetes mellitus currently living with daughter who presents to the ED with one of her daughters for an evaluation of increasing generalized weakness/fatigue and decreased oral intake for the past 3 days. Per triage notes, the patient would state that she is hungry but then not proceed to eat. Here, her daughter states that everyone in the house has been sick recently with fatigue and cough. She notes that the patient eats \"barely 50 calories\" daily. The patient herself endorses generalized body aches and a cough along with her weakness/fatigue but otherwise denies nausea/emesis, abdominal pain, or pain elsewhere. She has received her first dose of the COVID-19 vaccine and is lined up for assisted living next week.      Review of Systems   Unable to perform ROS: Other   Constitutional: Positive for fatigue.   Respiratory: Positive for cough.    Gastrointestinal: Negative for abdominal pain, nausea and vomiting.   Musculoskeletal: Positive for myalgias (generalized body aches).   Neurological: Positive for weakness.   All other systems reviewed and are negative.   Tests Performed: labs, . Abnormal Results:   Labs Ordered and Resulted from Time of ED Arrival Up to the Time of Departure from the ED   CBC WITH PLATELETS - Abnormal; Notable for the following components:       Result Value    Platelet Count 117 (*)     All other components within normal limits   BASIC METABOLIC PANEL - Abnormal; Notable for the following components:    Glucose 139 (*)     All other components within normal limits   INR - Abnormal; Notable for the following components:    INR 1.58 (*)     All other components within normal limits   SARS-COV-2 (COVID-19) VIRUS RT-PCR - Abnormal; Notable for the following components:    SARS-CoV-2 PCR Result POSITIVE (*)     All other components within normal limits   LACTIC ACID WHOLE BLOOD "   ROUTINE UA WITH MICROSCOPIC REFLEX TO CULTURE   MAY SALINE LOCK IV     XR Chest 1 View   Final Result   IMPRESSION: Left-sided dual chamber cardiac pacemaker. Heart size upper limits of normal. Lungs clear.      CT Head w/o Contrast   Final Result   IMPRESSION:   1.  No acute intracranial process. No significant change since 12/04/2020.   2.  Stable moderate chronic small vessel ischemic disease and mild to moderate generalized brain parenchymal volume loss.   3.  Stable chronic left posterior cerebral artery territory infarct.        .   Treatments provided: fluids, frequent VS  Family Comments: Pt lives with dtr.  OBS brochure/video discussed/provided to patient:  No, confused. Pt does not understand  ED Medications:   Medications   0.9% sodium chloride BOLUS (0 mLs Intravenous Stopped 5/28/21 2035)     Drips infusing:  No  For the majority of the shift, the patient's behavior Green. Interventions performed were n/a.    Sepsis treatment initiated: No     Patient tested for COVID 19 prior to admission: YES    ED Nurse Name/Phone Number: Pooja Palm RN,   9:39 PM    RECEIVING UNIT ED HANDOFF REVIEW    Above ED Nurse Handoff Report was reviewed: Yes  Reviewed by: Jovita Huggins RN on May 28, 2021 at 10:22 PM

## 2021-05-29 NOTE — PHARMACY-ANTICOAGULATION SERVICE
Clinical Pharmacy - Warfarin Dosing Consult     Pharmacy has been consulted to manage this patient s warfarin therapy.  Indication: Atrial Fibrillation  Therapy Goal: INR 2-3  Warfarin Prior to Admission: Yes  Warfarin PTA Regimen: 2mg on Sun/Wed and 4mg all other days. Patient already received a dose today.     INR   Date Value Ref Range Status   05/28/2021 1.58 (H) 0.86 - 1.14 Final   12/03/2020 2.39 (H) 0.86 - 1.14 Final       Recommend warfarin 0 mg today.  Pharmacy will monitor Radha GRETEL Marisa daily and order warfarin doses to achieve specified goal.      Please contact pharmacy as soon as possible if the warfarin needs to be held for a procedure or if the warfarin goals change.

## 2021-05-29 NOTE — ED NOTES
Emergency Department Attending Supervision Note  2/21/2018  4:46 PM      I evaluated this patient in conjunction with Allison Mcmullen PA-C      Briefly, the patient presented with increasing weakness, difficulty getting up independently, poor po intake over last 3-4 days.  Daughter, who she lives with, also notes cough.        On my exam,     Gen:tired appearing   Eyes: Normal conjunctiva, No  discharge  CV: ppi, regular   Resp: speaking in full sentences without any resp distress  Skin: warm dry well perfused  Neuro: Alert, no gross motor or sensory deficits,         Results:  Labs Ordered and Resulted from Time of ED Arrival Up to the Time of Departure from the ED   CBC WITH PLATELETS - Abnormal; Notable for the following components:       Result Value    Platelet Count 117 (*)     All other components within normal limits   BASIC METABOLIC PANEL - Abnormal; Notable for the following components:    Glucose 139 (*)     All other components within normal limits   INR - Abnormal; Notable for the following components:    INR 1.58 (*)     All other components within normal limits   SARS-COV-2 (COVID-19) VIRUS RT-PCR - Abnormal; Notable for the following components:    SARS-CoV-2 PCR Result POSITIVE (*)     All other components within normal limits   LACTIC ACID WHOLE BLOOD   MAY SALINE LOCK IV     XR Chest 1 View   Final Result   IMPRESSION: Left-sided dual chamber cardiac pacemaker. Heart size upper limits of normal. Lungs clear.      CT Head w/o Contrast   Final Result   IMPRESSION:   1.  No acute intracranial process. No significant change since 12/04/2020.   2.  Stable moderate chronic small vessel ischemic disease and mild to moderate generalized brain parenchymal volume loss.   3.  Stable chronic left posterior cerebral artery territory infarct.            My impression is gen wkness, poor po intake and functional decline likely due to covid 19 given + pcr.  Admit for supportive care until able to transition home  or consider TCU        Diagnosis    ICD-10-CM    1. Generalized muscle weakness  M62.81 UA with Microscopic reflex to Culture     CBC with platelets     Basic metabolic panel     INR     Procalcitonin     CRP inflammation   2. Lab test positive for detection of COVID-19 virus  U07.1    3. Subtherapeutic international normalized ratio (INR)  R79.1            Behzad Berumen MD  [facility]       Behzad Berumen MD  05/29/21 1024

## 2021-05-29 NOTE — PROGRESS NOTES
"[facility] RCAT  Date: May 29, 2021  Admission Dx: COVID  Pulmonary History: COPD, Asthma  Home Nebulizer/MDI Use: albuterol prn, Duoneb QID  Home Oxygen: none  Acuity Level (RCAT flow sheet): Level 4, Home nebs QID, will continue QID.    Aerosol Therapy initiated: Duoneb QID    Pulmonary Hygiene initiated: Coughing and deep breathing techniques    Volume Expansion initiated: Incentive spirometer    Current Oxygen Requirements: Room air  Current SpO2: 94%    Re-evaluation date: 6/1/2021    Patient Education: Education was provided on RCAT process. Will continue to do education with patient.    See \"RT Assessments\" flow sheet for patient assessment scoring and Acuity Level Details.     Khao Perry, RT on 5/29/2021 at 3:22 PM      "

## 2021-05-29 NOTE — PLAN OF CARE
PRIMARY DIAGNOSIS: GENERALIZED WEAKNESS, COVID     OUTPATIENT/OBSERVATION GOALS TO BE MET BEFORE DISCHARGE  1. Orthostatic performed: N/A    2. Tolerating PO medications: Yes    3. Return to near baseline physical activity: No    4. Cleared for discharge by consultants (if involved): No    Patient alert, oriented only to self. Pleasant, though uncooperative with cares. Vitals are Temp: 99.1  F (37.3  C) Temp src: Oral BP: 119/65 Pulse: 90   Resp: 18 SpO2: 92 % RA. Denies pain. Attempted to get patient up right in chair with assist of 2, patient did not cooperate and was repositioned back in bed. Patient refused medications and sips of water. Denies shortness of breath and difficulty breathing. IV saline locked. PT consulted. Purewick in place. Continuing with supportive cares and symptom management. COVID-19 precautions maintained.     Discharge Planner Nurse   Safe discharge environment identified: No  Barriers to discharge: Yes       Entered by: Anastasia Argueta 05/29/2021 10:39 AM     Please review provider order for any additional goals.   Nurse to notify provider when observation goals have been met and patient is ready for discharge.

## 2021-05-29 NOTE — PROGRESS NOTES
"   05/29/21 1130   Quick Adds   Type of Visit Initial PT Evaluation   Living Environment   People in home child(ahmet), adult   Living Environment Comments Unable to formally assess due to pt.'s cognition. Will attempt to identify in future sessions.   Self-Care   Usual Activity Tolerance other (see comments)  (Unable to formally assess due to pt.'s cognition. )   Current Activity Tolerance other (see comments)  (Unable to formally assess due to pt.'s cognition.)   Equipment Currently Used at Home other (see comments)  (Unable to formally assess due to pt.'s cognition.)   Activity/Exercise/Self-Care Comment Unable to formally assess due to pt.'s cognition. Will attempt to identify in future sessions.   Disability/Function   Hearing Difficulty or Deaf yes   Patient's preferred means of communication English speaker with hearing loss, no speech problems.   Describe hearing loss bilateral hearing loss   Use of hearing assistive devices none   Were auxiliary aids offered? no   The following aids were provided; patient declined offer of auxiliary aids   Wear Glasses or Blind no   Concentrating, Remembering or Making Decisions Difficulty yes   Change in Functional Status Since Onset of Current Illness/Injury yes   General Information   Onset of Illness/Injury or Date of Surgery 05/28/21   Referring Physician Dr. Hemant Avila   Patient/Family Therapy Goals Statement (PT) \"I want to be able to walk better.\"   Pertinent History of Current Problem (include personal factors and/or comorbidities that impact the POC) Pt. is a 89 y.o.  female who presents to [facility] secondary to COVID-19 with subsequent acute infectious encephalopathy   Existing Precautions/Restrictions swallowing;other (see comments)  (COVID-19 precautions)   General Observations Pt. presents lying semi-supine in hospital bed with bed alarm on in no acute distress.   Cognition   Orientation Status (Cognition) unable/difficult to assess   Affect/Mental Status " (Cognition) emotionally labile   Follows Commands (Cognition) follows one-step commands;follows two-step commands;50-74% accuracy;delayed response/completion;increased processing time needed;initiation impaired;physical/tactile prompts required;repetition of directions required;verbal cues/prompting required   Safety Deficit (Cognition) moderate deficit;ability to follow commands;insight into deficits/self-awareness;judgment;problem-solving   Memory Deficit (Cognition) moderate deficit;short-term memory;immediate recall;procedural memory;working memory   Pain Assessment   Patient Currently in Pain No   Posture    Posture Forward head position;Protracted shoulders;Kyphosis   Range of Motion (ROM)   ROM Comment B LE ROM WFL   Strength   Strength Comments Decreased B LE strength (L > R)   Balance   Balance other (describe)   Standing Balance: Static poor balance   Standing Balance: Dynamic poor balance   Systems Impairment Contributing to Balance Disturbance cognitive;neuromuscular;musculoskeletal   Identified Impairments Contributing to Balance Disturbance impaired coordination;impaired motor control;impaired postural control;decreased strength   Balance Quick Add Standing balance: static;Standing balance: dynamic;Systems impairment contributing to balance disturbance;Identified impairments contributing to balance disturbance   Sensory Examination   Sensory Perception Comments Unable to formally assess due to pt. cognition   Coordination   Coordination Comments Unable to formally assess due to pt. cognition.   Clinical Impression   Criteria for Skilled Therapeutic Intervention yes, treatment indicated;meets criteria   PT Diagnosis (PT) Pt. presents with the aforementioned impairments, necessitating the need for skilled PT services to assist in her return to her PLOF. Given the multiple system impairments, her prognosis to return to her PLOF is fair.   Influenced by the following impairments Decreased B LE strength,  decreased static/dynamic standing balance, decreased cognition   Functional limitations due to impairments Decreased B LE strength, decreased static/dynamic standing balance, decreased cognition   Clinical Presentation Evolving/Changing   Clinical Presentation Rationale Pt.'s clinical presentation evolving secondary to COVID-19 diagnosis.   Clinical Decision Making (Complexity) moderate complexity   Therapy Frequency (PT) Daily   Predicted Duration of Therapy Intervention (days/wks) 3 days   Planned Therapy Interventions (PT) balance training;bed mobility training;gait training;motor coordination training;neuromuscular re-education;patient/family education;postural re-education;stair training;strengthening;transfer training   Anticipated Equipment Needs at Discharge (PT) other (see comments)  (TBD based on prior ownership/PLOF)   Risk & Benefits of therapy have been explained evaluation/treatment results reviewed;care plan/treatment goals reviewed;risks/benefits reviewed;current/potential barriers reviewed;participants voiced agreement with care plan;participants included;patient   PT Discharge Planning    PT Discharge Recommendation (DC Rec) Transitional Care Facility   PT Rationale for DC Rec Recommend discharge to TCU due to decreased independence with mobility skills, as well as, cognitive deficits limiting overall safety and ability to independently perform ADLs/IADLs.   PT Brief overview of current status  Assist of 1 via use of FWW   Total Evaluation Time   Total Evaluation Time (Minutes) 20

## 2021-05-29 NOTE — PLAN OF CARE
PRIMARY DIAGNOSIS: COVID POSITIVE AND GENERALIZED WEAKNESS.  OUTPATIENT/OBSERVATION GOALS TO BE MET BEFORE DISCHARGE:  1. Vital signs stable: Yes    2. Dyspnea improved and O2 sats >88% at RA or at prior home O2 therapy level: Yes      SpO2: 93 %,      4. Short term supplemental O2 needed for use with activity at home:  Unknown mostly in bed resting and maintaining Sats above 90%RA. No drop in O2 with repositioning.     5. Tolerating adequate PO diet and medications: Yes, Able to take sips of water with no complaints.     6. Return to near baseline physical activity: No    Vitals are Temp: 98.6  F (37  C) Temp src: Oral BP: 126/75 Pulse: 87   Resp: 24 SpO2: 93 %.  Patient is Disorientated to, Time, and Situation. She denies any pain with interview. Denies any SOB, Sats above 92%RA.Interpretation of cardiac rhythm per telemetry tech: Afib 79. Patient is 2 assist with Air slide transfer from stretcher to bed. Has not been out of bed during shift and required extensive assist of two with bed repositioning.  Covid Isolation Precautions in place.  Pt is a Regular diet. Patient is Saline locked.     Discharge Planner Nurse   Safe discharge environment identified: Yes  Barriers to discharge: Yes       Entered by: Jovita Huggins 05/29/2021 1:15 AM     Please review provider order for any additional goals.   Nurse to notify provider when observation goals have been met and patient is ready for discharge.

## 2021-05-29 NOTE — H&P
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 Asthma, diaphragmatic hernia, Type 2 diabetes mellitus, large left MCA territory CVA 2020 , encephalomalacia, cognitive impairment, possible prior seizure on anti-epileptics, who presents with systemic weakness, decreased appetite.    In the ED temp 99.3F, oxygen saturation 92% on RA, RR 17, /95, Pulse 84.   Lab work notable for a BC with mild thrombocytopenia of 117, BMP unremarkable.  Lactic acid negative.  Glucose 139.  INR mildly subtherapeutic at 1.58.  Covid PCR positive. CXR without infiltrates. NCHCT without acute intracranial process.  Discussed with Allison Mcmullen PA-C in the ED, full chart review including lab work, imaging, and vital signs were reviewed. Patient received 500 ml NS bolus in the ED. Admission was requested to hospitalist service for further cares and monitoring.    1. Acute COVID 19 Viral Infection -   Time onset ~5/26 with systemic malaise, poor PO intake, frequent nonproductive cough and progressive global weakness.  On admission no evidence of hypoxic respiratory failure or associated pneumonia. Initial chest x-ray without opacities.  -Continuous O2 monitoring  -Monitor SpO2, if hypoxic start Decadron   -check procal, CRP  -resume PTA bronchodilators/nebs as needed, currently no significant wheezing on evaluation  -analgesics, supportive cares  -Monitor BP/UO, if PO intake is low would recommend gentle IV fluids  -She is lightly subtherapeutic in INR recommend CT PE study to rule out VTE if ascending  "supplemental O2 requirements, tachycardic etc. For now will hold off.  May also need to be premedicated if this is pursued as unclear rxn to contrast dye.  -likely admit to IP if requiring additional cares pending clinical course    2. Acute Infectious Encephalopathy  Cognitive Impairment -   I see no prior diagnosis of dementia but looks she has scored in the 20 range on SLUMS prior to stroke in 2020, probable element of underlying vascular dementia with contribution of acute infectious encephalopathy with COVID 19 infection. Per daughter has history of \"memory problems\" and at her baseline but weak.  No falls, trauma, or recent medication changes. No seizure like activity.  -recommend OT going forward,more formal testing with a neurologist if they desire to more fully evaluate   -bedside attendant prn, currently calm and cooperative do not think will require given profound global weakness  -will need placement- PT/OT/SW consults    3. Subtherapeutic INR -  INR 1.58 on admission, P.o. intake has been poor.  Looks this has been a bit labile to manage as outpatient, 1.8 <- 5.4 in April.  -Pharmacy consult to resume warfarin  -if difficulty managing INR recommend partial bridge with lovenox in the setting of acute COVID infection     4. Atrial Fibrillation  Bradycardia s/p DPPM  HLD -  Rate controlled. Chronic.   Mod MR, TR, mildly dilated AA, LVEF est 55-60% on TTE 9/2020.   -Resume prior to admission warfarin, pharmacy consult to assist in dosing    5. CVA -   History of large left MCA territory stroke not treated with TPA, prior right basal ganglia infarct.  -resume PTA Lacosamide  -resume statin at discharge (obs status)    6. Asthma v. COPD -does not appear acutely exacerbated, not on chronic oxygen therapy.  Resume PTA nebulizer treatments.     DVT Prophylaxis: Warfarin  Code Status: DNR / DNI per lengthy discussion with daughter, Ana was present at bedside.  At this point I recommend a palliative care consult " "if she is remaining hospitalized on Monday or at least some assistance in filling out an updated POLST I also believe that she may have a partial guardianship but details are unclear. SW consult to assist.     Expected discharge: Tomorrow, recommended to transitional care unit vs. Admit to IP pending clinical course.     Family Updated with Plan of Care: Daughter, Hilton at bedside.    Carla Irwin PA-C    Primary Care Physician   Samaria Chappell    Chief Complaint   \"generalized weakness\"    Unable to obtain a history from the patient due to confusion and history obtained from daughter.   Services Used: No    History of Present Illness   June GRETEL Cunningham is a 89 year old female with PMHx of atrial fibrillation, dual chamber PPM for bradycardia, hyperlipidemia, hypertension, PVD, nonrheumatic mitral regurgitation, osteoarthritis, peptic ulcer disease, thyroid disease, anemia, COPD v. Asthma, diaphragmatic hernia, 2 diabetes mellitus, large left MCA territory CVA 2020 , encephalomalacia, cognitive impairment, possible prior seizure on anti-epileptics, who presents with systemic weakness, decreased appetite.    Mrs. Cunningham lives with her daughter who observed that over the past 3 days the patient has been eating very little and globally weak.  The patient has reported to the daughter feeling intermittently nauseated.  She has been noted to have a infrequent cough that is nonproductive.  Daughter brought her to the emergency department today due to patient's profound weakness exceeding level of care able to provide from home, does express they \"have been looking for other care\".   Daughter Hilton states that the patient has had no ill contacts to her knowledge but in the emergency department stated that \"everyone in the house has been sick recently with fatigue and cough\". She is unable to provide much medical history regarding her mother and stated \"I took her to the [facility] a couple months ago and everything " "was fine\".    On evaluation the patient appears comfortable.  She denies fever, has chills.  She is confused.  She is moving all extremities but globally weak.  She is noted to have an infrequent nonproductive cough.  No chest pain.  No weight gain.  Tolerating sips of clear liquids, no appetite. No abdominal pain, diarrhea.   Received first dose of Covid vaccination, daughter is not sure when this was, but she believes due for her second dose.     Past Medical History    Atrial fibrillation  dual chamber PPM for bradycardia  Hyperlipidemia  Hypertension,  PVD  Nonrheumatic mitral regurgitation  Osteoarthritis  Peptic ulcer disease  Thyroid disease  Anemia  COPD v. Asthma  Diaphragmatic hernia  Type 2 diabetes mellitus  Large left MCA territory CVA 2020, right prior basal ganglia infarct  Encephalomalacia  Cognitive impairment  Possible prior seizure on anti-epileptics    Past Surgical History   I have reviewed this patient's surgical history and updated it with pertinent information if needed.  Past Surgical History:   Procedure Laterality Date     HERNIA REPAIR  2004    umbilical     Skin cancer removal - forehead  2005       Prior to Admission Medications   Prior to Admission Medications   Prescriptions Last Dose Informant Patient Reported? Taking?   Calcium Carb-Cholecalciferol (CALTRATE 600+D3) 600-800 MG-UNIT TABS 5/28/2021 at Unknown time  Yes Yes   Sig: Take 1 tablet by mouth daily   HYDROcodone-acetaminophen (NORCO) 5-325 MG tablet  Daughter Yes Yes   Sig: Take 1 tablet by mouth every 8 hours as needed for severe pain   albuterol (PROVENTIL) (2.5 MG/3ML) 0.083% neb solution   No Yes   Sig: Take 1 vial (2.5 mg) by nebulization every 4 hours as needed for wheezing   atorvastatin (LIPITOR) 10 MG tablet 5/27/2021 at Unknown time  Yes Yes   Sig: Take 10 mg by mouth At Bedtime    emollient (VANICREAM) external cream   Yes Yes   Sig: Apply topically 2 times daily as needed To whole body    fluocinonide (LIDEX) " 0.05 % external solution  Daughter Yes Yes   Sig: Apply topically 2 times daily as needed To scalp as needed for dryness.   fluticasone (FLONASE) 50 MCG/ACT nasal spray   Yes No   Sig: Spray 1 spray into both nostrils daily as needed for rhinitis or allergies   ipratropium - albuterol 0.5 mg/2.5 mg/3 mL (DUONEB) 0.5-2.5 (3) MG/3ML neb solution   No Yes   Sig: Take 1 vial (3 mLs) by nebulization 4 times daily   multivitamin w/minerals (THERA-VIT-M) tablet 2021 at Unknown time Daughter Yes Yes   Sig: Take 1 tablet by mouth daily   nystatin (MYCOSTATIN) 195405 UNIT/GM external powder   Yes Yes   Sig: Apply topically 2 times daily as needed Under right breast and groin area    warfarin ANTICOAGULANT (COUMADIN) 2 MG tablet 2021 at 4 mg Daughter Yes Yes   Si mg Sun, Wed and 4 mg ROW      Facility-Administered Medications: None     Allergies   Allergies   Allergen Reactions     Contrast Dye      Diagnostic X-Ray Materials Unknown     Itching and eyes swell shut.   She reports reactions to xray contrast material         Social History   I have reviewed this patient's social history and updated it with pertinent information if needed. Radha Cunningham  reports that she has never smoked. She does not have any smokeless tobacco history on file. She reports that she does not drink alcohol.    Family History   I have reviewed this patient's family history and updated it with pertinent information if needed.   Family History   Problem Relation Age of Onset     C.A.D. Mother      Hypertension Mother        Review of Systems   The 10 point Review of Systems is negative other than noted in the HPI or here.     Physical Exam   Temp: 99.3  F (37.4  C)   BP: (!) 121/95 Pulse: 84   Resp: 16 SpO2: 92 %      Vital Signs with Ranges  Temp:  [99.3  F (37.4  C)] 99.3  F (37.4  C)  Pulse:  [84-99] 84  Resp:  [16] 16  BP: (114-141)/() 121/95  SpO2:  [92 %-93 %] 92 %  0 lbs 0 oz    Constitutional: Awake, alert,  no apparent  distress.  Eyes: Extra occular eye movements intact.   HEENT: Non traumatic. Dry mucous membranes, no dentition.  Respiratory: No abnormal work of breathing.  Shallow inspirations and poor inspiratory effort.  No crackles or wheezing.    Cardiovascular: Regular rate and rhythm, normal S1 and S2, and no murmur noted.  GI: Soft, non-distended, non-tender, bowel sounds present. No rebound tenderness or guarding.  Lymph/Hematologic: No anterior cervical or supraclavicular adenopathy.  Skin: Warm, dry. No edema.  Musculoskeletal: No gross deformities noted.  No erythema or tenderness. Moving all extremities.  Neurologic: Oriented to self only.  Answers simple questions. Follows commands. No tremor. Speech is not well enunciated due to the patient's lack of dentures.  Moving all extremities with globally reduced strength. CN 2-12 grossly intact.  Coordination and sensation intact.   Psychiatric: Unable to fully assess.    Data   Data reviewed today:      EKG: None  Imaging:   Recent Results (from the past 24 hour(s))   CT Head w/o Contrast    Narrative    EXAM: CT HEAD W/O CONTRAST  LOCATION: [facility]  DATE/TIME: 5/28/2021 7:32 PM    INDICATION: Mental status change, unknown cause, confusion.  COMPARISON: CT head 12/04/2020.  TECHNIQUE: Routine CT Head without IV contrast. Multiplanar reformats. Dose reduction techniques were used.    FINDINGS:  INTRACRANIAL CONTENTS: No intracranial hemorrhage, extraaxial collection, or mass effect.  No CT evidence of acute infarct. Moderate presumed chronic small vessel ischemic changes throughout the cerebral hemispheric white matter bilaterally. Stable   chronic left posterior cerebral artery territory infarct. Stable chronic right basal ganglia and left thalamic lacunar infarcts. Mild to moderate generalized volume loss. No hydrocephalus.     VISUALIZED ORBITS/SINUSES/MASTOIDS: Prior bilateral cataract surgery. Visualized portions of the orbits are otherwise  unremarkable. No paranasal sinus mucosal disease. No middle ear or mastoid effusion.    BONES/SOFT TISSUES: No acute abnormality.      Impression    IMPRESSION:  1.  No acute intracranial process. No significant change since 12/04/2020.  2.  Stable moderate chronic small vessel ischemic disease and mild to moderate generalized brain parenchymal volume loss.  3.  Stable chronic left posterior cerebral artery territory infarct.   XR Chest 1 View    Narrative    EXAM: XR CHEST 1 VIEW  LOCATION: [facility]  DATE/TIME: 5/28/2021 8:20 PM    INDICATION: Weakness.  COMPARISON: None.      Impression    IMPRESSION: Left-sided dual chamber cardiac pacemaker. Heart size upper limits of normal. Lungs clear.       Recent Labs   Lab 05/28/21  1902   WBC 4.5   HGB 12.6   MCV 96   *   INR 1.58*      POTASSIUM 4.1   CHLORIDE 105   CO2 28   BUN 17   CR 0.83   ANIONGAP 4   SHAN 8.8   *       Carla Irwin PA-C on 5/28/2021 at 9:19 PM

## 2021-05-29 NOTE — PROGRESS NOTES
ROOM # 220    Living Situation (if not independent, order SW consult):Home with daughter   Facility name:  : Ana Daughter     Activity level at baseline: Indpt with walker   Activity level on admit: Extensive assist of 2      Patient registered to observation; given Patient Bill of Rights; given the opportunity to ask questions about observation status and their plan of care.  Patient has been oriented to the observation room, bathroom and call light is in place.    Discussed discharge goals and expectations with patient/family.

## 2021-05-29 NOTE — CONSULTS
Care Management Initial Consult    General Information  Assessment completed with: Family, (Hilton)  Type of CM/SW Visit: Initial Assessment    Primary Care Provider verified and updated as needed: Yes   Readmission within the last 30 days:        Reason for Consult: care coordination/care conference, discharge planning  Advance Care Planning:            Communication Assessment  Patient's communication style: spoken language (English or Bilingual)    Hearing Difficulty or Deaf: yes   Wear Glasses or Blind: no    Cognitive  Cognitive/Neuro/Behavioral: .WDL except  Level of Consciousness: confused, alert  Arousal Level: opens eyes spontaneously  Orientation: disoriented to, place, time, situation  Mood/Behavior: flat affect, hypoactive (quiet, withdrawn)  Best Language: 0 - No aphasia  Speech: clear    Living Environment:   People in home: child(ahmet), adult     Current living Arrangements: house      Able to return to prior arrangements: yes       Family/Social Support:  Care provided by: self, child(ahmet)  Provides care for:    Marital Status: Single  Children          Description of Support System: Supportive, Involved    Support Assessment: Adequate family and caregiver support    Current Resources:   Patient receiving home care services: No     Community Resources: None  Equipment currently used at home: walker, standard  Supplies currently used at home:      Employment/Financial:  Employment Status: retired        Financial Concerns: No concerns identified           Lifestyle & Psychosocial Needs:        Socioeconomic History     Marital status:      Spouse name: Not on file     Number of children: Not on file     Years of education: Not on file     Highest education level: Not on file     Tobacco Use     Smoking status: Never Smoker   Substance and Sexual Activity     Alcohol use: No       Functional Status:  Prior to admission patient needed assistance:   Dependent ADLs:: Ambulation-walker  Dependent  IADLs:: Cleaning, Cooking, Laundry, Meal Preparation, Medication Management, Transportation       Mental Health Status:  Mental Health Status: No Current Concerns       Chemical Dependency Status:  Chemical Dependency Status: No Current Concerns             Values/Beliefs:  Spiritual, Cultural Beliefs, Zoroastrian Practices, Values that affect care: no               Additional Information:  Pt is COVID + and confused.  Called and spoke with dtg Hilton.  Pt currently lives with Hilton, however she is scheduled to move to a  on June 2.  Hilton said that she is moving her mother because she thinks the setting will be better for her as she works all day.  Hilton is hopeful pt will stay at the hospital until June 2 and be transferred to .  CTS encouraged hilton to speak with doctor about discharge readiness. At baseline, pt uses a walker.  Hilton said she has been having to assist her mom with more ADL's.  Currently pt does not receive any services.  Dtg will provide transportation at discharge.      Mandi Bush RN, BSN, PHN, CTS  Care Coordinator  [facility]  [phone]

## 2021-05-29 NOTE — PHARMACY-ADMISSION MEDICATION HISTORY
Admission medication history interview status for this patient is complete. See [facility] admission navigator for allergy information, prior to admission medications and immunization status.     Medication history interview done, indicate source(s): Patient and Family  Medication history resources (including written lists, pill bottles, clinic record):None  Pharmacy: -    Changes made to PTA medication list:  Added: lacosamide, cranberry  Deleted: magic cup,   Changed: warfarin, to caltrate    Actions taken by pharmacist (provider contacted, etc):called daughter for mr     Additional medication history information:None    Medication reconciliation/reorder completed by provider prior to medication history?  no   (Y/N)     For patients on insulin therapy:   Do you use sliding scale insulin based on blood sugars?   What is your pre-meal insulin coverage?    Do you typically eat three meals a day?   How many times do you check your blood glucose per day?   How many episodes of hypoglycemia do you typically have per month?   Do you have a Continuous Glucose Monitor (CGM)?      Prior to Admission medications    Medication Sig Last Dose Taking? Auth Provider   albuterol (PROVENTIL) (2.5 MG/3ML) 0.083% neb solution Take 1 vial (2.5 mg) by nebulization every 4 hours as needed for wheezing  Yes Zenon Solis MD   atorvastatin (LIPITOR) 10 MG tablet Take 10 mg by mouth At Bedtime  5/27/2021 at Unknown time Yes Reported, Patient   Calcium Carb-Cholecalciferol (CALTRATE 600+D3) 600-800 MG-UNIT TABS Take 1 tablet by mouth daily 5/28/2021 at Unknown time Yes Unknown, Entered By History   Cranberry 1000 MG CAPS Take 1 capsule by mouth daily 5/28/2021 at Unknown time Yes Unknown, Entered By History   emollient (VANICREAM) external cream Apply topically 2 times daily as needed To whole body   Yes Unknown, Entered By History   fluocinonide (LIDEX) 0.05 % external solution Apply topically 2 times daily as needed To scalp as needed for  dryness.  Yes Reported, Patient   HYDROcodone-acetaminophen (NORCO) 5-325 MG tablet Take 1 tablet by mouth every 8 hours as needed for severe pain  Yes Reported, Patient   ipratropium - albuterol 0.5 mg/2.5 mg/3 mL (DUONEB) 0.5-2.5 (3) MG/3ML neb solution Take 1 vial (3 mLs) by nebulization 4 times daily  Yes Dean Tavarez MD   lacosamide (VIMPAT) 50 MG TABS tablet Take 50 mg by mouth 2 times daily 5/28/2021 at x1 Yes Unknown, Entered By History   multivitamin w/minerals (THERA-VIT-M) tablet Take 1 tablet by mouth daily 5/28/2021 at Unknown time Yes Reported, Patient   nystatin (MYCOSTATIN) 020953 UNIT/GM external powder Apply topically 2 times daily as needed Under right breast and groin area   Yes Unknown, Entered By History   warfarin ANTICOAGULANT (COUMADIN) 2 MG tablet 2 mg Sun, Wed and 4 mg ROW 5/28/2021 at 4 mg Yes Reported, Patient   fluticasone (FLONASE) 50 MCG/ACT nasal spray Spray 1 spray into both nostrils daily as needed for rhinitis or allergies   Unknown, Entered By History

## 2021-05-29 NOTE — PLAN OF CARE
PRIMARY DIAGNOSIS: GENERALIZED WEAKNESS, COVID     OUTPATIENT/OBSERVATION GOALS TO BE MET BEFORE DISCHARGE  1. Orthostatic performed: N/A    2. Tolerating PO medications: Yes    3. Return to near baseline physical activity: No    4. Cleared for discharge by consultants (if involved): No    Patient alert, oriented only to self. Pleasant, though uncooperative with cares. Vitals are Temp: 98.3  F (36.8  C) Temp src: Axillary BP: 127/67 Pulse: 90   Resp: 18 SpO2: 95 % RA. Denies pain. Patient up with assist of 2. Patient ate and drank well at lunch time. Denies shortness of breath and difficulty breathing. IV saline locked. PT consulted, recommending TCU. Purewick in place. Continuing with supportive cares and symptom management. COVID-19 precautions maintained.     Discharge Planner Nurse   Safe discharge environment identified: No  Barriers to discharge: Yes       Entered by: Anastasia Argueta 05/29/2021 1:13 PM     Please review provider order for any additional goals.   Nurse to notify provider when observation goals have been met and patient is ready for discharge.

## 2021-05-29 NOTE — UTILIZATION REVIEW
Concurrent stay review; Secondary Review Determination     [facility]          Under the authority of the Utilization Management Committee, the utilization review process indicated a secondary review on the above patient.  The review outcome is based on review of the medical records, discussions with staff, and applying clinical experience noted on the date of the review.          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 Asthma, diaphragmatic hernia, Type 2 diabetes mellitus, large left MCA territory CVA 2020 , encephalomalacia, cognitive impairment, possible prior seizure on anti-epileptics, who presents with systemic weakness, decreased appetite.  The patient had symptom onset on 5/26/2021 with malaise, poor oral intake, nonproductive cough.  The patient had a positive COVID-19 PCR on 5/28/2021. The patient remained off supplemental oxygen.  Patient may reason for ongoing hospital care is significant weakness and fatigue, might need transitional care unit, there is no clear indication for prolonged inpatient hospital stay at the time of this review however with a an acute Covid infection if there is clinical deterioration or worsening shortness of breath consider advancing to inpatient  The severity of illness, intensity of service provided, expected LOS and risk for adverse outcome make the care appropriate for observation.      This document was produced using voice recognition software       The information on this document is developed by the utilization review team in order for the business office to ensure compliance.  This only denotes the appropriateness of proper admission status and does not reflect  the quality of care rendered.         The definitions of Inpatient Status and Observation Status used in making the determination above are those provided in the CMS Coverage Manual, Chapter 1 and Chapter 6, section 70.4.      Sincerely,     LIBBY WARREN MD    System Medical Director  Utilization Management  [facility].

## 2021-05-30 LAB
ALBUMIN SERPL-MCNC: 2.8 G/DL (ref 3.4–5)
ALP SERPL-CCNC: 49 U/L (ref 40–150)
ALT SERPL W P-5'-P-CCNC: 21 U/L (ref 0–50)
ANION GAP SERPL CALCULATED.3IONS-SCNC: 4 MMOL/L (ref 3–14)
AST SERPL W P-5'-P-CCNC: 29 U/L (ref 0–45)
BILIRUB DIRECT SERPL-MCNC: 0.1 MG/DL (ref 0–0.2)
BILIRUB SERPL-MCNC: 0.5 MG/DL (ref 0.2–1.3)
BUN SERPL-MCNC: 12 MG/DL (ref 7–30)
CALCIUM SERPL-MCNC: 8.3 MG/DL (ref 8.5–10.1)
CHLORIDE SERPL-SCNC: 108 MMOL/L (ref 94–109)
CO2 SERPL-SCNC: 27 MMOL/L (ref 20–32)
CREAT SERPL-MCNC: 0.68 MG/DL (ref 0.52–1.04)
CRP SERPL-MCNC: 36.4 MG/L (ref 0–8)
ERYTHROCYTE [DISTWIDTH] IN BLOOD BY AUTOMATED COUNT: 13.5 % (ref 10–15)
GFR SERPL CREATININE-BSD FRML MDRD: 76 ML/MIN/{1.73_M2}
GLUCOSE SERPL-MCNC: 91 MG/DL (ref 70–99)
HCT VFR BLD AUTO: 34.1 % (ref 35–47)
HGB BLD-MCNC: 11.7 G/DL (ref 11.7–15.7)
INR PPP: 1.8 (ref 0.86–1.14)
MCH RBC QN AUTO: 32.2 PG (ref 26.5–33)
MCHC RBC AUTO-ENTMCNC: 34.3 G/DL (ref 31.5–36.5)
MCV RBC AUTO: 94 FL (ref 78–100)
PLATELET # BLD AUTO: 99 10E9/L (ref 150–450)
POTASSIUM SERPL-SCNC: 3.6 MMOL/L (ref 3.4–5.3)
PROT SERPL-MCNC: 6.1 G/DL (ref 6.8–8.8)
RBC # BLD AUTO: 3.63 10E12/L (ref 3.8–5.2)
SODIUM SERPL-SCNC: 139 MMOL/L (ref 133–144)
WBC # BLD AUTO: 3.8 10E9/L (ref 4–11)

## 2021-05-30 PROCEDURE — 96366 THER/PROPH/DIAG IV INF ADDON: CPT

## 2021-05-30 PROCEDURE — 86140 C-REACTIVE PROTEIN: CPT | Performed by: PHYSICIAN ASSISTANT

## 2021-05-30 PROCEDURE — 80076 HEPATIC FUNCTION PANEL: CPT | Performed by: PHYSICIAN ASSISTANT

## 2021-05-30 PROCEDURE — 85610 PROTHROMBIN TIME: CPT | Performed by: PHYSICIAN ASSISTANT

## 2021-05-30 PROCEDURE — 85027 COMPLETE CBC AUTOMATED: CPT | Performed by: PHYSICIAN ASSISTANT

## 2021-05-30 PROCEDURE — 99226 PR SUBSEQUENT OBSERVATION CARE,LEVEL III: CPT | Performed by: INTERNAL MEDICINE

## 2021-05-30 PROCEDURE — 36415 COLL VENOUS BLD VENIPUNCTURE: CPT | Performed by: PHYSICIAN ASSISTANT

## 2021-05-30 PROCEDURE — 250N000013 HC RX MED GY IP 250 OP 250 PS 637: Performed by: PHYSICIAN ASSISTANT

## 2021-05-30 PROCEDURE — 999N000157 HC STATISTIC RCP TIME EA 10 MIN

## 2021-05-30 PROCEDURE — 250N000013 HC RX MED GY IP 250 OP 250 PS 637: Performed by: INTERNAL MEDICINE

## 2021-05-30 PROCEDURE — G0378 HOSPITAL OBSERVATION PER HR: HCPCS

## 2021-05-30 PROCEDURE — 80048 BASIC METABOLIC PNL TOTAL CA: CPT | Performed by: PHYSICIAN ASSISTANT

## 2021-05-30 RX ORDER — WARFARIN SODIUM 2 MG/1
4 TABLET ORAL
Status: COMPLETED | OUTPATIENT
Start: 2021-05-30 | End: 2021-05-30

## 2021-05-30 RX ORDER — ALBUTEROL SULFATE 90 UG/1
2 AEROSOL, METERED RESPIRATORY (INHALATION) 4 TIMES DAILY
Status: DISCONTINUED | OUTPATIENT
Start: 2021-05-30 | End: 2021-06-01 | Stop reason: HOSPADM

## 2021-05-30 RX ADMIN — WARFARIN SODIUM 4 MG: 2 TABLET ORAL at 17:29

## 2021-05-30 RX ADMIN — LACOSAMIDE 50 MG: 50 TABLET, FILM COATED ORAL at 20:37

## 2021-05-30 RX ADMIN — MULTIPLE VITAMINS W/ MINERALS TAB 1 TABLET: TAB at 09:17

## 2021-05-30 RX ADMIN — LACOSAMIDE 50 MG: 50 TABLET, FILM COATED ORAL at 09:17

## 2021-05-30 RX ADMIN — ATORVASTATIN CALCIUM 10 MG: 10 TABLET, FILM COATED ORAL at 20:37

## 2021-05-30 NOTE — PLAN OF CARE
PRIMARY DIAGNOSIS: GENERALIZED WEAKNESS    OUTPATIENT/OBSERVATION GOALS TO BE MET BEFORE DISCHARGE  1. Orthostatic performed: No    2. Tolerating PO medications: Yes    3. Return to near baseline physical activity: No    4. Cleared for discharge by consultants (if involved): No    Vitals are Temp: 98.7  F (37.1  C) Temp src: Axillary BP: (!) 134/94 Pulse: 102   Resp: 16 SpO2: 93 %.  Patient is Disorientated to, Time, Place, and Situation. Denies any pain with interview and no non-verbal sign of pain noted. Denies any SOB. No de-sating noted with repositioning. Refused HS med with multiple attempts, tried meds with apple sauces and she split it back to staff. Provider notified. Un-cooperative with care and treatment. Repositioned and purewick changed and in place. Voiding with no concerns. Bowels are active x4 quads She required extensive assist of 2 with bed mobility.  Hasn't been out of bed during shift.  Covid Isolation precaution.  Pt is a Regular diet. Patient is Saline locked. Interpretation of cardiac rhythm per telemetry tech: Afib 95. Will Continue to monitor.       Discharge Planner Nurse   Safe discharge environment identified: Yes  Barriers to discharge: Yes       Entered by: Jovita Huggins 05/29/2021 10:02 PM     Please review provider order for any additional goals.   Nurse to notify provider when observation goals have been met and patient is ready for discharge.

## 2021-05-30 NOTE — PLAN OF CARE
PRIMARY DIAGNOSIS: Covid  OUTPATIENT/OBSERVATION GOALS TO BE MET BEFORE DISCHARGE:  1. ADLs back to baseline: No    2. Activity and level of assistance: Up with maximum assistance. Consider SW and/or PT evaluation.     3. Pain status: Pain free.    4. Return to near baseline physical activity: No     Discharge Planner Nurse   Safe discharge environment identified: Yes  Barriers to discharge: No       Entered by: Nita Ayon 05/30/2021 5:54 PM     Please review provider order for any additional goals.   Nurse to notify provider when observation goals have been met and patient is ready for discharge.    Patient was 87% on RA and appeared short of breath, placed on 2L O2. Lungs diminished. Took coumadin whole in applesauce. Purewick in place. Tele - afib rvr v-paced. Plan for TCU at discharge. Will continue to monitor.

## 2021-05-30 NOTE — PLAN OF CARE
PRIMARY DIAGNOSIS: GENERALIZED WEAKNESS, COVID     OUTPATIENT/OBSERVATION GOALS TO BE MET BEFORE DISCHARGE  1. Orthostatic performed: N/A    2. Tolerating PO medications: Yes    3. Return to near baseline physical activity: No    4. Cleared for discharge by consultants (if involved): No    Patient alert, oriented only to self. Pleasant, though uncooperative with cares. Vitals are Temp: 98.7  F (37.1  C) Temp src: Axillary BP: (!) 134/94 Pulse: 102   Resp: 16 SpO2: 93 % RA. Denies pain. Patient up with assist of 2 with walker. Patient ate and drank well at supper time. Denies shortness of breath and difficulty breathing. IV saline locked. PT consulted, recommending TCU. Purewick in place. Patient turned every 2 hours. Continuing with supportive cares and symptom management. COVID-19 precautions maintained.     Discharge Planner Nurse   Safe discharge environment identified: No  Barriers to discharge: Yes       Entered by: Anastasia Argueta 05/29/2021 7:17 PM     Please review provider order for any additional goals.   Nurse to notify provider when observation goals have been met and patient is ready for discharge.

## 2021-05-30 NOTE — PROGRESS NOTES
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 Asthma, diaphragmatic hernia, Type 2 diabetes mellitus, large left MCA territory CVA 2020 , encephalomalacia, cognitive impairment, possible prior seizure on anti-epileptics, who presents with systemic weakness, decreased appetite.  The patient had symptom onset on 5/26/2021 with malaise, poor oral intake, nonproductive cough.  The patient had a positive COVID-19 PCR on 5/28/2021.  The patient remained off supplemental oxygen.  Patient was seen by physical therapy who recommended TCU.  The patient's daughter Hilton was agreeable to this.  Social work was consulted for placement.    Problem List:   1.  Acute COVID 19 Viral Infection -   - Symptom Onset:  5/26/2021 - malaise, decreased oral intake, nonproductive cough  - Positive Covid 19 PCR:  5/28/2021  - On admission no evidence of hypoxic respiratory failure or associated pneumonia. Initial chest x-ray without opacities.  - Continuous O2 monitoring  - Monitor SpO2, if hypoxic start Decadron   - Procal = 0.17  - resume PTA bronchodilators/nebs as needed, currently no significant wheezing on evaluation  - analgesics, supportive cares  - PT/OT evaluation - recommending TCU.  Pt's daughter Hilton is agreeable.  Discussed with Social Work.  - Patient's daughter has plans to enroll patient into a group home starting on June 2.     2. Acute Infectious Encephalopathy  Cognitive Impairment -   I see no prior diagnosis of dementia but looks she has scored in the 20 range on SLUMS prior to stroke in 2020, probable element of underlying vascular  "dementia with contribution of acute infectious encephalopathy with COVID 19 infection. Per daughter has history of \"memory problems\" and at her baseline but weak.  No falls, trauma, or recent medication changes. No seizure like activity.  Pt has had progressive decline in functional status over the last 6 months since a seizure in December 2020.  - recommend OT going forward, more formal testing with a neurologist if they desire to more fully evaluate   - bedside attendant prn, currently calm and cooperative do not think will require given profound global weakness  - will need placement- PT/OT/SW consults     3. Subtherapeutic INR -  INR 1.58 on admission, P.o. intake has been poor.  Looks this has been a bit labile to manage as outpatient, 1.8 <- 5.4 in April.  - Pharmacy consult to resume warfarin  - if difficulty managing INR recommend partial bridge with lovenox in the setting of acute COVID infection   - Pt refused warfarin last night so given dose of lovenox subcutaneous.  Per pt's daughter, if pt refuses medications then reattempt after 5 minutes and pt will often agree to it.     4. Atrial Fibrillation  Bradycardia s/p DPPM  HLD -  Rate controlled. Chronic.   Mod MR, TR, mildly dilated AA, LVEF est 55-60% on TTE 9/2020.   - Resume prior to admission warfarin, pharmacy consult to assist in dosing     5. CVA -   History of large left MCA territory stroke not treated with TPA, prior right basal ganglia infarct.  - resume PTA Lacosamide  - resume statin at discharge (obs status)     6. Asthma v. COPD -does not appear acutely exacerbated, not on chronic oxygen therapy.  Resume PTA nebulizer treatments.     TODAY'S PLAN: At this point the patient is medically stable for discharge.  PT recommending TCU and daughter is agreeable.  Discussed with social work.  We will plan for discharge to TCU pending acceptance.    DVT Prophylaxis: Enoxaparin (Lovenox) SQ and Warfarin  Code Status: No CPR- Do NOT Intubate  Diet: " Combination Diet Regular Diet Adult    Sosa Catheter: not present  Disposition: Expected discharge pending bed availability.   Family updated today: Yes      Interval History   Pt seen and examined.  Pt sleeping on arrival.  Arousable.  Denies sob.  States she is hungry.  Thinks she is at home.    -Data reviewed today: I personally reviewed all new labs and imaging results over the last 24 hours.     Physical Exam   Temp: 98.4  F (36.9  C) Temp src: Axillary BP: 112/72 Pulse: 75   Resp: 30 SpO2: 91 % O2 Device: None (Room air)    Vitals:    05/29/21 0420   Weight: 67 kg (147 lb 9.6 oz)     Vital Signs with Ranges  Temp:  [98.3  F (36.8  C)-98.8  F (37.1  C)] 98.4  F (36.9  C)  Pulse:  [] 75  Resp:  [16-30] 30  BP: (112-134)/(67-94) 112/72  SpO2:  [91 %-95 %] 91 %  I/O last 3 completed shifts:  In: -   Out: 475 [Urine:475]    GENERAL: No apparent distress. Awake, alert, and fully oriented.  HEENT: Normocephalic, atraumatic. Extraocular movements intact.  CARDIOVASCULAR: Regular rate and rhythm without murmurs or rubs. No S3.  PULMONARY: Clear bilaterally.  GASTROINTESTINAL: Soft, non-tender, non-distended. Bowel sounds normoactive.   EXTREMITIES: No cyanosis or clubbing. No edema.  NEUROLOGICAL: CN 2-12 grossly intact, no focal neurological deficits.  DERMATOLOGICAL: No rash, ulcer, bruising, nor jaundice.    Medications     - MEDICATION INSTRUCTIONS -       Warfarin Therapy Reminder         albuterol  2 puff Inhalation 4x Daily     atorvastatin  10 mg Oral At Bedtime     lacosamide  50 mg Oral BID     multivitamin w/minerals  1 tablet Oral Daily     sodium chloride (PF)  3 mL Intracatheter Q8H     warfarin ANTICOAGULANT  4 mg Oral ONCE at 18:00     Data     Laboratory:  Recent Labs   Lab 05/30/21  0649 05/29/21  0633 05/28/21  1902   WBC 3.8* 3.3* 4.5   HGB 11.7 11.9 12.6   HCT 34.1* 34.5* 37.4   MCV 94 96 96   PLT 99* 106* 117*     Recent Labs   Lab 05/30/21  0649 05/29/21  0633 05/28/21  1902    140  137   POTASSIUM 3.6 3.5 4.1   CHLORIDE 108 108 105   CO2 27 27 28   ANIONGAP 4 6 4   GLC 91 86 139*   BUN 12 16 17   CR 0.68 0.76 0.83   GFRESTIMATED 76 68 61   GFRESTBLACK 88 79 71   SHAN 8.3* 8.6 8.8     Recent Labs   Lab 05/30/21  0649 05/29/21  0633   CRP 36.4* 27.1*     Recent Labs   Lab 05/30/21  0649   AST 29   ALT 21   ALKPHOS 49   BILITOTAL 0.5     No results for input(s): CULT in the last 168 hours.    Imaging:  No results found for this or any previous visit (from the past 24 hour(s)).      Hemant Avila DO  Formerly [facility] Hospitalist  [address] [place] [address]  05/30/2021

## 2021-05-30 NOTE — PROGRESS NOTES
Care Management Follow Up    Length of Stay (days): 0    Expected Discharge Date: 05/30/21     Concerns to be Addressed: discharge planning     Patient plan of care discussed at interdisciplinary rounds: Yes    Anticipated Discharge Disposition: Transitional Care     Anticipated Discharge Services:  therapy  Anticipated Discharge DME: Oxygen    Patient/family educated on Medicare website which has current facility and service quality ratings: yes  Education Provided on the Discharge Plan:  yes  Patient/Family in Agreement with the Plan: yes    Referrals Placed by CM/SW:  [facility] and [facility]  Private pay costs discussed: private room/amenity fees would like shared room.  Transportation. Reviewed out of pocket cost for [facility] stretcher transport, $1042.75 for base rate and $25 per mile to the destination. Pt/family are thinking about this.    Additional Information:  Spoke with pt dtg Hilton regarding TCU placement.  Educated Hilton that there are only two TCU in the area currently accepting COVID + pt.  Referrals were sent to [facility] and [facility] (first choice). Will continue to follow for discharge planning.    Mandi Bush RN, BSN, PHN, CTS  Care Coordinator  [facility]  [phone]        Addendum 1540:  [facility] does not have beds.  Pt has been accepted at [facility] p [phone] fax [phone].  CTS completed and faxed insurance authorization to [employer].  Called and left  for dtg updating her on discharge plan.

## 2021-05-30 NOTE — PROGRESS NOTES
XCOVER:    Pt subtherapeutic INR, refusing PO warfarin and lacosamide this evening.   Give one time Lovenox dose partial bridge 40 mg.  Recheck INR in the morning.  Give one time IV dose lacosamide.

## 2021-05-30 NOTE — PLAN OF CARE
Attempted to see pt., however, pt. Planning to discharge in afternoon to TCU. Will attempt if pt.'s discharge status is changed.

## 2021-05-30 NOTE — PLAN OF CARE
PRIMARY DIAGNOSIS: GENERALIZED WEAKNESS    OUTPATIENT/OBSERVATION GOALS TO BE MET BEFORE DISCHARGE  1. Orthostatic performed: No    2. Tolerating PO medications: Yes    3. Return to near baseline physical activity: No    4. Cleared for discharge by consultants (if involved): No    Vitals are Temp: 98.4  F (36.9  C) Temp src: Axillary BP: 127/81 Pulse: 82   Resp: 18 SpO2: 92 %.    Patient is Disorientated to, Time, Place, and Situation. Denies any pain with interview and no non-verbal sign of pain noted. Denies any SOB. No de-sating noted with repositioning. Repositioned every 2 hours and purewick in place. Voiding with no concerns. Bowels are active x4 quads, non-tender.  She required extensive assist of 2 with bed mobility.  Hasn't been out of bed during shift.  Covid Isolation precaution.  Pt is a Regular diet. Patient is Saline locked. Interpretation of cardiac rhythm per telemetry tech: Afib HR 83. Plan is PT consulted, recommending TCU. SW is following for discharge consult. Patient's daughter has plans to enroll patient into a group home starting on June 2.    Discharge Planner Nurse   Safe discharge environment identified: Yes  Barriers to discharge: Yes       Entered by: Jovita Huggins 05/30/2021 4:53 AM     Please review provider order for any additional goals.   Nurse to notify provider when observation goals have been met and patient is ready for discharge.

## 2021-05-30 NOTE — PLAN OF CARE
PRIMARY DIAGNOSIS: GENERALIZED WEAKNESS, COVID     OUTPATIENT/OBSERVATION GOALS TO BE MET BEFORE DISCHARGE  1. Orthostatic performed: N/A    2. Tolerating PO medications: Yes    3. Return to near baseline physical activity:  Up with assist of 2 with walker and gait belt     4. Cleared for discharge by consultants (if involved): No    Patient alert, oriented only to self. Pleasant and cooperative with cares. Vitals are Temp: 98.4  F (36.9  C) Temp src: Axillary BP: 112/72 Pulse: 75   Resp: 30 SpO2: 91 % RA. Denies pain, nonverbal indicators of pain are absent. Tolerating regular diet, patient ate ~25% of breakfast when fed by staff. Lung sounds diminished. A fib 60-70s per tele tech. COVID precautions maintained. IV saline locked. PT recommending TCU, SW assisting with placement. Continuing with supportive cares and symptom management.     Discharge Planner Nurse   Safe discharge environment identified: Yes  Barriers to discharge: No       Entered by: Anastasia Argueta 05/30/2021      Please review provider order for any additional goals.   Nurse to notify provider when observation goals have been met and patient is ready for discharge.

## 2021-05-30 NOTE — PLAN OF CARE
Physical Therapy Discharge Summary    Reason for therapy discharge:    Discharged to transitional care facility.    Progress towards therapy goal(s). See goals on Care Plan in [facility] electronic health record for goal details.  Goals not met.  Barriers to achieving goals:   discharge from facility.    Therapy recommendation(s):    Continued therapy is recommended.  Rationale/Recommendations:  Recommend continued therapy in TCU setting to increase independence with mobility skills and reduce burden of care.

## 2021-05-30 NOTE — PLAN OF CARE
PRIMARY DIAGNOSIS: GENERALIZED WEAKNESS    OUTPATIENT/OBSERVATION GOALS TO BE MET BEFORE DISCHARGE  1. Orthostatic performed: No    2. Tolerating PO medications: Yes    3. Return to near baseline physical activity: No    4. Cleared for discharge by consultants (if involved): No    Vitals are Temp: 98.6  F (37  C) Temp src: Axillary BP: (!) 132/91 Pulse: 102   Resp: 18 SpO2: 94 %.    Patient is Disorientated to, Time, Place, and Situation. Denies any pain with interview and no non-verbal sign of pain noted. Denies any SOB. No de-sating noted with repositioning. Repositioned every 2 hours and purewick in place. Voiding with no concerns. Bowels are active x4 quads, non-tender.  She required extensive assist of 2 with bed mobility.  Hasn't been out of bed during shift.  Covid Isolation precaution.  Pt is a Regular diet. Patient is Saline locked. Interpretation of cardiac rhythm per telemetry tech: Afib HR 74. Will Continue to monitor.       Discharge Planner Nurse   Safe discharge environment identified: Yes  Barriers to discharge: Yes       Entered by: Jovita Huggins 05/30/2021 2:14 AM     Please review provider order for any additional goals.   Nurse to notify provider when observation goals have been met and patient is ready for discharge.

## 2021-05-30 NOTE — PROGRESS NOTES
Pt is agitated about nebs and inhalers and thinks she shouldn't be doing them.      Will continue to educate and follow and assess.    Michelle Corrales, RT on 5/30/2021 at 2:27 PM

## 2021-05-31 ENCOUNTER — APPOINTMENT (OUTPATIENT)
Dept: GENERAL RADIOLOGY | Facility: CLINIC | Age: 86
End: 2021-05-31
Attending: INTERNAL MEDICINE
Payer: COMMERCIAL

## 2021-05-31 LAB
ANION GAP SERPL CALCULATED.3IONS-SCNC: 2 MMOL/L (ref 3–14)
BUN SERPL-MCNC: 13 MG/DL (ref 7–30)
CALCIUM SERPL-MCNC: 8.4 MG/DL (ref 8.5–10.1)
CHLORIDE SERPL-SCNC: 107 MMOL/L (ref 94–109)
CO2 SERPL-SCNC: 31 MMOL/L (ref 20–32)
CREAT SERPL-MCNC: 0.8 MG/DL (ref 0.52–1.04)
ERYTHROCYTE [DISTWIDTH] IN BLOOD BY AUTOMATED COUNT: 13.4 % (ref 10–15)
GFR SERPL CREATININE-BSD FRML MDRD: 64 ML/MIN/{1.73_M2}
GLUCOSE SERPL-MCNC: 116 MG/DL (ref 70–99)
HCT VFR BLD AUTO: 33.6 % (ref 35–47)
HGB BLD-MCNC: 11.4 G/DL (ref 11.7–15.7)
INR PPP: 1.49 (ref 0.86–1.14)
MCH RBC QN AUTO: 32.1 PG (ref 26.5–33)
MCHC RBC AUTO-ENTMCNC: 33.9 G/DL (ref 31.5–36.5)
MCV RBC AUTO: 95 FL (ref 78–100)
PLATELET # BLD AUTO: 112 10E9/L (ref 150–450)
POTASSIUM SERPL-SCNC: 3.6 MMOL/L (ref 3.4–5.3)
RBC # BLD AUTO: 3.55 10E12/L (ref 3.8–5.2)
SODIUM SERPL-SCNC: 140 MMOL/L (ref 133–144)
WBC # BLD AUTO: 4.4 10E9/L (ref 4–11)

## 2021-05-31 PROCEDURE — 99226 PR SUBSEQUENT OBSERVATION CARE,LEVEL III: CPT | Performed by: INTERNAL MEDICINE

## 2021-05-31 PROCEDURE — 250N000013 HC RX MED GY IP 250 OP 250 PS 637: Performed by: PHYSICIAN ASSISTANT

## 2021-05-31 PROCEDURE — G0378 HOSPITAL OBSERVATION PER HR: HCPCS

## 2021-05-31 PROCEDURE — 250N000013 HC RX MED GY IP 250 OP 250 PS 637: Performed by: INTERNAL MEDICINE

## 2021-05-31 PROCEDURE — 36415 COLL VENOUS BLD VENIPUNCTURE: CPT | Performed by: PHYSICIAN ASSISTANT

## 2021-05-31 PROCEDURE — 71045 X-RAY EXAM CHEST 1 VIEW: CPT

## 2021-05-31 PROCEDURE — 96361 HYDRATE IV INFUSION ADD-ON: CPT

## 2021-05-31 PROCEDURE — 80048 BASIC METABOLIC PNL TOTAL CA: CPT | Performed by: PHYSICIAN ASSISTANT

## 2021-05-31 PROCEDURE — 85027 COMPLETE CBC AUTOMATED: CPT | Performed by: PHYSICIAN ASSISTANT

## 2021-05-31 PROCEDURE — 258N000003 HC RX IP 258 OP 636: Performed by: PHYSICIAN ASSISTANT

## 2021-05-31 PROCEDURE — 85610 PROTHROMBIN TIME: CPT | Performed by: PHYSICIAN ASSISTANT

## 2021-05-31 RX ORDER — SODIUM CHLORIDE, SODIUM LACTATE, POTASSIUM CHLORIDE, CALCIUM CHLORIDE 600; 310; 30; 20 MG/100ML; MG/100ML; MG/100ML; MG/100ML
INJECTION, SOLUTION INTRAVENOUS CONTINUOUS
Status: ACTIVE | OUTPATIENT
Start: 2021-05-31 | End: 2021-06-01

## 2021-05-31 RX ORDER — WARFARIN SODIUM 4 MG/1
4 TABLET ORAL
Status: COMPLETED | OUTPATIENT
Start: 2021-05-31 | End: 2021-05-31

## 2021-05-31 RX ORDER — FAMOTIDINE 20 MG/1
20 TABLET, FILM COATED ORAL DAILY
Status: DISCONTINUED | OUTPATIENT
Start: 2021-05-31 | End: 2021-05-31

## 2021-05-31 RX ORDER — FAMOTIDINE 10 MG
10 TABLET ORAL 2 TIMES DAILY
Status: DISCONTINUED | OUTPATIENT
Start: 2021-05-31 | End: 2021-06-01 | Stop reason: HOSPADM

## 2021-05-31 RX ORDER — VITAMIN B COMPLEX
50 TABLET ORAL DAILY
Status: DISCONTINUED | OUTPATIENT
Start: 2021-05-31 | End: 2021-06-01 | Stop reason: HOSPADM

## 2021-05-31 RX ADMIN — LACOSAMIDE 50 MG: 50 TABLET, FILM COATED ORAL at 21:36

## 2021-05-31 RX ADMIN — ATORVASTATIN CALCIUM 10 MG: 10 TABLET, FILM COATED ORAL at 21:35

## 2021-05-31 RX ADMIN — SODIUM CHLORIDE, POTASSIUM CHLORIDE, SODIUM LACTATE AND CALCIUM CHLORIDE: 600; 310; 30; 20 INJECTION, SOLUTION INTRAVENOUS at 18:46

## 2021-05-31 RX ADMIN — FAMOTIDINE 10 MG: 10 TABLET, FILM COATED ORAL at 21:36

## 2021-05-31 RX ADMIN — MULTIPLE VITAMINS W/ MINERALS TAB 1 TABLET: TAB at 08:52

## 2021-05-31 RX ADMIN — Medication 250 MG: at 14:40

## 2021-05-31 RX ADMIN — WARFARIN SODIUM 4 MG: 4 TABLET ORAL at 18:41

## 2021-05-31 RX ADMIN — FAMOTIDINE 10 MG: 10 TABLET, FILM COATED ORAL at 14:37

## 2021-05-31 RX ADMIN — Medication 50 MCG: at 14:37

## 2021-05-31 RX ADMIN — LACOSAMIDE 50 MG: 50 TABLET, FILM COATED ORAL at 08:52

## 2021-05-31 NOTE — PLAN OF CARE
PRIMARY DIAGNOSIS: GENERALIZED WEAKNESS and COVID (+)    OUTPATIENT/OBSERVATION GOALS TO BE MET BEFORE DISCHARGE  1. Orthostatic performed: No    2. Tolerating PO medications: Yes    3. Return to near baseline physical activity: No    4. Cleared for discharge by consultants (if involved): No    Vitals are Temp: 98.6  F (37  C) Temp src: Axillary BP: 104/70 Pulse: 84   Resp: 22 SpO2: 94 %.    Patient is Alert to self only. Disorientated to time, place and situation. Patient has been very restless the past couple of hours. Vital signs are WNL. Removed her O2 and will not keep on. O2 sats fluctuating between 89-94% on RA. They are 2 assist with Gait Belt and Walker.  Patient is on Airborne precuations for COVID 19.  Pt is a Regular diet, requires feeding assistance. Monitoring telemetry. Per tele tech, patient is running A. Fib with HR in the 80's. Lung sounds have crackles and are diminished bilaterally. They are not showing any nonverbal signs of being in pain. Patient is Saline locked. PT recommending TCU. Referrals placed per  note.    Discharge Planner Nurse   Safe discharge environment identified: Yes - referrals pending  Barriers to discharge: No       Entered by: Luz Maria Encarnacion 05/31/2021 4:00 AM     Please review provider order for any additional goals.   Nurse to notify provider when observation goals have been met and patient is ready for discharge.

## 2021-05-31 NOTE — PLAN OF CARE
PRIMARY DIAGNOSIS: GENERALIZED WEAKNESS and COVID (+)    OUTPATIENT/OBSERVATION GOALS TO BE MET BEFORE DISCHARGE  1. Orthostatic performed: No    2. Tolerating PO medications: Yes    3. Return to near baseline physical activity: No    4. Cleared for discharge by consultants (if involved): No    Vitals are Temp: 99.1  F (37.3  C) Temp src: Axillary BP: 115/72 Pulse: 114   Resp: 20 SpO2: 95 %.    Patient is Alert to self only. Disorientated to time, place and situation. Requires a lot of cueing but overall is very pleasant. Vital signs are WNL. Currently on 2L O2. They are 2 assist with Gait Belt and Walker.  Patient is on Airborne precuations for COVID 19.  Pt is a Regular diet, requires feeding assistance. Monitoring telemetry. Per tele tech, patient is running A. Fib with HR 85. Lung sounds have crackles and are diminished bilaterally. They are not showing any nonverbal signs of being in pain. Patient is Saline locked. PT recommending TCU. Referrals placed per  note.    Discharge Planner Nurse   Safe discharge environment identified: Yes - referrals pending  Barriers to discharge: No       Entered by: Luz Maria Encarnacion 05/31/2021 12:46 AM     Please review provider order for any additional goals.   Nurse to notify provider when observation goals have been met and patient is ready for discharge.

## 2021-05-31 NOTE — PLAN OF CARE
"PRIMARY DIAGNOSIS: GENERALIZED WEAKNESS and COVID +    OUTPATIENT/OBSERVATION GOALS TO BE MET BEFORE DISCHARGE  1. Orthostatic performed: N/A    2. Tolerating PO medications: Yes    3. Return to near baseline physical activity: No    4. Cleared for discharge by consultants (if involved): No    Discharge Planner Nurse   Safe discharge environment identified: No  Barriers to discharge: Yes       Entered by: Elizabeth Solis 05/31/2021 5:41 PM     Please review provider order for any additional goals.   Nurse to notify provider when observation goals have been met and patient is ready for discharge.    /88 (BP Location: Left arm)   Pulse 78   Temp 98.6  F (37  C) (Axillary)   Resp 20   Ht 1.626 m (5' 4\")   Wt 67 kg (147 lb 9.6 oz)   SpO2 97%   BMI 25.34 kg/m      Disoriented x3, Ax2, refused lunch, total feed, tele- demand v paced w/ underlying afib CVR, lungs- wheezes, bowels- active, incontinent, purewick in place, loss of IV access, weaned to 1L O2 from 5L O2. Purewick changed 1530. Referrals sent. PT recommending TCU  "

## 2021-05-31 NOTE — PROGRESS NOTES
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 Asthma, diaphragmatic hernia, Type 2 diabetes mellitus, large left MCA territory CVA 2020 , encephalomalacia, cognitive impairment, possible prior seizure on anti-epileptics, who presents with systemic weakness, decreased appetite.  The patient had symptom onset on 5/26/2021 with malaise, poor oral intake, nonproductive cough.  The patient had a positive COVID-19 PCR on 5/28/2021.  The patient remained off supplemental oxygen.  Patient was seen by physical therapy who recommended TCU.  The patient's daughter Hilton was agreeable to this.  Social work was consulted for placement.  On 5/31/2021, the patient had increased oxygen requirements.  A chest x-ray showed clear lungs and no acute process.    Problem List:   1.  Acute COVID 19 Viral Infection -   - Symptom Onset:  5/26/2021 - malaise, decreased oral intake, nonproductive cough  - Positive Covid 19 PCR:  5/28/2021  - On admission no evidence of hypoxic respiratory failure or associated pneumonia. Initial chest x-ray without opacities.  - Continuous O2 monitoring  - Monitor SpO2, if hypoxic start Decadron   - Procal = 0.17  - resume PTA bronchodilators/nebs as needed, currently no significant wheezing on evaluation  - analgesics, supportive cares  - PT/OT evaluation - recommending TCU.  Pt's daughter Hilton is agreeable.  Discussed with Social Work.  - Patient's daughter has plans to enroll patient into a group home starting on June 2.     2. Acute Infectious Encephalopathy  Cognitive Impairment -   I see no prior diagnosis of  "dementia but looks she has scored in the 20 range on SLUMS prior to stroke in 2020, probable element of underlying vascular dementia with contribution of acute infectious encephalopathy with COVID 19 infection. Per daughter has history of \"memory problems\" and at her baseline but weak.  No falls, trauma, or recent medication changes. No seizure like activity.  Pt has had progressive decline in functional status over the last 6 months since a seizure in December 2020.  - recommend OT going forward, more formal testing with a neurologist if they desire to more fully evaluate   - bedside attendant prn, currently calm and cooperative do not think will require given profound global weakness  - will need placement- PT/OT/SW consults     3. Subtherapeutic INR -  INR 1.58 on admission, P.o. intake has been poor.  Looks this has been a bit labile to manage as outpatient, 1.8 <- 5.4 in April.  - Pharmacy consult to resume warfarin  - if difficulty managing INR recommend partial bridge with lovenox in the setting of acute COVID infection   - Pt refused warfarin last night so given dose of lovenox subcutaneous.  Per pt's daughter, if pt refuses medications then reattempt after 5 minutes and pt will often agree to it.     4. Atrial Fibrillation  Bradycardia s/p DPPM  HLD -  Rate controlled. Chronic.   Mod MR, TR, mildly dilated AA, LVEF est 55-60% on TTE 9/2020.   - Resume prior to admission warfarin, pharmacy consult to assist in dosing     5. CVA -   History of large left MCA territory stroke not treated with TPA, prior right basal ganglia infarct.  - resume PTA Lacosamide  - resume statin at discharge (obs status)     6. Asthma v. COPD -does not appear acutely exacerbated, not on chronic oxygen therapy.  Resume PTA nebulizer treatments.     7.  Acute Hypoxic Respiratory Failure  - Continue combivent QID  - CXR shows no acute process and pt is not wheezy.  Will hold off on steroids at this time  - Recommend weaning O2 as able " and increasing ambulation/activity  - Encourage incentive spirometry    TODAY'S PLAN:  Wean oxygen as able.  Pt with increasing oxygen requirements this morning.  CXR unremarkable.  Encourage incentive spirometry.  Discussed with pts daughter Hilton via phone.  All questions answered.    DVT Prophylaxis: Warfarin  Code Status: No CPR- Do NOT Intubate  Diet: Combination Diet Regular Diet Adult    Sosa Catheter: not present  Disposition: Expected discharge pending TCU bed availability.   Family updated today: Yes      Interval History   Pt seen and examined.  Pt denies any sob or cough.  Appears comfortable.    -Data reviewed today: I personally reviewed all new labs and imaging results over the last 24 hours.     Physical Exam   Temp: 98.2  F (36.8  C) Temp src: Axillary BP: 107/75 Pulse: 71   Resp: 24 SpO2: 96 % O2 Device: Nasal cannula Oxygen Delivery: 2 LPM  Vitals:    05/29/21 0420   Weight: 67 kg (147 lb 9.6 oz)     Vital Signs with Ranges  Temp:  [98.2  F (36.8  C)-99.7  F (37.6  C)] 98.2  F (36.8  C)  Pulse:  [] 71  Resp:  [16-24] 24  BP: (104-121)/(70-82) 107/75  SpO2:  [87 %-96 %] 96 %  I/O last 3 completed shifts:  In: -   Out: 200 [Urine:200]    GENERAL: No apparent distress. Awake, alert, and fully oriented.  HEENT: Normocephalic, atraumatic. Extraocular movements intact.  CARDIOVASCULAR: Regular rate and rhythm without murmurs or rubs. No S3.  PULMONARY: Clear bilaterally.  GASTROINTESTINAL: Soft, non-tender, non-distended. Bowel sounds normoactive.   EXTREMITIES: No cyanosis or clubbing. No edema.  NEUROLOGICAL: CN 2-12 grossly intact, no focal neurological deficits.  DERMATOLOGICAL: No rash, ulcer, bruising, nor jaundice.    Medications     - MEDICATION INSTRUCTIONS -       Warfarin Therapy Reminder         albuterol  2 puff Inhalation 4x Daily     atorvastatin  10 mg Oral At Bedtime     famotidine  10 mg Oral BID     lacosamide  50 mg Oral BID     multivitamin w/minerals  1 tablet Oral Daily      sodium chloride (PF)  3 mL Intracatheter Q8H     vitamin C  250 mg Oral Daily     cholecalciferol  50 mcg Oral Daily     warfarin ANTICOAGULANT  4 mg Oral ONCE at 18:00     Data     Laboratory:  Recent Labs   Lab 05/31/21  0635 05/30/21  0649 05/29/21  0633   WBC 4.4 3.8* 3.3*   HGB 11.4* 11.7 11.9   HCT 33.6* 34.1* 34.5*   MCV 95 94 96   * 99* 106*     Recent Labs   Lab 05/31/21  0635 05/30/21  0649 05/29/21  0633    139 140   POTASSIUM 3.6 3.6 3.5   CHLORIDE 107 108 108   CO2 31 27 27   ANIONGAP 2* 4 6   * 91 86   BUN 13 12 16   CR 0.80 0.68 0.76   GFRESTIMATED 64 76 68   GFRESTBLACK 74 88 79   SHAN 8.4* 8.3* 8.6     No results for input(s): CULT in the last 168 hours.    Imaging:  Recent Results (from the past 24 hour(s))   XR Chest Port 1 View    Narrative    CHEST ONE VIEW PORTABLE    5/31/2021 11:30 AM     HISTORY: Hypoxia    COMPARISON: Chest x-ray 5/28/2021.      Impression    IMPRESSION: Portable chest. Lungs are clear. Heart is normal in size.  No pneumothorax. No definite pleural effusions. Implantable cardiac  device and both leads appear stable in position.    MD Hemant RICK DO  Formerly [facility] Hospitalist  [address] [place] [address]  05/31/2021

## 2021-05-31 NOTE — PLAN OF CARE
"PRIMARY DIAGNOSIS: GENERALIZED WEAKNESS and COVID +    OUTPATIENT/OBSERVATION GOALS TO BE MET BEFORE DISCHARGE  1. Orthostatic performed: N/A    2. Tolerating PO medications: Yes    3. Return to near baseline physical activity: No    4. Cleared for discharge by consultants (if involved): No    Discharge Planner Nurse   Safe discharge environment identified: No  Barriers to discharge: Yes       Entered by: Elizabeth Solis 05/31/2021 5:39 PM     Please review provider order for any additional goals.   Nurse to notify provider when observation goals have been met and patient is ready for discharge.    /88 (BP Location: Left arm)   Pulse 78   Temp 98.6  F (37  C) (Axillary)   Resp 20   Ht 1.626 m (5' 4\")   Wt 67 kg (147 lb 9.6 oz)   SpO2 97%   BMI 25.34 kg/m      Disoriented x3, Ax2, refused lunch, total feed, tele- demand v paced w/ underlying afib CVR, lungs- wheezes, bowels- active, incontinent, purewick in place  "

## 2021-05-31 NOTE — PLAN OF CARE
PRIMARY DIAGNOSIS: GENERALIZED WEAKNESS and COVID (+)    OUTPATIENT/OBSERVATION GOALS TO BE MET BEFORE DISCHARGE  1. Orthostatic performed: No    2. Tolerating PO medications: Yes    3. Return to near baseline physical activity: No    4. Cleared for discharge by consultants (if involved): No    Vitals are Temp: 99.7  F (37.6  C) Temp src: Axillary BP: 121/82 Pulse: 100   Resp: 20 SpO2: 91 %.    Patient is Alert to self only. Disorientated to time, place and situation. Requires a lot of cueing. Vital signs are WNL. Currently on 2L O2. They are 2 assist with Gait Belt and Walker.  Patient is on Airborne precuations for COVID 19.  Pt is a Regular diet, requires feeding assistance. Monitoring telemetry. Per tele tech, patient is running A. Fib with . Lung sounds have crackles and are diminished bilaterally. They are not showing any nonverbal signs of being in pain. Patient is Saline locked. PT recommending TCU. Referrals placed per  note.    Discharge Planner Nurse   Safe discharge environment identified: Yes - referrals pending  Barriers to discharge: No       Entered by: Luz Maria Encarnacion 05/30/2021 8:52 PM     Please review provider order for any additional goals.   Nurse to notify provider when observation goals have been met and patient is ready for discharge.

## 2021-06-01 ENCOUNTER — APPOINTMENT (OUTPATIENT)
Dept: PHYSICAL THERAPY | Facility: CLINIC | Age: 86
End: 2021-06-01
Attending: PHYSICIAN ASSISTANT
Payer: COMMERCIAL

## 2021-06-01 VITALS
DIASTOLIC BLOOD PRESSURE: 75 MMHG | HEART RATE: 69 BPM | WEIGHT: 147.6 LBS | OXYGEN SATURATION: 90 % | RESPIRATION RATE: 16 BRPM | BODY MASS INDEX: 25.2 KG/M2 | TEMPERATURE: 97.2 F | HEIGHT: 64 IN | SYSTOLIC BLOOD PRESSURE: 117 MMHG

## 2021-06-01 LAB
ANION GAP SERPL CALCULATED.3IONS-SCNC: 6 MMOL/L (ref 3–14)
BUN SERPL-MCNC: 13 MG/DL (ref 7–30)
CALCIUM SERPL-MCNC: 8.3 MG/DL (ref 8.5–10.1)
CHLORIDE SERPL-SCNC: 107 MMOL/L (ref 94–109)
CO2 SERPL-SCNC: 26 MMOL/L (ref 20–32)
CREAT SERPL-MCNC: 0.68 MG/DL (ref 0.52–1.04)
CRP SERPL-MCNC: 45.5 MG/L (ref 0–8)
ERYTHROCYTE [DISTWIDTH] IN BLOOD BY AUTOMATED COUNT: 13.5 % (ref 10–15)
GFR SERPL CREATININE-BSD FRML MDRD: 76 ML/MIN/{1.73_M2}
GLUCOSE SERPL-MCNC: 111 MG/DL (ref 70–99)
HCT VFR BLD AUTO: 32.1 % (ref 35–47)
HGB BLD-MCNC: 10.8 G/DL (ref 11.7–15.7)
INR PPP: 1.56 (ref 0.86–1.14)
MCH RBC QN AUTO: 32 PG (ref 26.5–33)
MCHC RBC AUTO-ENTMCNC: 33.6 G/DL (ref 31.5–36.5)
MCV RBC AUTO: 95 FL (ref 78–100)
PLATELET # BLD AUTO: 115 10E9/L (ref 150–450)
POTASSIUM SERPL-SCNC: 3.5 MMOL/L (ref 3.4–5.3)
RBC # BLD AUTO: 3.37 10E12/L (ref 3.8–5.2)
SODIUM SERPL-SCNC: 139 MMOL/L (ref 133–144)
WBC # BLD AUTO: 4.2 10E9/L (ref 4–11)

## 2021-06-01 PROCEDURE — 97164 PT RE-EVAL EST PLAN CARE: CPT | Mod: GP

## 2021-06-01 PROCEDURE — 85027 COMPLETE CBC AUTOMATED: CPT | Performed by: INTERNAL MEDICINE

## 2021-06-01 PROCEDURE — 97530 THERAPEUTIC ACTIVITIES: CPT | Mod: GP

## 2021-06-01 PROCEDURE — 96366 THER/PROPH/DIAG IV INF ADDON: CPT

## 2021-06-01 PROCEDURE — 250N000013 HC RX MED GY IP 250 OP 250 PS 637: Performed by: INTERNAL MEDICINE

## 2021-06-01 PROCEDURE — 86140 C-REACTIVE PROTEIN: CPT | Performed by: INTERNAL MEDICINE

## 2021-06-01 PROCEDURE — G0378 HOSPITAL OBSERVATION PER HR: HCPCS

## 2021-06-01 PROCEDURE — 80048 BASIC METABOLIC PNL TOTAL CA: CPT | Performed by: INTERNAL MEDICINE

## 2021-06-01 PROCEDURE — 94640 AIRWAY INHALATION TREATMENT: CPT

## 2021-06-01 PROCEDURE — 96361 HYDRATE IV INFUSION ADD-ON: CPT

## 2021-06-01 PROCEDURE — 94640 AIRWAY INHALATION TREATMENT: CPT | Mod: 76

## 2021-06-01 PROCEDURE — 85610 PROTHROMBIN TIME: CPT | Performed by: INTERNAL MEDICINE

## 2021-06-01 PROCEDURE — 999N000157 HC STATISTIC RCP TIME EA 10 MIN

## 2021-06-01 PROCEDURE — 36415 COLL VENOUS BLD VENIPUNCTURE: CPT | Performed by: INTERNAL MEDICINE

## 2021-06-01 PROCEDURE — 99217 PR OBSERVATION CARE DISCHARGE: CPT | Performed by: PHYSICIAN ASSISTANT

## 2021-06-01 PROCEDURE — 250N000013 HC RX MED GY IP 250 OP 250 PS 637: Performed by: PHYSICIAN ASSISTANT

## 2021-06-01 RX ORDER — GUAIFENESIN/DEXTROMETHORPHAN 100-10MG/5
5 SYRUP ORAL EVERY 4 HOURS PRN
DISCHARGE
Start: 2021-06-01 | End: 2021-09-22

## 2021-06-01 RX ORDER — BENZONATATE 100 MG/1
100 CAPSULE ORAL 3 TIMES DAILY
Qty: 30 CAPSULE | Refills: 0 | DISCHARGE
Start: 2021-06-01 | End: 2021-09-22

## 2021-06-01 RX ORDER — FAMOTIDINE 10 MG
10 TABLET ORAL 2 TIMES DAILY
DISCHARGE
Start: 2021-06-01

## 2021-06-01 RX ORDER — WARFARIN SODIUM 5 MG/1
5 TABLET ORAL
Status: DISCONTINUED | OUTPATIENT
Start: 2021-06-01 | End: 2021-06-01 | Stop reason: HOSPADM

## 2021-06-01 RX ORDER — WARFARIN SODIUM 5 MG/1
5 TABLET ORAL DAILY
Qty: 30 TABLET | Refills: 0 | DISCHARGE
Start: 2021-06-01 | End: 2021-09-22

## 2021-06-01 RX ADMIN — ALBUTEROL SULFATE 2 PUFF: 90 AEROSOL, METERED RESPIRATORY (INHALATION) at 15:43

## 2021-06-01 RX ADMIN — ALBUTEROL SULFATE 2 PUFF: 90 AEROSOL, METERED RESPIRATORY (INHALATION) at 07:49

## 2021-06-01 RX ADMIN — LACOSAMIDE 50 MG: 50 TABLET, FILM COATED ORAL at 08:41

## 2021-06-01 NOTE — PLAN OF CARE
PRIMARY DIAGNOSIS: GENERALIZED WEAKNESS and COVID +    OUTPATIENT/OBSERVATION GOALS TO BE MET BEFORE DISCHARGE  1. Orthostatic performed: N/A    2. Tolerating PO medications: Yes, whole one at a time in applesauce or pudding.     3. Return to near baseline physical activity: No, Pt. Up wit assist of 2, gait belt and walker. Has not gotten oob this shift.     4. Cleared for discharge by consultants (if involved): No    Discharge Planner Nurse   Safe discharge environment identified: No  Barriers to discharge: Yes       Entered by: Jahaira Arguello 06/01/2021 4:33 AM     Please review provider order for any additional goals.   Nurse to notify provider when observation goals have been met and patient is ready for discharge.     Pt. A&Ox1, when asked what year it is Pt. Responded 1940 something. Pt. Is aware she is in the hospital but not sure why. VSS, on 2L via NC. Pt. Has been compliant with keeping o2 in place. Denies pain, no observed s/s when utilizing FACES scale. PIV has LR at 100 mL/hr. Regular diet-requires a total feed assist. Tele in place, a fib HR 70's per tele tech. Exp. Wheezes noted, Pt. Denies SOB. Purewick in place. Awaiting placement to COVID + TCU. Referrals have been sent. PT/OT and SW consulted. Nursing to continue to monitor and assess Pt. And provide supportive cares.

## 2021-06-01 NOTE — PHARMACY-ANTICOAGULATION SERVICE
Clinical Pharmacy- Warfarin Discharge Note  This patient is currently on warfarin for the treatment of Atrial fibrillation.  INR Goal= 2-3      Warfarin PTA Regimen: 2mg on Sun/Wed and 4mg all other days      Anticoagulation Dose History     Recent Dosing and Labs Latest Ref Rng & Units 9/10/2020 12/3/2020 5/28/2021 5/29/2021 5/30/2021 5/31/2021 6/1/2021    Warfarin 2 mg - - - - - 4 mg - -    Warfarin 4 mg - - - - - - 4 mg -    INR 0.86 - 1.14 1.21(H) 2.39(H) 1.58(H) 1.76(H) 1.80(H) 1.49(H) 1.56(H)          Vitamin K doses administered during the last 7 days:    FFP administered during the last 7 days:      Agree with discharging the patient on a warfarin regimen of 5 mg today (6/1), then check INR on 6/2 for further dosing.  Note: discharging on Lovenox for bridging.   Warfarin PTA Regimen: 2mg on Sun/Wed and 4mg all other days.

## 2021-06-01 NOTE — PROGRESS NOTES
"   06/01/21 1210   Quick Adds   Quick Adds Certification   Type of Visit PT Re-evaluation   Living Environment   People in home child(ahmet), adult   Current Living Arrangements house   Living Environment Comments Patient to move to group home; however group home unable to accept due to acute COVID infection   Self-Care   Usual Activity Tolerance other (see comments)  (Unable to formally assess due to pt.'s cognition. )   Current Activity Tolerance other (see comments)  (Unable to formally assess due to pt.'s cognition.)   Equipment Currently Used at Home other (see comments)  (Unable to formally assess due to pt.'s cognition.)   Disability/Function   Hearing Difficulty or Deaf yes   Patient's preferred means of communication English speaker with hearing loss, no speech problems.   Describe hearing loss bilateral hearing loss   Use of hearing assistive devices none   Were auxiliary aids offered? no   The following aids were provided; patient declined offer of auxiliary aids   Wear Glasses or Blind no   Concentrating, Remembering or Making Decisions Difficulty yes   Change in Functional Status Since Onset of Current Illness/Injury yes   General Information   Onset of Illness/Injury or Date of Surgery 06/28/21   Referring Physician Magalie Avalos PA-C   Patient/Family Therapy Goals Statement (PT) \"I want to go home\"   Pertinent History of Current Problem (include personal factors and/or comorbidities that impact the POC) Radha Cunningham is a 89 year old female with PMHx of atrial fibrillation, 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 Asthma, diaphragmatic hernia, Type 2 diabetes mellitus, large left MCA territory CVA 2020 , encephalomalacia, cognitive impairment, possible prior seizure on anti-epileptics now admitted with Acute COVID 19 viral infection, acute infectious " encephalopathy.   Existing Precautions/Restrictions fall   General Observations Pt. presents lying semi-supine in hospital bed with bed alarm on in no acute distress.   Cognition   Orientation Status (Cognition) unable/difficult to assess   Affect/Mental Status (Cognition) emotionally labile   Follows Commands (Cognition) follows one-step commands;follows two-step commands;50-74% accuracy;delayed response/completion;increased processing time needed;initiation impaired;physical/tactile prompts required;repetition of directions required;verbal cues/prompting required   Safety Deficit (Cognition) moderate deficit;ability to follow commands;insight into deficits/self-awareness;judgment;problem-solving   Memory Deficit (Cognition) moderate deficit;short-term memory;immediate recall;procedural memory;working memory   Pain Assessment   Patient Currently in Pain No   Posture    Posture Forward head position;Protracted shoulders;Kyphosis   Range of Motion (ROM)   ROM Comment B LE ROM WFL   Strength   Strength Comments Decreased B LE strength (L > R)   Bed Mobility   Comment (Bed Mobility) Graded assist between min -max A x1   Transfers   Transfer Safety Comments Unable to trial standing secondary to patient's confusion   Gait/Stairs (Locomotion)   Comment (Gait/Stairs) Unable to assess gait secondary to patient's confusion   Balance   Balance Comments Required CGA in sitting (primarily due to impulsivity), unable to assess standing balance   Clinical Impression   Criteria for Skilled Therapeutic Intervention yes, treatment indicated;meets criteria   PT Diagnosis (PT) Decreased independence with mobility   Influenced by the following impairments decreased strength, decreased activity tolerance, decreased balance, confusion   Functional limitations due to impairments Decreased B LE strength, decreased static/dynamic standing balance, decreased cognition   Clinical Presentation Stable/Uncomplicated   Clinical Presentation  Rationale Complex pmh, stable presentation, fair social support   Clinical Decision Making (Complexity) low complexity   Therapy Frequency (PT) 5x/week   Planned Therapy Interventions (PT) balance training;bed mobility training;gait training;motor coordination training;neuromuscular re-education;patient/family education;postural re-education;stair training;strengthening;transfer training   Risk & Benefits of therapy have been explained evaluation/treatment results reviewed;care plan/treatment goals reviewed;risks/benefits reviewed;current/potential barriers reviewed;participants voiced agreement with care plan;participants included;patient   PT Discharge Planning    PT Discharge Recommendation (DC Rec) Transitional Care Facility   PT Rationale for DC Rec Recommend discharge to TCU due to decreased independence with mobility skills, as well as, cognitive deficits limiting overall safety and ability to independently perform ADLs/IADLs.   PT Brief overview of current status  Variable level of assist with mobility from min A to max A   Therapy Certification   Start of care date 06/01/21   Medical Diagnosis COVID-19   Total Evaluation Time   Total Evaluation Time (Minutes) 10

## 2021-06-01 NOTE — PROGRESS NOTES
RT-Pt refused Cpap. Pt currently on 2LPM, 92%. RT will continue to monitor.    Tristen Woods, RT on 6/1/2021 at 3:50 AM

## 2021-06-01 NOTE — PROGRESS NOTES
"Care Management Follow Up    Length of Stay (days): 0    Expected Discharge Date: 06/02/21     Concerns to be Addressed: discharge planning     Patient plan of care discussed at interdisciplinary rounds: Yes    Anticipated Discharge Disposition: Transitional Care vs. Move to Group Home     Patient/family educated on Medicare website which has current facility and service quality ratings: yes  Education Provided on the Discharge Plan:  Yes  Patient/Family in Agreement with the Plan: yes    Referrals Placed by CM/SW:  Skilled Nursing Facility  Private pay costs discussed: transportation costs    Additional Information:  [facility]. [employer] insurance auth faxed in--they need updated PT notes to review. PT re-ordered this morning.      spoke with daughter Hilton via phone. She reports that pt is scheduled to move into [facility] in [place] tomorrow, 6/2.  placed call to supervisor Colette at [facility] ([phone]) to update them that pt is COVID+ as of 5/28 with symptoms starting 5/25. Inquired about admitting to  with COVID precautions. Colette is going to speak with her manager and return call to .     Plan:  (if they can take her on COVID precautions) tomorrow vs. [facility] (COVID+) [facility] once insurance authorization obtained.      will continue to follow.     1020: Call back from [facility]. Since pt tested positive 5/28, they could not accept pt until Monday, June 14.     Plan: [facility] pending insurance authorization.     Your information has been submitted on June 01st, 2021 at 01:29:55 PM CDT. The confirmation number is [id number]    1400: Call placed to [employer], Insurance Authorization obtained. Auth #: [id number].    ALEJANDRO Nugent        "

## 2021-06-01 NOTE — PLAN OF CARE
PRIMARY DIAGNOSIS: GENERALIZED WEAKNESS, COVID      OUTPATIENT/OBSERVATION GOALS TO BE MET BEFORE DISCHARGE  1. Orthostatic performed: N/A    2. Tolerating PO medications: Yes    3. Return to near baseline physical activity: No    4. Cleared for discharge by consultants (if involved): Yes    Patient alert, oriented to self. Disoriented to place, time, and situation. Vitals are Temp: 99.7  F (37.6  C) Temp src: Oral BP: 116/81 Pulse: 83   Resp: 20 SpO2: 94 %RA. Denies pain, nonverbal indicators of pain are absent. Tolerating regular diet, requires feeding for adequate intake. Up with assist of 2 with gait belt and walker at time, other times requires a lift. Patient's mobility depends on her willingness to participate at the time. Up in chair for breakfast. PT recommending TCU. Loose cough noted, lungs diminished. Tele a fib 70-80s. Accepted at [facility] T[facility], waiting authorization. Continuing with supportive cares and symptom management.     Discharge Planner Nurse   Safe discharge environment identified: Yes  Barriers to discharge: No       Entered by: Anastasia Argueta 06/01/2021     Please review provider order for any additional goals.   Nurse to notify provider when observation goals have been met and patient is ready for discharge.

## 2021-06-01 NOTE — PLAN OF CARE
PRIMARY DIAGNOSIS: GENERALIZED WEAKNESS and COVID +    OUTPATIENT/OBSERVATION GOALS TO BE MET BEFORE DISCHARGE  1. Orthostatic performed: N/A    2. Tolerating PO medications: Yes, whole one at a time in applesauce or pudding.     3. Return to near baseline physical activity: No, Pt. Up wit assist of 2, gait belt and walker. Has not gotten oob this shift.     4. Cleared for discharge by consultants (if involved): No    Discharge Planner Nurse   Safe discharge environment identified: No  Barriers to discharge: Yes       Entered by: Jahaira Arguello 06/01/2021 5:07 AM     Please review provider order for any additional goals.   Nurse to notify provider when observation goals have been met and patient is ready for discharge.     Pt. A&Ox1. Pt. Is aware she is in the hospital but not sure why. VSS, on 2L via NC, continuous pulse ox in place. Pt. Has been compliant with keeping o2 in place. BP soft 105/79. Denies pain, none observed s/s when utiliing FACES scale. PIV has LR at 100 mL/hr. Regular diet-requires a total feed assist. Tele in place, a fib HR '60s per tele tech. Exp. Wheezes noted, Pt. Denies SOB. Purewick in place. Awaiting placement to COVID + TCU. Referrals have been sent. PT/OT and SW consulted. Nursing to continue to monitor and assess Pt. And provide supportive cares.

## 2021-06-01 NOTE — PLAN OF CARE
[facility]      OUTPATIENT PHYSICAL THERAPY EVALUATION  PLAN OF TREATMENT FOR OUTPATIENT REHABILITATION  (COMPLETE FOR INITIAL CLAIMS ONLY)  Patient's Last Name, First Name, M.I.  YOB: 1929  Marisa,June P                        Provider's Name  [facility] Medical Record No.  [id number]                               Onset Date:  06/28/21   Start of Care Date:  06/01/21      Type:     _X_PT   ___OT   ___SLP Medical Diagnosis:  COVID-19                        PT Diagnosis:  Decreased independence with mobility   Visits from SOC: 3   _________________________________________________________________________________  Plan of Treatment/Functional Goals    Planned Interventions: balance training, bed mobility training, gait training, motor coordination training, neuromuscular re-education, patient/family education, postural re-education, stair training, strengthening, transfer training     Goals: See Physical Therapy Goals on Care Plan in [employer] electronic health record.    Therapy Frequency: 5x/week  Predicted Duration of Therapy Intervention: 3 days  _________________________________________________________________________________    I CERTIFY THE NEED FOR THESE SERVICES FURNISHED UNDER        THIS PLAN OF TREATMENT AND WHILE UNDER MY CARE     (Physician co-signature of this document indicates review and certification of the therapy plan).                Certification date from: 05/29/21, Certification date to: 06/01/21    Referring Physician: Magalie Avalos PA-C            Initial Assessment        See Physical Therapy evaluation dated 06/01/21 in Epic electronic health record.   Cardiac Catheterization Appropriate Use    [facility] Clinical Frailty Scale     [] 1. Very Fit  [] 2. Well  [] 3. Managing Well  [x] 4. Vulnerable  [] 5. Mildly Frail  [] 6. Moderately Frail [] 7. Severely Frail  [] 8. Very Severely Frail  [] 9. Terminally III        Heart Failure  []  Yes or [x]  No     If Yes,  Newly Diagnosed? []  Yes or []  No     If Yes, Heart Failure Type? []  Diastolic  []  Systolic  [] Unknown              Electrocardiac   Assessment  Method    [] ECG   [x] Telemetry Monitor [] Holter Monitor  [] Other   [] None     If any methods. Results   []Normal  [] Abnormal [] Uninterpretable         If Abnormal, New Antiarrhythmic  Therapy Initiated Prior to Cath Lab   []Yes  []No    If Abnormal,Electrocardiographic   Abnormality Type (select all that apply)      [] VFib   [] Sustained VT  [] Non-sustained VT  [] Exercised Induced VT  [] T wave Inversions    [] ST deviation >=0.5mm [] New LBBB   [] New Onset Afib  [] New Onset Aflutter  [] PVC- frequent  [] PVC- infrequent   [] 2nd Degree AV Block Type 1  [] 2nd Degree AV Block Type 2  [] 3rd Degree AV Block  [] Symptomatic Bradyarrhythmia  [] Other Electrocardiograph Abnormalities         If new onset Afib,     Heart Rate  ______bpm    If Non-sustained VT      (select all that apply) [] Symptomatic [] Newly Diagnosed  [] Other        Stress Test Peformed  []  Yes  [x]  No         If yes, specify test performed and must include risk of ischemia    StressTest Type Test Result Risk of Ischemia   [] Standard Exercise ST             (without imaging)  [] Stress Echo  [] ST Nuclear   [] ST with CMR    [] Positive                   []Negative  [] Indeterminate  [] Unavailable [] High   [] Intermediate  [] Low  [] Unavailable        [] Cardiac CTA (only pick one)     Indication(s) for Cath Lab Visit  [] 3VD   [] 2VD   [] 1VD   [] No Disease       (select all that apply)   [] Indeterminant      [] ACS<=24 hours    [] ACS>24 hours  []New onset angina<=2 months  []  Worsening Angina  [] Resuscitated Cardiac Arrest   [] Stable Known CAD  [] Suspected CAD  [] Valvular Disease  [] Pericardial Disease  [] Pre-Operative Evaluation  []  Syncope []Post-Cardiac Transplant  []Cardiac Arrhythmias   [] Cardiomyopathy  [] LV dysfunction  [] Evaluation for Exercise Clearance  [x] Other         Chest Pain Symptoms     [] Typical Angina      []   Atypical                                Angina      [] Non-anginal Chest Pain [x]Asymptomatic      Cardiovascular Instability  [] Yes  [x]  No     If yes, specify instability type (select all that apply)    [] Persistent Ischemic                     Symptoms(chest pain)   [] Hemodynamic Instability(not        Cardiogenic shock)  [] Cardiogenic Shock  [] Refractory Cardiogenic Shock   [] Acute Heart Failure Symptoms  [] Ventricular Arrhythmia        Evaluation for Surgery []  Cardiac Surgery        []  Non-Cardiac Surgery      Functional Capacity []<4 METS  []>= 4 METS without symptoms  []  >=4 mets with symptoms    []  Unknown     Surgical Risk []  Low     []  Intermediate    []High Risk Vascular  []  High Risk Non-Vascular     Solid Organ Transplant Surgery   []  No  []  Yes                  If yes, Donor   []  No  []  Yes                   If yes, Organ  []  Heart  []  Kidney    []  Liver  []  Lung   []  Pancreas  []  Other Organ

## 2021-06-01 NOTE — PLAN OF CARE
PRIMARY DIAGNOSIS: GENERALIZED WEAKNESS, COVID      OUTPATIENT/OBSERVATION GOALS TO BE MET BEFORE DISCHARGE  1. Orthostatic performed: N/A    2. Tolerating PO medications: Yes    3. Return to near baseline physical activity: No    4. Cleared for discharge by consultants (if involved): Yes    Patient alert, oriented to self. Disoriented to place, time, and situation. Vital signs stable on room air. Denies pain, nonverbal indicators of pain are absent. Tolerating regular diet, requires feeding for adequate intake. Up with assist of 2 with gait belt and walker at time, other times requires a lift. Patient's mobility depends on her willingness to participate at the time. Up in chair for breakfast. PT recommending TCU. Loose cough noted, lungs diminished. Tele a fib 70-80s. Accepted at [facility], waiting authorization. Continuing with supportive cares and symptom management.     Discharge Planner Nurse   Safe discharge environment identified: Yes  Barriers to discharge: No       Entered by: Anastasia Argueta 06/01/2021     Please review provider order for any additional goals.   Nurse to notify provider when observation goals have been met and patient is ready for discharge.

## 2021-06-01 NOTE — PLAN OF CARE
PRIMARY DIAGNOSIS: GENERALIZED WEAKNESS and COVID +    OUTPATIENT/OBSERVATION GOALS TO BE MET BEFORE DISCHARGE  1. Orthostatic performed: N/A    2. Tolerating PO medications: Yes, whole one at a time in applesauce or pudding.     3. Return to near baseline physical activity: No, Pt. Up wit assist of 2, gait belt and walker. Has not gotten oob this shift.     4. Cleared for discharge by consultants (if involved): No    Discharge Planner Nurse   Safe discharge environment identified: No  Barriers to discharge: Yes       Entered by: Jahaira Arguello 06/01/2021 7:45 AM     Please review provider order for any additional goals.   Nurse to notify provider when observation goals have been met and patient is ready for discharge.     Pt. A&Ox1, requires a lot of queuing, Pt. Pleasant. VSS, was able to wheen to RA this AM Pt. Tolerating well. Denies pain, none observed s/s when utilizing FACES scale. PIV SL completed 1L of fluids now SL. Regular diet-requires a total feed assist. Tele in place, a fib HR 84 per tele tech. Exp. Wheezes noted, Pt. Denies SOB. Purewick in place, changed this AM. Keeping HOB elevated for easier breathing. Awaiting placement to COVID + TCU. Referrals have been sent Pt. Has been accepted, no transportation set up at this time. PT/OT and SW consulted. Nursing to continue to monitor and assess Pt. And provide supportive cares.

## 2021-06-01 NOTE — PLAN OF CARE
"PRIMARY DIAGNOSIS: GENERALIZED WEAKNESS, COVID+    OUTPATIENT/OBSERVATION GOALS TO BE MET BEFORE DISCHARGE  1. Orthostatic performed: N/A    2. Tolerating PO medications: Yes    3. Return to near baseline physical activity: No    4. Cleared for discharge by consultants (if involved): Yes    Discharge Planner Nurse   Safe discharge environment identified: Yes  Barriers to discharge: No       Entered by: Dia Parrish 06/01/2021      Vitals: /78 (BP Location: Left arm)   Pulse 85   Temp 94.7  F (34.8  C) (Oral)   Resp 16   Ht 1.626 m (5' 4\")   Wt 67 kg (147 lb 9.6 oz)   SpO2 94%   BMI 25.34 kg/m    BMI= Body mass index is 25.34 kg/m .     Alert to self only.  Denies pain, no nonverbal indicators of pain.  Reg diet - needs help eating.  Up w/ 2 assist or lift depending on willingness to help.  [facility] at 1730.  Will cont to monitor & provide cares.      Please review provider order for any additional goals.   Nurse to notify provider when observation goals have been met and patient is ready for discharge.  "

## 2021-06-01 NOTE — PLAN OF CARE
Patient's After Visit Summary was reviewed with patient.   Patient verbalized understanding of After Visit Summary, recommended follow up and was given an opportunity to ask questions.   Discharge medications sent home with patient/family: No.  Discharged with other: [facility] via stretcher.    OBSERVATION patient END time: 1754.

## 2021-06-01 NOTE — DISCHARGE SUMMARY
[facility]  Discharge Summary        Radha Cunningham MRN# [id number]   YOB: 1929 Age: 89 year old     Date of Admission:  5/28/2021  Date of Discharge:  6/1/2021  5:55 PM  Admitting Physician:  Hemant Avila DO  Discharge Physician: Magalie Avalos PA-C  Discharging Service: Hospitalist     Primary Provider: Samaria Chappell  Primary Care Physician Phone Number: [phone]         Discharge Diagnoses/Problem Oriented Hospital Course (Providers):    Radha Cunningham was admitted on 5/28/2021 by Hemant Avila DO and I would refer you to their history and physical.  The following problems were addressed during her hospitalization:    1. Acute hypoxic respiratory failure due to Covid 19 infection (malaise, decreased PO intake, non-productive cough)   2. Acute infectious encephalopathy in the setting of cognitive impairment   3. Subtherapeutic INR, covered with Lovenox  4. Atrial fibrillation-rate controlled  5. Hx of CVA         Code Status:      DNR / DNI        Brief Hospital Stay Summary Sent Home With Patient in AVS:        Reason for your hospital stay      You were admitted for concerns of generalized malaise fatigue, you were   tested positive for Covid.  You did require initial O2 supplementation but   has been weaned off.  Your chest x-ray looks okay where you did not have   any pneumonia or need for oral steroids.  Your INR is a bit low and you   have been started on Lovenox injection for DVT prophylaxis as well as   coverage for atrial fibrillation while your INR becomes therapeutic.  You   will need additional therapy at transitional care unit.         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 Asthma, diaphragmatic hernia, Type 2 diabetes mellitus,  "large left MCA territory CVA 2020 , encephalomalacia, cognitive impairment, possible prior seizure on anti-epileptics, who presents with systemic weakness, decreased appetite.  The patient had symptom onset on 5/26/2021 with malaise, poor oral intake, nonproductive cough.  The patient had a positive COVID-19 PCR on 5/28/2021.  The patient remained off supplemental oxygen.  Patient was seen by physical therapy who recommended TCU.  The patient's daughter Hilton was agreeable to this.  Social work was consulted for placement.  On 5/31/2021, the patient had increased oxygen requirements.  A chest x-ray showed clear lungs and no acute process.  She did require O2 initially but again chest x-ray was clear.  She was resumed on her PTA bronchodilators and nebs as needed with no wheezing.  She was able to wean off of O2 on the day of discharge.     Problem List:   1.  Acute COVID 19 Viral Infection -   - Symptom Onset:  5/26/2021 - malaise, decreased oral intake, nonproductive cough  - Positive Covid 19 PCR:  5/28/2021  -Chest x-ray negative for acute finding did require O2 briefly but was able to wean off of oxygen on the day of discharge.  She did not require steroids or remdesivir  --She was complaining of cough and did improve with Tessalon and Robitussin which I will continue at TCU.    2. Acute Infectious Encephalopathy  Cognitive Impairment -   I see no prior diagnosis of dementia but looks she has scored in the 20 range on SLUMS prior to stroke in 2020, probable element of underlying vascular dementia with contribution of acute infectious encephalopathy with COVID 19 infection. Per daughter has history of \"memory problems\" and at her baseline but weak.  No falls, trauma, or recent medication changes. No seizure like activity.  Pt has had progressive decline in functional status over the last 6 months since a seizure in December 2020.  - recommend OT going forward, more formal testing with a neurologist if they desire " to more fully evaluate      3. Subtherapeutic INR -  INR 1.58 on admission, P.o. intake has been poor.  Looks this has been a bit labile to manage as outpatient, 1.8 <- 5.4 in April.  -Patient was placed on therapeutic Lovenox while she was on warfarin  --On the time of discharge she was still subtherapeutic with INR of 1.56.  She is going to TCU today so will be discharged with warfarin and repeat INR in 1 day.  She will also be on's therapeutic Lovenox until her INR is 2 or greater       4. Atrial Fibrillation  Bradycardia s/p DPPM  HLD -  Rate controlled. Chronic.   Mod MR, TR, mildly dilated AA, LVEF est 55-60% on TTE 9/2020.   - Resume prior to admission warfarin, pharmacy consulted to assist in dosing     5. CVA -   History of large left MCA territory stroke not treated with TPA, prior right basal ganglia infarct.  - resume PTA Lacosamide  - resume statin at discharge (obs status)     6. Asthma v. COPD -does not appear acutely exacerbated, not on chronic oxygen therapy.  Resume PTA nebulizer treatments.      Patient discharged to TCU in stable condition.         Important Results:      Recent Labs   Lab 06/01/21 0418 05/31/21  0635 05/30/21  0649   WBC 4.2 4.4 3.8*   HGB 10.8* 11.4* 11.7   HCT 32.1* 33.6* 34.1*   MCV 95 95 94   * 112* 99*     Recent Labs   Lab 06/01/21 0418 05/31/21  0635 05/30/21  0649    140 139   POTASSIUM 3.5 3.6 3.6   CHLORIDE 107 107 108   CO2 26 31 27   ANIONGAP 6 2* 4   * 116* 91   BUN 13 13 12   CR 0.68 0.80 0.68   GFRESTIMATED 76 64 76   GFRESTBLACK 88 74 88   SHAN 8.3* 8.4* 8.3*     No results for input(s): CULT in the last 168 hours.           Pending Results:        Unresulted Labs Ordered in the Past 30 Days of this Admission     No orders found from 4/28/2021 to 5/29/2021.            Discharge Instructions and Follow-Up:      Follow-up Appointments     Follow Up and recommended labs and tests      Follow up with detention physician.  The following  labs/tests are   recommended: INR and CBC with Plt on 6/2 for warfarin dosing. Also get CBC   with Plt count every Wed while on Lovenox.               Discharge Disposition:      Discharged to home         Discharge Medications:        Current Discharge Medication List      START taking these medications    Details   benzonatate (TESSALON) 100 MG capsule Take 1 capsule (100 mg) by mouth 3 times daily  Qty: 30 capsule, Refills: 0    Associated Diagnoses: 2019 novel coronavirus disease (COVID-19)      enoxaparin ANTICOAGULANT (LOVENOX) 60 MG/0.6ML syringe Inject 0.6 mLs (60 mg) Subcutaneous 2 times daily    Associated Diagnoses: 2019 novel coronavirus disease (COVID-19)      famotidine (PEPCID) 10 MG tablet Take 1 tablet (10 mg) by mouth 2 times daily  Qty:      Associated Diagnoses: 2019 novel coronavirus disease (COVID-19)      guaiFENesin-dextromethorphan (ROBITUSSIN DM) 100-10 MG/5ML syrup Take 5 mLs by mouth every 4 hours as needed for cough  Qty:      Associated Diagnoses: 2019 novel coronavirus disease (COVID-19)         CONTINUE these medications which have CHANGED    Details   warfarin ANTICOAGULANT (COUMADIN) 5 MG tablet Take 1 tablet (5 mg) by mouth daily  Qty: 30 tablet, Refills: 0    Comments: TAKE 5 mg TABLET TONIGHT 6/1 THEN DOSE as per INR  Associated Diagnoses: Subtherapeutic international normalized ratio (INR)         CONTINUE these medications which have NOT CHANGED    Details   albuterol (PROVENTIL) (2.5 MG/3ML) 0.083% neb solution Take 1 vial (2.5 mg) by nebulization every 4 hours as needed for wheezing    Associated Diagnoses: Asthma with acute exacerbation, unspecified asthma severity, unspecified whether persistent; Acute respiratory distress; Acute respiratory failure with hypoxia (H)      atorvastatin (LIPITOR) 10 MG tablet Take 10 mg by mouth At Bedtime       Calcium Carb-Cholecalciferol (CALTRATE 600+D3) 600-800 MG-UNIT TABS Take 1 tablet by mouth daily      Cranberry 1000 MG CAPS Take 1  "capsule by mouth daily      emollient (VANICREAM) external cream Apply topically 2 times daily as needed To whole body       fluocinonide (LIDEX) 0.05 % external solution Apply topically 2 times daily as needed To scalp as needed for dryness.      HYDROcodone-acetaminophen (NORCO) 5-325 MG tablet Take 1 tablet by mouth every 8 hours as needed for severe pain      ipratropium - albuterol 0.5 mg/2.5 mg/3 mL (DUONEB) 0.5-2.5 (3) MG/3ML neb solution Take 1 vial (3 mLs) by nebulization 4 times daily    Associated Diagnoses: Reactive airway disease with acute exacerbation, unspecified asthma severity, unspecified whether persistent      lacosamide (VIMPAT) 50 MG TABS tablet Take 50 mg by mouth 2 times daily      multivitamin w/minerals (THERA-VIT-M) tablet Take 1 tablet by mouth daily      nystatin (MYCOSTATIN) 561629 UNIT/GM external powder Apply topically 2 times daily as needed Under right breast and groin area       fluticasone (FLONASE) 50 MCG/ACT nasal spray Spray 1 spray into both nostrils daily as needed for rhinitis or allergies               Allergies:         Allergies   Allergen Reactions     Contrast Dye      Diagnostic X-Ray Materials Unknown     Itching and eyes swell shut.   She reports reactions to xray contrast material             Consultations This Hospital Stay:      No consultations were requested during this admission         Condition and Physical on Discharge:      Discharge condition: Stable   Vitals: Blood pressure 107/78, pulse 85, temperature 94.7  F (34.8  C), temperature source Oral, resp. rate 16, height 1.626 m (5' 4\"), weight 67 kg (147 lb 9.6 oz), SpO2 95 %.  147 lbs 9.6 oz      GENERAL:  Comfortable.  PSYCH: pleasant, oriented, No acute distress.  HEENT:  PERRLA. Normal conjunctiva, normal hearing, nasal mucosa and Oropharynx are normal.  NECK:  Supple, no neck vein distention, adenopathy or bruits, normal thyroid.  HEART:  Normal S1, S2 with no murmur, no pericardial rub, gallops or S3 " or S4.  LUNGS:  Clear to auscultation, normal Respiratory effort. No wheezing, rales or ronchi.  ABDOMEN:  Soft, no hepatosplenomegaly, normal bowel sounds. Non-tender, non distended.   EXTREMITIES:  No pedal edema, +2 pulses bilateral and equal.  SKIN:  Dry to touch, No rash, wound or ulcerations.  NEUROLOGIC:  CN 2-12 intact, BL 5/5 symmetric upper and lower extremity strength, sensation is intact with no focal deficits.         Discharge Time:      >30 mins         Image Results From This Hospital Stay (For Non-EPIC Providers):        Results for orders placed or performed during the hospital encounter of 05/28/21   CT Head w/o Contrast    Narrative    EXAM: CT HEAD W/O CONTRAST  LOCATION: [facility]  DATE/TIME: 5/28/2021 7:32 PM    INDICATION: Mental status change, unknown cause, confusion.  COMPARISON: CT head 12/04/2020.  TECHNIQUE: Routine CT Head without IV contrast. Multiplanar reformats. Dose reduction techniques were used.    FINDINGS:  INTRACRANIAL CONTENTS: No intracranial hemorrhage, extraaxial collection, or mass effect.  No CT evidence of acute infarct. Moderate presumed chronic small vessel ischemic changes throughout the cerebral hemispheric white matter bilaterally. Stable   chronic left posterior cerebral artery territory infarct. Stable chronic right basal ganglia and left thalamic lacunar infarcts. Mild to moderate generalized volume loss. No hydrocephalus.     VISUALIZED ORBITS/SINUSES/MASTOIDS: Prior bilateral cataract surgery. Visualized portions of the orbits are otherwise unremarkable. No paranasal sinus mucosal disease. No middle ear or mastoid effusion.    BONES/SOFT TISSUES: No acute abnormality.      Impression    IMPRESSION:  1.  No acute intracranial process. No significant change since 12/04/2020.  2.  Stable moderate chronic small vessel ischemic disease and mild to moderate generalized brain parenchymal volume loss.  3.  Stable chronic left posterior cerebral artery  territory infarct.   XR Chest 1 View    Narrative    EXAM: XR CHEST 1 VIEW  LOCATION: [facility]  DATE/TIME: 5/28/2021 8:20 PM    INDICATION: Weakness.  COMPARISON: None.      Impression    IMPRESSION: Left-sided dual chamber cardiac pacemaker. Heart size upper limits of normal. Lungs clear.   XR Chest Port 1 View    Narrative    CHEST ONE VIEW PORTABLE    5/31/2021 11:30 AM     HISTORY: Hypoxia    COMPARISON: Chest x-ray 5/28/2021.      Impression    IMPRESSION: Portable chest. Lungs are clear. Heart is normal in size.  No pneumothorax. No definite pleural effusions. Implantable cardiac  device and both leads appear stable in position.    JORGE LUIS GARCIA MD

## 2021-06-01 NOTE — PROGRESS NOTES
Care Management Discharge Note    Discharge Date: 06/01/21     Discharge Disposition: [facility]    Discharge Transportation:  Stretcher    Private pay costs discussed: transportation costs    PAS Confirmation Code:  [id number]  Patient/family educated on Medicare website which has current facility and service quality ratings: yes    Education Provided on the Discharge Plan:  Yes  Persons Notified of Discharge Plans: Patient's daughter Drew Canela admissions  Patient/Family in Agreement with the Plan: yes    Handoff Referral Completed: No    Additional Information:    Reviewed out of pocket cost for [facility] stretcher transport, $1042.75 for base rate and $25 per mile to the destination. Pt/family expressed understanding and are agreeable to this.      Patient requires stretcher transportation due to COVID precautions    Stretcher transportation has been arranged for 1730. Patient and bedside nurse notified of transportation time.    Ambulance PCS form completed and placed in patient chart. Ambulance PCS form should be given to transportation team.    1550: Discharge orders faxed.     ALEJANDRO Nugent

## 2021-06-02 NOTE — PLAN OF CARE
Physical Therapy Discharge Summary    Reason for therapy discharge:    Discharged to transitional care facility.    Progress towards therapy goal(s). See goals on Care Plan in [facility] electronic health record for goal details.  Goals not met.  Barriers to achieving goals:   discharge from facility and discharge on same date as initial evaluation.    Therapy recommendation(s):    Continued therapy is recommended.  Rationale/Recommendations:  PT eval at TCU in order to increase strength, activity tolerance and independence with mobility.

## 2021-09-22 ENCOUNTER — APPOINTMENT (OUTPATIENT)
Dept: CT IMAGING | Facility: CLINIC | Age: 86
DRG: 056 | End: 2021-09-22
Attending: EMERGENCY MEDICINE
Payer: COMMERCIAL

## 2021-09-22 ENCOUNTER — HOSPITAL ENCOUNTER (INPATIENT)
Facility: CLINIC | Age: 86
LOS: 3 days | Discharge: NURSING, REHAB OR LONG-TERM CARE | DRG: 056 | End: 2021-09-25
Attending: EMERGENCY MEDICINE | Admitting: INTERNAL MEDICINE
Payer: COMMERCIAL

## 2021-09-22 DIAGNOSIS — N39.0 URINARY TRACT INFECTION WITHOUT HEMATURIA, SITE UNSPECIFIED: Primary | ICD-10-CM

## 2021-09-22 DIAGNOSIS — I63.9 ACUTE ISCHEMIC STROKE (H): ICD-10-CM

## 2021-09-22 LAB
ALBUMIN UR-MCNC: 50 MG/DL
ANION GAP SERPL CALCULATED.3IONS-SCNC: 5 MMOL/L (ref 3–14)
APPEARANCE UR: ABNORMAL
APTT PPP: 32 SECONDS (ref 22–38)
ATRIAL RATE - MUSE: 131 BPM
BASOPHILS # BLD AUTO: 0 10E3/UL (ref 0–0.2)
BASOPHILS NFR BLD AUTO: 1 %
BILIRUB UR QL STRIP: NEGATIVE
BUN SERPL-MCNC: 12 MG/DL (ref 7–30)
CALCIUM SERPL-MCNC: 9.4 MG/DL (ref 8.5–10.1)
CHLORIDE BLD-SCNC: 104 MMOL/L (ref 94–109)
CO2 SERPL-SCNC: 27 MMOL/L (ref 20–32)
COLOR UR AUTO: ABNORMAL
CREAT SERPL-MCNC: 0.74 MG/DL (ref 0.52–1.04)
DIASTOLIC BLOOD PRESSURE - MUSE: NORMAL MMHG
EOSINOPHIL # BLD AUTO: 0 10E3/UL (ref 0–0.7)
EOSINOPHIL NFR BLD AUTO: 1 %
ERYTHROCYTE [DISTWIDTH] IN BLOOD BY AUTOMATED COUNT: 13.4 % (ref 10–15)
GFR SERPL CREATININE-BSD FRML MDRD: 71 ML/MIN/1.73M2
GLUCOSE BLD-MCNC: 110 MG/DL (ref 70–99)
GLUCOSE UR STRIP-MCNC: NEGATIVE MG/DL
HCT VFR BLD AUTO: 33.8 % (ref 35–47)
HGB BLD-MCNC: 11.2 G/DL (ref 11.7–15.7)
HGB UR QL STRIP: ABNORMAL
HOLD SPECIMEN: NORMAL
IMM GRANULOCYTES # BLD: 0 10E3/UL
IMM GRANULOCYTES NFR BLD: 0 %
INR PPP: 1.26 (ref 0.85–1.15)
INTERPRETATION ECG - MUSE: NORMAL
KETONES UR STRIP-MCNC: ABNORMAL MG/DL
LEUKOCYTE ESTERASE UR QL STRIP: ABNORMAL
LYMPHOCYTES # BLD AUTO: 1.3 10E3/UL (ref 0.8–5.3)
LYMPHOCYTES NFR BLD AUTO: 25 %
MCH RBC QN AUTO: 32.4 PG (ref 26.5–33)
MCHC RBC AUTO-ENTMCNC: 33.1 G/DL (ref 31.5–36.5)
MCV RBC AUTO: 98 FL (ref 78–100)
MONOCYTES # BLD AUTO: 0.6 10E3/UL (ref 0–1.3)
MONOCYTES NFR BLD AUTO: 11 %
NEUTROPHILS # BLD AUTO: 3.3 10E3/UL (ref 1.6–8.3)
NEUTROPHILS NFR BLD AUTO: 62 %
NITRATE UR QL: NEGATIVE
NRBC # BLD AUTO: 0 10E3/UL
NRBC BLD AUTO-RTO: 0 /100
P AXIS - MUSE: NORMAL DEGREES
PH UR STRIP: 6 [PH] (ref 5–7)
PLATELET # BLD AUTO: 182 10E3/UL (ref 150–450)
POTASSIUM BLD-SCNC: 4 MMOL/L (ref 3.4–5.3)
PR INTERVAL - MUSE: NORMAL MS
QRS DURATION - MUSE: 100 MS
QT - MUSE: 304 MS
QTC - MUSE: 396 MS
R AXIS - MUSE: -58 DEGREES
RBC # BLD AUTO: 3.46 10E6/UL (ref 3.8–5.2)
RBC URINE: 125 /HPF
SARS-COV-2 RNA RESP QL NAA+PROBE: NEGATIVE
SODIUM SERPL-SCNC: 136 MMOL/L (ref 133–144)
SP GR UR STRIP: 1.02 (ref 1–1.03)
SYSTOLIC BLOOD PRESSURE - MUSE: NORMAL MMHG
T AXIS - MUSE: -84 DEGREES
UROBILINOGEN UR STRIP-MCNC: 2 MG/DL
VENTRICULAR RATE- MUSE: 102 BPM
WBC # BLD AUTO: 5.3 10E3/UL (ref 4–11)
WBC URINE: 18 /HPF

## 2021-09-22 PROCEDURE — 99223 1ST HOSP IP/OBS HIGH 75: CPT | Mod: AI | Performed by: INTERNAL MEDICINE

## 2021-09-22 PROCEDURE — 81001 URINALYSIS AUTO W/SCOPE: CPT | Performed by: EMERGENCY MEDICINE

## 2021-09-22 PROCEDURE — 80235 DRUG ASSAY LACOSAMIDE: CPT | Performed by: EMERGENCY MEDICINE

## 2021-09-22 PROCEDURE — 85610 PROTHROMBIN TIME: CPT | Performed by: EMERGENCY MEDICINE

## 2021-09-22 PROCEDURE — C9254 INJECTION, LACOSAMIDE: HCPCS | Performed by: EMERGENCY MEDICINE

## 2021-09-22 PROCEDURE — 250N000011 HC RX IP 250 OP 636: Performed by: EMERGENCY MEDICINE

## 2021-09-22 PROCEDURE — 99285 EMERGENCY DEPT VISIT HI MDM: CPT | Mod: 25

## 2021-09-22 PROCEDURE — 85730 THROMBOPLASTIN TIME PARTIAL: CPT | Performed by: EMERGENCY MEDICINE

## 2021-09-22 PROCEDURE — 85025 COMPLETE CBC W/AUTO DIFF WBC: CPT | Performed by: EMERGENCY MEDICINE

## 2021-09-22 PROCEDURE — 120N000001 HC R&B MED SURG/OB

## 2021-09-22 PROCEDURE — C9803 HOPD COVID-19 SPEC COLLECT: HCPCS

## 2021-09-22 PROCEDURE — 250N000011 HC RX IP 250 OP 636: Performed by: INTERNAL MEDICINE

## 2021-09-22 PROCEDURE — 93005 ELECTROCARDIOGRAM TRACING: CPT

## 2021-09-22 PROCEDURE — 87086 URINE CULTURE/COLONY COUNT: CPT | Performed by: EMERGENCY MEDICINE

## 2021-09-22 PROCEDURE — 96375 TX/PRO/DX INJ NEW DRUG ADDON: CPT

## 2021-09-22 PROCEDURE — 258N000003 HC RX IP 258 OP 636: Performed by: EMERGENCY MEDICINE

## 2021-09-22 PROCEDURE — 80048 BASIC METABOLIC PNL TOTAL CA: CPT | Performed by: EMERGENCY MEDICINE

## 2021-09-22 PROCEDURE — 70450 CT HEAD/BRAIN W/O DYE: CPT

## 2021-09-22 PROCEDURE — 87635 SARS-COV-2 COVID-19 AMP PRB: CPT | Performed by: EMERGENCY MEDICINE

## 2021-09-22 PROCEDURE — G0378 HOSPITAL OBSERVATION PER HR: HCPCS

## 2021-09-22 PROCEDURE — 36415 COLL VENOUS BLD VENIPUNCTURE: CPT | Performed by: EMERGENCY MEDICINE

## 2021-09-22 PROCEDURE — 96374 THER/PROPH/DIAG INJ IV PUSH: CPT

## 2021-09-22 RX ORDER — MIRTAZAPINE 15 MG/1
15 TABLET, FILM COATED ORAL AT BEDTIME
COMMUNITY

## 2021-09-22 RX ORDER — ACETAMINOPHEN 325 MG/1
650 TABLET ORAL EVERY 4 HOURS PRN
COMMUNITY

## 2021-09-22 RX ORDER — HYDROCODONE BITARTRATE AND ACETAMINOPHEN 5; 325 MG/1; MG/1
1 TABLET ORAL EVERY 24 HOURS
Status: ON HOLD | COMMUNITY
End: 2021-09-25

## 2021-09-22 RX ORDER — POTASSIUM CHLORIDE 1500 MG/1
20 TABLET, EXTENDED RELEASE ORAL DAILY
COMMUNITY

## 2021-09-22 RX ORDER — CEFTRIAXONE 1 G/1
1 INJECTION, POWDER, FOR SOLUTION INTRAMUSCULAR; INTRAVENOUS ONCE
Status: COMPLETED | OUTPATIENT
Start: 2021-09-22 | End: 2021-09-22

## 2021-09-22 RX ORDER — ATORVASTATIN CALCIUM 10 MG/1
10 TABLET, FILM COATED ORAL AT BEDTIME
Status: DISCONTINUED | OUTPATIENT
Start: 2021-09-22 | End: 2021-09-22

## 2021-09-22 RX ORDER — BISACODYL 10 MG
10 SUPPOSITORY, RECTAL RECTAL DAILY PRN
COMMUNITY

## 2021-09-22 RX ORDER — IPRATROPIUM BROMIDE AND ALBUTEROL 20; 100 UG/1; UG/1
1-2 SPRAY, METERED RESPIRATORY (INHALATION) 4 TIMES DAILY PRN
COMMUNITY

## 2021-09-22 RX ORDER — ATORVASTATIN CALCIUM 10 MG/1
10 TABLET, FILM COATED ORAL AT BEDTIME
Status: DISCONTINUED | OUTPATIENT
Start: 2021-09-22 | End: 2021-09-25 | Stop reason: HOSPADM

## 2021-09-22 RX ORDER — LIDOCAINE 40 MG/G
CREAM TOPICAL
Status: DISCONTINUED | OUTPATIENT
Start: 2021-09-22 | End: 2021-09-25 | Stop reason: HOSPADM

## 2021-09-22 RX ORDER — CEFTRIAXONE 1 G/1
1 INJECTION, POWDER, FOR SOLUTION INTRAMUSCULAR; INTRAVENOUS EVERY 24 HOURS
Status: DISCONTINUED | OUTPATIENT
Start: 2021-09-23 | End: 2021-09-25

## 2021-09-22 RX ORDER — METHYLPREDNISOLONE SODIUM SUCCINATE 125 MG/2ML
125 INJECTION, POWDER, LYOPHILIZED, FOR SOLUTION INTRAMUSCULAR; INTRAVENOUS ONCE
Status: DISCONTINUED | OUTPATIENT
Start: 2021-09-22 | End: 2021-09-22

## 2021-09-22 RX ORDER — DIPHENHYDRAMINE HYDROCHLORIDE 50 MG/ML
50 INJECTION INTRAMUSCULAR; INTRAVENOUS ONCE
Status: DISCONTINUED | OUTPATIENT
Start: 2021-09-22 | End: 2021-09-22

## 2021-09-22 RX ORDER — ACETAMINOPHEN 650 MG/1
650 SUPPOSITORY RECTAL EVERY 4 HOURS PRN
Status: DISCONTINUED | OUTPATIENT
Start: 2021-09-22 | End: 2021-09-25 | Stop reason: HOSPADM

## 2021-09-22 RX ORDER — LOPERAMIDE HYDROCHLORIDE 2 MG/1
TABLET ORAL
COMMUNITY

## 2021-09-22 RX ORDER — IOPAMIDOL 755 MG/ML
70 INJECTION, SOLUTION INTRAVASCULAR ONCE
Status: DISCONTINUED | OUTPATIENT
Start: 2021-09-22 | End: 2021-09-22 | Stop reason: CLARIF

## 2021-09-22 RX ORDER — SODIUM CHLORIDE 9 MG/ML
INJECTION, SOLUTION INTRAVENOUS CONTINUOUS PRN
Status: DISCONTINUED | OUTPATIENT
Start: 2021-09-22 | End: 2021-09-25 | Stop reason: HOSPADM

## 2021-09-22 RX ADMIN — CEFTRIAXONE SODIUM 1 G: 1 INJECTION, POWDER, FOR SOLUTION INTRAMUSCULAR; INTRAVENOUS at 21:06

## 2021-09-22 RX ADMIN — SODIUM CHLORIDE 100 MG: 9 INJECTION, SOLUTION INTRAVENOUS at 21:41

## 2021-09-22 ASSESSMENT — ENCOUNTER SYMPTOMS
FACIAL ASYMMETRY: 1
SPEECH DIFFICULTY: 1

## 2021-09-22 NOTE — PHARMACY-ADMISSION MEDICATION HISTORY
Pharmacy Medication History  Admission medication history interview status for the 9/22/2021  admission is complete. See EPIC admission navigator for prior to admission medications     Location of Interview: Outside patient room but on unit  Medication history sources: MAR ([facility])    Significant changes made to the medication list:  Warfarin and enoxaparin were stopped.  Apixaban was added.  Atrovent inhaler was added and duoneb, albuterol nebs were stopped.  Flonase was stopped.    Additional medication history information:   Note: none of the am medications were charted today except nystatin and vicodin.  It is unclear if the patient refused her other meds or if they were not given for another reason.    Medication reconciliation completed by provider prior to medication history? No    Time spent in this activity: 20 minutes    Prior to Admission medications    Medication Sig Last Dose Taking? Auth Provider   acetaminophen (TYLENOL) 325 MG tablet Take 650 mg by mouth every 4 hours as needed for mild pain 9/13/2021 at prn Yes Unknown, Entered By History   apixaban ANTICOAGULANT (ELIQUIS) 5 MG tablet Take 5 mg by mouth 2 times daily 9/21/2021 at 1716 Yes Unknown, Entered By History   atorvastatin (LIPITOR) 10 MG tablet Take 10 mg by mouth At Bedtime  9/21/2021 at 1922 Yes Reported, Patient   Pamella Protect (EUCERIN) external cream Apply topically 2 times daily as needed for dry skin or other  at prn Yes Unknown, Entered By History   bisacodyl (DULCOLAX) 10 MG suppository Place 10 mg rectally daily as needed for constipation  at prn Yes Unknown, Entered By History   Calcium Carb-Cholecalciferol (CALTRATE 600+D3) 600-800 MG-UNIT TABS Take 1 tablet by mouth daily 9/21/2021 at 0846 Yes Unknown, Entered By History   Cranberry 1000 MG CAPS Take 2 capsules by mouth daily  9/21/2021 at 0846 Yes Unknown, Entered By History   emollient (VANICREAM) external cream Apply topically 2 times daily as needed To  whole body   at prn Yes Unknown, Entered By History   famotidine (PEPCID) 10 MG tablet Take 1 tablet (10 mg) by mouth 2 times daily 9/21/2021 at 1716 Yes Avalos, Magalie Schaffer PA-C   guaiFENesin (ROBITUSSIN) 100 MG/5ML SYRP Take 10 mLs by mouth every 4 hours as needed for cough  at prn Yes Unknown, Entered By History   HYDROcodone-acetaminophen (NORCO) 5-325 MG tablet Take 1 tablet by mouth every 24 hours 9/22/2021 at 0534 Yes Unknown, Entered By History   ipratropium-albuterol (COMBIVENT RESPIMAT)  MCG/ACT inhaler Inhale 1-2 puffs into the lungs 4 times daily  at prn Yes Unknown, Entered By History   lacosamide (VIMPAT) 50 MG TABS tablet Take 50 mg by mouth 2 times daily 9/21/2021 at 1716 Yes Unknown, Entered By History   loperamide (IMODIUM A-D) 2 MG tablet Take 4 mg after first loose stool.  Then take 2 mg after each loose stool as needed.  Max 16 mg/day  at prn Yes Unknown, Entered By History   mirtazapine (REMERON) 15 MG tablet Take 15 mg by mouth At Bedtime 9/21/2021 at 1922 Yes Unknown, Entered By History   multivitamin w/minerals (THERA-VIT-M) tablet Take 1 tablet by mouth daily 9/21/2021 at 0846 Yes Reported, Patient   nystatin (MYCOSTATIN) 701764 UNIT/GM external powder Apply topically 2 times daily Under bilateral breasts 9/22/2021 at 0955 Yes Unknown, Entered By History   potassium chloride ER (KLOR-CON M) 20 MEQ CR tablet Take 20 mEq by mouth daily 9/21/2021 at 0821 Yes Unknown, Entered By History   HYDROcodone-acetaminophen (NORCO) 5-325 MG tablet Take 1 tablet by mouth every 8 hours as needed for severe pain 9/17/2021 at 531 pm  Reported, Patient       The information provided in this note is only as accurate as the sources available at the time of update(s)

## 2021-09-22 NOTE — ED TRIAGE NOTES
Pt lives in a memory care nursing home.  She started having Slurred speech and right side facial droop at 1645.  On thinners

## 2021-09-22 NOTE — CONSULTS
[facility]    Stroke Telephone Note    I was called by Thiago Miller on 09/22/21 regarding patient Radha Cunningham.  Patient is a 89 year old woman  has a past medical history of Acute gangrenous cholecystitis, Asthma, Atrial fibrillation (H), Cancer of skin, Cholelithiasis, Diverticulosis, Hiatal hernia, Hyperlipemia, Hypertension, Hypertension, Incarcerated hiatal hernia, Paroxysmal A-fib (H), Pelvic abscess in female, PUD (peptic ulcer disease), Reactive airway disease, Skin cancer, and Stroke (H).     Patient presents on eliquis who had onset of right sided facial droop and right arm weakness from her facility. Currently on exam, she has left gaze preference and right hemibody weakness. Per report from facility, patient requires assistance with walking and transfers as well as activity of daily living.     Further spoke with the [facility] facility and confirmed pt last dose eliquis was 9/21 evening.  Noted 9/20 2 AM dose was refused by patient.  Further discussed symptoms witnessed by facility employees which prompted stroke activation visit-patient displayed symptoms of mumbling speech and eyes looking left as well as patient not cooperating to lift legs from wheelchair.  Staff at [facility] noted that patient was moving bilateral upper extremities.  Per ED, patient was moving all extremities but moving right upper extremity less and had right upper extremity in flexed position against patient's body.    Stroke Code Data (for stroke code without tele)  Stroke code activated 09/22/21   1801   Stroke provider first response  09/22/21 1801            Last known normal 09/22/21        Unknown (Concern for around 4 pm)   Time of discovery   (or onset of symptoms) 09/22/21       Head CT read by Stroke Neuro Dr/Provider 09/22/21 1812   Was stroke code de-escalated? Yes      presence of contraindications for both intravenous and intra-arterial stroke treatments    "    Imaging Findings   Re demonstration of prior strokes    Thrombolytic Treatment   Not given due to DOAC dose within 48 hours or INR > 1.7.    Endovascular Treatment  Due to MR S of 4-patient unable to walk without assistance and due to previous stroke burden and disability burden risks considered outweigh benefits.  CTA due to concern for anaphylaxis and given limited treatment options following was not pursued.    Impression  Left MCA stroke versus seizure versus recrudescence    Recommendations   - Use orderset: \"Ischemic Stroke Routine Admission\" or \"Ischemic Stroke No Thrombolytics/No Thrombectomy ICU Admission\"  - Neurochecks and Vital Signs every 2 hours   - Permissive HTN; goal SBP < 220 mmHg  - MRI Brain with and without contrast  - Telemetry, EKG  - Bedside Glucose Monitoring  - A1c, Lipid Panel, Troponin x 3  - PT/OT/SLP  - Stroke Education  - Euthermia, Euglycemia   - If unable to get MRI, pursue CT scan head in the morning  -Continue patient's PTA antiseizure medication  - EEG     My recommendations are based on the information provided over the phone by Radha Cunningham's in-person providers. They are not intended to replace the clinical judgment of her in-person providers. I was not requested to personally see or examine the patient at this time.    The Stroke Staff is Dr. Williamson.    Santosh Paul MD  Vascular Neurology Fellow  To page me or covering stroke neurology team member, click here: AMCOM   Choose \"On Call\" tab at top, then search dropdown box for \"Neurology Adult\", select location, press Enter, then look for stroke/neuro ICU/telestroke.        "

## 2021-09-22 NOTE — ED PROVIDER NOTES
History   Chief Complaint:  Slurred Speech     The history is provided by the EMS personnel.      Radha Cunningham is a 89 year old female on Eliquis with history of dementia, CVA, Afib and hypertension who presents via EMS with slurred speech. EMS reports that at 1645, the patient's care facility staff noticed the patient had slurred speech and right sided facial droop. At her baseline, she speaks clearly. She is able to move all her extremities.     Review of Systems   Neurological: Positive for facial asymmetry and speech difficulty.   All other systems reviewed and are negative.    Allergies:  Contrast Dye    Medications:  Eliquis   Pepcid   Levaquin  Remeron  Zoloft   Lipitor   Norco   Duoneb  Vimpat     Past Medical History:    Asthma   Afib  Hyperlipidemia   Hypertension  PUD  CVA  Dementia   Seizures    Past Surgical History:    Cholecystectomy    Family History:    Mother - CAD, hypertension    Social History:  Presents alone via EMS  Lives in [place]    Physical Exam     Physical Exam    Patient Vitals for the past 24 hrs:   BP Temp Temp src Pulse Resp SpO2 Weight   09/22/21 1931 -- -- -- -- -- 98 % --   09/22/21 1930 124/88 -- -- 88 -- -- --   09/22/21 1900 119/70 -- -- 103 -- 97 % --   09/22/21 1828 123/87 98  F (36.7  C) Oral 116 18 94 % 66.6 kg (146 lb 13.2 oz)       General: Alert, No distress. Nontoxic appearance. Moaning. Not following commands.   Head: No signs of trauma.   Mouth/Throat: Oropharynx moist.   Eyes: Conjunctivae are normal. Pupils are equal..   Neck: Normal range of motion.    CV: Appears well perfused.  Resp:No respiratory distress.   MSK: Normal range of motion. No obvious deformity. Appears to be moving all 4 extremities.   Neuro: The patient is alert and interactive. GODLEN. Speech normal. GCS 15. Left sided gaze preference. Right sided facial droop.   Skin: No lesion or sign of trauma noted.   Psych: normal mood and affect. behavior is normal.     Emergency Department Course    ECG  ECG taken at 2048, ECG read at 2057  Atrial fibrillation with rapid ventricular response with premature ventricular or aberrantly conducted complexes  Left anterior fascicular block  Cannot rule out anterior infarct, age undetermined   Rate 102 bpm. NV interval * ms. QRS duration 100 ms. QT/QTc 304/396 ms. P-R-T axes * -58 -84.     Imaging:  CT Head w/o IV contrast:   1.  No CT findings of acute intracranial process.   2.  Chronic left posterior cerebral artery territory and right basal ganglia region infarcts, unchanged.   3.  Brain atrophy and presumed chronic small vessel ischemic change, as described, as per radiology.    MRA Brain ([place]) w/o contrast:  Pending, as per radiology.     MRA Neck (Carotids) w/o & w/ contrast:  Pending, as per radiology.     MR Brain w/o & w contrast:  Pending, as per radiology.     Laboratory:  CBC: WBC 5.3, HGB 11.2 (L),   BMP: Glucose 110 (H), o/w WNL (Creatinine: 0.74)    INR: 1.26 (H)  PTT: 32    Lacosamide Level: Pending    UA: Ketones: Trace, Blood: Large, Protein Albumin: 50, Leukocyte Esterase: Moderate, WBC: 18 (H), RBC: 125 (H), o/w Negative  Urine Culture: Pending    Asymptomatic COVID-19 PCR: Pending    Procedures  None    Emergency Department Course:    Reviewed:  I reviewed nursing notes, vitals, past medical history and care everywhere    Assessments:  1754 I obtained history and examined the patient as noted above.   1758    Tier 1 code stroke called.   1925 I rechecked the patient and explained findings.     Consults:   1802 Consulted stroke neuro regarding the patient.   1827 I spoke with stroke neuro again regarding patient's contrast allergy  1943 Talked with Dr. Krishnan of the hospital regarding admission.   2033 Spoke to Dr. Krishnan of the hospital regarding MRI of the patient. Admitting hospitalist discussed the patient's pacemaker being incompatible with MRI, daughter states this is not the case and patient can receive an MRI.      Interventions:  Vimpat 100 mg, IV  Rocephin, 1 g, IV (?UTI)    Disposition:  The patient was admitted to the hospital under the care of Dr. Krishnan.     Impression & Plan   CMS Diagnoses: The patient has stroke symptoms:         ED Stroke specific documentation           NIHSS PDF     Patient last known well time: 1644  ED Provider first to bedside at: 1754  CT Results received at: 1827    Thrombolytics:   Not given due to patient being on Eliquis.    If treating with thrombolytics: Ensure SBP<180 and DBP<105 prior to treatment with thrombolytics.  Administering thrombolytics after treatment with IV labetalol, hydralazine, or nicardipine is reasonable once BP control is established.    Endovascular Retrieval:  Not initiated due to absence of proximal vessel occlusion    National Institutes of Health Stroke Scale (Baseline)  Time Performed: 1755     Score    Level of consciousness: (1)   Not alert; arousable w/ minor stim to obey/answer/respond    LOC questions: (2)   Answers neither question correctly    LOC commands: (2)   Performs neither task correctly    Best gaze: (2)   Forced deviation    Visual: (0)   No visual loss    Facial palsy: (3)   Complete paralysis of one or both sides    Motor arm (left): (0)   No drift    Motor arm (right): (0)   No drift    Motor leg (left): (0)   No drift    Motor leg (right): (0)   No drift    Limb ataxia: (0)   Absent    Sensory: (0)   Normal- no sensory loss    Best language: (0)   Normal- no aphasia    Dysarthria: (0)   Normal    Extinction and inattention: (1)   Visual, tacile, auditory, spatial, person inattention        Total Score:  11        Stroke Mimics were considered (including migraine headache, seizure disorder, hypoglycemia (or hyperglycemia), head or spinal trauma, CNS infection, Toxin ingestion and shock state (e.g. sepsis) .        National Institutes of Health Stroke Scale  Time Performed: 1828  Total Score: 11 (unchanged from last stroke  score)      Medical Decision Making:  Radha Cunningham is a 89 year old female who presents for evaluation of slurred speech and facial droop.  This is consistent with likely cerebrovascular accident.  Imaging as noted above. Patient was unable to receive CTA due to threat of anaphylaxis. Patient also was unable to receive an MRI because of an incompatible pacemaker.  Based on patient being on Eliquis, they are not a candidate for TPA. Did discuss this with the patient. Differential considered for symptoms included CVA, TIA, dissection, cerebral tumor, migraine, infection, metabolic derangement, demyelination, etc. Will admit to medicine with neurology consulting for further cares.  Will need further workup as inpatient for reason for neurologic deficits.      After the hospitalist evaluated the patient and spoke with the daughter it is evident that she has undergone MRI in the past without adverse reaction from the pacemaker.  After discussions with the admitting hospitalist MRI/MRA was ordered and is pending.    Critical Care Time: was 30 minutes for this patient excluding procedures    Covid-19  Radha Cunningham was evaluated during a global COVID-19 pandemic, which necessitated consideration that the patient might be at risk for infection with the SARS-CoV-2 virus that causes COVID-19.   Applicable protocols for evaluation were followed during the patient's care.   COVID-19 was considered as part of the patient's evaluation. The plan for testing is:  a test was obtained during this visit.     Diagnosis:    ICD-10-CM    1. Acute ischemic stroke (H)  I63.9      Scribe Disclosure:  Abigail BARRIOS, am serving as a scribe at 5:56 PM on 9/22/2021 to document services personally performed by Thiago Miller MD based on my observations and the provider's statements to me.            Thiago Miller MD  09/22/21 2839

## 2021-09-22 NOTE — ED NOTES
Bed: ED20  Expected date:   Expected time:   Means of arrival:   Comments:  Crow Iyer4 Stroke 89 f

## 2021-09-23 ENCOUNTER — APPOINTMENT (OUTPATIENT)
Dept: SPEECH THERAPY | Facility: CLINIC | Age: 86
DRG: 056 | End: 2021-09-23
Attending: INTERNAL MEDICINE
Payer: COMMERCIAL

## 2021-09-23 ENCOUNTER — APPOINTMENT (OUTPATIENT)
Dept: GENERAL RADIOLOGY | Facility: CLINIC | Age: 86
DRG: 056 | End: 2021-09-23
Attending: INTERNAL MEDICINE
Payer: COMMERCIAL

## 2021-09-23 ENCOUNTER — HOSPITAL ENCOUNTER (INPATIENT)
Dept: NEUROLOGY | Facility: CLINIC | Age: 86
DRG: 056 | End: 2021-09-23
Attending: INTERNAL MEDICINE
Payer: COMMERCIAL

## 2021-09-23 ENCOUNTER — APPOINTMENT (OUTPATIENT)
Dept: CT IMAGING | Facility: CLINIC | Age: 86
DRG: 056 | End: 2021-09-23
Attending: INTERNAL MEDICINE
Payer: COMMERCIAL

## 2021-09-23 LAB
ANION GAP SERPL CALCULATED.3IONS-SCNC: 7 MMOL/L (ref 3–14)
BUN SERPL-MCNC: 11 MG/DL (ref 7–30)
CALCIUM SERPL-MCNC: 8.3 MG/DL (ref 8.5–10.1)
CHLORIDE BLD-SCNC: 106 MMOL/L (ref 94–109)
CHOLEST SERPL-MCNC: 127 MG/DL
CO2 SERPL-SCNC: 26 MMOL/L (ref 20–32)
CREAT SERPL-MCNC: 0.7 MG/DL (ref 0.52–1.04)
ERYTHROCYTE [DISTWIDTH] IN BLOOD BY AUTOMATED COUNT: 13.2 % (ref 10–15)
GFR SERPL CREATININE-BSD FRML MDRD: 75 ML/MIN/1.73M2
GLUCOSE BLD-MCNC: 79 MG/DL (ref 70–99)
GLUCOSE BLDC GLUCOMTR-MCNC: 111 MG/DL (ref 70–99)
GLUCOSE BLDC GLUCOMTR-MCNC: 115 MG/DL (ref 70–99)
GLUCOSE BLDC GLUCOMTR-MCNC: 66 MG/DL (ref 70–99)
GLUCOSE BLDC GLUCOMTR-MCNC: 74 MG/DL (ref 70–99)
GLUCOSE BLDC GLUCOMTR-MCNC: 80 MG/DL (ref 70–99)
GLUCOSE BLDC GLUCOMTR-MCNC: 88 MG/DL (ref 70–99)
HBA1C MFR BLD: 6 % (ref 0–5.6)
HCT VFR BLD AUTO: 28.1 % (ref 35–47)
HDLC SERPL-MCNC: 43 MG/DL
HGB BLD-MCNC: 9.4 G/DL (ref 11.7–15.7)
LDLC SERPL CALC-MCNC: 70 MG/DL
MCH RBC QN AUTO: 32.2 PG (ref 26.5–33)
MCHC RBC AUTO-ENTMCNC: 33.5 G/DL (ref 31.5–36.5)
MCV RBC AUTO: 96 FL (ref 78–100)
NONHDLC SERPL-MCNC: 84 MG/DL
PLATELET # BLD AUTO: 153 10E3/UL (ref 150–450)
POTASSIUM BLD-SCNC: 3.6 MMOL/L (ref 3.4–5.3)
RBC # BLD AUTO: 2.92 10E6/UL (ref 3.8–5.2)
SODIUM SERPL-SCNC: 139 MMOL/L (ref 133–144)
TRIGL SERPL-MCNC: 68 MG/DL
WBC # BLD AUTO: 4.1 10E3/UL (ref 4–11)

## 2021-09-23 PROCEDURE — 36415 COLL VENOUS BLD VENIPUNCTURE: CPT | Performed by: INTERNAL MEDICINE

## 2021-09-23 PROCEDURE — 95816 EEG AWAKE AND DROWSY: CPT

## 2021-09-23 PROCEDURE — 250N000009 HC RX 250: Performed by: INTERNAL MEDICINE

## 2021-09-23 PROCEDURE — 99233 SBSQ HOSP IP/OBS HIGH 50: CPT | Performed by: INTERNAL MEDICINE

## 2021-09-23 PROCEDURE — 258N000003 HC RX IP 258 OP 636: Performed by: INTERNAL MEDICINE

## 2021-09-23 PROCEDURE — 250N000011 HC RX IP 250 OP 636: Performed by: INTERNAL MEDICINE

## 2021-09-23 PROCEDURE — 85027 COMPLETE CBC AUTOMATED: CPT | Performed by: INTERNAL MEDICINE

## 2021-09-23 PROCEDURE — 80048 BASIC METABOLIC PNL TOTAL CA: CPT | Performed by: INTERNAL MEDICINE

## 2021-09-23 PROCEDURE — 258N000001 HC RX 258: Performed by: INTERNAL MEDICINE

## 2021-09-23 PROCEDURE — 72170 X-RAY EXAM OF PELVIS: CPT

## 2021-09-23 PROCEDURE — 92526 ORAL FUNCTION THERAPY: CPT | Mod: GN | Performed by: SPEECH-LANGUAGE PATHOLOGIST

## 2021-09-23 PROCEDURE — 83036 HEMOGLOBIN GLYCOSYLATED A1C: CPT | Performed by: STUDENT IN AN ORGANIZED HEALTH CARE EDUCATION/TRAINING PROGRAM

## 2021-09-23 PROCEDURE — C9254 INJECTION, LACOSAMIDE: HCPCS | Performed by: INTERNAL MEDICINE

## 2021-09-23 PROCEDURE — 80061 LIPID PANEL: CPT | Performed by: STUDENT IN AN ORGANIZED HEALTH CARE EDUCATION/TRAINING PROGRAM

## 2021-09-23 PROCEDURE — 70450 CT HEAD/BRAIN W/O DYE: CPT

## 2021-09-23 PROCEDURE — 99223 1ST HOSP IP/OBS HIGH 75: CPT | Mod: 25 | Performed by: PSYCHIATRY & NEUROLOGY

## 2021-09-23 PROCEDURE — 95816 EEG AWAKE AND DROWSY: CPT | Mod: 26 | Performed by: PSYCHIATRY & NEUROLOGY

## 2021-09-23 PROCEDURE — 120N000001 HC R&B MED SURG/OB

## 2021-09-23 RX ORDER — FAMOTIDINE 10 MG
10 TABLET ORAL 2 TIMES DAILY
Status: DISCONTINUED | OUTPATIENT
Start: 2021-09-23 | End: 2021-09-23

## 2021-09-23 RX ORDER — DEXTROSE MONOHYDRATE 25 G/50ML
25-50 INJECTION, SOLUTION INTRAVENOUS
Status: DISCONTINUED | OUTPATIENT
Start: 2021-09-23 | End: 2021-09-25 | Stop reason: HOSPADM

## 2021-09-23 RX ORDER — NICOTINE POLACRILEX 4 MG
15-30 LOZENGE BUCCAL
Status: DISCONTINUED | OUTPATIENT
Start: 2021-09-23 | End: 2021-09-25 | Stop reason: HOSPADM

## 2021-09-23 RX ORDER — SODIUM CHLORIDE AND POTASSIUM CHLORIDE 150; 900 MG/100ML; MG/100ML
INJECTION, SOLUTION INTRAVENOUS CONTINUOUS
Status: DISCONTINUED | OUTPATIENT
Start: 2021-09-23 | End: 2021-09-23

## 2021-09-23 RX ORDER — HALOPERIDOL 5 MG/ML
2.5 INJECTION INTRAMUSCULAR EVERY 6 HOURS PRN
Status: DISCONTINUED | OUTPATIENT
Start: 2021-09-23 | End: 2021-09-25 | Stop reason: HOSPADM

## 2021-09-23 RX ORDER — MIRTAZAPINE 15 MG/1
15 TABLET, FILM COATED ORAL AT BEDTIME
Status: DISCONTINUED | OUTPATIENT
Start: 2021-09-23 | End: 2021-09-25 | Stop reason: HOSPADM

## 2021-09-23 RX ORDER — DEXTROSE MONOHYDRATE, SODIUM CHLORIDE, AND POTASSIUM CHLORIDE 50; 1.49; 9 G/1000ML; G/1000ML; G/1000ML
INJECTION, SOLUTION INTRAVENOUS CONTINUOUS
Status: DISCONTINUED | OUTPATIENT
Start: 2021-09-23 | End: 2021-09-24

## 2021-09-23 RX ORDER — POTASSIUM CHLORIDE 1500 MG/1
20 TABLET, EXTENDED RELEASE ORAL DAILY
Status: DISCONTINUED | OUTPATIENT
Start: 2021-09-23 | End: 2021-09-25 | Stop reason: HOSPADM

## 2021-09-23 RX ORDER — ATORVASTATIN CALCIUM 10 MG/1
10 TABLET, FILM COATED ORAL AT BEDTIME
Status: DISCONTINUED | OUTPATIENT
Start: 2021-09-23 | End: 2021-09-23

## 2021-09-23 RX ORDER — MIRTAZAPINE 15 MG/1
15 TABLET, FILM COATED ORAL AT BEDTIME
Status: DISCONTINUED | OUTPATIENT
Start: 2021-09-23 | End: 2021-09-23

## 2021-09-23 RX ADMIN — CEFTRIAXONE SODIUM 1 G: 1 INJECTION, POWDER, FOR SOLUTION INTRAMUSCULAR; INTRAVENOUS at 22:01

## 2021-09-23 RX ADMIN — DEXTROSE MONOHYDRATE 25 ML: 500 INJECTION PARENTERAL at 17:27

## 2021-09-23 RX ADMIN — FAMOTIDINE 20 MG: 10 INJECTION, SOLUTION INTRAVENOUS at 22:01

## 2021-09-23 RX ADMIN — POTASSIUM CHLORIDE AND SODIUM CHLORIDE: 900; 150 INJECTION, SOLUTION INTRAVENOUS at 08:38

## 2021-09-23 RX ADMIN — FAMOTIDINE 20 MG: 10 INJECTION, SOLUTION INTRAVENOUS at 00:17

## 2021-09-23 RX ADMIN — SODIUM CHLORIDE 50 MG: 9 INJECTION, SOLUTION INTRAVENOUS at 20:07

## 2021-09-23 RX ADMIN — POTASSIUM CHLORIDE, DEXTROSE MONOHYDRATE AND SODIUM CHLORIDE: 150; 5; 900 INJECTION, SOLUTION INTRAVENOUS at 20:02

## 2021-09-23 RX ADMIN — SODIUM CHLORIDE 50 MG: 9 INJECTION, SOLUTION INTRAVENOUS at 08:49

## 2021-09-23 ASSESSMENT — ACTIVITIES OF DAILY LIVING (ADL)
ADLS_ACUITY_SCORE: 21
PATIENT_/_FAMILY_COMMUNICATION_STYLE: SPOKEN LANGUAGE (ENGLISH OR BILINGUAL)
DIFFICULTY_COMMUNICATING: NO
DRESSING/BATHING_DIFFICULTY: OTHER (SEE COMMENTS)
ADLS_ACUITY_SCORE: 21
EQUIPMENT_CURRENTLY_USED_AT_HOME: WALKER, STANDARD
ADLS_ACUITY_SCORE: 21
WEAR_GLASSES_OR_BLIND: NO
TOILETING_ASSISTANCE: TOILETING DIFFICULTY, ASSISTANCE 1 PERSON
WALKING_OR_CLIMBING_STAIRS_DIFFICULTY: YES
CONCENTRATING,_REMEMBERING_OR_MAKING_DECISIONS_DIFFICULTY: YES
FALL_HISTORY_WITHIN_LAST_SIX_MONTHS: YES
DOING_ERRANDS_INDEPENDENTLY_DIFFICULTY: YES
ADLS_ACUITY_SCORE: 23
DIFFICULTY_EATING/SWALLOWING: OTHER (SEE COMMENTS)
ADLS_ACUITY_SCORE: 12
HEARING_DIFFICULTY_OR_DEAF: NO
TOILETING_ISSUES: YES
ADLS_ACUITY_SCORE: 25

## 2021-09-23 NOTE — PROGRESS NOTES
MD Notification    Notified Person: MD    Notified Person Name: Aj    Notification Date/Time: 9/23 1741    Notification Interaction: page    Purpose of Notification: FYI pt BGS 66, NPO, no orders for dextrose. Please call or enter orders. Thanks!    Orders Received: IVF + dextrose    Comments:

## 2021-09-23 NOTE — PROGRESS NOTES
[facility]    Hospitalist Progress Note    Date of Service (when I saw the patient): 09/23/2021  Admit date: 9/22/2021    Assessment & Plan   Radha Cunningham is a 89 year old female with cognitive impairment, Afib on anticoagulation, history of strokes who presented on 9/22/21 with stroke-like symptoms.    Patient was brought to the emergency room after she was found to have slurred speech and right-sided facial droop by staff at her memory care unit.  Reportedly at baseline she speaks clearly and is able to carry out simple conversation.  * Code stroke was called in the ER, evaluated by stroke neurology, not felt to be a candidate for TPA because of patient being on Eliquis.  * Head CT without contrast shows no acute findings. Old strokes in cerebral artery territory and R basal ganglia region.   * Unable to do CT angiogram as reportedly she has had anaphylaxis to contrast dye although it is noted that she had CTA head and neck done in 2020 with premedication(per daughter)  * Initially told that the MRI was incompatible with pacemaker. However, noted that MRI done last November at [facility].     Acute ischemic CVA versus seizure - presenting with slurred speech and R facial droop.   Previous history of CVA  Acute encephalopathy - likely secondary to UTI and dehydration. Also note she is on once daily hydrocodone PTA .  Seizure disorder Patient reportedly refused her Vimpat earlier this morning. This may contribute to above.     - Stroke Neurology consulted.   - Follow up CT on 09/23/21 unchanged.   - Improving mental status, more responsive, responding to daughter.  - PT, OT and SLP evaluation  - Appreciate stroke neurology. Discussed. Suspect this represents recrudescence of prior stroke symptoms in the setting of a metabolic encephalopathy  Awaiting final EEG read. No seizure activity. Less likely this was seizure, but in that case would recommend continuation of current dose AED  without change in dosage. Avoid missing doses.   - Follow up on final read of EEG  - Give PTA Vimpat as IV, currently unable to take PO medications.   - Treat UTI as below.  - Suspect dehydration.  Continues NPO per speech evaluation, start IVF  - Continue PTA statin when taking PO  - Resume apixaban when able to take PO.     Suspect dehydration   UTI - UA: 18 WBC, 125 RBC. Negative Nitrate. SG 1.022.   * UA is abnormal, will start empiric Rocephin while awaiting cultures.  - Started on IV ceftriaxone  - follow UCx  - IVF as above.     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  Essential hypertension  Hyperlipidemia    - Resume PTA apixaban when able to initiate PO.   - On no rate controlling medications PTA  - Monitor her on telemetry    Cognitive impairment. Patient lives in the memory care unit, per daughter at the bedside she does not have a formal diagnosis of dementia. She does have some ongoing memory problems especially since the stroke a year ago but at baseline she is able to carry out reasonable conversation.   - Daughter at bedside and feels she is getting close to her baseline. Currently responding yes and no to simple questions.      History of hypokalemia -   - resume PTA daily potassium when taking PO    Probable GERD  - PTA famotidine given via IV until able to take PO    Reactive airway disease  - No active issues at the moment, continue prior to admission inhaler    Chronic mild anemia ~ 11, stable  Recent Labs   Lab 09/22/21  1801   HGB 11.2*       Diet: Orders Placed This Encounter      NPO for Medical/Clinical Reasons Except for: No Exceptions     IVF: Started on NS + 20 KCl @ 75 ml/h.   Sosa Catheter: Not present     DVT Prophylaxis: Pneumatic Compression Devices  Code Status: No CPR- Do NOT Intubate     Disposition: Expected discharge in 1-2 day back to prior living situation, long-term memory care.    Communication: Discussed with daughter, RN, neurology on 09/23/21    Antionette Rocha  Hospitalist Service  [facility]  Securely message with the [employer] Web Console (learn more here)  Text page via [employer] Paging/Directory    Total time spent:  > 35 minutes  Time spent counseling, coordination of care: 25 minutes including discussion with care team, daughter, neurology, personal review and interpretation of labs and studies as noted above    Interval History   Events over last 24 hours as outlined above.   Patient unable to give history. Gradual improvement today, becoming more responsive. Moving all extremities. Answering yes no to simple questions.   No evidence SOB, CP, abdominal pain, N/V/D.     -Data reviewed today: I reviewed all new labs and imaging results over the last 24 hours. I personally reviewed no images or EKG's today.    Physical Exam   Temp: 98.1  F (36.7  C) Temp src: Axillary BP: 127/81 Pulse: 83   Resp: 16 SpO2: 94 % O2 Device: None (Room air)    Vitals:    09/22/21 1828   Weight: 66.6 kg (146 lb 13.2 oz)     Vital Signs with Ranges  Temp:  [98  F (36.7  C)-98.7  F (37.1  C)] 98.1  F (36.7  C)  Pulse:  [] 83  Resp:  [15-18] 16  BP: (113-159)/(57-92) 127/81  SpO2:  [92 %-98 %] 94 %  No intake/output data recorded.    Today's Exam  Constitutional:  NAD,   Neuropsyche: alert, unable to answer questions of oriented, face appears symmetric, appears to have left gaze preference, Not able to follow all commands but moving all 4 extremities. Generalized weakness noted. RUE weaker than left.   Respiratory:  Breathing comfortably, good air exchange, no wheezes, no crackles.   Cardiovascular: Irregular rate and rhythm, trace edema.  GI: soft, NT/ND, BS normal  Skin/Integumen:  No acute rash or sign of bleeding.     Medications   All medications reviewed on 09/23/21     - MEDICATION INSTRUCTIONS -       - MEDICATION INSTRUCTIONS -       sodium chloride         [Held by  provider] apixaban ANTICOAGULANT  5 mg Oral BID     [Held by provider] atorvastatin  10 mg Oral or Feeding Tube At Bedtime     cefTRIAXone  1 g Intravenous Q24H     famotidine  20 mg Intravenous Q24H     lacosamide (VIMPAT) intermittent infusion  50 mg Intravenous BID     mirtazapine  15 mg Oral At Bedtime     [Held by provider] potassium chloride ER  20 mEq Oral Daily     sodium chloride (PF)  3 mL Intracatheter Q8H       Data   Recent Labs   Lab 09/23/21  0150 09/22/21  1801   WBC  --  5.3   HGB  --  11.2*   MCV  --  98   PLT  --  182   INR  --  1.26*   NA  --  136   POTASSIUM  --  4.0   CHLORIDE  --  104   CO2  --  27   BUN  --  12   CR  --  0.74   ANIONGAP  --  5   SHAN  --  9.4   * 110*       Recent Results (from the past 24 hour(s))   CT Head w/o Contrast    Narrative    EXAM: CT HEAD W/O CONTRAST  LOCATION: [facility]  DATE/TIME: 9/22/2021 6:14 PM    INDICATION: Code stroke.  COMPARISON: 12/04/2020.  TECHNIQUE: Routine CT Head without IV contrast. Multiplanar reformats. Dose reduction techniques were used.    FINDINGS:  INTRACRANIAL CONTENTS: Chronic moderate to large left posterior cerebral artery territory ischemic infarct involving the paramedian left occipital lobe and medial left temporal lobe. Unchanged chronic small infarct in the right basal ganglia region. No   definite CT signs of acute infarct. Mild to moderate generalized brain parenchymal volume loss. Moderately extensive patchy and confluent hypoattenuation in the cerebral white matter is nonspecific, but likely due to chronic small vessel ischemic   disease. No acute intracranial hemorrhage, extra-axial fluid collection or mass effect. Prominent intracranial atherosclerotic calcifications involving the carotid siphons and left vertebral artery.    VISUALIZED ORBITS/SINUSES/MASTOIDS: Prior bilateral cataract surgery. Visualized portions of the orbits are otherwise unremarkable. No paranasal sinus mucosal disease.  No middle ear or mastoid effusion.    BONES/SOFT TISSUES: No acute abnormality. Right temporomandibular joint degenerative changes.      Impression    IMPRESSION:  1.  No CT findings of acute intracranial process.  2.  Chronic left posterior cerebral artery territory and right basal ganglia region infarcts, unchanged.  3.  Brain atrophy and presumed chronic small vessel ischemic change, as described.    The findings were discussed with the ER physician by myself at approximately 6:24 PM on 09/22/2021.

## 2021-09-23 NOTE — PLAN OF CARE
Here with UTI, possible seizure.  Neuros stable.  Unable to complete full neuro assessment.  Does not follow commands.  Moves all extremities, holds body very tight at times.  Says 'yes', 'no', 'ouch'.  Will look to the left, but not follow things.  Right neglect.  Tele Afib, CVR.  NPO.  Incontinent of urine.  Discharge plans pending.

## 2021-09-23 NOTE — PROGRESS NOTES
SPIRITUAL HEALTH SERVICES  SPIRITUAL ASSESSMENT Progress Note  [facility]     REFERRAL SOURCE: From Family for Support    I visited with Radha today per her family's request for a visit for emotional support.    I met with Radha today. She was pretty sleepy but opened her eyes. She did not respond to me and I was unsure how much she was tracking, consistent with chart review. I spent time at bedside and shared words of comfort, peace and support.    I tried later to stop by to see if family was present but they were not.     PLAN: [facility] will continue to follow.     Michelle Bejarano  Associate    Phone: [phone]  Pager: [phone]

## 2021-09-23 NOTE — ED NOTES
[facility]  ED Nurse Handoff Report    ED Chief complaint: Slurred Speech      ED Diagnosis:   Final diagnoses:   Acute ischemic stroke (H)       Code Status: DNR / DNI    Allergies:   Allergies   Allergen Reactions     Contrast Dye      Diagnostic X-Ray Materials Unknown     Itching and eyes swell shut.   She reports reactions to xray contrast material         Patient Story: Patient presents to the ED complaining of slurred speech and deviated gaze.      Focused Assessment:  Patient is not alert, or oriented.  She is resistant to cares.  Urine positive for UTI.  Initial Head CT negative.  Patient has a contrast allergy and is not a candidate for intervention, therefor no further imaging is needed.     Treatments and/or interventions provided: labs, imaging  Patient's response to treatments and/or interventions:   Labs Ordered and Resulted from Time of ED Arrival Up to the Time of Departure from the ED   BASIC METABOLIC PANEL - Abnormal; Notable for the following components:       Result Value    Glucose 110 (*)     All other components within normal limits   INR - Abnormal; Notable for the following components:    INR 1.26 (*)     All other components within normal limits   CBC WITH PLATELETS AND DIFFERENTIAL - Abnormal; Notable for the following components:    RBC Count 3.46 (*)     Hemoglobin 11.2 (*)     Hematocrit 33.8 (*)     All other components within normal limits   ROUTINE UA WITH MICROSCOPIC REFLEX TO CULTURE - Abnormal; Notable for the following components:    Color Urine Brown (*)     Appearance Urine Cloudy (*)     Ketones Urine Trace (*)     Blood Urine Large (*)     Protein Albumin Urine 50  (*)     Leukocyte Esterase Urine Moderate (*)     RBC Urine 125 (*)     WBC Urine 18 (*)     All other components within normal limits    Narrative:     Urine Culture ordered based on laboratory criteria   PARTIAL THROMBOPLASTIN TIME - Normal   EXTRA BLUE TOP TUBE   EXTRA RED TOP TUBE   EXTRA  GREEN TOP (LITHIUM HEPARIN) TUBE   EXTRA PURPLE TOP TUBE   EXTRA BLOOD BANK PURPLE TOP TUBE   EXTRA GREEN TOP (LITHIUM HEPARIN) ON ICE   LACOSAMIDE LEVEL   COVID-19 VIRUS (CORONAVIRUS) BY PCR   URINE CULTURE   EXTRA TUBE    Narrative:     The following orders were created for panel order Topmost Draw.  Procedure                               Abnormality         Status                     ---------                               -----------         ------                     Extra Blue Top Tube[[id number]]                              Final result               Extra Red Top Tube[[id number]]                               Final result               Extra Green Top (Lithium...[[id number]]                      Final result               Extra Purple Top Tube[[id number]]                            Final result               Extra Blood Bank Purple ...[[id number]]                      Final result               Extra Green Top (Lithium...[[id number]]                      Final result                 Please view results for these tests on the individual orders.   CBC WITH PLATELETS & DIFFERENTIAL    Narrative:     The following orders were created for panel order CBC with Platelets & Differential.  Procedure                               Abnormality         Status                     ---------                               -----------         ------                     CBC with platelets and d...[[id number]]  Abnormal            Final result                 Please view results for these tests on the individual orders.         To be done/followed up on inpatient unit:      Does this patient have any cognitive concerns?: Baseline dementia    Activity level - Baseline/Home:  Wheelchair  Activity Level - Current:   Total Care    Patient's Preferred language: English   Needed?: No    Isolation: None  Infection: Not Applicable  Patient tested for COVID 19 prior to admission: YES  Bariatric?: No    Vital Signs:   Vitals:     09/22/21 1828 09/22/21 1900 09/22/21 1930 09/22/21 1931   BP: 123/87 119/70 124/88    Pulse: 116 103 88    Resp: 18      Temp: 98  F (36.7  C)      TempSrc: Oral      SpO2: 94% 97%  98%   Weight: 66.6 kg (146 lb 13.2 oz)          Cardiac Rhythm:     Was the PSS-3 completed:   Yes  What interventions are required if any?               Family Comments:   OBS brochure/video discussed/provided to patient/family: N/A              Name of person given brochure if not patient:               Relationship to patient:     For the majority of the shift this patient's behavior was Yellow.   Behavioral interventions performed were decreasing stimulation .    ED NURSE PHONE NUMBER: *[phone]

## 2021-09-23 NOTE — PROGRESS NOTES
09/23/21 1110   General Information   Onset of Illness/Injury or Date of Surgery 09/22/21   Referring Physician Dr. Krishnan   Patient/Family Therapy Goal Statement (SLP) Patient not able to state.    Pertinent History of Current Problem Patient is a 89 year old woman  has a past medical history of Acute gangrenous cholecystitis, Asthma, Atrial fibrillation (H), Cancer of skin, Cholelithiasis, Diverticulosis, Hiatal hernia, Hyperlipemia, Hypertension, Hypertension, Incarcerated hiatal hernia, Paroxysmal A-fib (H), Pelvic abscess in female, PUD (peptic ulcer disease), Reactive airway disease, skin cancer, and stroke.Patient presents on eliquis who had onset of right sided facial droop and right arm weakness from her facility. Currently on exam, she has left gaze preference and right hemibody weakness. Per report from facility, patient requires assistance with walking and transfers as well as activity of daily living.    Past History of Dysphagia Patient seen in 2020 on several admission. Last admission 7/14/20 she discharged on a pureed diet and thin liquids.    Type of Evaluation   Type of Evaluation Swallow Evaluation   Oral Motor   Oral Musculature unable to assess due to poor participation/comprehension   Dentition (Oral Motor)   Dentition (Oral Motor) edentulous   General Swallowing Observations   Current Diet/Method of Nutritional Intake (General Swallowing Observations, NIS) NPO   Respiratory Support (General Swallowing Observations) none   Swallowing Evaluation Clinical swallow evaluation   Clinical Swallow Evaluation   Feeding Assistance dependent   Clinical Swallow Evaluation Textures Trialed mildly thick liquids   Clinical Swallow Eval: Mildly Thick Liquids   Mode of Presentation spoon;fed by clinician   Volume Presented 1 teaspoon of water   Oral Phase poor AP movement;residue in oral cavity   Oral Residue left lip drooling;right lip drooling   Pharyngeal Phase impaired  (No swallow response. )    Diagnostic Statement Patient with no awareness of the bolus and let it spill out of her oral cavity.    Swallowing Recommendations   Diet Consistency Recommendations NPO   Medication Administration Recommendations, Swallowing (SLP) Non oral means.   General Therapy Interventions   Planned Therapy Interventions Dysphagia Treatment   SLP Therapy Assessment/Plan   Criteria for Skilled Therapeutic Interventions Met (SLP Eval) yes   SLP Diagnosis Severe dysphagia   Rehab Potential (SLP Eval) fair, will monitor progress closely   Therapy Frequency (SLP Eval) 5 times/wk   Predicted Duration of Therapy Intervention (SLP Eval) 1 week   Comment, Therapy Assessment/Plan (SLP) Patient presents with severe oral/pharyngeal dysphagia at bedside due to decreased alertness/awareness. Patient was given a teaspoon of mildly thick liquids that she did not attempt to swallow and let if run out of her oral cavity. Recommend: 1. NPO overnight and will re-assess in the am for PO intake.    SLP Discharge Planning    SLP Discharge Recommendation (DC Rec) Long term care facility   SLP Rationale for DC Rec Will need ST needs for dysphagia management.    SLP Brief overview of current status  Patient with severe dysphagia at bedside. Recommend: 1. NPO and will re-assess in the am.     Total Evaluation Time   Total Evaluation Time (Minutes) 15

## 2021-09-23 NOTE — PROGRESS NOTES
SW received voicemail from pt residence, [facility].  Nurse Lilibeth would like update when workup is complete.  Lilibeth [phone].

## 2021-09-23 NOTE — PROGRESS NOTES
RECEIVING UNIT ED HANDOFF REVIEW    ED Nurse Handoff Report was reviewed by: Dolores Casiano RN on September 22, 2021 at 8:52 PM

## 2021-09-23 NOTE — H&P
[facility]    History and Physical  Hospitalist       Date of Admission:  9/22/2021    Assessment & Plan   Radha Cunningham is a 89 year old female who presents with strokelike symptoms.    Acute ischemic CVA versus seizure  Previous history of CVA  Acute encephalopathy  -Patient was brought to the emergency room after she was found to have slurred speech and right-sided facial droop by staff at her memory care unit.  Reportedly at baseline she speaks clearly and is able to carry out simple conversation..  -Code stroke was called in the ER, evaluated by stroke neurology, not felt to be a candidate for TPA because of patient being on Eliquis.  -Unable to do CT angiogram as reportedly she has had anaphylaxis to contrast dye although I do see that she had CTA head and neck done in 2020 with premedication(per daughter)  -I was initially told that the MRI was incompatible with pacemaker however I do see that she had an MRI done last November at [facility].  -We will try to confirm with [employer] and get an MRI of the brain if it is compatible with her pacemaker  -Admit to neuro floor  -Neurochecks every 2 hour  -PT, OT and SLP evaluation  -Hold anticoagulation until stroke burden is evaluated  -Appreciate stroke neurology following  -Permissive hypertension for now  -EEG    Addendum:  UA is abnormal, will start empiric Rocephin while awaiting cultures.    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  Essential hypertension  Hyperlipidemia  -Hold prior to admission Eliquis until her potential stroke burden is evaluated  -It does not appear like she is on any rate controlling medication at home  -Monitor her on telemetry    Seizure disorder  -Patient reportedly refused her Vimpat earlier this morning  -Given her encephalopathy I do not think she will be able to swallow her pills  -We will change prior to admission  Vimpat to IV    Cognitive impairment.  -Patient lives in the memory care unit, per daughter at the bedside she does not have a formal diagnosis of dementia she does have some ongoing memory problems especially since the stroke a year ago but at baseline she is able to carry out reasonable conversation  -Patient is encephalopathic at the moment, difficult to say if there is a significant component of aphasia/dysarthria  -Follow clinically    Reactive airway disease  -No active issues at the moment, continue prior to admission inhalers      Radha is a LOW SUSPICION PUI.  Follow these instructions:    If COVID test positive -> continue isolation precautions    If COVID test negative -> discontinue COVID-specific isolation precautions     DVT Prophylaxis: Pneumatic Compression Devices  Code Status: DNR / DNI    Disposition: Expected discharge in <2 days    Florian Krishnan MD    Primary Care Physician   ROSALIND Duran, CNP    Chief Complaint   Stroke like symptoms    History is obtained from the patient    History of Present Illness   June GRETEL Cunningham is a 89 year old female on Eliquis with history of  cognitive impairment, CVA, Afib and hypertension who presents via EMS with slurred speech. EMS reports that at 1645, the patient's care facility staff noticed the patient had slurred speech, was mumbling, had a preferences left-sided gaze and right sided facial droop. At her baseline, she speaks clearly and is able to move all her extremities.   CT head was negative for acute findings.  Patient was unable to receive CTA due her reported anaphylaxis to contrast agents.  Initially I was told that she cannot get an MRI of the brain but on reviewing her chart it appears like she had an MRI last November at [facility] so it does appear that her pacemaker is MRI compatible.  Per the daughter at the bedside, at baseline she is able to have 2 way conversation at baseline.  No reported preceding acute illness like  nausea vomiting or diarrhea.  No cough, no shortness of breath.  Patient is unable to answer much of the questions for review of system due to slurring of speech/dysarthria and encephalopathy.  Her last dose of Eliquis was reportedly on the evening of 9/21.  She apparently refused this morning's dose of Eliquis and Vimpat.  In the emergency room the patient was seen by Dr. Miller.  CT head was negative, a code stroke was called and patient was evaluated by neurology.  Given the fact that she was also on Eliquis it was felt that she was not a candidate for TPA.  CT angiogram was deferred due to her history of anaphylaxis to contrast dye.    Past Medical History    I have reviewed this patient's medical history and updated it with pertinent information if needed.   Past Medical History:   Diagnosis Date     Acute gangrenous cholecystitis      Asthma      Atrial fibrillation (H)     perioperative     Cancer of skin      Cholelithiasis      Diverticulosis      Hiatal hernia      Hyperlipemia      Hypertension      Hypertension      Incarcerated hiatal hernia      Paroxysmal A-fib (H)      Pelvic abscess in female      PUD (peptic ulcer disease)      Reactive airway disease      Skin cancer      Stroke (H)     old, no residual       Past Surgical History   I have reviewed this patient's surgical history and updated it with pertinent information if needed.  Past Surgical History:   Procedure Laterality Date     ABCESS DRAINAGE      Gall bladder bed     CHOLECYSTECTOMY      robotic     GASTROSTOMY, INSERT TUBE, COMBINED       HERNIA REPAIR  2004    umbilical     HERNIA REPAIR      umbilical     HERNIA REPAIR      obstructed hiatal     PELVIC ABCESS DRAINAGE      transcutaneous     Skin cancer removal - forehead  2005       Prior to Admission Medications   Prior to Admission Medications   Prescriptions Last Dose Informant Patient Reported? Taking?   Pamella Protect (EUCERIN) external cream  at prn Nursing Home Yes Yes   Sig:  Apply topically 2 times daily as needed for dry skin or other   Calcium Carb-Cholecalciferol (CALTRATE 600+D3) 600-800 MG-UNIT TABS 9/21/2021 at [address] [place] [address] Yes Yes   Sig: Take 1 tablet by mouth daily   Cranberry 1000 MG CAPS 9/21/2021 at [address] [place] [address] Yes Yes   Sig: Take 2 capsules by mouth daily    HYDROcodone-acetaminophen (NORCO) 5-325 MG tablet 9/17/2021 at 531 pm [facility] Yes No   Sig: Take 1 tablet by mouth every 8 hours as needed for severe pain   HYDROcodone-acetaminophen (NORCO) 5-325 MG tablet 9/22/2021 at [address] [place] [address] Yes Yes   Sig: Take 1 tablet by mouth every 24 hours   acetaminophen (TYLENOL) 325 MG tablet 9/13/2021 at [facility] Yes Yes   Sig: Take 650 mg by mouth every 4 hours as needed for mild pain   apixaban ANTICOAGULANT (ELIQUIS) 5 MG tablet 9/21/2021 at [address] [place] [address] Yes Yes   Sig: Take 5 mg by mouth 2 times daily   atorvastatin (LIPITOR) 10 MG tablet 9/21/2021 at [address] [place] [address] Yes Yes   Sig: Take 10 mg by mouth At Bedtime    bisacodyl (DULCOLAX) 10 MG suppository  at prn [facility] Yes Yes   Sig: Place 10 mg rectally daily as needed for constipation   emollient (VANICREAM) external cream  at DCn [facility] Yes Yes   Sig: Apply topically 2 times daily as needed To whole body    famotidine (PEPCID) 10 MG tablet 9/21/2021 at [address] [place] [address] No Yes   Sig: Take 1 tablet (10 mg) by mouth 2 times daily   guaiFENesin (ROBITUSSIN) 100 MG/5ML SYRP  at prn [facility] Yes Yes   Sig: Take 10 mLs by mouth every 4 hours as needed for cough   ipratropium-albuterol (COMBIVENT RESPIMAT)  MCG/ACT inhaler  at prn [facility] Yes Yes   Sig: Inhale 1-2 puffs into the lungs 4 times daily   lacosamide (VIMPAT) 50 MG TABS tablet 9/21/2021 at [address] [place] [address] Yes Yes   Sig: Take 50 mg by mouth 2 times daily   loperamide (IMODIUM A-D) 2 MG tablet  at prn Nursing Home Yes Yes   Sig: Take 4 mg after first loose stool.  Then take 2 mg after each loose stool as needed.  Max  16 mg/day   mirtazapine (REMERON) 15 MG tablet 9/21/2021 at [facility] Nursing Home Yes Yes   Sig: Take 15 mg by mouth At Bedtime   multivitamin w/minerals (THERA-VIT-M) tablet 9/21/2021 at 0846 Nursing Home Yes Yes   Sig: Take 1 tablet by mouth daily   nystatin (MYCOSTATIN) 832227 UNIT/GM external powder 9/22/2021 at 0955 Nursing Home Yes Yes   Sig: Apply topically 2 times daily Under bilateral breasts   potassium chloride ER (KLOR-CON M) 20 MEQ CR tablet 9/21/2021 at 0821 Nursing Home Yes Yes   Sig: Take 20 mEq by mouth daily      Facility-Administered Medications: None     Allergies   Allergies   Allergen Reactions     Contrast Dye      Diagnostic X-Ray Materials Unknown     Itching and eyes swell shut.   She reports reactions to xray contrast material         Social History   I have reviewed this patient's social history and updated it with pertinent information if needed. Radha Cunningham  reports that she has never smoked. She does not have any smokeless tobacco history on file. She reports that she does not drink alcohol.    Family History   I have reviewed this patient's family history and updated it with pertinent information if needed.   Family History   Problem Relation Age of Onset     C.A.D. Mother      Hypertension Mother      Diabetes Type 2  Daughter        Review of Systems   The 10 point Review of Systems is negative other than noted in the HPI or here.     Physical Exam   Temp: 98  F (36.7  C) Temp src: Oral BP: 124/88 Pulse: 88   Resp: 18 SpO2: 98 %      Vital Signs with Ranges  Temp:  [98  F (36.7  C)] 98  F (36.7  C)  Pulse:  [] 88  Resp:  [18] 18  BP: (119-124)/(70-88) 124/88  SpO2:  [94 %-98 %] 98 %  146 lbs 13.22 oz    Gen: Awake, not following commands, voices incomprehensible words/sentences which appears to be slurred.  HEENT:  no pallor or icterus  Neck: Supple neck, good ROM, no stridor  Resp: CTA B/L, normal WOB; no crackles; no wheezes  CVS-irregular, rate controlled   abd/GI: Soft,  non-tender. BS- normoactive  Skin- Warm, dry, no rashes  MSK: No joint deformity; no edema  Neuro-difficult to accurately assess cranial nerves it does appear like she has significant aphasia/dysarthria with slurring of speech.  She is not following commands.  Motor strength appears to be preserved although difficult to accurately quantify yet.  She she has a flexed position of bilateral upper extremity.  No involuntary movement.    Data   Data reviewed today:  I personally reviewed no images or EKG's today.  Recent Labs   Lab 09/22/21  1801   WBC 5.3   HGB 11.2*   MCV 98      INR 1.26*      POTASSIUM 4.0   CHLORIDE 104   CO2 27   BUN 12   CR 0.74   ANIONGAP 5   SHAN 9.4   *       Recent Results (from the past 24 hour(s))   CT Head w/o Contrast    Narrative    EXAM: CT HEAD W/O CONTRAST  LOCATION: [facility]  DATE/TIME: 9/22/2021 6:14 PM    INDICATION: Code stroke.  COMPARISON: 12/04/2020.  TECHNIQUE: Routine CT Head without IV contrast. Multiplanar reformats. Dose reduction techniques were used.    FINDINGS:  INTRACRANIAL CONTENTS: Chronic moderate to large left posterior cerebral artery territory ischemic infarct involving the paramedian left occipital lobe and medial left temporal lobe. Unchanged chronic small infarct in the right basal ganglia region. No   definite CT signs of acute infarct. Mild to moderate generalized brain parenchymal volume loss. Moderately extensive patchy and confluent hypoattenuation in the cerebral white matter is nonspecific, but likely due to chronic small vessel ischemic   disease. No acute intracranial hemorrhage, extra-axial fluid collection or mass effect. Prominent intracranial atherosclerotic calcifications involving the carotid siphons and left vertebral artery.    VISUALIZED ORBITS/SINUSES/MASTOIDS: Prior bilateral cataract surgery. Visualized portions of the orbits are otherwise unremarkable. No paranasal sinus mucosal disease. No  middle ear or mastoid effusion.    BONES/SOFT TISSUES: No acute abnormality. Right temporomandibular joint degenerative changes.      Impression    IMPRESSION:  1.  No CT findings of acute intracranial process.  2.  Chronic left posterior cerebral artery territory and right basal ganglia region infarcts, unchanged.  3.  Brain atrophy and presumed chronic small vessel ischemic change, as described.    The findings were discussed with the ER physician by myself at approximately 6:24 PM on 09/22/2021.

## 2021-09-23 NOTE — CONSULTS
[facility]    Stroke Consult Note    Reason for Consult:  Stroke vs Seizure    Chief Complaint: Slurred Speech       HPI  Patient is a 89 year old woman  has a past medical history of Acute gangrenous cholecystitis, Asthma, Atrial fibrillation (H), Cancer of skin, Cholelithiasis, Diverticulosis, Hiatal hernia, Hyperlipemia, Hypertension, Hypertension, Incarcerated hiatal hernia, Paroxysmal A-fib (H), Pelvic abscess in female, PUD (peptic ulcer disease), Reactive airway disease, Skin cancer, and Stroke (H).      Patient presents on eliquis who had onset of right sided facial droop and right arm weakness from her facility. Currently on exam, she has left gaze preference and right hemibody weakness. Per report from facility, patient requires assistance with walking and transfers as well as activity of daily living.      Further spoke with the [facility] facility and confirmed pt last dose eliquis was 9/21 evening.  Noted 9/20 2 AM dose was refused by patient.  Further discussed symptoms witnessed by facility employees which prompted stroke activation visit-patient displayed symptoms of mumbling speech and eyes looking left as well as patient not cooperating to lift legs from wheelchair.  Staff at [facility] noted that patient was moving bilateral upper extremities.  Per ED, patient was moving all extremities but moving right upper extremity less and had right upper extremity in flexed position against patient's body.    Per hospitalist note-patient did not take Vimpat 9/21.     On examining the patient 9/23/2021, patient with limited affect and indicating left hip pain.  Patient minimally verbal and slow in response.    Stroke Evaluation Summarized    MRI/Head CT Pending repeat   Intracranial Vasculature -   Cervical Vasculature -     Echocardiogram -   EKG/Telemetry Atrial fibrillation with rapid ventricular response    Other Testing Not Applicable     LDL  No lab value  "available in past 30 days   A1C  No lab value available in past 30 days   Troponin No lab value available in past 48 hrs       Impression  Left MCA stroke versus seizure versus recrudescence vs toxic metabolic infectious encephalopathy secondary to urinary tract infection    Recommendations  > Defer treatment of urinary tract infection to primary team  > Defer evaluation and treatment of cachexia to primary team  > Continue PTA Vimpat  > Repeat CT head for 9/23 to evaluate for further infarction (ordered)  > Continue holding anticoagulant therapy until CT head completed  > LDL labs (ordered)  > A1c Labs (ordered)  > PT/OT/SLP        Patient Follow-up    - final recommendation pending work-up    Thank you for this consult. If stroke on CT, we will continue to follow. If no stroke detected on repeat CT, would defer to general neurology for further workup if desired.       The Stroke Staff is Dr. Williamson.    Santosh Paul MD  Vascular Neurology Fellow  To page me or covering stroke neurology team member, click here: AMCOM   Choose \"On Call\" tab at top, then search dropdown box for \"Neurology Adult\", select location, press Enter, then look for stroke/neuro ICU/telestroke.    _____________________________________________________    Clinically Significant Risk Factors Present on Admission            # Coagulation Defect: home medication list includes an anticoagulant medication     #Cachexia     Past Medical History   Past Medical History:   Diagnosis Date     Acute gangrenous cholecystitis      Asthma      Atrial fibrillation (H)     perioperative     Cancer of skin      Cholelithiasis      Diverticulosis      Hiatal hernia      Hyperlipemia      Hypertension      Hypertension      Incarcerated hiatal hernia      Paroxysmal A-fib (H)      Pelvic abscess in female      PUD (peptic ulcer disease)      Reactive airway disease      Skin cancer      Stroke (H)     old, no residual     Past Surgical History   Past Surgical " History:   Procedure Laterality Date     ABCESS DRAINAGE      Gall bladder bed     CHOLECYSTECTOMY      robotic     GASTROSTOMY, INSERT TUBE, COMBINED       HERNIA REPAIR  2004    umbilical     HERNIA REPAIR      umbilical     HERNIA REPAIR      obstructed hiatal     PELVIC ABCESS DRAINAGE      transcutaneous     Skin cancer removal - forehead  2005     Medications   Home Meds  Prior to Admission medications    Medication Sig Start Date End Date Taking? Authorizing Provider   acetaminophen (TYLENOL) 325 MG tablet Take 650 mg by mouth every 4 hours as needed for mild pain   Yes Unknown, Entered By History   apixaban ANTICOAGULANT (ELIQUIS) 5 MG tablet Take 5 mg by mouth 2 times daily   Yes Unknown, Entered By History   atorvastatin (LIPITOR) 10 MG tablet Take 10 mg by mouth At Bedtime    Yes Reported, Patient   Pamella Protect (EUCERIN) external cream Apply topically 2 times daily as needed for dry skin or other   Yes Unknown, Entered By History   bisacodyl (DULCOLAX) 10 MG suppository Place 10 mg rectally daily as needed for constipation   Yes Unknown, Entered By History   Calcium Carb-Cholecalciferol (CALTRATE 600+D3) 600-800 MG-UNIT TABS Take 1 tablet by mouth daily   Yes Unknown, Entered By History   Cranberry 1000 MG CAPS Take 2 capsules by mouth daily    Yes Unknown, Entered By History   emollient (VANICREAM) external cream Apply topically 2 times daily as needed To whole body    Yes Unknown, Entered By History   famotidine (PEPCID) 10 MG tablet Take 1 tablet (10 mg) by mouth 2 times daily 6/1/21  Yes Avalos, Magalie Schaffer PA-C   guaiFENesin (ROBITUSSIN) 100 MG/5ML SYRP Take 10 mLs by mouth every 4 hours as needed for cough   Yes Unknown, Entered By History   HYDROcodone-acetaminophen (NORCO) 5-325 MG tablet Take 1 tablet by mouth every 24 hours   Yes Unknown, Entered By History   ipratropium-albuterol (COMBIVENT RESPIMAT)  MCG/ACT inhaler Inhale 1-2 puffs into the lungs 4 times daily as needed    Yes Unknown,  Entered By History   lacosamide (VIMPAT) 50 MG TABS tablet Take 50 mg by mouth 2 times daily   Yes Unknown, Entered By History   loperamide (IMODIUM A-D) 2 MG tablet Take 4 mg after first loose stool.  Then take 2 mg after each loose stool as needed.  Max 16 mg/day   Yes Unknown, Entered By History   mirtazapine (REMERON) 15 MG tablet Take 15 mg by mouth At Bedtime   Yes Unknown, Entered By History   multivitamin w/minerals (THERA-VIT-M) tablet Take 1 tablet by mouth daily   Yes Reported, Patient   nystatin (MYCOSTATIN) 853560 UNIT/GM external powder Apply topically 2 times daily Under bilateral breasts   Yes Unknown, Entered By History   potassium chloride ER (KLOR-CON M) 20 MEQ CR tablet Take 20 mEq by mouth daily   Yes Unknown, Entered By History   HYDROcodone-acetaminophen (NORCO) 5-325 MG tablet Take 1 tablet by mouth every 8 hours as needed for severe pain    Reported, Patient       Scheduled Meds    atorvastatin  10 mg Oral or Feeding Tube At Bedtime     cefTRIAXone  1 g Intravenous Q24H     famotidine  20 mg Intravenous Q24H     lacosamide (VIMPAT) intermittent infusion  50 mg Intravenous BID     sodium chloride (PF)  3 mL Intracatheter Q8H       Infusion Meds    - MEDICATION INSTRUCTIONS -       - MEDICATION INSTRUCTIONS -       sodium chloride         PRN Meds  acetaminophen, lidocaine 4%, lidocaine (buffered or not buffered), - MEDICATION INSTRUCTIONS -, - MEDICATION INSTRUCTIONS -, sodium chloride (PF), sodium chloride, umeclidinium-vilanterol    Allergies   Allergies   Allergen Reactions     Contrast Dye      Diagnostic X-Ray Materials Unknown     Itching and eyes swell shut.   She reports reactions to xray contrast material       Family History   Family History   Problem Relation Age of Onset     C.A.D. Mother      Hypertension Mother      Diabetes Type 2  Daughter      Social History   Social History     Tobacco Use     Smoking status: Never Smoker   Substance Use Topics     Alcohol use: No     Drug  use: Not on file       Review of Systems   Review of systems not obtained due to patient factors - mental status       PHYSICAL EXAMINATION   Temp:  [98  F (36.7  C)-98.7  F (37.1  C)] 98.1  F (36.7  C)  Pulse:  [] 83  Resp:  [15-18] 16  BP: (113-159)/(57-92) 127/81  SpO2:  [92 %-98 %] 94 %    General physical exam:    HEENT: normocephalic, eyes open with no discharge, nares patent, oropharynx is clear with no lesions, palate intact  CV: regular rate  Chest: normal configuration  Ab: non-distended  Skin: no rashes or lesions  Ext: LLE shorter than RLE; pain in left hip    Neuro exam:    Mental status:   Awake, alert, only following simple commands, indicating she had some pain    Speech:   Normal Abnormal   Fluency   paucity of speech   Comprehension   follows simple commands   Articulation   dysarthria               Cranial Nerves:   PERRL, EOMI with normal smooth pursuit, facial movements symmetric but tight seemingly from possible dehydration, hearing not formally tested but intact to simple commands, dysarthria    Muscle/motor:   Patient with diminished muscle bulk throughout.  Increased tone in right upper extremity with limited motion.  Able to wiggle toes to command bilaterally.  Patient with more free movement in upper left extremity.    Sensation:  Patient responds to touch in all 4 extremities      Coordination:   No overt incoordination however patient with paucity of movement and mental status    Gait and Station:   Unable to complete due to patient's mental status and physical limitations          Dysphagia Screen  Per Nursing        Modified Cassia Score (Pre-morbid)  4-Moderately severe disability; unable to walk without assistance and unable to attend to own bodily    Imaging  I personally reviewed all imaging; relevant findings per HPI.    Labs Data   CBC  Recent Labs   Lab 09/22/21  1801   WBC 5.3   RBC 3.46*   HGB 11.2*   HCT 33.8*        Basic Metabolic Panel   Recent Labs   Lab  09/23/21  0150 09/22/21  1801   NA  --  136   POTASSIUM  --  4.0   CHLORIDE  --  104   CO2  --  27   BUN  --  12   CR  --  0.74   * 110*   SHAN  --  9.4     Liver Panel  No results for input(s): PROTTOTAL, ALBUMIN, BILITOTAL, ALKPHOS, AST, ALT, BILIDIRECT in the last 168 hours.  INR    Recent Labs   Lab Test 09/22/21  1801 06/01/21  0418 05/31/21  0635   INR 1.26* 1.56* 1.49*      Lipid Profile    Recent Labs   Lab Test 09/07/20  0722   CHOL 192   HDL 71   *   TRIG 66     A1C    Recent Labs   Lab Test 09/07/20  0722 12/15/19  0524   A1C 6.5* 6.7*     Troponin I  No results for input(s): TROPONIN, GHTROP in the last 168 hours.       Stroke Consult Data Data This was a non-emergent, non-telestroke consult.

## 2021-09-23 NOTE — PHARMACY-CONSULT NOTE
Pharmacy Consult to evaluate for medication related stroke core measures    Radha MAJANO Marisa, 89 year old female admitted for CVA vs seizure on 9/22/2021.    Thrombolytic was not given because of Clinical contraindications    VTE Prophylaxis SCDs /PCDs placed on 9/22, as appropriate prior to end of hospital day 2.    Antithrombotic:Hold thrombolytic and a/c until stroke burden is evaluated. Continue antithrombotic therapy on discharge to meet quality measures, unless contraindicated.    Anticoagulation if history of A-fib/flutter: Hold a/c until stroke burden is evaluated    LDL Cholesterol Calculated   Date Value Ref Range Status   09/07/2020 108 (H) <100 mg/dL Final     Comment:     Above desirable:  100-129 mg/dl  Borderline High:  130-159 mg/dL  High:             160-189 mg/dL  Very high:       >189 mg/dl       Atorvastatin currently on hold.    Recommendations: None at this time    Thank you for the consult.  Pamela Arita, LoanD

## 2021-09-23 NOTE — PLAN OF CARE
Pt here with stroke vs. Seizure, presenting with slurred speech, facial droop. Lethargic overnight, KENDRA orientation. Neuros difficult to assess as pt does follow commands, pupils equal and sluggish, possible L. Gaze preference, move all extremities purposefully, pt with limited speech. VSS on RA, permissive HTN. Tele A fib CVR with PAC's. NPO pending speech evaluation. Up with A2 using the lift, T/R Q2H. Skin with scattered bruising, scab on L. Lambert. Incontinent. No nonverbal S/S of pain. Pt scoring green on the Aggression Stop Light Tool. Plan for MRI - pacemaker present, will need [employer] representative to complete scan, unable to do CT as pt has an allergy to contrast dye. Discharge pending.

## 2021-09-24 ENCOUNTER — APPOINTMENT (OUTPATIENT)
Dept: SPEECH THERAPY | Facility: CLINIC | Age: 86
DRG: 056 | End: 2021-09-24
Attending: INTERNAL MEDICINE
Payer: COMMERCIAL

## 2021-09-24 LAB
BACTERIA UR CULT: ABNORMAL
BACTERIA UR CULT: ABNORMAL
GLUCOSE BLDC GLUCOMTR-MCNC: 104 MG/DL (ref 70–99)
GLUCOSE BLDC GLUCOMTR-MCNC: 109 MG/DL (ref 70–99)
GLUCOSE BLDC GLUCOMTR-MCNC: 116 MG/DL (ref 70–99)
GLUCOSE BLDC GLUCOMTR-MCNC: 148 MG/DL (ref 70–99)
GLUCOSE BLDC GLUCOMTR-MCNC: 93 MG/DL (ref 70–99)
LACOSAMIDE SERPL-MCNC: 1.9 UG/ML

## 2021-09-24 PROCEDURE — 250N000011 HC RX IP 250 OP 636: Performed by: INTERNAL MEDICINE

## 2021-09-24 PROCEDURE — 258N000001 HC RX 258: Performed by: INTERNAL MEDICINE

## 2021-09-24 PROCEDURE — 120N000001 HC R&B MED SURG/OB

## 2021-09-24 PROCEDURE — 258N000003 HC RX IP 258 OP 636: Performed by: INTERNAL MEDICINE

## 2021-09-24 PROCEDURE — 250N000013 HC RX MED GY IP 250 OP 250 PS 637: Performed by: INTERNAL MEDICINE

## 2021-09-24 PROCEDURE — C9254 INJECTION, LACOSAMIDE: HCPCS | Performed by: INTERNAL MEDICINE

## 2021-09-24 PROCEDURE — 99233 SBSQ HOSP IP/OBS HIGH 50: CPT | Performed by: INTERNAL MEDICINE

## 2021-09-24 PROCEDURE — 92526 ORAL FUNCTION THERAPY: CPT | Mod: GN | Performed by: SPEECH-LANGUAGE PATHOLOGIST

## 2021-09-24 RX ORDER — FAMOTIDINE 10 MG
10 TABLET ORAL 2 TIMES DAILY
Status: DISCONTINUED | OUTPATIENT
Start: 2021-09-24 | End: 2021-09-25 | Stop reason: HOSPADM

## 2021-09-24 RX ORDER — LACOSAMIDE 50 MG/1
50 TABLET ORAL 2 TIMES DAILY
Status: DISCONTINUED | OUTPATIENT
Start: 2021-09-24 | End: 2021-09-25 | Stop reason: HOSPADM

## 2021-09-24 RX ADMIN — POTASSIUM CHLORIDE, DEXTROSE MONOHYDRATE AND SODIUM CHLORIDE: 150; 5; 900 INJECTION, SOLUTION INTRAVENOUS at 09:08

## 2021-09-24 RX ADMIN — MIRTAZAPINE 15 MG: 15 TABLET, FILM COATED ORAL at 21:41

## 2021-09-24 RX ADMIN — SODIUM CHLORIDE 50 MG: 9 INJECTION, SOLUTION INTRAVENOUS at 09:06

## 2021-09-24 RX ADMIN — CEFTRIAXONE SODIUM 1 G: 1 INJECTION, POWDER, FOR SOLUTION INTRAMUSCULAR; INTRAVENOUS at 21:41

## 2021-09-24 RX ADMIN — LACOSAMIDE 50 MG: 50 TABLET, FILM COATED ORAL at 21:41

## 2021-09-24 ASSESSMENT — ACTIVITIES OF DAILY LIVING (ADL)
ADLS_ACUITY_SCORE: 25
ADLS_ACUITY_SCORE: 31
ADLS_ACUITY_SCORE: 27
ADLS_ACUITY_SCORE: 25

## 2021-09-24 NOTE — PLAN OF CARE
"OT: Contacted facility [facility] where pt resides. Talked with RN available but who did not know this pt personally. Per chart review at residence, pt assist x2 for bed mobility, dressing and toileting. Requires a overhead lift for repositioning and functional transfers. Assist x1 for grooming and hygiene and requires \"set-up, cueing and sometimes assist\" for feeding. RN was going to do some investigating to see if all of this charted information was most accurate for patient and return my call this afternoon. At this time, pt with minimal command following. A therapy eval is likely not warranted as pt near/at baseline but will wait for further information form facility before completing orders. Will update as more info comes. Please reach out if questions.    Attempted to call facility additional x2 this PM. Care manager about to contact facility and received similar information as above. No inpatient OT needs at this time d/t pt at baseline with functional mobility and self-cares (lift for transfers and assist 1-2 for self-cares). Will defer order at this time.    "

## 2021-09-24 NOTE — PLAN OF CARE
Reason for Admission: UTI, possible seizure    Cognitive/Mentation: Unable to assess orientation due to hx of dementia  Neuros/CMS: Unable to perform full neuro/CMS exam. Patient has L gaze, moves all extremities. Does not follow commands  VS: Stable. Tele: Afib CVR.  GI: BS +, + flatus,. Incontinent.  : Incontinent.  Pulmonary: LS clear bilaterally.  Pain: Denies pain when asked.     Drains: None  Skin: Scattered bruising  Activity: Assist x 2 with lift.  Diet: NPO    Therapies recs: Long term care  Discharge: Pending    End of shift summary: Patient receiving continuous infusion of dextrose 5% in 0.9% NS with 20mEq Kcl added to maintain blood sugar. Patient restless at the beginning of the night but relaxed as the night went on. Mitts on both hands as patient will pull out IV. Seizure precautions maintained.

## 2021-09-24 NOTE — PROVIDER NOTIFICATION
Hospitalist service cross cover:    Paged by nursing staff regarding agitation, attempting to crawl out of bed. Patient is incontinent of urine and isn't retaining, blood glucose normal, afebrile.     Interventions/plan:  -- 2.5 mg IV haldol q6hrs PRN, call if agitation continues    Please page with additional concerns,     ROSALIND Castro, CNP  Hospitalist - [facility]  Text Page  [phone])

## 2021-09-24 NOTE — CONSULTS
Care Management Initial Consult    General Information  Assessment completed with: Care Team Member, nurse Batsheva at [facility]  Type of CM/SW Visit: Initial Assessment    Primary Care Provider verified and updated as needed: No   Readmission within the last 30 days: no previous admission in last 30 days      Reason for Consult: discharge planning  Advance Care Planning: Advance Care Planning Reviewed: present on chart       Communication Assessment  Patient's communication style: spoken language (English or Bilingual)    Hearing Difficulty or Deaf: no   Wear Glasses or Blind: no    Cognitive  Cognitive/Neuro/Behavioral: .WDL except  Level of Consciousness: alert  Arousal Level: opens eyes spontaneously  Orientation: other (see comments) (robbin)  Mood/Behavior: calm  Best Language: 2 - Severe aphasia  Speech: garbled    Living Environment:   People in home: alone     Current living Arrangements: assisted living (memory care)  Name of Facility: [facility]   Able to return to prior arrangements: yes       Family/Social Support:  Care provided by: other (see comments) (memory care staff)  Provides care for: no one, unable/limited ability to care for self  Marital Status:              Description of Support System:        Current Resources:   Patient receiving home care services: No     Community Resources:    Equipment currently used at home: wheelchair, manual, other (see comments) (mechanical lift for transfers)  Supplies currently used at home:      Employment/Financial:  Employment Status:          Financial Concerns:         Lifestyle & Psychosocial Needs:  Social Determinants of Health     Tobacco Use: Unknown     Smoking Tobacco Use: Never Smoker     Smokeless Tobacco Use: Unknown   Alcohol Use:      Frequency of Alcohol Consumption:      Average Number of Drinks:      Frequency of Binge Drinking:    Financial Resource Strain:      Difficulty of Paying Living Expenses:    Food Insecurity:       Worried About Running Out of Food in the Last Year:      Ran Out of Food in the Last Year:    Transportation Needs:      Lack of Transportation (Medical):      Lack of Transportation (Non-Medical):    Physical Activity:      Days of Exercise per Week:      Minutes of Exercise per Session:    Stress:      Feeling of Stress :    Social Connections:      Frequency of Communication with Friends and Family:      Frequency of Social Gatherings with Friends and Family:      Attends Yarsani Services:      Active Member of Clubs or Organizations:      Attends Club or Organization Meetings:      Marital Status:    Intimate Partner Violence:      Fear of Current or Ex-Partner:      Emotionally Abused:      Physically Abused:      Sexually Abused:    Depression:      PHQ-2 Score:    Housing Stability:      Unable to Pay for Housing in the Last Year:      Number of Places Lived in the Last Year:      Unstable Housing in the Last Year:        Functional Status:  Prior to admission patient needed assistance:   yes      Mental Health Status:      n/a    Chemical Dependency Status:      n/a          Values/Beliefs:  Spiritual, Cultural Beliefs, Yarsani Practices, Values that affect care:           Additional Information:  CM consulted for discharge planning.  Patient resides at [facility] ([phone] or [phone]).  Contacted facility and spoke with nurse Batsheva.  She indicated patient uses a WC for mobility and they use mechanical lift and 2 assist for transfers. She is usually able to follow simple commands and can sometimes have behaviors in the evening which have led to falls.  They also provide feeding assistance as needed.  If she requires any home care services at discharge they have an in house provider ([facility]).  At discharge orders should be faxed to [phone].  They would send any new RX to their pharmacy.  They generally do not want patients to return on the weekend but she  would need to check with director to see if this would be possible.    Azalea Felipe RN

## 2021-09-24 NOTE — PLAN OF CARE
"SLP: Pt alert, frequently saying \"yeah\" \"we will do it when we want.\" Pt accepted oral cares. 2oz of thin warm water, 2oz of honey thick warm water, and two bites of pudding trialed. Pt using tongue to push out trials, spit, did not close lips/mouth to initiate a swallow. Pt was not responsive to max verbal cues and tactile cues to assist with mouth/lip closure. No swallow observed.    Based on review of SLP notes after CVA 2020, very similar deficits with spitting, accepting limited licks of pudding and only accepting liquids with hand over hand assist by cup. SLP noted at that time, \"Everything she eats needs to be room temp or warm she will not tolerate cold textures.\"    Continue NPO with oral cares; anticipate ongoing nutrition/hydration concerns with pt accepting limited trials/spitting. Will continue to follow. Vocera \"Lili Quaas\" with any questions.     Addendum: Improved participation with daughter present; IDDSI level 4 (puree) and thin liquids (no spoon or straw) hand over hand assist to drink by cup; hold if overt s/sx of aspiration. Will need dentures to return to eating toast/regular diet  "

## 2021-09-24 NOTE — PLAN OF CARE
Physical Therapy    No IPPT skilled needs identified - pt uses w/c at baseline and requires Ax2 with overhead lift for functional transfers. At this time pt with minimal command following.  A physical therapy eval is likely not warranted as pt near/at baseline. Will defer order at this time, please re-order if status changes or further skilled IP physical therapy needs identified.     Mary Carmen Desai, PT, DPT

## 2021-09-24 NOTE — PLAN OF CARE
Pt lethargic, KENDRA orientation. VSS on RA. Appears comfortable. Neuros difficult to assess due to inability to follow commands, mumbles a few words, speech repetitive, L gaze preference and R side neglect. Tele Afib CVR. NPO per speech. BGS 66 this michelle - got IV Dextrose, BGS improved to 115. Recheck later 80. Dextrose also added to IVF. IV atbx. Became more agitated this michelle - several attempts to climb OOB. On-call paged for meds for agitation, but upon reassessment pt had calmed down. Pt did pull out IV, mitts applied to pt and IV replaced. Incontinent, bladder scanned 61ml. Total cares, turn/repo. Continue to monitor.

## 2021-09-24 NOTE — PROGRESS NOTES
[facility]    Hospitalist Progress Note    Date of Service (when I saw the patient): 09/24/2021  Admit date: 9/22/2021    Assessment & Plan   Radha Cunningham is a 89 year old female with cognitive impairment, Afib on anticoagulation, history of strokes who presented on 9/22/21 with stroke-like symptoms.    Patient was brought to the emergency room after she was found to have slurred speech and right-sided facial droop by staff at her memory care unit.  Reportedly at baseline she speaks clearly and is able to carry out simple conversation.  * Code stroke was called in the ER, evaluated by stroke neurology, not felt to be a candidate for TPA because of patient being on Eliquis.  * Head CT without contrast shows no acute findings. Old strokes in cerebral artery territory and R basal ganglia region.   * Unable to do CT angiogram as reportedly she has had anaphylaxis to contrast dye although it is noted that she had CTA head and neck done in 2020 with premedication(per daughter)  * Initially told that the MRI was incompatible with pacemaker. However, noted that MRI done last November at [facility].     Acute ischemic CVA versus seizure - presenting with slurred speech and R facial droop.   Previous history of CVA  Acute encephalopathy - likely secondary to UTI and dehydration. Also note she is on once daily hydrocodone PTA .  Seizure disorder Patient reportedly refused her Vimpat earlier this morning. This may contribute to above.   * Follow up CT on 09/23/21 unchanged.  * EEG showed generalized slowing consistent with a moderate encephalopathy and maximum cortical dysfunction in the left hemisphere, specifically left posterior quadrant.  No electrographic seizures or epileptiform discharges.   - Stroke Neurology consulted and have signed off.     - Improving mental status, more responsive, responding to daughter.  - PT, OT and SLP evaluation  - Appreciate stroke neurology. Discussed on  9/23/21. Suspect this represents recrudescence of prior stroke symptoms in the setting of a metabolic encephalopathy  Less likely this was seizure. They recommend continuation of current dose AED without change in dosage. Avoid missing doses.   - Continue PTA Vimpat 50 mg BID > change to PO on 09/24/21, now that diet is advanced.   - Treat UTI as below  - Speech started dysphagia diet on 09/24/21.   - Resume PTA statin now taking PO  - Resume apixaban on 09/24/21 now taking PO     Suspect dehydration   UTI - UA: 18 WBC, 125 RBC. Negative Nitrate. SG 1.022.   * UA is abnormal, will start empiric Rocephin while awaiting cultures.   - Continue IV ceftriaxone (started at admission)   - UCx growing E.coli,  susceptibilities pending on 09/24/21.   - S/p IVF, stop now that diet advanced with BMP in AM.     H/o afib s/p PPM for bradycardia with pauses on chronic anticoagulation  Essential hypertension  Hyperlipidemia  - Resume PTA apixaban  - On no rate controlling medications PTA  - Reviewed telemetry on 09/24/21, rate controlled afib > stop telemetry.     Cognitive impairment. Patient lives in the memory care unit, per daughter at the bedside she does not have a formal diagnosis of dementia. She does have some ongoing memory problems especially since the stroke a year ago but at baseline she is able to carry out reasonable conversation.   - Daughter at bedside and feels she is getting close to her baseline. Currently responding yes and no to simple questions, on 09/24/21.      History of hypokalemia   - Resume PTA daily potassium now taking PO    Probable GERD  - PTA famotidine given via IV until now taking PO    Reactive airway disease   - No active issues at the moment, continue prior to admission inhaler    Chronic mild anemia ~ 11, stable  Recent Labs   Lab 09/23/21  0928 09/22/21  1801   HGB 9.4* 11.2*       Diet: Orders Placed This Encounter      Combination Diet Pureed Diet (level 4); Thin Liquids (level 0) (by  cup; no spoon or straw)     DVT Prophylaxis: Pneumatic Compression Devices  Code Status: No CPR- Do NOT Intubate     Disposition: Expected discharge 9/25/21 back to long-term care if diet can be advanced today without problem and susceptibilities back on UCx tomorrow.    Communication: Discussed with daughter, RN, on 09/24/21.   Antionette Rocha  Hospitalist Service  [facility]  Securely message with the Vocera Web Console (learn more here)  Text page via [facility] Paging/Directory    Total time spent:  > 35 minutes  Time spent counseling, coordination of care: 25 minutes including discussion with care team, daughter, neurology, personal review and interpretation of labs and studies as noted above    Interval History   Events over last 24 hours as outlined above.   Patient unable to give history. Gradual improvement today, becoming more responsive. Moving all extremities. Answering yes no to simple questions.   No evidence SOB, CP, abdominal pain, N/V/D.     -Data reviewed today: I reviewed all new labs and imaging results over the last 24 hours. I personally reviewed the EKG tracing showing afib with CVR.    Physical Exam   Temp: 97.6  F (36.4  C) Temp src: Axillary BP: 125/62 Pulse: 62   Resp: 16 SpO2: 96 % O2 Device: None (Room air)    Vitals:    09/22/21 1828   Weight: 66.6 kg (146 lb 13.2 oz)     Vital Signs with Ranges  Temp:  [97.4  F (36.3  C)-98.3  F (36.8  C)] 97.6  F (36.4  C)  Pulse:  [59-93] 62  Resp:  [16] 16  BP: (125-151)/(62-92) 125/62  SpO2:  [92 %-98 %] 96 %  I/O last 3 completed shifts:  In: 1460 [P.O.:100; I.V.:1360]  Out: -     Today's Exam  Constitutional:  NAD,   Neuropsyche: alert, unable to answer questions of orientation, face appears symmetric, Has left gaze preference (chronic since stroke per daughter). Unable to name daughter, but today responding with full sentences. Contraction of RUE with increased tonicity. Moving all extremities, but diffusely weak and unable  to follow commands.   Respiratory:  Breathing comfortably, good air exchange, no wheezes, no crackles.   Cardiovascular: Irregular rate and rhythm, trace edema.  GI: soft, NT/ND, BS normal  Skin/Integumen:  No acute rash or sign of bleeding.     Medications   All medications reviewed on 09/24/21    - MEDICATION INSTRUCTIONS -       - MEDICATION INSTRUCTIONS -       sodium chloride         apixaban ANTICOAGULANT  5 mg Oral BID     atorvastatin  10 mg Oral or Feeding Tube At Bedtime     cefTRIAXone  1 g Intravenous Q24H     famotidine  10 mg Oral BID     lacosamide  50 mg Oral BID     mirtazapine  15 mg Oral or Feeding Tube At Bedtime     potassium chloride ER  20 mEq Oral Daily     sodium chloride (PF)  3 mL Intracatheter Q8H       Data   Recent Labs   Lab 09/24/21  1136 09/24/21  0733 09/24/21  0258 09/23/21  1246 09/23/21  0928 09/23/21  0150 09/22/21  1801   WBC  --   --   --   --  4.1  --  5.3   HGB  --   --   --   --  9.4*  --  11.2*   MCV  --   --   --   --  96  --  98   PLT  --   --   --   --  153  --  182   INR  --   --   --   --   --   --  1.26*   NA  --   --   --   --  139  --  136   POTASSIUM  --   --   --   --  3.6  --  4.0   CHLORIDE  --   --   --   --  106  --  104   CO2  --   --   --   --  26  --  27   BUN  --   --   --   --  11  --  12   CR  --   --   --   --  0.70  --  0.74   ANIONGAP  --   --   --   --  7  --  5   SHAN  --   --   --   --  8.3*  --  9.4   * 104* 93   < > 79   < > 110*    < > = values in this interval not displayed.       No results found for this or any previous visit (from the past 24 hour(s)).

## 2021-09-24 NOTE — PLAN OF CARE
Here with UTI, possible seizure.  Neuros stable.  Inconsistent with commands, garbled speech.  Moves all extremities.  Slight right neglect, improved with alertness. Started on diet, good appetite, needs to be fed.  RFLACC pain score 0.  Incontinent of bladder.  Up with lift.  Discharge plans pending back to facility, likely at baseline.

## 2021-09-25 ENCOUNTER — APPOINTMENT (OUTPATIENT)
Dept: SPEECH THERAPY | Facility: CLINIC | Age: 86
DRG: 056 | End: 2021-09-25
Attending: INTERNAL MEDICINE
Payer: COMMERCIAL

## 2021-09-25 VITALS
BODY MASS INDEX: 25.2 KG/M2 | HEART RATE: 64 BPM | DIASTOLIC BLOOD PRESSURE: 74 MMHG | WEIGHT: 146.83 LBS | OXYGEN SATURATION: 85 % | SYSTOLIC BLOOD PRESSURE: 127 MMHG | RESPIRATION RATE: 18 BRPM | TEMPERATURE: 97.4 F

## 2021-09-25 LAB
ANION GAP SERPL CALCULATED.3IONS-SCNC: 8 MMOL/L (ref 3–14)
BUN SERPL-MCNC: 7 MG/DL (ref 7–30)
CALCIUM SERPL-MCNC: 8.3 MG/DL (ref 8.5–10.1)
CHLORIDE BLD-SCNC: 110 MMOL/L (ref 94–109)
CO2 SERPL-SCNC: 23 MMOL/L (ref 20–32)
CREAT SERPL-MCNC: 0.63 MG/DL (ref 0.52–1.04)
GFR SERPL CREATININE-BSD FRML MDRD: 78 ML/MIN/1.73M2
GLUCOSE BLD-MCNC: 98 MG/DL (ref 70–99)
GLUCOSE BLDC GLUCOMTR-MCNC: 113 MG/DL (ref 70–99)
GLUCOSE BLDC GLUCOMTR-MCNC: 81 MG/DL (ref 70–99)
HGB BLD-MCNC: 10.5 G/DL (ref 11.7–15.7)
POTASSIUM BLD-SCNC: 3.7 MMOL/L (ref 3.4–5.3)
SODIUM SERPL-SCNC: 141 MMOL/L (ref 133–144)

## 2021-09-25 PROCEDURE — 250N000011 HC RX IP 250 OP 636: Performed by: INTERNAL MEDICINE

## 2021-09-25 PROCEDURE — 99239 HOSP IP/OBS DSCHRG MGMT >30: CPT | Performed by: INTERNAL MEDICINE

## 2021-09-25 PROCEDURE — 92526 ORAL FUNCTION THERAPY: CPT | Mod: GN

## 2021-09-25 PROCEDURE — 36415 COLL VENOUS BLD VENIPUNCTURE: CPT | Performed by: INTERNAL MEDICINE

## 2021-09-25 PROCEDURE — 85018 HEMOGLOBIN: CPT | Performed by: INTERNAL MEDICINE

## 2021-09-25 PROCEDURE — 80048 BASIC METABOLIC PNL TOTAL CA: CPT | Performed by: INTERNAL MEDICINE

## 2021-09-25 PROCEDURE — 250N000013 HC RX MED GY IP 250 OP 250 PS 637: Performed by: INTERNAL MEDICINE

## 2021-09-25 RX ORDER — CEFTRIAXONE 1 G/1
1 INJECTION, POWDER, FOR SOLUTION INTRAMUSCULAR; INTRAVENOUS ONCE
Status: COMPLETED | OUTPATIENT
Start: 2021-09-25 | End: 2021-09-25

## 2021-09-25 RX ORDER — CEPHALEXIN 500 MG/1
500 CAPSULE ORAL 3 TIMES DAILY
Qty: 9 CAPSULE | Refills: 0 | DISCHARGE
Start: 2021-09-25 | End: 2021-09-28

## 2021-09-25 RX ADMIN — CEFTRIAXONE SODIUM 1 G: 1 INJECTION, POWDER, FOR SOLUTION INTRAMUSCULAR; INTRAVENOUS at 11:45

## 2021-09-25 RX ADMIN — APIXABAN 5 MG: 5 TABLET, FILM COATED ORAL at 11:39

## 2021-09-25 RX ADMIN — LACOSAMIDE 50 MG: 50 TABLET, FILM COATED ORAL at 11:39

## 2021-09-25 ASSESSMENT — ACTIVITIES OF DAILY LIVING (ADL)
ADLS_ACUITY_SCORE: 31
ADLS_ACUITY_SCORE: 31
ADLS_ACUITY_SCORE: 26
ADLS_ACUITY_SCORE: 29
ADLS_ACUITY_SCORE: 25
ADLS_ACUITY_SCORE: 31

## 2021-09-25 NOTE — PROGRESS NOTES
Care Management Discharge Note    Discharge Date: 09/27/2021       Discharge Disposition: Assisted Living at [facility].  Discharge Services:  Resume prior level of carecare    Discharge DME:      Discharge Transportation: health plan transportation due to weakness and confusion    Private pay costs discussed: Not applicable    PAS Confirmation Code:  n/a  Patient/family educated on Medicare website which has current facility and service quality ratings: no    Education Provided on the Discharge Plan:    HARVEY called [facility] this am and left two messages on the  RN line of pt's anticipated return today( [phone])  Per hospitalist, pt ready to returnto AL today.  HARVEY set up a stretcher transport for  t noon today. PCS form completed, faxed and placed on pt chart for time of discharge.    Persons Notified of Discharge Plans: HARVEY left two voice messages on MCRN phone. HARVEY also called the main AL number and was put through to Rosa Maria regarding pt's return today.  Rosa Maria questioning pt's return as they do not take pt's on weekends. HARVEY disused with Rosa Maria the voice message that was received yesterday by phone that the facility RN would reassess pt remotely if she were to return on the weekend . Rosa Maria is currently calling her administrative staff regarding pt's return.  AL's are now required to accept pt's hospital returns on weekends and staff this accordingly.  HARVEY updated pt's daughter about pt's return today and she is in complete agreement with her plan to return.  Daughter, Hilton, believes that it would be detrimental for pt to stay in the hospital unnecessarily as it limits her mobility. Hilton has been speaking with Jose Herman yesterday and was updated last evening by Batsheva RADFORD that they would be able to receive pt back at the AL today.     Patient/Family in Agreement with the Plan: HARVEY spoke with daughter,Hilton and she is in agreement with discharge home today.  Handoff Referral  Completed:   Additional Information:  HARVEY will follow up and send discharge orders to [phone]. Discharge orders sent via fax.    12:20: Pt's discharge has been delayed as she has not been able to take her am medication.  Pt's transportation already here and re- assigned to another pt for discharge.    13:10 : Pt's ride is rescheduled for 17:30 today. SW called and left message of change in discharge time at [phone] and re faxed discharge orders with time change to [phone].  HARVEY contacted  and spoke with . She remembered that SW had spoken with Rosa Maria previously and SW stated Rosa Maria has not made contact after original call.  indicated that Rosa Maria has left for the day. SW indicated that messages were left on MCRN phone. Updated her on pt's discharge and that discharge orders were sent x2.  will update Memory Care staff of pt's planned return today.  HARVEY updated pt's daughter, Shira    Tricia Akins, [facility]  [phone]

## 2021-09-25 NOTE — PLAN OF CARE
Pt here with possible seizure, UTI. Alert, orientated to self only. Neuros very difficult to assess as patient has trouble following commands, but appears intact overall. VSS. Purred diet, thin liquids. Takes pills crushed or whole in apple sauce or apple juice but use of very little liquid because she becomes tired of drinking or eating. Daughter states this is baseline.   Up with lift. Denies pain. Incontinent with briefs in place.  Pt scoring green on the Aggression Stop Light Tool. Plan return to prior facility. Discharge pending for 1730

## 2021-09-25 NOTE — PLAN OF CARE
Patient A&0x1-2. Up w a lift. Turn Q 2 hrs.  Neuro's unchanged. Hard to assess neuros as she is confused and does not follow commands. But no obvious deficits noted.   Incontinent of bowel and bladder.  No signs of pain. DNR/I. Pureed diet - no straws. She is a feeder. IV SL

## 2021-09-25 NOTE — PLAN OF CARE
Pt here with possible seizure, UTI. Alert, orientated to self only. Neuros very difficult to assess as patient follows little to no commands, appear intact overall. VSS. Purred diet, thin liquids. Takes pills crushed or whole in apple sauce. Up with lift. Denies pain. Incontinent, purewick in place. Turn and reposition Q2 hours. Pt scoring green on the Aggression Stop Light Tool. Plan return to prior facility. Discharge pending.

## 2021-09-26 NOTE — PROGRESS NOTES
RN was unable to get a hole of the ROGER, and contacted daughter Hilton, who provided me with a different phone number (). Rajni,triage nurse, confirmed she is able to take patient back tonight, but hasn't been able to see the orders yet. Orders faxed to a different fax number.   At 20:08 [facility] arrived.

## 2021-09-26 NOTE — PLAN OF CARE
Reason for Admission: Possible seizure    Cognitive/Mentation: A/Ox 1. D/o time, place, and situtation  Neuros/CMS: Intact ex inconsistent following commands  VS: stable.  GI: BS active, + flatus. InContinent.  : voiding. InContinent.  Pulmonary: LS clear.  Pain: no signs of pain.     Drains: PIV removed  Skin: intact   Activity: Assist x 2 with lift.  Diet: Pureed with thin liquids. Takes pills whole.     Therapies recs: TCU  Discharge: today    End of shift summary: Patient stable throughout shift. Shoes were left at hospital. Will send to facility tomorrow. Transportation came at 2000.

## 2021-09-26 NOTE — PLAN OF CARE
Speech Language Therapy Discharge Summary    Reason for therapy discharge:    Discharged to long term care facility.    Progress towards therapy goal(s). See goals on Care Plan in [facility] electronic health record for goal details.  Goals not met.  Barriers to achieving goals:   discharge from facility.    Therapy recommendation(s):    Continued therapy is recommended.  Rationale/Recommendations:  Ongoing dysphagia management/education. Pt discharged with the following recommendations: IDDSI level 4 (puree) and thin liquids (no spoon or straw) hand over hand assist to drink by cup; hold if overt s/sx of aspiration. Will need dentures to return to eating toast/regular diet.

## 2021-11-07 ENCOUNTER — APPOINTMENT (OUTPATIENT)
Dept: GENERAL RADIOLOGY | Facility: CLINIC | Age: 86
End: 2021-11-07
Attending: EMERGENCY MEDICINE
Payer: COMMERCIAL

## 2021-11-07 ENCOUNTER — HOSPITAL ENCOUNTER (EMERGENCY)
Facility: CLINIC | Age: 86
Discharge: HOME | End: 2021-11-07
Attending: EMERGENCY MEDICINE | Admitting: EMERGENCY MEDICINE
Payer: COMMERCIAL

## 2021-11-07 ENCOUNTER — APPOINTMENT (OUTPATIENT)
Dept: CT IMAGING | Facility: CLINIC | Age: 86
End: 2021-11-07
Attending: EMERGENCY MEDICINE
Payer: COMMERCIAL

## 2021-11-07 VITALS
HEART RATE: 78 BPM | DIASTOLIC BLOOD PRESSURE: 68 MMHG | OXYGEN SATURATION: 98 % | SYSTOLIC BLOOD PRESSURE: 119 MMHG | RESPIRATION RATE: 20 BRPM | TEMPERATURE: 97.8 F

## 2021-11-07 DIAGNOSIS — F03.90 DEMENTIA WITHOUT BEHAVIORAL DISTURBANCE, UNSPECIFIED DEMENTIA TYPE: ICD-10-CM

## 2021-11-07 LAB
ALBUMIN SERPL-MCNC: 3.2 G/DL (ref 3.4–5)
ALBUMIN UR-MCNC: NEGATIVE MG/DL
ALP SERPL-CCNC: 73 U/L (ref 40–150)
ALT SERPL W P-5'-P-CCNC: 22 U/L (ref 0–50)
ANION GAP SERPL CALCULATED.3IONS-SCNC: 3 MMOL/L (ref 3–14)
APPEARANCE UR: CLEAR
AST SERPL W P-5'-P-CCNC: 20 U/L (ref 0–45)
BACTERIA #/AREA URNS HPF: ABNORMAL /HPF
BASOPHILS # BLD AUTO: 0.1 10E3/UL (ref 0–0.2)
BASOPHILS NFR BLD AUTO: 1 %
BILIRUB SERPL-MCNC: 0.4 MG/DL (ref 0.2–1.3)
BILIRUB UR QL STRIP: NEGATIVE
BUN SERPL-MCNC: 18 MG/DL (ref 7–30)
CALCIUM SERPL-MCNC: 8.9 MG/DL (ref 8.5–10.1)
CHLORIDE BLD-SCNC: 108 MMOL/L (ref 94–109)
CO2 SERPL-SCNC: 30 MMOL/L (ref 20–32)
COLOR UR AUTO: YELLOW
CREAT SERPL-MCNC: 0.76 MG/DL (ref 0.52–1.04)
EOSINOPHIL # BLD AUTO: 0.1 10E3/UL (ref 0–0.7)
EOSINOPHIL NFR BLD AUTO: 3 %
ERYTHROCYTE [DISTWIDTH] IN BLOOD BY AUTOMATED COUNT: 13.6 % (ref 10–15)
GFR SERPL CREATININE-BSD FRML MDRD: 68 ML/MIN/1.73M2
GLUCOSE BLD-MCNC: 118 MG/DL (ref 70–99)
GLUCOSE UR STRIP-MCNC: NEGATIVE MG/DL
HCT VFR BLD AUTO: 32.4 % (ref 35–47)
HGB BLD-MCNC: 10.6 G/DL (ref 11.7–15.7)
HGB UR QL STRIP: NEGATIVE
HYALINE CASTS: 3 /LPF
IMM GRANULOCYTES # BLD: 0 10E3/UL
IMM GRANULOCYTES NFR BLD: 0 %
KETONES UR STRIP-MCNC: NEGATIVE MG/DL
LEUKOCYTE ESTERASE UR QL STRIP: NEGATIVE
LYMPHOCYTES # BLD AUTO: 1.3 10E3/UL (ref 0.8–5.3)
LYMPHOCYTES NFR BLD AUTO: 26 %
MCH RBC QN AUTO: 32 PG (ref 26.5–33)
MCHC RBC AUTO-ENTMCNC: 32.7 G/DL (ref 31.5–36.5)
MCV RBC AUTO: 98 FL (ref 78–100)
MONOCYTES # BLD AUTO: 0.3 10E3/UL (ref 0–1.3)
MONOCYTES NFR BLD AUTO: 7 %
MUCOUS THREADS #/AREA URNS LPF: PRESENT /LPF
NEUTROPHILS # BLD AUTO: 3.2 10E3/UL (ref 1.6–8.3)
NEUTROPHILS NFR BLD AUTO: 63 %
NITRATE UR QL: NEGATIVE
NRBC # BLD AUTO: 0 10E3/UL
NRBC BLD AUTO-RTO: 0 /100
PH UR STRIP: 5.5 [PH] (ref 5–7)
PLATELET # BLD AUTO: 241 10E3/UL (ref 150–450)
POTASSIUM BLD-SCNC: 4.4 MMOL/L (ref 3.4–5.3)
PROT SERPL-MCNC: 6.9 G/DL (ref 6.8–8.8)
RBC # BLD AUTO: 3.31 10E6/UL (ref 3.8–5.2)
RBC URINE: 3 /HPF
SODIUM SERPL-SCNC: 141 MMOL/L (ref 133–144)
SP GR UR STRIP: 1.02 (ref 1–1.03)
SQUAMOUS EPITHELIAL: 1 /HPF
UROBILINOGEN UR STRIP-MCNC: NORMAL MG/DL
WBC # BLD AUTO: 5.1 10E3/UL (ref 4–11)
WBC URINE: 1 /HPF

## 2021-11-07 PROCEDURE — 36415 COLL VENOUS BLD VENIPUNCTURE: CPT | Performed by: EMERGENCY MEDICINE

## 2021-11-07 PROCEDURE — 80053 COMPREHEN METABOLIC PANEL: CPT | Performed by: EMERGENCY MEDICINE

## 2021-11-07 PROCEDURE — 96365 THER/PROPH/DIAG IV INF INIT: CPT

## 2021-11-07 PROCEDURE — 87086 URINE CULTURE/COLONY COUNT: CPT | Performed by: EMERGENCY MEDICINE

## 2021-11-07 PROCEDURE — 250N000011 HC RX IP 250 OP 636: Performed by: EMERGENCY MEDICINE

## 2021-11-07 PROCEDURE — 71046 X-RAY EXAM CHEST 2 VIEWS: CPT

## 2021-11-07 PROCEDURE — 258N000003 HC RX IP 258 OP 636: Performed by: EMERGENCY MEDICINE

## 2021-11-07 PROCEDURE — 70450 CT HEAD/BRAIN W/O DYE: CPT

## 2021-11-07 PROCEDURE — 99285 EMERGENCY DEPT VISIT HI MDM: CPT | Mod: 25

## 2021-11-07 PROCEDURE — 85025 COMPLETE CBC W/AUTO DIFF WBC: CPT | Performed by: EMERGENCY MEDICINE

## 2021-11-07 PROCEDURE — 87186 SC STD MICRODIL/AGAR DIL: CPT | Performed by: EMERGENCY MEDICINE

## 2021-11-07 PROCEDURE — 81001 URINALYSIS AUTO W/SCOPE: CPT | Performed by: EMERGENCY MEDICINE

## 2021-11-07 PROCEDURE — 93005 ELECTROCARDIOGRAM TRACING: CPT

## 2021-11-07 PROCEDURE — 80235 DRUG ASSAY LACOSAMIDE: CPT | Performed by: EMERGENCY MEDICINE

## 2021-11-07 RX ORDER — CEFTRIAXONE 1 G/1
1 INJECTION, POWDER, FOR SOLUTION INTRAMUSCULAR; INTRAVENOUS ONCE
Status: COMPLETED | OUTPATIENT
Start: 2021-11-07 | End: 2021-11-07

## 2021-11-07 RX ADMIN — CEFTRIAXONE SODIUM 1 G: 1 INJECTION, POWDER, FOR SOLUTION INTRAMUSCULAR; INTRAVENOUS at 16:27

## 2021-11-07 RX ADMIN — SODIUM CHLORIDE 1000 ML: 9 INJECTION, SOLUTION INTRAVENOUS at 16:26

## 2021-11-07 ASSESSMENT — ENCOUNTER SYMPTOMS: COUGH: 0

## 2021-11-07 NOTE — DISCHARGE INSTRUCTIONS
I have treated June for possible urinary tract infection with 1 g of Rocephin intravenously  The patient has a Vimpat level pending, this should be back in 1 to 2 days  The patient was given intravenous hydration in the emergency department    Family wishes to be notified and should be notified if the patient is not eating, drinking, or taking her daily medications so they can be involved to try to facilitate this.

## 2021-11-07 NOTE — ED TRIAGE NOTES
"Pt BIBA from NH. Family reports patient was \"talking to people who weren't there and had \"super human\" strength.\" Facility reports that this is patient's baseline and the natural progression of her dementia. Family accusing staff members that they are not feeding or giving patients her medications. Staff members report that patient refuses food,drink and medications. Patient typically goes to [facility] but family requesting patient come to [facility] ED. Patient primarily bed bound, family wanting patient out of NH and into families home to care for her.   "

## 2021-11-07 NOTE — ED PROVIDER NOTES
History   Chief Complaint:  Dementia       The history is limited by the condition of the patient.      Radha Cunningham is a 89 year old female with history of dementia, type 2 diabetes, chronic atrial fibrillation, hypertension and hyperlipidemia who presents via EMS with dememtia. The patient resides in a nursing home and was sent to the ER due to progression of her dementia. Per triage note, the patient's family reports that she was talking to people who were not there and had super human strength. Her facility staff state that this is baseline for her. Family requests that the patient be brought home to care for her due to their concerns with staff not administering her medications and not feeding her. Staff state however that the patient refuses to eat and take her medications. The patient does not have a cough. Of note, the patient is on Eliquis. Her POLST form indicates selective treatment as opposed to full and she is DNR.     I spoke with the patient's daughter Hilton and also another daughter via a conference call.  They basically articulate that the care center at times does not insist that the patient eat drink or take her meds.  This deeply concerns them.  They are requesting that I write an order in the record that they are to be notified if the patient is not taking her medications as they note that they have the ability to go over and assist with this.    They note that the patient does have a history of dehydration and urinary tract infection that have led to increasing confusion in the past.     Review of Systems   Respiratory: Negative for cough.    Psychiatric/Behavioral:        Dementia      Review of systems is limited secondary to the patient's dementia.    Allergies:  Contrast Dye  Diagnostic X-Ray Materials    Medications:  Lipitor   Eliquis   Pepcid   Robitussin   Norco  Vimpat   Imodium   Remeron   Mycostatin     Past Medical History:     Acute gangrenous cholecystitis   Asthma   Atrial  fibrillation   Cholelithiasis   Diverticulitis   Hypertension    Hyperlipemia   Hiatal hernia   Paroxysmal A-fib  PUD   Skin cancer   Stroke   Umbilical hernia   Malignant neoplasm of thyroid gland   Acute bronchitis   Acute respiratory disease  Bronchitis   Stroke   Type 2 diabetes   Peptic ulcer   Encephalopathy   Cardiomegaly       Past Surgical History:   Hernia repair   Tonsillectomy   Cataract removal   Cholecystectomy    Gastrostomy   Pelvic abscess drainage   Skin cancer removal     Family History:    Mother: CAD, hypertension  Daughter: type 2 diabetes, obesity, anxiety, asthma, hypertension   Sister: aneurysm, dementia, heart failure   Son: obesity     Social History:  Patient presents alone via EMS from nursing home    Physical Exam     Patient Vitals for the past 24 hrs:   BP Temp Pulse Resp SpO2   11/07/21 1303 -- -- -- -- 98 %   11/07/21 1152 124/71 97.8  F (36.6  C) 67 -- --   11/07/21 1144 -- -- 77 16 95 %       Physical Exam  General: Resting comfortably on the gurney, pleasant and not agitated. Limited history given dementia   Head:  The scalp, face, and head appear normal  Eyes:  The pupils are equal, round, and reactive to light    There is no nystagmus    Extraocular muscles are intact    Conjunctivae and sclerae are normal  ENT:    The nose is normal    There is no evidence of mastoid erythema or pain    The right ear appears normal including the tympanic membrane    There is some dark earwax in the left external acoustic canal.  The tympanic membrane appears intact.  There is no yanci evidence of infection.  The patient is somewhat resistant to examination.    Pinnae are normal    The oropharynx is normal    Uvula is in the midline  Neck:  Normal range of motion    There is no rigidity noted    There is no midline cervical spine pain/tenderness    Trachea is in the midline    No mass is detected  CV:  regular rate and irregular underlying rhythm     Normal S1/S2, no S3    No pathological  murmur detected  Resp:  Lungs reveal limited air flow throughout     There is no tachypnea    Non-labored    Trace rales right inferior posterior base    No wheezing   GI:  Abdomen is soft, there is no rigidity    No distension    No tympani    No rebound tenderness     Non-surgical without peritoneal features  MS:  Normal muscular tone    Symmetric motor strength    No major joint effusions    Trace asymmetric leg swelling, left slightly larger than the right (shins), no calf tenderness  Skin:  No rash or acute skin lesions noted  Neuro: Moderately severe dementia is noted     The patient follows commands.    Patient is not agitated at this time.  Psych:  Awake.  Lymph: No anterior cervical lymphadenopathy noted    Emergency Department Course   ECG  ECG obtained at 1215, ECG read at 1222  Atrial fibrillation   Left anterior fascicular block   Nonspecific ST abnormality    No significant changes as compared to prior, dated 9/20/21.  Rate 82 bpm. NY interval * ms. QRS duration 98 ms. QT/QTc 426/497 ms. P-R-T axes * -49 6.     Imaging:  Head CT w/o contrast   Final Result   IMPRESSION:   1.  No evidence of acute intracranial hemorrhage, mass, or herniation.   2.  Chronic areas of encephalomalacia in the left occipital lobe and right corona radiata/basal ganglia region.   3.  Moderate diffuse parenchymal volume loss and marked white matter changes most likely due to chronic microvascular ischemic disease.   4.  Large bilateral mastoid effusions also with opacification of the middle ear cavities. These are new since prior CT and could be inflammatory or infectious in etiology.         Chest XR,  PA & LAT   Final Result   IMPRESSION: Negative chest.           Report per radiology    Laboratory:  CBC: WBC 5.1, HGB 10.6 (L),      CMP: glucose 118 (H), albumin 3.2 (L) o/w WNL (Creatinine 0.76)     UA with microscopic: bacteria few, mucus present, RBC 3 (H), hyaline casts 3 (H) o/w WNL     Lacosamide: pending      Urine culture: pending      Emergency Department Course:  Reviewed:  I reviewed nursing notes, vitals, past medical history and Care Everywhere    Assessments:  1144 I obtained history and examined the patient as noted above.   1458 I rechecked the patient and explained findings. Patient is resting comfortably.    Consults:  1529 I spoke with the patient's daughters about the patient's findings.     Interventions:  1626 0.9% sodium chloride bolus, 1000 mL, IV  1627 Rocephin, 1 g, IV    Disposition:  The patient was discharged to home.     Impression & Plan       Medical Decision Making:  This patient presents with a history of dementia.  Family was concerned about the possibility of dehydration or evolving urinary tract infection given that her dementia has had a bit more of some behavioral disturbance such as possible delusions or hallucinations.  CT scan of the brain is found to be negative.  Screening laboratories are grossly within normal limits.  Her urine does not show definitive evidence of urinary tract infection although there are some trace hyaline cast noted which could be secondary to some very mild dehydration.  The patient was hydrated in the emergency department, and per the family's request, I did give her 1 dose of intravenous antibiotic given her marked history of urinary tract infection, while we await for the culture to return.  I also sent off a Vimpat level so that we get a sense as to whether the patient is taking this or not.  That will not result today.  I have also placed orders in the record that the family is to be notified if the patient is not taking her medications.  The patient is a good candidate for return to the care center.  Duplicate copies of the after visit summary are provided so that the family can have a copy as well.  There is no compelling reason to admit the patient to the hospital at this time.  If the patient develops increasing behavioral disturbance in the future,  there are some agents that could be considered by primary care to help with this, although as family was made aware, these have side effects as well.  No evidence of life-threatening etiology was detected during this visit.  There is no evidence of yanci urinary tract infection, there is no evidence of pneumonia, and there is no evidence of intracranial acute process.  There is a history of old strokes.      Diagnosis:    ICD-10-CM    1. Dementia without behavioral disturbance, unspecified dementia type (H)  F03.90        Scribe Disclosure:  Faviola BARRIOS, am serving as a scribe at 11:43 AM on 11/7/2021 to document services personally performed by Santosh Alegria MD based on my observations and the provider's statements to me.          Santosh Alegria MD  11/07/21 8423

## 2021-11-07 NOTE — ED NOTES
Bed: ED02  Expected date: 11/7/21  Expected time: 11:32 AM  Means of arrival: Ambulance  Comments:  Mdtw   89 dementia uti

## 2021-11-08 ENCOUNTER — TELEPHONE (OUTPATIENT)
Dept: EMERGENCY MEDICINE | Facility: CLINIC | Age: 86
End: 2021-11-08
Payer: COMMERCIAL

## 2021-11-08 LAB
ATRIAL RATE - MUSE: 119 BPM
DIASTOLIC BLOOD PRESSURE - MUSE: NORMAL MMHG
INTERPRETATION ECG - MUSE: NORMAL
P AXIS - MUSE: NORMAL DEGREES
PR INTERVAL - MUSE: NORMAL MS
QRS DURATION - MUSE: 98 MS
QT - MUSE: 426 MS
QTC - MUSE: 497 MS
R AXIS - MUSE: -49 DEGREES
SYSTOLIC BLOOD PRESSURE - MUSE: NORMAL MMHG
T AXIS - MUSE: 6 DEGREES
VENTRICULAR RATE- MUSE: 82 BPM

## 2021-11-08 NOTE — ED NOTES
Pt transferred to care facility, facility aware of pt return. Daughter here, updated and paperwork given to daughter as well as HE transport.

## 2021-11-08 NOTE — RESULT ENCOUNTER NOTE
[facility] Emergency Dept discharge antibiotic (if prescribed): None  No changes in treatment per [facility] ED Lab Result Urine culture protocol.

## 2021-11-08 NOTE — TELEPHONE ENCOUNTER
[facility] Emergency Department Lab result notification [Adult-Female]    [facility] ED lab result protocol used  Urine culture    Reason for call  Notify of lab results, assess symptoms,  review ED providers recommendations/discharge instructions (if necessary) and advise per ED lab result f/u protocol    Lab Result (including Rx patient on, if applicable)  Preliminary urine culture report on 11/8/21 shows the presence of bacteria(s):  >100,000 CFU/mL Enterococcus faecalis   Emergency Dept/Urgent Care discharge antibiotic: None  Recommendations per [facility] ED Lab result Urine culture protocol.  Information table from Emergency Dept Provider visit on 11/7/21  Symptoms reported at ED visit (Chief complaint, HPI) Dementia        The history is limited by the condition of the patient.      Radha Cunningham is a 89 year old female with history of dementia, type 2 diabetes, chronic atrial fibrillation, hypertension and hyperlipidemia who presents via EMS with dememtia. The patient resides in a nursing home and was sent to the ER due to progression of her dementia. Per triage note, the patient's family reports that she was talking to people who were not there and had super human strength. Her facility staff state that this is baseline for her. Family requests that the patient be brought home to care for her due to their concerns with staff not administering her medications and not feeding her. Staff state however that the patient refuses to eat and take her medications. The patient does not have a cough. Of note, the patient is on Eliquis. Her POLST form indicates selective treatment as opposed to full and she is DNR.      I spoke with the patient's daughter Hilton and also another daughter via a conference call.  They basically articulate that the care center at times does not insist that the patient eat drink or take her meds.  This deeply concerns them.  They are requesting that I write an order in  the record that they are to be notified if the patient is not taking her medications as they note that they have the ability to go over and assist with this.     They note that the patient does have a history of dehydration and urinary tract infection that have led to increasing confusion in the past.        Significant Medical hx, if applicable (i.e. CKD, diabetes) reviewed   Allergies Allergies   Allergen Reactions     Contrast Dye      Diagnostic X-Ray Materials Unknown     Itching and eyes swell shut.   She reports reactions to xray contrast material        Weight, if applicable Wt Readings from Last 2 Encounters:   09/22/21 66.6 kg (146 lb 13.2 oz)   05/29/21 67 kg (147 lb 9.6 oz)      Coumadin/Warfarin [Yes /No] No   Creatinine Level (mg/dl) Creatinine   Date Value Ref Range Status   11/07/2021 0.76 0.52 - 1.04 mg/dL Final   06/01/2021 0.68 0.52 - 1.04 mg/dL Final      Creatinine clearance (ml/min), if applicable Serum creatinine: 0.76 mg/dL 11/07/21 1210  Estimated creatinine clearance: 44.4 mL/min   ED providers Impression and Plan (applicable information) is patient presents with a history of dementia.  Family was concerned about the possibility of dehydration or evolving urinary tract infection given that her dementia has had a bit more of some behavioral disturbance such as possible delusions or hallucinations.  CT scan of the brain is found to be negative.  Screening laboratories are grossly within normal limits.  Her urine does not show definitive evidence of urinary tract infection although there are some trace hyaline cast noted which could be secondary to some very mild dehydration.  The patient was hydrated in the emergency department, and per the family's request, I did give her 1 dose of intravenous antibiotic given her marked history of urinary tract infection, while we await for the culture to return.  I also sent off a Vimpat level so that we get a sense as to whether the patient is taking this  or not.  That will not result today.  I have also placed orders in the record that the family is to be notified if the patient is not taking her medications.  The patient is a good candidate for return to the care center.  Duplicate copies of the after visit summary are provided so that the family can have a copy as well.  There is no compelling reason to admit the patient to the hospital at this time.  If the patient develops increasing behavioral disturbance in the future, there are some agents that could be considered by primary care to help with this, although as family was made aware, these have side effects as well.  No evidence of life-threatening etiology was detected during this visit.  There is no evidence of yanci urinary tract infection, there is no evidence of pneumonia, and there is no evidence of intracranial acute process.  There is a history of old strokes.   ED diagnosis Dementia without behavioral disturbance, unspecified dementia type (H)    ED provider Santosh Alegria MD      RN Assessment (Patient s current Symptoms), include time called.  [Insert Left message here if message left]  1:59PM: Talked with patient's facility nurse Ashley.     RN Recommendations/Instructions per [facility] ED lab result protocol  Patient's facility nurse notified of lab result.  Preliminary result faxed to patients facility nurse Ashley   At [facility]  Fax [phone]  Final result to be faxed when available.    PCP follow-up Questions asked: YES       Simran Troy RN  [facility]  Emergency Dept Lab Result RN  Ph# [phone]     Copy of Lab result   Urine Culture  Order: [id number]   Collected 11/7/2021  1:04 PM     Status: Preliminary result     Visible to patient: No (not released)    Specimen Information: Urine, Straight Catheter         1 Result Note    Culture >100,000 CFU/mL Enterococcus faecalis Abnormal             Resulting Agency: [facility]            Specimen Collected: 11/07/21  1:04 PM Last Resulted: 11/08/21  1:39 PM

## 2021-11-09 LAB — BACTERIA UR CULT: ABNORMAL

## 2021-11-09 NOTE — TELEPHONE ENCOUNTER
11/9/21 12:05PM: Spoke with Munira at [facility]. Munira states that the patient's old provider is no longer treating the patient. The patient does have a new facility provider and they are wondering if the new provider should treat the urine culture result that was faxed to them today.  Advised to have the new facility provider review and treat the urine culture result. If there are any concerns or other questions then please call back.  Munira is comfortable with the information given and states that the new provider will be able to treat the urine culture result.    Simran Troy RN  [facility] RN  Lung Nodule and ED Lab Result RN  Epic pool (ED late result f/u RN): [id number]  [facility] INCIDENTAL RADIOLOGY F/U NURSES: [id number]  # [phone]

## 2021-11-10 ENCOUNTER — TELEPHONE (OUTPATIENT)
Dept: EMERGENCY MEDICINE | Facility: CLINIC | Age: 86
End: 2021-11-10

## 2021-11-10 LAB — LACOSAMIDE SERPL-MCNC: 2.9 UG/ML

## 2022-04-14 ENCOUNTER — HOSPITAL ENCOUNTER (EMERGENCY)
Facility: CLINIC | Age: 87
Discharge: HOME | End: 2022-04-14
Attending: EMERGENCY MEDICINE | Admitting: EMERGENCY MEDICINE
Payer: COMMERCIAL

## 2022-04-14 VITALS
DIASTOLIC BLOOD PRESSURE: 68 MMHG | HEART RATE: 64 BPM | OXYGEN SATURATION: 97 % | TEMPERATURE: 97.5 F | RESPIRATION RATE: 20 BRPM | WEIGHT: 135 LBS | BODY MASS INDEX: 23.17 KG/M2 | SYSTOLIC BLOOD PRESSURE: 115 MMHG

## 2022-04-14 DIAGNOSIS — N39.0 URINARY TRACT INFECTION WITHOUT HEMATURIA, SITE UNSPECIFIED: ICD-10-CM

## 2022-04-14 DIAGNOSIS — R19.7 DIARRHEA, UNSPECIFIED TYPE: ICD-10-CM

## 2022-04-14 LAB
ALBUMIN SERPL-MCNC: 3.8 G/DL (ref 3.4–5)
ALBUMIN UR-MCNC: NEGATIVE MG/DL
ALP SERPL-CCNC: 71 U/L (ref 40–150)
ALT SERPL W P-5'-P-CCNC: 19 U/L (ref 0–50)
ANION GAP SERPL CALCULATED.3IONS-SCNC: <1 MMOL/L (ref 3–14)
APPEARANCE UR: CLEAR
AST SERPL W P-5'-P-CCNC: 10 U/L (ref 0–45)
BASOPHILS # BLD AUTO: 0 10E3/UL (ref 0–0.2)
BASOPHILS NFR BLD AUTO: 1 %
BILIRUB SERPL-MCNC: 0.6 MG/DL (ref 0.2–1.3)
BILIRUB UR QL STRIP: NEGATIVE
BUN SERPL-MCNC: 23 MG/DL (ref 7–30)
CALCIUM SERPL-MCNC: 9.6 MG/DL (ref 8.5–10.1)
CHLORIDE BLD-SCNC: 106 MMOL/L (ref 94–109)
CO2 SERPL-SCNC: 32 MMOL/L (ref 20–32)
COLOR UR AUTO: ABNORMAL
CREAT SERPL-MCNC: 0.86 MG/DL (ref 0.52–1.04)
EOSINOPHIL # BLD AUTO: 0.1 10E3/UL (ref 0–0.7)
EOSINOPHIL NFR BLD AUTO: 1 %
ERYTHROCYTE [DISTWIDTH] IN BLOOD BY AUTOMATED COUNT: 13.9 % (ref 10–15)
GFR SERPL CREATININE-BSD FRML MDRD: 63 ML/MIN/1.73M2
GLUCOSE BLD-MCNC: 86 MG/DL (ref 70–99)
GLUCOSE UR STRIP-MCNC: NEGATIVE MG/DL
HCT VFR BLD AUTO: 36.5 % (ref 35–47)
HGB BLD-MCNC: 11.8 G/DL (ref 11.7–15.7)
HGB UR QL STRIP: ABNORMAL
IMM GRANULOCYTES # BLD: 0 10E3/UL
IMM GRANULOCYTES NFR BLD: 1 %
KETONES UR STRIP-MCNC: NEGATIVE MG/DL
LEUKOCYTE ESTERASE UR QL STRIP: ABNORMAL
LYMPHOCYTES # BLD AUTO: 0.8 10E3/UL (ref 0.8–5.3)
LYMPHOCYTES NFR BLD AUTO: 13 %
MCH RBC QN AUTO: 32.8 PG (ref 26.5–33)
MCHC RBC AUTO-ENTMCNC: 32.3 G/DL (ref 31.5–36.5)
MCV RBC AUTO: 101 FL (ref 78–100)
MONOCYTES # BLD AUTO: 0.5 10E3/UL (ref 0–1.3)
MONOCYTES NFR BLD AUTO: 7 %
NEUTROPHILS # BLD AUTO: 4.9 10E3/UL (ref 1.6–8.3)
NEUTROPHILS NFR BLD AUTO: 77 %
NITRATE UR QL: NEGATIVE
NRBC # BLD AUTO: 0 10E3/UL
NRBC BLD AUTO-RTO: 0 /100
PH UR STRIP: 7 [PH] (ref 5–7)
PLATELET # BLD AUTO: 161 10E3/UL (ref 150–450)
POTASSIUM BLD-SCNC: 4 MMOL/L (ref 3.4–5.3)
PROT SERPL-MCNC: 6.9 G/DL (ref 6.8–8.8)
RBC # BLD AUTO: 3.6 10E6/UL (ref 3.8–5.2)
RBC URINE: 3 /HPF
SODIUM SERPL-SCNC: 138 MMOL/L (ref 133–144)
SP GR UR STRIP: 1 (ref 1–1.03)
UROBILINOGEN UR STRIP-MCNC: NORMAL MG/DL
WBC # BLD AUTO: 6.3 10E3/UL (ref 4–11)
WBC CLUMPS #/AREA URNS HPF: PRESENT /HPF
WBC URINE: 31 /HPF

## 2022-04-14 PROCEDURE — 96360 HYDRATION IV INFUSION INIT: CPT

## 2022-04-14 PROCEDURE — 250N000013 HC RX MED GY IP 250 OP 250 PS 637: Performed by: EMERGENCY MEDICINE

## 2022-04-14 PROCEDURE — 99283 EMERGENCY DEPT VISIT LOW MDM: CPT | Mod: 25

## 2022-04-14 PROCEDURE — 85004 AUTOMATED DIFF WBC COUNT: CPT | Performed by: EMERGENCY MEDICINE

## 2022-04-14 PROCEDURE — 87088 URINE BACTERIA CULTURE: CPT | Performed by: EMERGENCY MEDICINE

## 2022-04-14 PROCEDURE — 258N000003 HC RX IP 258 OP 636: Performed by: EMERGENCY MEDICINE

## 2022-04-14 PROCEDURE — 36415 COLL VENOUS BLD VENIPUNCTURE: CPT | Performed by: EMERGENCY MEDICINE

## 2022-04-14 PROCEDURE — 80053 COMPREHEN METABOLIC PANEL: CPT | Performed by: EMERGENCY MEDICINE

## 2022-04-14 PROCEDURE — 81001 URINALYSIS AUTO W/SCOPE: CPT | Performed by: EMERGENCY MEDICINE

## 2022-04-14 RX ORDER — CEPHALEXIN 500 MG/1
500 CAPSULE ORAL 3 TIMES DAILY
Qty: 15 CAPSULE | Refills: 0 | Status: SHIPPED | OUTPATIENT
Start: 2022-04-14 | End: 2022-04-19

## 2022-04-14 RX ORDER — CEPHALEXIN 500 MG/1
500 CAPSULE ORAL ONCE
Status: COMPLETED | OUTPATIENT
Start: 2022-04-14 | End: 2022-04-14

## 2022-04-14 RX ADMIN — CEPHALEXIN 500 MG: 500 CAPSULE ORAL at 15:10

## 2022-04-14 RX ADMIN — SODIUM CHLORIDE 1000 ML: 9 INJECTION, SOLUTION INTRAVENOUS at 12:59

## 2022-04-14 NOTE — ED PROVIDER NOTES
History     Chief Complaint:  Diarrhea      The history is limited by the condition of the patient (dementia).      Radha Cunningham is a 89 year old female who has a history of dementia, has a pacemaker, and presents with diarrhea and weakness. Patient comes from [facility] care with diarrhea and generalized weakness.  The nurse talked to the patient's daughter who just does state there is a GI bug going through the [facility] care facility      Review of Systems   Unable to perform ROS: Dementia     Allergies:  Contrast Dye  Diagnostic X-Ray Materials    Medications:    eliquis  atorvastatin   bisacodyl   famotidine   guaifenesin  norco  ipratropium-albuterol   lacosamide   loperamide   mirtazapine   nystatin   potassium chloride ER   Blood glucose test strips  Triamcinolone  Non-rheumatic mitral regurgitation  Knee osteoarthritis    Past Medical History:    Cholecystitis  Asthma  Atrial fibrillation  Skin cancer  Cholelithiasis  Diverticulosis  Hiatal hernia  Hyperlipidemia  Hypertension  Incarcerated hiatal hernia  Skin cancer   Stroke  Diabetes mellitus II  Pacemaker  COVID-19  Umbilical hernia     Past Surgical History:    Abscess drainage  Cholecystectomy  Gastrostomy  Umbilical hernia repair  Obstructed hiatal hernia repair  Transcutaneous    Family History:    Mother: CAD, hypertension  Daughter: diabetes mellitus type II    Social History:  Presents alone  Lives in [facility]    Physical Exam     Patient Vitals for the past 24 hrs:   BP Temp Temp src Pulse Resp SpO2 Weight   04/14/22 1200 115/68 -- -- 64 20 97 % --   04/14/22 1130 117/69 -- -- 57 19 99 % --   04/14/22 1111 -- -- -- -- -- -- 61.2 kg (135 lb)   04/14/22 1100 116/71 -- -- 82 14 96 % --   04/14/22 1053 103/87 97.5  F (36.4  C) Oral 69 18 98 % --       Physical Exam  General: Patient is alert and awake pleasantly demented  HEENT: Head atraumatic    Eyes: pupils equal and reactive. Conjunctiva clear   Nares: patent   Oropharynx: no lesions, uvula  midline, no palatal draping, normal voice, no trismus  Neck: Supple without lymphadenopathy, no meningismus  Chest: Heart regular rate and rhythm.   Lungs: Equal clear to auscultation with no wheeze or rales  Abdomen: Soft, non tender, nondistended, normal bowel sounds  Back: No costovertebral angle tenderness, no midline C, T or L spine tenderness  Neuro: Grossly nonfocal, normal speech, strength equal bilaterally, CN 2-12 intact  Extremities: No deformities, equal radial and DP pulses. No clubbing, cyanosis.  No edema  Skin: Warm and dry with no rash.       Emergency Department Course     Laboratory:  Labs Ordered and Resulted from Time of ED Arrival to Time of ED Departure   COMPREHENSIVE METABOLIC PANEL - Abnormal       Result Value    Sodium 138      Potassium 4.0      Chloride 106      Carbon Dioxide (CO2) 32      Anion Gap <1 (*)     Urea Nitrogen 23      Creatinine 0.86      Calcium 9.6      Glucose 86      Alkaline Phosphatase 71      AST 10      ALT 19      Protein Total 6.9      Albumin 3.8      Bilirubin Total 0.6      GFR Estimate 63     ROUTINE UA WITH MICROSCOPIC REFLEX TO CULTURE - Abnormal    Color Urine Straw      Appearance Urine Clear      Glucose Urine Negative      Bilirubin Urine Negative      Ketones Urine Negative      Specific Gravity Urine 1.004      Blood Urine Trace (*)     pH Urine 7.0      Protein Albumin Urine Negative      Urobilinogen Urine Normal      Nitrite Urine Negative      Leukocyte Esterase Urine Small (*)     WBC Clumps Urine Present (*)     RBC Urine 3 (*)     WBC Urine 31 (*)    CBC WITH PLATELETS AND DIFFERENTIAL - Abnormal    WBC Count 6.3      RBC Count 3.60 (*)     Hemoglobin 11.8      Hematocrit 36.5       (*)     MCH 32.8      MCHC 32.3      RDW 13.9      Platelet Count 161      % Neutrophils 77      % Lymphocytes 13      % Monocytes 7      % Eosinophils 1      % Basophils 1      % Immature Granulocytes 1      NRBCs per 100 WBC 0      Absolute Neutrophils 4.9       Absolute Lymphocytes 0.8      Absolute Monocytes 0.5      Absolute Eosinophils 0.1      Absolute Basophils 0.0      Absolute Immature Granulocytes 0.0      Absolute NRBCs 0.0     ENTERIC BACTERIA AND VIRUS PANEL BY TIFFANIE STOOL   CLOSTRIDIUM DIFFICILE TOXIN B   URINE CULTURE     Emergency Department Course:    Reviewed:  I reviewed nursing notes, vitals, past history and care everywhere    Assessments:  1112 I obtained history and examined the patient as noted above.   1313 I rechecked the patient and explained findings. Patient was informed by nurse who spoke with the daughter who states that there is a stomach bug going around the facility.    Interventions:  1259 NS 1000 mL IV    Disposition:  The patient was discharged to home.    Impression & Plan      Medical Decision Making:  Radha Cunningham is a 89 year old female who presents for evaluation of diarrhea. I considered a broad differential of course; the differential diagnosis of diarrhea is broad and includes such etiologies as viral gastroenteritis, traveler's diarrhea, giardia, colitis, GI bleed, bacterial infection of the large intestine (salmonella, shigella, campylobacter, e coli, etc), parasite, etc.  There are no signs of worrisome intra-abdominal pathologies detected during the visit today and no blood per patient report.  Patient has a completely benign abdominal examination I do not feel she needs CT scan imaging of her abdomen at this time.  The patient has a completely benign abdominal exam without rebound, guarding, or marked tenderness to palpation.  Supportive outpatient management is therefore indicated.  Abdominal pain precautions are given for home.  Attempted to order stool studies but patient had no stool here.  No indication for CT at this time.  It was discussed with memory care staff to return to the ED for blood in stool, increasing pain, or fevers more than 102.  Patient did have evidence of urinary tract infection.  Dose of Keflex  provided here and prescription of Keflex to go home with.  Patient is afebrile and hemodynamically stable.  No evidence of severe sepsis or septic shock based on her work-up today.  Plan is for discharge by EMS to the [facility]      Diagnosis:    ICD-10-CM    1. Diarrhea, unspecified type  R19.7    2. Urinary tract infection without hematuria, site unspecified  N39.0        Discharge Medications:  New Prescriptions    CEPHALEXIN (KEFLEX) 500 MG CAPSULE    Take 1 capsule (500 mg) by mouth 3 times daily for 5 days         Scribe Disclosure:  Caryl BARRIOSed, am serving as a scribe at 11:12 AM on 4/14/2022 to document services personally performed by Sirena Hdz MD based on my observations and the provider's statements to me.      Sirena Hdz MD  04/14/22 0966

## 2022-04-14 NOTE — ED NOTES
Bed: ED09  Expected date: 4/14/22  Expected time: 10:30 AM  Means of arrival: Ambulance  Comments:  514 89f weakness ETA 1032

## 2022-04-14 NOTE — ED TRIAGE NOTES
Pt presents from [facility] memory care by EMS. Pt is having diarrhea and generalized weakness. Hx of dementia and pacemaker. VSS on RA w/EMS.

## 2022-04-16 LAB — BACTERIA UR CULT: ABNORMAL

## 2023-05-13 NOTE — PHARMACY-ANTICOAGULATION SERVICE
Clinical Pharmacy - Warfarin Dosing Consult     Pharmacy has been consulted to manage this patient s warfarin therapy.  Indication: Atrial Fibrillation  Therapy Goal: INR 2-3  Warfarin Prior to Admission: Yes  Warfarin PTA Regimen: 3mg daily  Significant drug interactions: solu-medrol    INR   Date Value Ref Range Status   11/18/2019 1.73 (H) 0.86 - 1.14 Final       Recommend warfarin 3 mg today.  Pharmacy will monitor Radha P Cherrier daily and order warfarin doses to achieve specified goal.      Please contact pharmacy as soon as possible if the warfarin needs to be held for a procedure or if the warfarin goals change.       Never

## 2023-07-20 NOTE — PLAN OF CARE
"PRIMARY DIAGNOSIS: GENERALIZED WEAKNESS and COVID +    OUTPATIENT/OBSERVATION GOALS TO BE MET BEFORE DISCHARGE  1. Orthostatic performed: N/A    2. Tolerating PO medications: Yes    3. Return to near baseline physical activity: No    4. Cleared for discharge by consultants (if involved): No    Discharge Planner Nurse   Safe discharge environment identified: No  Barriers to discharge: Yes       Entered by: Elizabeth Solis 05/31/2021 11:13 AM     Please review provider order for any additional goals.   Nurse to notify provider when observation goals have been met and patient is ready for discharge.    /75 (BP Location: Left arm)   Pulse 71   Temp 98.2  F (36.8  C) (Axillary)   Resp 24   Ht 1.626 m (5' 4\")   Wt 67 kg (147 lb 9.6 oz)   SpO2 96%   BMI 25.34 kg/m      Disoriented x3, Ax2, tolerating diet, repo  " Consent: Written consent was obtained and risks were reviewed including but not limited to scarring, infection, bleeding, scabbing, incomplete removal, nerve damage and allergy to anesthesia.

## 2024-01-09 ENCOUNTER — LAB REQUISITION (OUTPATIENT)
Dept: LAB | Facility: CLINIC | Age: 89
End: 2024-01-09
Payer: COMMERCIAL

## 2024-01-09 DIAGNOSIS — I48.91 UNSPECIFIED ATRIAL FIBRILLATION (H): ICD-10-CM

## 2024-01-09 DIAGNOSIS — C73 MALIGNANT NEOPLASM OF THYROID GLAND (H): ICD-10-CM

## 2024-01-09 DIAGNOSIS — G40.909 EPILEPSY, UNSPECIFIED, NOT INTRACTABLE, WITHOUT STATUS EPILEPTICUS (H): ICD-10-CM

## 2024-01-10 LAB
ERYTHROCYTE [DISTWIDTH] IN BLOOD BY AUTOMATED COUNT: 14.3 % (ref 10–15)
HCT VFR BLD AUTO: 34.1 % (ref 35–47)
HGB BLD-MCNC: 11 G/DL (ref 11.7–15.7)
MCH RBC QN AUTO: 34.3 PG (ref 26.5–33)
MCHC RBC AUTO-ENTMCNC: 32.3 G/DL (ref 31.5–36.5)
MCV RBC AUTO: 106 FL (ref 78–100)
PLATELET # BLD AUTO: 146 10E3/UL (ref 150–450)
RBC # BLD AUTO: 3.21 10E6/UL (ref 3.8–5.2)
WBC # BLD AUTO: 5.7 10E3/UL (ref 4–11)

## 2024-01-10 PROCEDURE — 80048 BASIC METABOLIC PNL TOTAL CA: CPT | Mod: ORL

## 2024-01-10 PROCEDURE — 85027 COMPLETE CBC AUTOMATED: CPT | Mod: ORL

## 2024-01-10 PROCEDURE — P9604 ONE-WAY ALLOW PRORATED TRIP: HCPCS | Mod: ORL

## 2024-01-10 PROCEDURE — 84443 ASSAY THYROID STIM HORMONE: CPT | Mod: ORL

## 2024-01-10 PROCEDURE — 36415 COLL VENOUS BLD VENIPUNCTURE: CPT | Mod: ORL

## 2024-01-11 LAB
ANION GAP SERPL CALCULATED.3IONS-SCNC: 11 MMOL/L (ref 7–15)
BUN SERPL-MCNC: 25.2 MG/DL (ref 8–23)
CALCIUM SERPL-MCNC: 9.4 MG/DL (ref 8.2–9.6)
CHLORIDE SERPL-SCNC: 106 MMOL/L (ref 98–107)
CREAT SERPL-MCNC: 0.78 MG/DL (ref 0.51–0.95)
DEPRECATED HCO3 PLAS-SCNC: 27 MMOL/L (ref 22–29)
EGFRCR SERPLBLD CKD-EPI 2021: 70 ML/MIN/1.73M2
GLUCOSE SERPL-MCNC: 161 MG/DL (ref 70–99)
POTASSIUM SERPL-SCNC: 4.1 MMOL/L (ref 3.4–5.3)
SODIUM SERPL-SCNC: 144 MMOL/L (ref 135–145)
TSH SERPL DL<=0.005 MIU/L-ACNC: 1.68 UIU/ML (ref 0.3–4.2)

## 2024-02-01 ENCOUNTER — LAB REQUISITION (OUTPATIENT)
Dept: LAB | Facility: CLINIC | Age: 89
End: 2024-02-01
Payer: COMMERCIAL

## 2024-02-01 LAB
ALBUMIN UR-MCNC: 20 MG/DL
APPEARANCE UR: ABNORMAL
BILIRUB UR QL STRIP: NEGATIVE
COLOR UR AUTO: YELLOW
GLUCOSE UR STRIP-MCNC: NEGATIVE MG/DL
HGB UR QL STRIP: NEGATIVE
KETONES UR STRIP-MCNC: NEGATIVE MG/DL
LEUKOCYTE ESTERASE UR QL STRIP: ABNORMAL
MUCOUS THREADS #/AREA URNS LPF: PRESENT /LPF
NITRATE UR QL: POSITIVE
PH UR STRIP: 7.5 [PH] (ref 5–7)
RBC URINE: <1 /HPF
SP GR UR STRIP: 1.03 (ref 1–1.03)
SQUAMOUS EPITHELIAL: 2 /HPF
TRANSITIONAL EPI: <1 /HPF
TRI-PHOS CRY #/AREA URNS HPF: ABNORMAL /HPF
UROBILINOGEN UR STRIP-MCNC: NORMAL MG/DL
WBC URINE: 23 /HPF

## 2024-02-01 PROCEDURE — 81001 URINALYSIS AUTO W/SCOPE: CPT | Mod: ORL

## 2024-02-01 PROCEDURE — 87086 URINE CULTURE/COLONY COUNT: CPT | Mod: ORL

## 2024-02-01 PROCEDURE — 87186 SC STD MICRODIL/AGAR DIL: CPT | Mod: ORL

## 2024-02-02 LAB — BACTERIA UR CULT: ABNORMAL

## 2024-02-15 PROCEDURE — 81001 URINALYSIS AUTO W/SCOPE: CPT | Mod: ORL

## 2024-02-15 PROCEDURE — 87086 URINE CULTURE/COLONY COUNT: CPT | Mod: ORL

## 2024-02-16 ENCOUNTER — LAB REQUISITION (OUTPATIENT)
Dept: LAB | Facility: CLINIC | Age: 89
End: 2024-02-16
Payer: COMMERCIAL

## 2024-02-16 DIAGNOSIS — N39.0 URINARY TRACT INFECTION, SITE NOT SPECIFIED: ICD-10-CM

## 2024-02-16 LAB
ALBUMIN UR-MCNC: 30 MG/DL
APPEARANCE UR: ABNORMAL
BACTERIA #/AREA URNS HPF: ABNORMAL /HPF
BILIRUB UR QL STRIP: NEGATIVE
CAOX CRY #/AREA URNS HPF: ABNORMAL /HPF
COLOR UR AUTO: ABNORMAL
GLUCOSE UR STRIP-MCNC: NEGATIVE MG/DL
HGB UR QL STRIP: ABNORMAL
KETONES UR STRIP-MCNC: NEGATIVE MG/DL
LEUKOCYTE ESTERASE UR QL STRIP: ABNORMAL
NITRATE UR QL: POSITIVE
PH UR STRIP: 6 [PH] (ref 5–7)
RBC URINE: 62 /HPF
SP GR UR STRIP: 1.03 (ref 1–1.03)
SQUAMOUS EPITHELIAL: 7 /HPF
UROBILINOGEN UR STRIP-MCNC: NORMAL MG/DL
WBC CLUMPS #/AREA URNS HPF: PRESENT /HPF
WBC URINE: >182 /HPF

## 2024-02-17 LAB
BACTERIA UR CULT: ABNORMAL
BACTERIA UR CULT: ABNORMAL

## 2024-02-27 ENCOUNTER — LAB REQUISITION (OUTPATIENT)
Dept: LAB | Facility: CLINIC | Age: 89
End: 2024-02-27
Payer: COMMERCIAL

## 2024-02-27 DIAGNOSIS — E11.9 TYPE 2 DIABETES MELLITUS WITHOUT COMPLICATIONS (H): ICD-10-CM

## 2024-02-28 LAB — HBA1C MFR BLD: 6.3 %

## 2024-02-28 PROCEDURE — 83036 HEMOGLOBIN GLYCOSYLATED A1C: CPT | Mod: ORL

## 2024-02-28 PROCEDURE — 36415 COLL VENOUS BLD VENIPUNCTURE: CPT | Mod: ORL

## 2024-02-28 PROCEDURE — P9604 ONE-WAY ALLOW PRORATED TRIP: HCPCS | Mod: ORL

## 2024-05-23 ENCOUNTER — LAB REQUISITION (OUTPATIENT)
Dept: LAB | Facility: CLINIC | Age: 89
End: 2024-05-23
Payer: COMMERCIAL

## 2024-05-23 DIAGNOSIS — N39.0 URINARY TRACT INFECTION, SITE NOT SPECIFIED: ICD-10-CM

## 2024-05-23 LAB
ALBUMIN UR-MCNC: 10 MG/DL
APPEARANCE UR: ABNORMAL
BACTERIA #/AREA URNS HPF: ABNORMAL /HPF
BILIRUB UR QL STRIP: NEGATIVE
COLOR UR AUTO: YELLOW
GLUCOSE UR STRIP-MCNC: NEGATIVE MG/DL
HGB UR QL STRIP: NEGATIVE
KETONES UR STRIP-MCNC: NEGATIVE MG/DL
LEUKOCYTE ESTERASE UR QL STRIP: ABNORMAL
MUCOUS THREADS #/AREA URNS LPF: PRESENT /LPF
NITRATE UR QL: POSITIVE
PH UR STRIP: 7 [PH] (ref 5–7)
RBC URINE: 2 /HPF
SP GR UR STRIP: 1.02 (ref 1–1.03)
SQUAMOUS EPITHELIAL: 12 /HPF
TRANSITIONAL EPI: <1 /HPF
UROBILINOGEN UR STRIP-MCNC: NORMAL MG/DL
WBC URINE: 25 /HPF

## 2024-05-23 PROCEDURE — 81001 URINALYSIS AUTO W/SCOPE: CPT | Mod: ORL

## 2024-05-23 PROCEDURE — 87086 URINE CULTURE/COLONY COUNT: CPT | Mod: ORL

## 2024-05-24 LAB — BACTERIA UR CULT: NORMAL

## 2024-08-02 ENCOUNTER — TRANSFERRED RECORDS (OUTPATIENT)
Dept: HEALTH INFORMATION MANAGEMENT | Facility: CLINIC | Age: 89
End: 2024-08-02
Payer: COMMERCIAL

## 2024-08-05 ENCOUNTER — LAB REQUISITION (OUTPATIENT)
Dept: LAB | Facility: CLINIC | Age: 89
End: 2024-08-05
Payer: COMMERCIAL

## 2024-08-05 DIAGNOSIS — D64.9 ANEMIA, UNSPECIFIED: ICD-10-CM

## 2024-08-05 DIAGNOSIS — N18.30 CHRONIC KIDNEY DISEASE, STAGE 3 UNSPECIFIED (H): ICD-10-CM

## 2024-08-05 DIAGNOSIS — E11.9 TYPE 2 DIABETES MELLITUS WITHOUT COMPLICATIONS (H): ICD-10-CM

## 2024-08-07 LAB
ANION GAP SERPL CALCULATED.3IONS-SCNC: 12 MMOL/L (ref 7–15)
BASOPHILS # BLD AUTO: 0 10E3/UL (ref 0–0.2)
BASOPHILS NFR BLD AUTO: 0 %
BUN SERPL-MCNC: 20.2 MG/DL (ref 8–23)
CALCIUM SERPL-MCNC: 9.3 MG/DL (ref 8.8–10.4)
CHLORIDE SERPL-SCNC: 99 MMOL/L (ref 98–107)
CREAT SERPL-MCNC: 0.61 MG/DL (ref 0.51–0.95)
EGFRCR SERPLBLD CKD-EPI 2021: 82 ML/MIN/1.73M2
EOSINOPHIL # BLD AUTO: 0.1 10E3/UL (ref 0–0.7)
EOSINOPHIL NFR BLD AUTO: 1 %
ERYTHROCYTE [DISTWIDTH] IN BLOOD BY AUTOMATED COUNT: 14.2 % (ref 10–15)
GLUCOSE SERPL-MCNC: 123 MG/DL (ref 70–99)
HBA1C MFR BLD: 6.5 %
HCO3 SERPL-SCNC: 27 MMOL/L (ref 22–29)
HCT VFR BLD AUTO: 33.8 % (ref 35–47)
HGB BLD-MCNC: 10.7 G/DL (ref 11.7–15.7)
IMM GRANULOCYTES # BLD: 0.1 10E3/UL
IMM GRANULOCYTES NFR BLD: 1 %
LYMPHOCYTES # BLD AUTO: 1.2 10E3/UL (ref 0.8–5.3)
LYMPHOCYTES NFR BLD AUTO: 14 %
MCH RBC QN AUTO: 32.8 PG (ref 26.5–33)
MCHC RBC AUTO-ENTMCNC: 31.7 G/DL (ref 31.5–36.5)
MCV RBC AUTO: 104 FL (ref 78–100)
MONOCYTES # BLD AUTO: 1 10E3/UL (ref 0–1.3)
MONOCYTES NFR BLD AUTO: 11 %
NEUTROPHILS # BLD AUTO: 6.6 10E3/UL (ref 1.6–8.3)
NEUTROPHILS NFR BLD AUTO: 73 %
NRBC # BLD AUTO: 0 10E3/UL
NRBC BLD AUTO-RTO: 0 /100
PLATELET # BLD AUTO: 239 10E3/UL (ref 150–450)
POTASSIUM SERPL-SCNC: 4 MMOL/L (ref 3.4–5.3)
RBC # BLD AUTO: 3.26 10E6/UL (ref 3.8–5.2)
SODIUM SERPL-SCNC: 138 MMOL/L (ref 135–145)
WBC # BLD AUTO: 9 10E3/UL (ref 4–11)

## 2024-08-07 PROCEDURE — 85025 COMPLETE CBC W/AUTO DIFF WBC: CPT | Mod: ORL

## 2024-08-07 PROCEDURE — 83036 HEMOGLOBIN GLYCOSYLATED A1C: CPT | Mod: ORL

## 2024-08-07 PROCEDURE — 80048 BASIC METABOLIC PNL TOTAL CA: CPT | Mod: ORL

## 2024-11-06 ENCOUNTER — TRANSFERRED RECORDS (OUTPATIENT)
Dept: HEALTH INFORMATION MANAGEMENT | Facility: CLINIC | Age: 89
End: 2024-11-06
Payer: COMMERCIAL

## 2025-01-22 ENCOUNTER — LAB REQUISITION (OUTPATIENT)
Dept: LAB | Facility: CLINIC | Age: OVER 89
End: 2025-01-22
Payer: COMMERCIAL

## 2025-01-22 DIAGNOSIS — G30.9 ALZHEIMER'S DISEASE, UNSPECIFIED (CODE) (H): ICD-10-CM

## 2025-01-22 DIAGNOSIS — G40.909 EPILEPSY, UNSPECIFIED, NOT INTRACTABLE, WITHOUT STATUS EPILEPTICUS (H): ICD-10-CM

## 2025-01-22 DIAGNOSIS — L98.499 NON-PRESSURE CHRONIC ULCER OF SKIN OF OTHER SITES WITH UNSPECIFIED SEVERITY (H): ICD-10-CM

## 2025-01-22 DIAGNOSIS — K92.1 MELENA: ICD-10-CM

## 2025-01-29 LAB
ERYTHROCYTE [DISTWIDTH] IN BLOOD BY AUTOMATED COUNT: 14.4 % (ref 10–15)
HCT VFR BLD AUTO: 32.5 % (ref 35–47)
HGB BLD-MCNC: 10.3 G/DL (ref 11.7–15.7)
MCH RBC QN AUTO: 33.4 PG (ref 26.5–33)
MCHC RBC AUTO-ENTMCNC: 31.7 G/DL (ref 31.5–36.5)
MCV RBC AUTO: 106 FL (ref 78–100)
PLATELET # BLD AUTO: 177 10E3/UL (ref 150–450)
RBC # BLD AUTO: 3.08 10E6/UL (ref 3.8–5.2)
WBC # BLD AUTO: 5.2 10E3/UL (ref 4–11)

## 2025-01-29 PROCEDURE — 85027 COMPLETE CBC AUTOMATED: CPT | Mod: ORL

## 2025-01-29 PROCEDURE — P9603 ONE-WAY ALLOW PRORATED MILES: HCPCS | Mod: ORL

## 2025-01-29 PROCEDURE — 36415 COLL VENOUS BLD VENIPUNCTURE: CPT | Mod: ORL

## 2025-03-19 ENCOUNTER — LAB REQUISITION (OUTPATIENT)
Dept: LAB | Facility: CLINIC | Age: OVER 89
End: 2025-03-19
Payer: COMMERCIAL

## 2025-03-19 DIAGNOSIS — E11.9 TYPE 2 DIABETES MELLITUS WITHOUT COMPLICATIONS (H): ICD-10-CM

## 2025-03-19 DIAGNOSIS — C73 MALIGNANT NEOPLASM OF THYROID GLAND (H): ICD-10-CM

## 2025-03-19 DIAGNOSIS — F01.50 VASCULAR DEMENTIA, UNSPECIFIED SEVERITY, WITHOUT BEHAVIORAL DISTURBANCE, PSYCHOTIC DISTURBANCE, MOOD DISTURBANCE, AND ANXIETY (H): ICD-10-CM

## 2025-03-19 DIAGNOSIS — D64.9 ANEMIA, UNSPECIFIED: ICD-10-CM

## 2025-03-19 DIAGNOSIS — R29.898 OTHER SYMPTOMS AND SIGNS INVOLVING THE MUSCULOSKELETAL SYSTEM: ICD-10-CM

## 2025-03-26 LAB
ANION GAP SERPL CALCULATED.3IONS-SCNC: 8 MMOL/L (ref 7–15)
BUN SERPL-MCNC: 25.1 MG/DL (ref 8–23)
CALCIUM SERPL-MCNC: 9.3 MG/DL (ref 8.8–10.4)
CHLORIDE SERPL-SCNC: 106 MMOL/L (ref 98–107)
CREAT SERPL-MCNC: 0.66 MG/DL (ref 0.51–0.95)
EGFRCR SERPLBLD CKD-EPI 2021: 80 ML/MIN/1.73M2
ERYTHROCYTE [DISTWIDTH] IN BLOOD BY AUTOMATED COUNT: 13.7 % (ref 10–15)
EST. AVERAGE GLUCOSE BLD GHB EST-MCNC: 128 MG/DL
GLUCOSE SERPL-MCNC: 90 MG/DL (ref 70–99)
HBA1C MFR BLD: 6.1 %
HCO3 SERPL-SCNC: 28 MMOL/L (ref 22–29)
HCT VFR BLD AUTO: 31.1 % (ref 35–47)
HGB BLD-MCNC: 10.1 G/DL (ref 11.7–15.7)
MCH RBC QN AUTO: 34.2 PG (ref 26.5–33)
MCHC RBC AUTO-ENTMCNC: 32.5 G/DL (ref 31.5–36.5)
MCV RBC AUTO: 105 FL (ref 78–100)
PLATELET # BLD AUTO: 159 10E3/UL (ref 150–450)
POTASSIUM SERPL-SCNC: 3.9 MMOL/L (ref 3.4–5.3)
RBC # BLD AUTO: 2.95 10E6/UL (ref 3.8–5.2)
SODIUM SERPL-SCNC: 142 MMOL/L (ref 135–145)
TSH SERPL DL<=0.005 MIU/L-ACNC: 1.46 UIU/ML (ref 0.3–4.2)
WBC # BLD AUTO: 4 10E3/UL (ref 4–11)

## 2025-03-26 PROCEDURE — 85027 COMPLETE CBC AUTOMATED: CPT | Mod: ORL

## 2025-03-26 PROCEDURE — 83036 HEMOGLOBIN GLYCOSYLATED A1C: CPT | Mod: ORL

## 2025-03-26 PROCEDURE — P9604 ONE-WAY ALLOW PRORATED TRIP: HCPCS | Mod: ORL

## 2025-03-26 PROCEDURE — 80048 BASIC METABOLIC PNL TOTAL CA: CPT | Mod: ORL

## 2025-03-26 PROCEDURE — 84443 ASSAY THYROID STIM HORMONE: CPT | Mod: ORL

## 2025-03-26 PROCEDURE — 36415 COLL VENOUS BLD VENIPUNCTURE: CPT | Mod: ORL

## 2025-04-16 ENCOUNTER — LAB REQUISITION (OUTPATIENT)
Dept: LAB | Facility: CLINIC | Age: OVER 89
End: 2025-04-16
Payer: COMMERCIAL

## 2025-04-16 DIAGNOSIS — E11.9 TYPE 2 DIABETES MELLITUS WITHOUT COMPLICATIONS (H): ICD-10-CM

## 2025-04-16 DIAGNOSIS — D64.9 ANEMIA, UNSPECIFIED: ICD-10-CM

## 2025-04-16 DIAGNOSIS — F01.50 VASCULAR DEMENTIA, UNSPECIFIED SEVERITY, WITHOUT BEHAVIORAL DISTURBANCE, PSYCHOTIC DISTURBANCE, MOOD DISTURBANCE, AND ANXIETY (H): ICD-10-CM

## 2025-04-16 DIAGNOSIS — M17.9 OSTEOARTHRITIS OF KNEE, UNSPECIFIED: ICD-10-CM

## 2025-04-16 DIAGNOSIS — L98.499 NON-PRESSURE CHRONIC ULCER OF SKIN OF OTHER SITES WITH UNSPECIFIED SEVERITY (H): ICD-10-CM

## 2025-04-16 DIAGNOSIS — L98.9 DISORDER OF THE SKIN AND SUBCUTANEOUS TISSUE, UNSPECIFIED: ICD-10-CM

## 2025-04-23 LAB — FOLATE SERPL-MCNC: 5.6 NG/ML (ref 4.6–34.8)

## 2025-04-23 PROCEDURE — 83540 ASSAY OF IRON: CPT | Mod: ORL

## 2025-04-23 PROCEDURE — 82607 VITAMIN B-12: CPT | Mod: ORL

## 2025-04-23 PROCEDURE — P9604 ONE-WAY ALLOW PRORATED TRIP: HCPCS | Mod: ORL

## 2025-04-23 PROCEDURE — 82728 ASSAY OF FERRITIN: CPT | Mod: ORL

## 2025-04-23 PROCEDURE — 36415 COLL VENOUS BLD VENIPUNCTURE: CPT | Mod: ORL

## 2025-04-23 PROCEDURE — 82746 ASSAY OF FOLIC ACID SERUM: CPT | Mod: ORL

## 2025-04-24 LAB
FERRITIN SERPL-MCNC: 192 NG/ML (ref 11–328)
IRON BINDING CAPACITY (ROCHE): 244 UG/DL (ref 240–430)
IRON SATN MFR SERPL: 24 % (ref 15–46)
IRON SERPL-MCNC: 59 UG/DL (ref 37–145)
VIT B12 SERPL-MCNC: 617 PG/ML (ref 232–1245)

## 2025-06-11 ENCOUNTER — LAB REQUISITION (OUTPATIENT)
Dept: LAB | Facility: CLINIC | Age: OVER 89
End: 2025-06-11
Payer: COMMERCIAL

## 2025-06-11 DIAGNOSIS — D64.9 ANEMIA, UNSPECIFIED: ICD-10-CM

## 2025-06-11 DIAGNOSIS — L98.9 DISORDER OF THE SKIN AND SUBCUTANEOUS TISSUE, UNSPECIFIED: ICD-10-CM

## 2025-06-11 DIAGNOSIS — G30.9 ALZHEIMER'S DISEASE, UNSPECIFIED (CODE) (H): ICD-10-CM

## 2025-06-18 LAB
ERYTHROCYTE [DISTWIDTH] IN BLOOD BY AUTOMATED COUNT: 14.8 % (ref 10–15)
HCT VFR BLD AUTO: 28.5 % (ref 35–47)
HGB BLD-MCNC: 9 G/DL (ref 11.7–15.7)
MCH RBC QN AUTO: 32.7 PG (ref 26.5–33)
MCHC RBC AUTO-ENTMCNC: 31.6 G/DL (ref 31.5–36.5)
MCV RBC AUTO: 104 FL (ref 78–100)
PLATELET # BLD AUTO: 186 10E3/UL (ref 150–450)
RBC # BLD AUTO: 2.75 10E6/UL (ref 3.8–5.2)
WBC # BLD AUTO: 4.6 10E3/UL (ref 4–11)

## 2025-06-18 PROCEDURE — P9604 ONE-WAY ALLOW PRORATED TRIP: HCPCS | Mod: ORL

## 2025-06-18 PROCEDURE — 36415 COLL VENOUS BLD VENIPUNCTURE: CPT | Mod: ORL

## 2025-06-18 PROCEDURE — 85027 COMPLETE CBC AUTOMATED: CPT | Mod: ORL
